# Patient Record
Sex: MALE | Race: BLACK OR AFRICAN AMERICAN | Employment: PART TIME
[De-identification: names, ages, dates, MRNs, and addresses within clinical notes are randomized per-mention and may not be internally consistent; named-entity substitution may affect disease eponyms.]

---

## 2017-01-25 ENCOUNTER — MYC MEDICAL ADVICE (OUTPATIENT)
Dept: OTHER | Age: 72
End: 2017-01-25

## 2017-02-02 ENCOUNTER — TELEPHONE (OUTPATIENT)
Dept: INTERNAL MEDICINE | Facility: CLINIC | Age: 72
End: 2017-02-02

## 2017-02-02 NOTE — TELEPHONE ENCOUNTER
Reason for Call: Request for an order    Order or referral being requested: A1C    Date needed: before my next appointment 2/14    Has the patient been seen by the PCP for this problem? DR ERWIN  Additional comments: PT REQUESTING A1C BE DONE PRIOR TO HIS APPT WITH ANGE AS HE HAS DONE IN THE PAST WITH [facility], PLEASE CONTACT PT REGARDLESS TO SCHEDULE OR NOT    Phone number Patient can be reached at:  Home number on file [phone] (cell)    Best Time:  Anytime     Can we leave a detailed message on this number?  YES    Call taken on 2/2/2017 at 1:19 PM by Yvonne Owens

## 2017-02-14 ENCOUNTER — OFFICE VISIT (OUTPATIENT)
Dept: INTERNAL MEDICINE | Facility: CLINIC | Age: 72
End: 2017-02-14
Payer: MEDICARE

## 2017-02-14 VITALS
DIASTOLIC BLOOD PRESSURE: 76 MMHG | BODY MASS INDEX: 32.45 KG/M2 | HEART RATE: 70 BPM | RESPIRATION RATE: 16 BRPM | HEIGHT: 70 IN | TEMPERATURE: 98.1 F | SYSTOLIC BLOOD PRESSURE: 118 MMHG | WEIGHT: 226.7 LBS

## 2017-02-14 DIAGNOSIS — E11.21 TYPE 2 DIABETES MELLITUS WITH DIABETIC NEPHROPATHY, WITHOUT LONG-TERM CURRENT USE OF INSULIN (H): Primary | ICD-10-CM

## 2017-02-14 DIAGNOSIS — M54.42 LEFT-SIDED LOW BACK PAIN WITH LEFT-SIDED SCIATICA, UNSPECIFIED CHRONICITY: ICD-10-CM

## 2017-02-14 DIAGNOSIS — I10 ESSENTIAL HYPERTENSION: ICD-10-CM

## 2017-02-14 DIAGNOSIS — Z23 NEED FOR PNEUMOCOCCAL VACCINATION: ICD-10-CM

## 2017-02-14 DIAGNOSIS — E78.5 HYPERLIPIDEMIA LDL GOAL <100: ICD-10-CM

## 2017-02-14 LAB
ALBUMIN UR-MCNC: NEGATIVE MG/DL
APPEARANCE UR: CLEAR
BILIRUB UR QL STRIP: NEGATIVE
COLOR UR AUTO: YELLOW
ERYTHROCYTE [DISTWIDTH] IN BLOOD BY AUTOMATED COUNT: 13.9 % (ref 10–15)
GLUCOSE UR STRIP-MCNC: NEGATIVE MG/DL
HBA1C MFR BLD: 5.9 % (ref 4.3–6)
HCT VFR BLD AUTO: 30.7 % (ref 40–53)
HGB BLD-MCNC: 10.3 G/DL (ref 13.3–17.7)
HGB UR QL STRIP: NEGATIVE
KETONES UR STRIP-MCNC: ABNORMAL MG/DL
LEUKOCYTE ESTERASE UR QL STRIP: NEGATIVE
MCH RBC QN AUTO: 32.4 PG (ref 26.5–33)
MCHC RBC AUTO-ENTMCNC: 33.6 G/DL (ref 31.5–36.5)
MCV RBC AUTO: 97 FL (ref 78–100)
NITRATE UR QL: NEGATIVE
PH UR STRIP: 5 PH (ref 5–7)
PLATELET # BLD AUTO: 151 10E9/L (ref 150–450)
RBC # BLD AUTO: 3.18 10E12/L (ref 4.4–5.9)
SP GR UR STRIP: 1.02 (ref 1–1.03)
URN SPEC COLLECT METH UR: ABNORMAL
UROBILINOGEN UR STRIP-ACNC: 0.2 EU/DL (ref 0.2–1)
WBC # BLD AUTO: 4.8 10E9/L (ref 4–11)

## 2017-02-14 PROCEDURE — 36415 COLL VENOUS BLD VENIPUNCTURE: CPT | Performed by: INTERNAL MEDICINE

## 2017-02-14 PROCEDURE — 99214 OFFICE O/P EST MOD 30 MIN: CPT | Mod: 25 | Performed by: INTERNAL MEDICINE

## 2017-02-14 PROCEDURE — 81003 URINALYSIS AUTO W/O SCOPE: CPT | Performed by: INTERNAL MEDICINE

## 2017-02-14 PROCEDURE — 82043 UR ALBUMIN QUANTITATIVE: CPT | Performed by: INTERNAL MEDICINE

## 2017-02-14 PROCEDURE — 90670 PCV13 VACCINE IM: CPT | Performed by: INTERNAL MEDICINE

## 2017-02-14 PROCEDURE — 85027 COMPLETE CBC AUTOMATED: CPT | Performed by: INTERNAL MEDICINE

## 2017-02-14 PROCEDURE — 83036 HEMOGLOBIN GLYCOSYLATED A1C: CPT | Performed by: INTERNAL MEDICINE

## 2017-02-14 PROCEDURE — G0009 ADMIN PNEUMOCOCCAL VACCINE: HCPCS | Performed by: INTERNAL MEDICINE

## 2017-02-14 PROCEDURE — 80053 COMPREHEN METABOLIC PANEL: CPT | Performed by: INTERNAL MEDICINE

## 2017-02-14 NOTE — NURSING NOTE
"Chief Complaint   Patient presents with     Diabetes       Initial /76 (BP Location: Left arm, Patient Position: Chair, Cuff Size: Adult Large)  Pulse 70  Temp 98.1  F (36.7  C) (Oral)  Resp 16  Ht 5' 10\" (1.778 m)  Wt 226 lb 11.2 oz (102.8 kg)  BMI 32.53 kg/m2 Estimated body mass index is 32.53 kg/(m^2) as calculated from the following:    Height as of this encounter: 5' 10\" (1.778 m).    Weight as of this encounter: 226 lb 11.2 oz (102.8 kg).  Medication Reconciliation: complete    "

## 2017-02-14 NOTE — PROGRESS NOTES
"  SUBJECTIVE:                                                    Jeff Ricks is a 71 year old male who presents to clinic today for the following health issues:    New patient seen for assessment of chronic medical conditions. WARD from Dr Ramos.     Concern for LBP, chronic. No neurologic deficits. Radiates to the left leg.     Has h/o HTN. on medical treatment. BP has been controlled. No side effects from medications. No CP, HA, dizziness. good compliance with medications and low salt diet.    Has H/O hyperlipidemia. On medical treatment and diet. No side effects. No muscle weakness, myoralgias or upset stomach.     Has H/O BPH. On treatment with Flomax . Mild  symptoms of frequency, decreased urinary stream, no hematuria or dysuria. No side effects from medications.    Diabetes Follow-up      Patient is checking blood sugars: not at all    Diabetic concerns: None     Symptoms of hypoglycemia (low blood sugar): none     Paresthesias (numbness or burning in feet) or sores: No     Date of last diabetic eye exam: 11/1/16   Has H/O DM. On diet , exercise and PO medications. Blood sugars are controlled. No parestesias. No hypoglycemias.        Amount of exercise or physical activity: None    Problems taking medications regularly: No    Medication side effects: none  Diet: diabetic and carbohydrate counting        Problem list and histories reviewed & adjusted, as indicated.  Additional history: as documented    Problem list, Medication list, Allergies, and Medical/Social/Surgical histories reviewed in [facility] and updated as appropriate.    ROS:  Constitutional, HEENT, cardiovascular, pulmonary, GI, , musculoskeletal, neuro, skin, endocrine and psych systems are negative, except as otherwise noted.    OBJECTIVE:                                                    /76 (BP Location: Left arm, Patient Position: Chair, Cuff Size: Adult Large)  Pulse 70  Temp 98.1  F (36.7  C) (Oral)  Resp 16  Ht 5' 10\" (1.778 m)  Wt 226 " lb 11.2 oz (102.8 kg)  BMI 32.53 kg/m2  Body mass index is 32.53 kg/(m^2).  GENERAL: healthy, alert and no distress  EYES: Eyes grossly normal to inspection, PERRL and conjunctivae and sclerae normal  NECK: no adenopathy, no asymmetry, masses, or scars and thyroid normal to palpation  RESP: lungs clear to auscultation - no rales, rhonchi or wheezes  CV: regular rate and rhythm, normal S1 S2, no S3 or S4, no murmur, click or rub, no peripheral edema and peripheral pulses strong  ABDOMEN: soft, nontender, no hepatosplenomegaly, no masses and bowel sounds normal  MS: no gross musculoskeletal defects noted, no edema  SKIN: no suspicious lesions or rashes  NEURO: Normal strength and tone, mentation intact and speech normal    Diagnostic Test Results:  Results for orders placed or performed in visit on 02/14/17   *UA reflex to Microscopic and Culture ([facility] (except [place] and [place])   Result Value Ref Range    Color Urine Yellow     Appearance Urine Clear     Glucose Urine Negative NEG mg/dL    Bilirubin Urine Negative NEG    Ketones Urine Trace (A) NEG mg/dL    Specific Gravity Urine 1.020 1.003 - 1.035    Blood Urine Negative NEG    pH Urine 5.0 5.0 - 7.0 pH    Protein Albumin Urine Negative NEG mg/dL    Urobilinogen Urine 0.2 0.2 - 1.0 EU/dL    Nitrite Urine Negative NEG    Leukocyte Esterase Urine Negative NEG    Source Midstream Urine    Hemoglobin A1c   Result Value Ref Range    Hemoglobin A1C 5.9 4.3 - 6.0 %   Comprehensive metabolic panel   Result Value Ref Range    Sodium 139 133 - 144 mmol/L    Potassium 3.9 3.4 - 5.3 mmol/L    Chloride 106 94 - 109 mmol/L    Carbon Dioxide 25 20 - 32 mmol/L    Anion Gap 8 3 - 14 mmol/L    Glucose 88 70 - 99 mg/dL    Urea Nitrogen 22 7 - 30 mg/dL    Creatinine 1.46 (H) 0.66 - 1.25 mg/dL    GFR Estimate 47 (L) >60 mL/min/1.7m2    GFR Estimate If Black 57 (L) >60 mL/min/1.7m2    Calcium 8.3 (L) 8.5 - 10.1 mg/dL    Bilirubin Total 0.3 0.2 -  1.3 mg/dL    Albumin 2.9 (L) 3.4 - 5.0 g/dL    Protein Total 5.4 (L) 6.8 - 8.8 g/dL    Alkaline Phosphatase 45 40 - 150 U/L    ALT 18 0 - 70 U/L    AST 24 0 - 45 U/L   CBC with platelets   Result Value Ref Range    WBC 4.8 4.0 - 11.0 10e9/L    RBC Count 3.18 (L) 4.4 - 5.9 10e12/L    Hemoglobin 10.3 (L) 13.3 - 17.7 g/dL    Hematocrit 30.7 (L) 40.0 - 53.0 %    MCV 97 78 - 100 fl    MCH 32.4 26.5 - 33.0 pg    MCHC 33.6 31.5 - 36.5 g/dL    RDW 13.9 10.0 - 15.0 %    Platelet Count 151 150 - 450 10e9/L   Albumin Random Urine Quantitative   Result Value Ref Range    Creatinine Urine 162 mg/dL    Albumin Urine mg/L 6 mg/L    Albumin Urine mg/g Cr 3.60 0 - 17 mg/g Cr        ASSESSMENT/PLAN:                                                      Problem List Items Addressed This Visit     Essential hypertension    Relevant Orders    *UA reflex to Microscopic and Culture ([facility] (except [place] and [place]) (Completed)    CBC with platelets (Completed)    Albumin Random Urine Quantitative (Completed)    HYPERLIPIDEMIA LDL GOAL <100    Relevant Orders    Comprehensive metabolic panel (Completed)    Low back pain    Relevant Orders    [facility] PT, HAND, AND CHIROPRACTIC REFERRAL    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

## 2017-02-14 NOTE — MR AVS SNAPSHOT
After Visit Summary   2/14/2017    Jeff Ricks    MRN: [id number]           Patient Information     Date Of Birth          1945        Visit Information        Provider Department      2/14/2017 3:00 PM Guillermo Deleon MD [facility]        Today's Diagnoses     Type 2 diabetes mellitus with diabetic nephropathy, without long-term current use of insulin (H)    -  1    Left-sided low back pain with left-sided sciatica, unspecified chronicity        Hyperlipidemia LDL goal <100        Essential hypertension           Follow-ups after your visit        Additional Services     [facility] PT, HAND, AND CHIROPRACTIC REFERRAL       **This order will print in the [facility] Scheduling Office**    Physical Therapy, Hand Therapy and Chiropractic Care are available through:    *[facility]  *[facility]  *[facility]    Call one number to schedule at any of the above locations: [phone].    Your provider has referred you to: Physical Therapy at [facility] or [facility]    Indication/Reason for Referral: Low Back Pain  Onset of Illness: 3 days   Therapy Orders: Evaluate and Treat  Special Programs: None  Special Request: None    Renny Lawson      Additional Comments for the Therapist or Chiropractor: DDD    Please be aware that coverage of these services is subject to the terms and limitations of your health insurance plan.  Call member services at your health plan with any benefit or coverage questions.      Please bring the following to your appointment:    *Your personal calendar for scheduling future appointments  *Comfortable clothing                  Follow-up notes from your care team     Return in about 6 months (around 8/14/2017).      Who to contact     If you have questions or need follow up information about today's clinic visit or your schedule please contact [facility] directly at [phone].  Normal or non-critical lab  "and imaging results will be communicated to you by MyChart, letter or phone within 4 business days after the clinic has received the results. If you do not hear from us within 7 days, please contact the clinic through Take5 or phone. If you have a critical or abnormal lab result, we will notify you by phone as soon as possible.  Submit refill requests through Take5 or call your pharmacy and they will forward the refill request to us. Please allow 3 business days for your refill to be completed.          Additional Information About Your Visit        [address] [employer] Information     Take5 gives you secure access to your electronic health record. If you see a primary care provider, you can also send messages to your care team and make appointments. If you have questions, please call your primary care clinic.  If you do not have a primary care provider, please call [phone] and they will assist you.        Care EveryWhere ID     This is your Care EveryWhere ID. This could be used by other organizations to access your [facility] medical records  [id number]        Your Vitals Were     Pulse Temperature Respirations Height BMI (Body Mass Index)       70 98.1  F (36.7  C) (Oral) 16 5' 10\" (1.778 m) 32.53 kg/m2        Blood Pressure from Last 3 Encounters:   02/14/17 118/76   10/10/16 116/58   10/06/16 148/68    Weight from Last 3 Encounters:   02/14/17 226 lb 11.2 oz (102.8 kg)   10/10/16 213 lb (96.6 kg)   10/06/16 219 lb (99.3 kg)              We Performed the Following     *UA reflex to Microscopic and Culture ([facility] (except [place] and [place])     Albumin Random Urine Quantitative     CBC with platelets     Comprehensive metabolic panel     Hemoglobin A1c     [facility] PT, HAND, AND CHIROPRACTIC REFERRAL          Today's Medication Changes          These changes are accurate as of: 2/14/17  3:32 PM.  If you have any questions, ask your nurse or doctor.               Stop taking these " medicines if you haven't already. Please contact your care team if you have questions.     doxazosin 8 MG tablet   Commonly known as:  CARDURA   Stopped by:  Guillermo Deleon MD                    Primary Care Provider Office Phone # Fax #    Jesus Ramos [phone]       [facility] [address]  [facility] [id number]        Thank you!     Thank you for choosing [facility]  for your care. Our goal is always to provide you with excellent care. Hearing back from our patients is one way we can continue to improve our services. Please take a few minutes to complete the written survey that you may receive in the mail after your visit with us. Thank you!             Your Updated Medication List - Protect others around you: Learn how to safely use, store and throw away your medicines at www.disposemymeds.org.          This list is accurate as of: 2/14/17  3:32 PM.  Always use your most recent med list.                   Brand Name Dispense Instructions for use    CLOPIDOGREL BISULFATE PO      Take 75 mg by mouth daily       FREESTYLE LITE test strip   Generic drug:  blood glucose monitoring     100 strip    Use to test blood sugars 1 times daily or as directed.       hydrochlorothiazide 50 MG tablet    HYDRODIURIL    90 tablet    Take 1 tablet (50 mg) by mouth daily       hydrocortisone 2.5 % cream     30 g    Apply topically 2 times daily       lisinopril 10 MG tablet    PRINIVIL/ZESTRIL    90 tablet    Take 1 tablet (10 mg) by mouth daily       metFORMIN 1000 MG tablet    GLUCOPHAGE    180 tablet    Take 1 tablet (1,000 mg) by mouth 2 times daily (with meals) with food       nitroglycerin 0.6 MG sublingual tablet    NITROSTAT    100 tablet    Place 1 tablet (0.6 mg) under the tongue as needed       omega-3 acid ethyl esters 1 G capsule    Lovaza    180 capsule    Take 1 capsule (1 g) by mouth 2 times daily       order for DME     1 each    Equipment being  ordered: All diabetic testing supplies including test strips, lancets and solution for testing 2 times per day.       order for DME     1 Device    Equipment being ordered: Foot Orthotics Bilateral       pioglitazone 30 MG tablet    ACTOS    90 tablet    Take 1 tablet (30 mg) by mouth daily       simvastatin 40 MG tablet    ZOCOR    90 tablet    Take 1 tablet (40 mg) by mouth At Bedtime       tamsulosin 0.4 MG capsule    FLOMAX    90 capsule    Take 1 capsule (0.4 mg) by mouth daily       VITAMIN C PO      Take by mouth daily       VITAMIN D3 PO      Take 1,000 Units by mouth 2 times daily       VITAMIN E NATURAL PO      Take by mouth daily

## 2017-02-15 LAB
ALBUMIN SERPL-MCNC: 2.9 G/DL (ref 3.4–5)
ALP SERPL-CCNC: 45 U/L (ref 40–150)
ALT SERPL W P-5'-P-CCNC: 18 U/L (ref 0–70)
ANION GAP SERPL CALCULATED.3IONS-SCNC: 8 MMOL/L (ref 3–14)
AST SERPL W P-5'-P-CCNC: 24 U/L (ref 0–45)
BILIRUB SERPL-MCNC: 0.3 MG/DL (ref 0.2–1.3)
BUN SERPL-MCNC: 22 MG/DL (ref 7–30)
CALCIUM SERPL-MCNC: 8.3 MG/DL (ref 8.5–10.1)
CHLORIDE SERPL-SCNC: 106 MMOL/L (ref 94–109)
CO2 SERPL-SCNC: 25 MMOL/L (ref 20–32)
CREAT SERPL-MCNC: 1.46 MG/DL (ref 0.66–1.25)
CREAT UR-MCNC: 162 MG/DL
GFR SERPL CREATININE-BSD FRML MDRD: 47 ML/MIN/1.7M2
GLUCOSE SERPL-MCNC: 88 MG/DL (ref 70–99)
MICROALBUMIN UR-MCNC: 6 MG/L
MICROALBUMIN/CREAT UR: 3.6 MG/G CR (ref 0–17)
POTASSIUM SERPL-SCNC: 3.9 MMOL/L (ref 3.4–5.3)
PROT SERPL-MCNC: 5.4 G/DL (ref 6.8–8.8)
SODIUM SERPL-SCNC: 139 MMOL/L (ref 133–144)

## 2017-02-24 ENCOUNTER — THERAPY VISIT (OUTPATIENT)
Dept: PHYSICAL THERAPY | Facility: CLINIC | Age: 72
End: 2017-02-24
Payer: MEDICARE

## 2017-02-24 DIAGNOSIS — M54.42 ACUTE LEFT-SIDED LOW BACK PAIN WITH LEFT-SIDED SCIATICA: Primary | ICD-10-CM

## 2017-02-24 PROCEDURE — G8982 BODY POS GOAL STATUS: HCPCS | Mod: GP | Performed by: PHYSICAL THERAPIST

## 2017-02-24 PROCEDURE — 97161 PT EVAL LOW COMPLEX 20 MIN: CPT | Mod: GP | Performed by: PHYSICAL THERAPIST

## 2017-02-24 PROCEDURE — 97110 THERAPEUTIC EXERCISES: CPT | Mod: GP | Performed by: PHYSICAL THERAPIST

## 2017-02-24 PROCEDURE — G8981 BODY POS CURRENT STATUS: HCPCS | Mod: GP | Performed by: PHYSICAL THERAPIST

## 2017-02-24 NOTE — LETTER
DEPARTMENT OF HEALTH AND HUMAN SERVICES  CENTERS FOR MEDICARE & MEDICAID SERVICES    PLAN/UPDATED PLAN OF PROGRESS FOR OUTPATIENT REHABILITATION    PATIENTS NAME:  Jeff Ricks   : 1945  PROVIDER NUMBER:    [id number]  [id number]  PROVIDER NAME: [facility]ASSAN PT  MEDICAL RECORD NUMBER: [id number]   START OF CARE DATE:  SOC Date: 17   TYPE:  PT    PRIMARY/TREATMENT DIAGNOSIS: (Pertinent Medical Diagnosis)  Acute left-sided low back pain with left-sided sciatica    VISITS FROM START OF CARE:  Rxs Used: 1     Subjective:    Jeff Ricks is a 71 year old male with a lumbar condition.  Condition occurred with:  Insidious onset.  Condition occurred: for unknown reasons.  This is a recurrent condition  Onset of left lumbar pain radiating into the left lower extremity 2 weeks ago of unknown etiology. Pt has had intermittent pain since. Pt referred by MD for therapy on 17.    Patient reports pain:  Lumbar spine left.  Radiates to:  Gluteals left, thigh left, knee left and lower leg left.  Pain is described as sharp and shooting and is intermittent and reported as 3/10.     Symptoms are exacerbated by walking, twisting and standing (sit to stand) and relieved by rest and heat.  Since onset symptoms are gradually improving.    Previous treatment includes physical therapy and chiropractic.  There was moderate improvement following previous treatment.  General health as reported by patient is good.  Pertinent medical history includes:  Overweight, diabetes, heart problems and high blood pressure.  Medical allergies: no.  Other surgeries include:  Other (hernia).  Current medications:  High blood pressure medication.  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  Employment tasks: computer work.    Barriers include:  None as reported by the patient.  Red flags:  Calf pain, swelling, warmth.               Objective:  Standing Alignment:    Lumbar deviations  alignment: increased lordosis.  Flexibility/Screens:   Lower Extremity:  Decreased left lower extremity flexibility:Hamstrings  Decreased right lower extremity flexibility:  Hamstrings  Lumbar/SI Evaluation  ROM:    AROM Lumbar:   Flexion:          80%  Ext:                    60%   Side Bend:        Left:  50% pain    Right:  70%  Rotation:           Left:     Right:   Side Glide:        Left:     Right:        Strength: weak lower abdominals and pelvic stabilizers  PATIENTS NAME:  Jeff Rikcs   : 1945    Lumbar Myotomes:  normal  Lumbar DTR's:  normal  Lumbar Dermtomes:  normal  Neural Tension/Mobility:    Left side:SLR  negative.   Right side:   SLR  negative.   Lumbar Palpation:  normal    Assessment/Plan:      Patient is a 71 year old male with lumbar complaints.    Patient has the following significant findings with corresponding treatment plan.                Diagnosis 1:  Left lumbar pain radiating into left lower extremity  Pain -  hot/cold therapy, self management, education, directional preference exercise and home program  Decreased ROM/flexibility - therapeutic exercise, therapeutic activity and home program  Decreased strength - therapeutic exercise, therapeutic activities and home program    Therapy Evaluation Codes:   1) History comprised of:   Personal factors that impact the plan of care:      Past/current experiences.    Comorbidity factors that impact the plan of care are:      Diabetes, Heart problems, High blood pressure, Overweight and Weakness.     Medications impacting care: High blood pressure.  2) Examination of Body Systems comprised of:   Body structures and functions that impact the plan of care:      Lumbar spine.   Activity limitations that impact the plan of care are:      Stairs, Standing and Walking.  3) Clinical presentation characteristics are:   Stable/Uncomplicated.  4) Decision-Making    Low complexity using standardized patient assessment instrument and/or  "measureable assessment of functional outcome.  Cumulative Therapy Evaluation is: Low complexity.    Previous and current functional limitations:  (See Goal Flow Sheet for this information)    Short term and Long term goals: (See Goal Flow Sheet for this information)     Communication ability:  Patient appears to be able to clearly communicate and understand verbal and written communication and follow directions correctly.  Treatment Explanation - The following has been discussed with the patient:   RX ordered/plan of care  Anticipated outcomes  Possible risks and side effects                PATIENTS NAME:  Jeff Ricks   : 1945    This patient would benefit from PT intervention to resume normal activities.   Rehab potential is good.    Frequency:  1 X week, once daily  Duration:  for 6 weeks  Discharge Plan:  Achieve all LTG.  Independent in home treatment program.  Reach maximal therapeutic benefit.    Caregiver Signature/Credentials _____________________________ Date ________      Treating Provider: Piyush Lindsey, PT  I have reviewed and certified the need for these services and plan of treatment while under my care.        PHYSICIAN'S SIGNATURE:   ______________________________________ Date___________     Guillermo Deleon    Certification period:  Beginning of Cert date period: 17 to  End of Cert period date: 17     Functional Level Progress Report: Please see attached \"Goal Flow sheet for Functional level.\"    ____X____ Continue Services or       ________ DC Services                Service dates: From  SOC Date: 17 date to present                         "

## 2017-02-24 NOTE — MR AVS SNAPSHOT
After Visit Summary   2/24/2017    Jeff Ricks    MRN: [id number]           Patient Information     Date Of Birth          1945        Visit Information        Provider Department      2/24/2017 4:00 PM Piyush Lindsey, PT [facility]        Today's Diagnoses     Acute left-sided low back pain with left-sided sciatica    -  1       Follow-ups after your visit        Your next 10 appointments already scheduled     Feb 28, 2017  3:10 PM CST   [facility] with Shanelle Fonseca, PTA   [facility] ([facility]  )    [id number] [place] [address]  [address] [place] [address]   [phone]            Mar 10, 2017  2:40 PM CST   [facility] with Piyush Lindsey, PT   [facility] ([facility]  )    [id number] [place] [address]  [address] [place] [address]   [phone]            Mar 17, 2017  2:40 PM CDT   [facility] with Piyush Lindsey, PT   [facility] ([facility]  )    [id number] [place] [address]  [address] [place] [address]   [phone]            Mar 24, 2017  2:40 PM CDT   [facility] with Piyush Lindsey, PT   [facility] ([facility]  )    [id number] [place] [address]  [address] [place] [address]   [phone]            Mar 31, 2017  2:40 PM CDT   [facility] with Piyush Lindsey, PT   [facility] ([facility]  )    [id number] [place] [address]  [address] [place] [address]   [phone]              Who to contact     If you have questions or need follow up information about today's clinic visit or your schedule please contact [facility] directly at [phone].  Normal or non-critical lab and imaging results will be communicated to you by MyChart, letter or phone within 4 business days after the clinic has received the results. If you do not hear from us within 7 days, please contact the clinic through MyChart or phone. If you have a critical or abnormal lab result, we will notify you by phone as soon as possible.  Submit refill requests through [employer] or  call your pharmacy and they will forward the refill request to us. Please allow 3 business days for your refill to be completed.          Additional Information About Your Visit        MyClasseshart Information     [employer] gives you secure access to your electronic health record. If you see a primary care provider, you can also send messages to your care team and make appointments. If you have questions, please call your primary care clinic.  If you do not have a primary care provider, please call [phone] and they will assist you.        Care EveryWhere ID     This is your Care EveryWhere ID. This could be used by other organizations to access your [facility] medical records  [id number]         Blood Pressure from Last 3 Encounters:   02/14/17 118/76   10/10/16 116/58   10/06/16 148/68    Weight from Last 3 Encounters:   02/14/17 102.8 kg (226 lb 11.2 oz)   10/10/16 96.6 kg (213 lb)   10/06/16 99.3 kg (219 lb)              We Performed the Following     [facility] CERT REPORT     [facility] Inital Eval Report     PT Eval, Low Complexity (21531)     Therapeutic Exercises        Primary Care Provider Office Phone # Fax #    Guillermo Deleon [phone]       [facility] [address] [place] [address]        Thank you!     Thank you for choosing [facility]  for your care. Our goal is always to provide you with excellent care. Hearing back from our patients is one way we can continue to improve our services. Please take a few minutes to complete the written survey that you may receive in the mail after your visit with us. Thank you!             Your Updated Medication List - Protect others around you: Learn how to safely use, store and throw away your medicines at www.disposemymeds.org.          This list is accurate as of: 2/24/17 11:59 PM.  Always use your most recent med list.                   Brand Name Dispense Instructions for use    CLOPIDOGREL BISULFATE PO      Take 75 mg by  mouth daily       FREESTYLE LITE test strip   Generic drug:  blood glucose monitoring     100 strip    Use to test blood sugars 1 times daily or as directed.       hydrochlorothiazide 50 MG tablet    HYDRODIURIL    90 tablet    Take 1 tablet (50 mg) by mouth daily       hydrocortisone 2.5 % cream     30 g    Apply topically 2 times daily       lisinopril 10 MG tablet    PRINIVIL/ZESTRIL    90 tablet    Take 1 tablet (10 mg) by mouth daily       metFORMIN 1000 MG tablet    GLUCOPHAGE    180 tablet    Take 1 tablet (1,000 mg) by mouth 2 times daily (with meals) with food       nitroglycerin 0.6 MG sublingual tablet    NITROSTAT    100 tablet    Place 1 tablet (0.6 mg) under the tongue as needed       omega-3 acid ethyl esters 1 G capsule    Lovaza    180 capsule    Take 1 capsule (1 g) by mouth 2 times daily       order for DME     1 each    Equipment being ordered: All diabetic testing supplies including test strips, lancets and solution for testing 2 times per day.       order for DME     1 Device    Equipment being ordered: Foot Orthotics Bilateral       pioglitazone 30 MG tablet    ACTOS    90 tablet    Take 1 tablet (30 mg) by mouth daily       simvastatin 40 MG tablet    ZOCOR    90 tablet    Take 1 tablet (40 mg) by mouth At Bedtime       tamsulosin 0.4 MG capsule    FLOMAX    90 capsule    Take 1 capsule (0.4 mg) by mouth daily       VITAMIN C PO      Take by mouth daily       VITAMIN D3 PO      Take 1,000 Units by mouth 2 times daily       VITAMIN E NATURAL PO      Take by mouth daily

## 2017-02-24 NOTE — PROGRESS NOTES
Subjective:    Jeff Ricks is a 71 year old male with a lumbar condition.  Condition occurred with:  Insidious onset.  Condition occurred: for unknown reasons.  This is a recurrent condition  Onset of left lumbar pain radiating into the left lower extremity 2 weeks ago of unknown etiology. Pt has had intermittent pain since. Pt referred by MD for therapy on 2-14-17.    Patient reports pain:  Lumbar spine left.  Radiates to:  Gluteals left, thigh left, knee left and lower leg left.  Pain is described as sharp and shooting and is intermittent and reported as 3/10.     Symptoms are exacerbated by walking, twisting and standing (sit to stand) and relieved by rest and heat.  Since onset symptoms are gradually improving.    Previous treatment includes physical therapy and chiropractic.  There was moderate improvement following previous treatment.  General health as reported by patient is good.  Pertinent medical history includes:  Overweight, diabetes, heart problems and high blood pressure.  Medical allergies: no.  Other surgeries include:  Other (hernia).  Current medications:  High blood pressure medication.  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  Employment tasks: computer work.    Barriers include:  None as reported by the patient.    Red flags:  Calf pain, swelling, warmth.                      Objective:    Standing Alignment:        Lumbar deviations alignment: increased lordosis.                Flexibility/Screens:       Lower Extremity:  Decreased left lower extremity flexibility:Hamstrings    Decreased right lower extremity flexibility:  Hamstrings               Lumbar/SI Evaluation  ROM:    AROM Lumbar:   Flexion:          80%  Ext:                    60%   Side Bend:        Left:  50% pain    Right:  70%  Rotation:           Left:     Right:   Side Glide:        Left:     Right:         Strength: weak lower abdominals and pelvic stabilizers  Lumbar Myotomes:   normal            Lumbar DTR's:  normal        Lumbar Dermtomes:  normal                Neural Tension/Mobility:      Left side:SLR  negative.     Right side:   SLR  negative.   Lumbar Palpation:  normal                                                         General     ROS    Assessment/Plan:      Patient is a 71 year old male with lumbar complaints.    Patient has the following significant findings with corresponding treatment plan.                Diagnosis 1:  Left lumbar pain radiating into left lower extremity  Pain -  hot/cold therapy, self management, education, directional preference exercise and home program  Decreased ROM/flexibility - therapeutic exercise, therapeutic activity and home program  Decreased strength - therapeutic exercise, therapeutic activities and home program    Therapy Evaluation Codes:   1) History comprised of:   Personal factors that impact the plan of care:      Past/current experiences.    Comorbidity factors that impact the plan of care are:      Diabetes, Heart problems, High blood pressure, Overweight and Weakness.     Medications impacting care: High blood pressure.  2) Examination of Body Systems comprised of:   Body structures and functions that impact the plan of care:      Lumbar spine.   Activity limitations that impact the plan of care are:      Stairs, Standing and Walking.  3) Clinical presentation characteristics are:   Stable/Uncomplicated.  4) Decision-Making    Low complexity using standardized patient assessment instrument and/or measureable assessment of functional outcome.  Cumulative Therapy Evaluation is: Low complexity.    Previous and current functional limitations:  (See Goal Flow Sheet for this information)    Short term and Long term goals: (See Goal Flow Sheet for this information)     Communication ability:  Patient appears to be able to clearly communicate and understand verbal and written communication and follow directions correctly.  Treatment  Explanation - The following has been discussed with the patient:   RX ordered/plan of care  Anticipated outcomes  Possible risks and side effects  This patient would benefit from PT intervention to resume normal activities.   Rehab potential is good.    Frequency:  1 X week, once daily  Duration:  for 6 weeks  Discharge Plan:  Achieve all LTG.  Independent in home treatment program.  Reach maximal therapeutic benefit.    Please refer to the daily flowsheet for treatment today, total treatment time and time spent performing 1:1 timed codes.

## 2017-02-28 ENCOUNTER — TELEPHONE (OUTPATIENT)
Dept: INTERNAL MEDICINE | Facility: CLINIC | Age: 72
End: 2017-02-28

## 2017-04-11 PROBLEM — M54.42 ACUTE LEFT-SIDED LOW BACK PAIN WITH LEFT-SIDED SCIATICA: Status: RESOLVED | Noted: 2017-02-24 | Resolved: 2017-04-11

## 2017-04-14 ENCOUNTER — TRANSFERRED RECORDS (OUTPATIENT)
Dept: HEALTH INFORMATION MANAGEMENT | Facility: CLINIC | Age: 72
End: 2017-04-14

## 2017-05-21 ENCOUNTER — MYC MEDICAL ADVICE (OUTPATIENT)
Dept: INTERNAL MEDICINE | Facility: CLINIC | Age: 72
End: 2017-05-21

## 2017-05-21 DIAGNOSIS — I25.10 ASHD (ARTERIOSCLEROTIC HEART DISEASE): Primary | ICD-10-CM

## 2017-05-24 RX ORDER — CLOPIDOGREL BISULFATE 75 MG/1
75 TABLET ORAL DAILY
Qty: 90 TABLET | Refills: 2 | Status: SHIPPED | OUTPATIENT
Start: 2017-05-24 | End: 2018-02-14

## 2017-05-24 NOTE — TELEPHONE ENCOUNTER
"Pt called-Stated Dr Ramos started pt on med. Pt is taking 75mg qd.    Will forward to [facility] due to CBC (pt stated his CBC is always \"off\")      Lab Results   Component Value Date    WBC 4.8 02/14/2017     Lab Results   Component Value Date    RBC 3.18 02/14/2017     Lab Results   Component Value Date    HGB 10.3 02/14/2017     Lab Results   Component Value Date    HCT 30.7 02/14/2017     No components found for: MCT  Lab Results   Component Value Date    MCV 97 02/14/2017     Lab Results   Component Value Date    MCH 32.4 02/14/2017     Lab Results   Component Value Date    MCHC 33.6 02/14/2017     Lab Results   Component Value Date    RDW 13.9 02/14/2017     Lab Results   Component Value Date     02/14/2017       "

## 2017-06-01 DIAGNOSIS — I25.10 ASHD (ARTERIOSCLEROTIC HEART DISEASE): ICD-10-CM

## 2017-06-05 RX ORDER — NITROGLYCERIN 0.6 MG/1
TABLET SUBLINGUAL
Qty: 100 TABLET | Refills: 1 | Status: SHIPPED | OUTPATIENT
Start: 2017-06-05 | End: 2017-12-02

## 2017-06-05 NOTE — TELEPHONE ENCOUNTER
Pt is using more that #25 a month. Used approximately #200 in 6 months.     Provide approval needed.

## 2017-06-07 ENCOUNTER — OFFICE VISIT (OUTPATIENT)
Dept: INTERNAL MEDICINE | Facility: CLINIC | Age: 72
End: 2017-06-07
Payer: MEDICARE

## 2017-06-07 VITALS
RESPIRATION RATE: 16 BRPM | HEART RATE: 87 BPM | WEIGHT: 221.1 LBS | TEMPERATURE: 98 F | SYSTOLIC BLOOD PRESSURE: 122 MMHG | OXYGEN SATURATION: 100 % | DIASTOLIC BLOOD PRESSURE: 56 MMHG | BODY MASS INDEX: 31.65 KG/M2 | HEIGHT: 70 IN

## 2017-06-07 DIAGNOSIS — M54.2 CERVICALGIA: ICD-10-CM

## 2017-06-07 DIAGNOSIS — E11.21 TYPE 2 DIABETES MELLITUS WITH DIABETIC NEPHROPATHY, WITHOUT LONG-TERM CURRENT USE OF INSULIN (H): ICD-10-CM

## 2017-06-07 DIAGNOSIS — M25.561 ACUTE PAIN OF RIGHT KNEE: Primary | ICD-10-CM

## 2017-06-07 PROCEDURE — 99214 OFFICE O/P EST MOD 30 MIN: CPT | Performed by: FAMILY MEDICINE

## 2017-06-07 NOTE — PROGRESS NOTES
SUBJECTIVE:                                                    Jeff Ricks is a 71 year old male who presents to clinic today for the following health issues:    SUBJECTIVE:  Chief Complaint   Patient presents with     Knee Pain     Right knee sharp pain occasionally when stepping the wrong way. Sx for 1 week.      Musculoskeletal Problem     pain when turn neck onto the right. Hear a popping sound in the neck- Sx for 1.5 month     Jeff Ricks is a 71 year old male who presents with a chief complaint of right knee pain.  Symptoms began 1 week(s) ago, are moderate and intermittent episodes lasting  Several minutes to hours  Context:  Injury:No.  Pain exacerbated by walking and twisting Relieved by rest.     Has mild L sided neck pain that accompanies a mild clicking sensation when he turns to the R.   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     Heart attack (H)      Hyperlipidaemia      Obese      Other and unspecified hyperlipidemia      Other premature beats      Phlebitis and thrombophlebitis of other deep vessels of lower extremities 2000    has had 2-3 episodes     Stented coronary artery      4 stents     Syncope      Thrombosis of leg      Type II or unspecified type diabetes mellitus without mention of complication, not stated as uncontrolled      Unspecified essential hypertension      Current Outpatient Prescriptions   Medication Sig Dispense Refill     nitroglycerin (NITROSTAT) 0.6 MG sublingual tablet DISSOLVE 1 TABLET UNDER THETONGUE AS NEEDED 100 tablet 1     clopidogrel (PLAVIX) 75 MG tablet Take 1 tablet (75 mg) by mouth daily 90 tablet 2     omega-3 acid ethyl esters (LOVAZA) 1 G capsule  "Take 1 capsule (1 g) by mouth 2 times daily 180 capsule 3     tamsulosin (FLOMAX) 0.4 MG capsule Take 1 capsule (0.4 mg) by mouth daily 90 capsule 3     metFORMIN (GLUCOPHAGE) 1000 MG tablet Take 1 tablet (1,000 mg) by mouth 2 times daily (with meals) with food 180 tablet 4     lisinopril (PRINIVIL/ZESTRIL) 10 MG tablet Take 1 tablet (10 mg) by mouth daily 90 tablet 4     pioglitazone (ACTOS) 30 MG tablet Take 1 tablet (30 mg) by mouth daily 90 tablet 4     simvastatin (ZOCOR) 40 MG tablet Take 1 tablet (40 mg) by mouth At Bedtime 90 tablet 3     hydrochlorothiazide (HYDRODIURIL) 50 MG tablet Take 1 tablet (50 mg) by mouth daily 90 tablet 4     CLOPIDOGREL BISULFATE PO Take 75 mg by mouth daily       Ascorbic Acid (VITAMIN C PO) Take by mouth daily       Cholecalciferol (VITAMIN D3 PO) Take 1,000 Units by mouth 2 times daily        VITAMIN E NATURAL PO Take by mouth daily       ORDER FOR DME Equipment being ordered: Foot Orthotics Bilateral 1 Device 1     hydrocortisone 2.5 % cream Apply topically 2 times daily 30 g 11     Glucose Blood (FREESTYLE LITE) strip Use to test blood sugars 1 times daily or as directed. 100 strip prn     ORDER FOR DME Equipment being ordered:  All diabetic testing supplies including test strips, lancets and solution for testing 2 times per day. 1 each 0     Social History   Substance Use Topics     Smoking status: Former Smoker     Packs/day: 3.00     Years: 6.00     Start date: 7/1/1961     Quit date: 1/1/1967     Smokeless tobacco: Never Used      Comment: quit 1960s     Alcohol use Yes      Comment: 1 beer a week       ROS:  Review of systems negative except as stated below    EXAM:   /56  Pulse 87  Temp 98  F (36.7  C) (Oral)  Resp 16  Ht 5' 10\" (1.778 m)  Wt 221 lb 1.6 oz (100.3 kg)  SpO2 100%  BMI 31.72 kg/m2  M/S Exam:R knee FROM and and non tender joint line   GENERAL APPEARANCE: healthy, alert and no distress  NECK: supple, non-tender to palpation, FROM   EXTREMITIES: " peripheral pulses normal  SKIN: no suspicious lesions or rashes  NEURO: Normal strength and tone, sensory exam grossly normal, mentation intact and speech normal    X-RAY was not done.    ASSESSMENT:  1. Acute pain of right knee  Patient has severe pain with pivoting.   Concern for mensicus injury.    Give 2 weeks    If persists get MRI.   PRICE.  - MR Knee Right w/o Contrast; Future    2. Cervicalgia  Reassure.   Likely djd   Tylenol prn    3. Type 2 diabetes mellitus with diabetic nephropathy, without long-term current use of insulin (H)  Stay on current meds   - **A1C FUTURE 3mo; Future  - Lipid Profile (Chol, Trig, HDL, LDL calc); Future  - Comprehensive metabolic panel (BMP + Alb, Alk Phos, ALT, AST, Total. Bili, TP); Future  - **TSH with free T4 reflex FUTURE 1yr; Future      Greater than 25 minutes spent with patient and family discussing risks and benefits and management with possible outcomes regarding this issue with greater than 50% in counseling for medical decision making and coordination of care.

## 2017-06-07 NOTE — MR AVS SNAPSHOT
After Visit Summary   6/7/2017    Jeff Ricks    MRN: [id number]           Patient Information     Date Of Birth          1945        Visit Information        Provider Department      6/7/2017 2:40 PM Ricardo Cadena MD [facility]        Today's Diagnoses     Acute pain of right knee    -  1    Cervicalgia        Type 2 diabetes mellitus with diabetic nephropathy, without long-term current use of insulin (H)           Follow-ups after your visit        Future tests that were ordered for you today     Open Future Orders        Priority Expected Expires Ordered    **A1C FUTURE 3mo Routine 9/5/2017 10/5/2017 6/7/2017    Lipid Profile (Chol, Trig, HDL, LDL calc) Routine  6/7/2018 6/7/2017    Comprehensive metabolic panel (BMP + Alb, Alk Phos, ALT, AST, Total. Bili, TP) Routine  6/7/2018 6/7/2017    **TSH with free T4 reflex FUTURE 1yr Routine 5/8/2018 6/7/2018 6/7/2017    MR Knee Right w/o Contrast Routine  6/7/2018 6/7/2017            Who to contact     If you have questions or need follow up information about today's clinic visit or your schedule please contact [facility] directly at [phone].  Normal or non-critical lab and imaging results will be communicated to you by MyChart, letter or phone within 4 business days after the clinic has received the results. If you do not hear from us within 7 days, please contact the clinic through Sportlobsterhart or phone. If you have a critical or abnormal lab result, we will notify you by phone as soon as possible.  Submit refill requests through [employer]ellisense or call your pharmacy and they will forward the refill request to us. Please allow 3 business days for your refill to be completed.          Additional Information About Your Visit        MyChart Information     Intell[employer] gives you secure access to your electronic health record. If you see a primary care provider, you can also send messages to your care team and make  "appointments. If you have questions, please call your primary care clinic.  If you do not have a primary care provider, please call [phone] and they will assist you.        Care EveryWhere ID     This is your Care EveryWhere ID. This could be used by other organizations to access your [place] medical records  [id number]        Your Vitals Were     Pulse Temperature Respirations Height Pulse Oximetry BMI (Body Mass Index)    87 98  F (36.7  C) (Oral) 16 5' 10\" (1.778 m) 100% 31.72 kg/m2       Blood Pressure from Last 3 Encounters:   06/07/17 122/56   02/14/17 118/76   10/10/16 116/58    Weight from Last 3 Encounters:   06/07/17 221 lb 1.6 oz (100.3 kg)   02/14/17 226 lb 11.2 oz (102.8 kg)   10/10/16 213 lb (96.6 kg)               Primary Care Provider Office Phone # Fax #    Guillermo Deleon [phone]       [facility] [address] [place] [address]        Thank you!     Thank you for choosing [facility]  for your care. Our goal is always to provide you with excellent care. Hearing back from our patients is one way we can continue to improve our services. Please take a few minutes to complete the written survey that you may receive in the mail after your visit with us. Thank you!             Your Updated Medication List - Protect others around you: Learn how to safely use, store and throw away your medicines at www.disposemymeds.org.          This list is accurate as of: 6/7/17  4:30 PM.  Always use your most recent med list.                   Brand Name Dispense Instructions for use    * CLOPIDOGREL BISULFATE PO      Take 75 mg by mouth daily       * clopidogrel 75 MG tablet    PLAVIX    90 tablet    Take 1 tablet (75 mg) by mouth daily       FREESTYLE LITE test strip   Generic drug:  blood glucose monitoring     100 strip    Use to test blood sugars 1 times daily or as directed.       hydrochlorothiazide 50 MG tablet    HYDRODIURIL    90 tablet    " Take 1 tablet (50 mg) by mouth daily       hydrocortisone 2.5 % cream     30 g    Apply topically 2 times daily       lisinopril 10 MG tablet    PRINIVIL/ZESTRIL    90 tablet    Take 1 tablet (10 mg) by mouth daily       metFORMIN 1000 MG tablet    GLUCOPHAGE    180 tablet    Take 1 tablet (1,000 mg) by mouth 2 times daily (with meals) with food       nitroglycerin 0.6 MG sublingual tablet    NITROSTAT    100 tablet    DISSOLVE 1 TABLET UNDER THETONGUE AS NEEDED       omega-3 acid ethyl esters 1 G capsule    Lovaza    180 capsule    Take 1 capsule (1 g) by mouth 2 times daily       order for DME     1 each    Equipment being ordered: All diabetic testing supplies including test strips, lancets and solution for testing 2 times per day.       order for DME     1 Device    Equipment being ordered: Foot Orthotics Bilateral       pioglitazone 30 MG tablet    ACTOS    90 tablet    Take 1 tablet (30 mg) by mouth daily       simvastatin 40 MG tablet    ZOCOR    90 tablet    Take 1 tablet (40 mg) by mouth At Bedtime       tamsulosin 0.4 MG capsule    FLOMAX    90 capsule    Take 1 capsule (0.4 mg) by mouth daily       VITAMIN C PO      Take by mouth daily       VITAMIN D3 PO      Take 1,000 Units by mouth 2 times daily       VITAMIN E NATURAL PO      Take by mouth daily       * Notice:  This list has 2 medication(s) that are the same as other medications prescribed for you. Read the directions carefully, and ask your doctor or other care provider to review them with you.

## 2017-06-07 NOTE — NURSING NOTE
"Chief Complaint   Patient presents with     Knee Pain     Right knee sharp pain occasionally when stepping the wrong way. Sx for 1 week.      Musculoskeletal Problem     pain when turn neck onto the right. Hear a popping sound in the neck- Sx for 1.5 month       Initial /56  Pulse 87  Temp 98  F (36.7  C) (Oral)  Resp 16  Ht 5' 10\" (1.778 m)  Wt 221 lb 1.6 oz (100.3 kg)  SpO2 100%  BMI 31.72 kg/m2 Estimated body mass index is 31.72 kg/(m^2) as calculated from the following:    Height as of this encounter: 5' 10\" (1.778 m).    Weight as of this encounter: 221 lb 1.6 oz (100.3 kg).  Medication Reconciliation: complete      Susan Deras CMA      "

## 2017-09-06 DIAGNOSIS — E11.21 TYPE 2 DIABETES MELLITUS WITH DIABETIC NEPHROPATHY, WITHOUT LONG-TERM CURRENT USE OF INSULIN (H): ICD-10-CM

## 2017-09-06 LAB — HBA1C MFR BLD: 6.7 % (ref 4.3–6)

## 2017-09-06 PROCEDURE — 80061 LIPID PANEL: CPT | Performed by: FAMILY MEDICINE

## 2017-09-06 PROCEDURE — 36415 COLL VENOUS BLD VENIPUNCTURE: CPT | Performed by: FAMILY MEDICINE

## 2017-09-06 PROCEDURE — 80053 COMPREHEN METABOLIC PANEL: CPT | Performed by: FAMILY MEDICINE

## 2017-09-06 PROCEDURE — 83036 HEMOGLOBIN GLYCOSYLATED A1C: CPT | Performed by: FAMILY MEDICINE

## 2017-09-07 LAB
ALBUMIN SERPL-MCNC: 3.1 G/DL (ref 3.4–5)
ALP SERPL-CCNC: 45 U/L (ref 40–150)
ALT SERPL W P-5'-P-CCNC: 17 U/L (ref 0–70)
ANION GAP SERPL CALCULATED.3IONS-SCNC: 8 MMOL/L (ref 3–14)
AST SERPL W P-5'-P-CCNC: 22 U/L (ref 0–45)
BILIRUB SERPL-MCNC: 0.5 MG/DL (ref 0.2–1.3)
BUN SERPL-MCNC: 21 MG/DL (ref 7–30)
CALCIUM SERPL-MCNC: 8.3 MG/DL (ref 8.5–10.1)
CHLORIDE SERPL-SCNC: 104 MMOL/L (ref 94–109)
CHOLEST SERPL-MCNC: 92 MG/DL
CO2 SERPL-SCNC: 27 MMOL/L (ref 20–32)
CREAT SERPL-MCNC: 1.59 MG/DL (ref 0.66–1.25)
GFR SERPL CREATININE-BSD FRML MDRD: 43 ML/MIN/1.7M2
GLUCOSE SERPL-MCNC: 91 MG/DL (ref 70–99)
HDLC SERPL-MCNC: 70 MG/DL
LDLC SERPL CALC-MCNC: 17 MG/DL
NONHDLC SERPL-MCNC: 22 MG/DL
POTASSIUM SERPL-SCNC: 3.8 MMOL/L (ref 3.4–5.3)
PROT SERPL-MCNC: 5.7 G/DL (ref 6.8–8.8)
SODIUM SERPL-SCNC: 139 MMOL/L (ref 133–144)
TRIGL SERPL-MCNC: 25 MG/DL

## 2017-09-08 DIAGNOSIS — R97.20 ELEVATED PROSTATE SPECIFIC ANTIGEN (PSA): Primary | ICD-10-CM

## 2017-09-08 DIAGNOSIS — R97.20 ELEVATED PROSTATE SPECIFIC ANTIGEN (PSA): ICD-10-CM

## 2017-09-08 PROCEDURE — 36415 COLL VENOUS BLD VENIPUNCTURE: CPT | Performed by: UROLOGY

## 2017-09-08 PROCEDURE — 84153 ASSAY OF PSA TOTAL: CPT | Performed by: UROLOGY

## 2017-09-09 LAB — PSA SERPL-MCNC: 4.02 UG/L (ref 0–4)

## 2017-09-11 ENCOUNTER — OFFICE VISIT (OUTPATIENT)
Dept: UROLOGY | Facility: CLINIC | Age: 72
End: 2017-09-11
Payer: MEDICARE

## 2017-09-11 VITALS
DIASTOLIC BLOOD PRESSURE: 68 MMHG | HEIGHT: 70 IN | SYSTOLIC BLOOD PRESSURE: 120 MMHG | HEART RATE: 60 BPM | BODY MASS INDEX: 32.21 KG/M2 | WEIGHT: 225 LBS

## 2017-09-11 DIAGNOSIS — R97.20 ELEVATED PROSTATE SPECIFIC ANTIGEN (PSA): Primary | ICD-10-CM

## 2017-09-11 PROCEDURE — 99213 OFFICE O/P EST LOW 20 MIN: CPT | Performed by: UROLOGY

## 2017-09-11 ASSESSMENT — PAIN SCALES - GENERAL: PAINLEVEL: MILD PAIN (2)

## 2017-09-11 NOTE — LETTER
9/11/2017       RE: Jeff Ricks  [address] [facility] [address]     Dear Colleague,    Thank you for referring your patient, Jeff Ricks, to the [facility] [place] [facility]. Please see a copy of my visit note below.    Office Visit Note  [facility]  [phone]    UROLOGIC DIAGNOSES:   Right hydrocele    CURRENT INTERVENTIONS:   S/P repair    HISTORY:   Jeff returns to clinic today because his PSA was found to be elevated. His PSA was 4.07 in May of last year. Recently it was 4.02. In 2009 it was 3.09. He has no urinary complaints or symptoms at this time.      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     Heart attack (H)      Hyperlipidaemia      Obese      Other and unspecified hyperlipidemia      Other premature beats      Phlebitis and thrombophlebitis of other deep vessels of lower extremities 2000    has had 2-3 episodes     Stented coronary artery      4 stents     Syncope      Thrombosis of leg      Type II or unspecified type diabetes mellitus without mention of complication, not stated as uncontrolled      Unspecified essential hypertension        PAST SURGICAL HISTORY:   Past Surgical History:   Procedure Laterality Date     BIOPSY  8/2016     C NONSPECIFIC PROCEDURE      colonoscopy approx 1999 done elsewhere     CARDIAC SURGERY       COLONOSCOPY       CORONARY ANGIOGRAPHY ADULT ORDER  8/28/2000    75% distal LAD stenosis, 80% third diagonal stenosis, 75-80% mid ramus stenosis, ostial 60% stenosis in circumflex w/90% stenosis in distal circumflex     CORONARY ANGIOGRAPHY ADULT ORDER  2003    4 stents placed     EP ABLATION / EP STUDIES  3/25/2014     HEART  "CATH, ANGIOPLASTY       HYDROCELECTOMY SCROTAL Right 10/6/2016    Procedure: HYDROCELECTOMY SCROTAL;  Surgeon: Jordan Chandra MD;  Location: [facility]       FAMILY HISTORY:   Family History   Problem Relation Age of Onset     Musculoskeletal Disorder Mother      spinal stenosis     CANCER Mother      cancer kidney age 85     Hypertension Mother      Born 1923     DIABETES Father      Born 1923       SOCIAL HISTORY:   Social History   Substance Use Topics     Smoking status: Former Smoker     Packs/day: 3.00     Years: 6.00     Start date: 7/1/1961     Quit date: 1/1/1967     Smokeless tobacco: Never Used      Comment: quit 1960s     Alcohol use Yes      Comment: 1 beer a week       Current Outpatient Prescriptions   Medication     clopidogrel (PLAVIX) 75 MG tablet     omega-3 acid ethyl esters (LOVAZA) 1 G capsule     tamsulosin (FLOMAX) 0.4 MG capsule     metFORMIN (GLUCOPHAGE) 1000 MG tablet     lisinopril (PRINIVIL/ZESTRIL) 10 MG tablet     pioglitazone (ACTOS) 30 MG tablet     simvastatin (ZOCOR) 40 MG tablet     hydrochlorothiazide (HYDRODIURIL) 50 MG tablet     CLOPIDOGREL BISULFATE PO     Ascorbic Acid (VITAMIN C PO)     Cholecalciferol (VITAMIN D3 PO)     VITAMIN E NATURAL PO     hydrocortisone 2.5 % cream     ORDER FOR DME     nitroglycerin (NITROSTAT) 0.6 MG sublingual tablet     ORDER FOR DME     Glucose Blood (FREESTYLE LITE) strip     No current facility-administered medications for this visit.      Facility-Administered Medications Ordered in Other Visits   Medication     gadobutrol (GADAVIST) injection 10 mL         PHYSICAL EXAM:    /68  Pulse 60  Ht 1.778 m (5' 10\")  Wt 102.1 kg (225 lb)  BMI 32.28 kg/m2    HEENT: Normocephalic and atraumatic   Cardiac: Not done  Back/Flank: Not done  CNS/PNS: Not done  Respiratory: Normal non-labored breathing  Abdomen: Soft nontender and nondistended  Peripheral Vascular: Not done  Mental Status: Not done    Penis: Not done  Scrotal Skin: Not " done  Testicles: Not done  Epididymis: Not done  Digital Rectal Exam: His prostate is moderately enlarged but benign and symmetric to palpation    Cystoscopy: Not done    Imaging: None    Urinalysis: UA RESULTS:  Recent Labs   Lab Test  02/14/17   1538   03/21/14   1940   COLOR  Yellow   < >  Yellow   APPEARANCE  Clear   < >  Clear   URINEGLC  Negative   < >  Negative   URINEBILI  Negative   < >  Negative   URINEKETONE  Trace*   < >  Negative   SG  1.020   < >  1.018   UBLD  Negative   < >  Negative   URINEPH  5.0   < >  6.5   PROTEIN  Negative   < >  Negative   UROBILINOGEN  0.2   < >   --    NITRITE  Negative   < >  Negative   LEUKEST  Negative   < >  Negative   RBCU   --    --   <1   WBCU   --    --   <1    < > = values in this interval not displayed.       PSA: 4.02    Post Void Residual:     Other labs: None today      IMPRESSION:  Elevated PSA, enlarged prostate    PLAN:  His PSA is elevated but stable. His prostate is enlarged, likely explaining the slight elevation in PSA. He has no symptoms at this time. I recommended that we continue to follow this annually. I will see him back in 1 year for a PSA and exam.    Total Time: 15 minutes                                      Total in Consultation: 15 minutes      Jordan Chandra M.D.

## 2017-09-11 NOTE — MR AVS SNAPSHOT
After Visit Summary   9/11/2017    Jeff Ricks    MRN: [id number]           Patient Information     Date Of Birth          1945        Visit Information        Provider Department      9/11/2017 3:45 PM Jordan Chandra MD [facility]rology [facility]        Today's Diagnoses     Elevated prostate specific antigen (PSA)    -  1       Follow-ups after your visit        Follow-up notes from your care team     Return in about 1 year (around 9/11/2018) for PSA.      Your next 10 appointments already scheduled     Sep 18, 2017  3:20 PM CDT   SHORT with Guillermo Deleon MD   [facility] ([facility]    [address]  [facility] [id number] [phone]              Future tests that were ordered for you today     Open Future Orders        Priority Expected Expires Ordered    PSA tumor marker Routine  9/11/2018 9/11/2017            Who to contact     If you have questions or need follow up information about today's clinic visit or your schedule please contact [facility] UROLOGY [facility] directly at [phone].  Normal or non-critical lab and imaging results will be communicated to you by MyChart, letter or phone within 4 business days after the clinic has received the results. If you do not hear from us within 7 days, please contact the clinic through [employer] or phone. If you have a critical or abnormal lab result, we will notify you by phone as soon as possible.  Submit refill requests through [employer] or call your pharmacy and they will forward the refill request to us. Please allow 3 business days for your refill to be completed.          Additional Information About Your Visit        [employer] Information     [employer] gives you secure access to your electronic health record. If you see a primary care provider, you can also send messages to your care team and make appointments. If you have  "questions, please call your primary care clinic.  If you do not have a primary care provider, please call [phone] and they will assist you.        Care EveryWhere ID     This is your Care EveryWhere ID. This could be used by other organizations to access your [place] medical records  [id number]        Your Vitals Were     Pulse Height BMI (Body Mass Index)             60 1.778 m (5' 10\") 32.28 kg/m2          Blood Pressure from Last 3 Encounters:   09/11/17 120/68   06/07/17 122/56   02/14/17 118/76    Weight from Last 3 Encounters:   09/11/17 102.1 kg (225 lb)   06/07/17 100.3 kg (221 lb 1.6 oz)   02/14/17 102.8 kg (226 lb 11.2 oz)               Primary Care Provider Office Phone # Fax #    Guillermo Deleon [phone]       [address] [facility] [address]        Equal Access to Services     [facility] : Hadii aad ku hadasho Soomaali, waaxda luqadaha, qaybta kaalmada adeegyada, waxay idiin hayaan adeeg kharalance lakian . So [facility] [phone].    ATENCIÓN: Si habla español, tiene a bishop disposición servicios gratuitos de asistencia lingüística. Llame al [phone].    We comply with applicable federal civil rights laws and [place] laws. We do not discriminate on the basis of race, color, national origin, age, disability sex, sexual orientation or gender identity.            Thank you!     Thank you for choosing [facility] [place]  for your care. Our goal is always to provide you with excellent care. Hearing back from our patients is one way we can continue to improve our services. Please take a few minutes to complete the written survey that you may receive in the mail after your visit with us. Thank you!             Your Updated Medication List - Protect others around you: Learn how to safely use, store and throw away your medicines at www.disposemymeds.org.          This list is accurate as of: 9/11/17  4:11 PM.  Always use your most " recent med list.                   Brand Name Dispense Instructions for use Diagnosis    * CLOPIDOGREL BISULFATE PO      Take 75 mg by mouth daily        * clopidogrel 75 MG tablet    PLAVIX    90 tablet    Take 1 tablet (75 mg) by mouth daily    ASHD (arteriosclerotic heart disease)       FREESTYLE LITE test strip   Generic drug:  blood glucose monitoring     100 strip    Use to test blood sugars 1 times daily or as directed.    Type 2 diabetes, HbA1C goal < 8% (H)       hydrochlorothiazide 50 MG tablet    HYDRODIURIL    90 tablet    Take 1 tablet (50 mg) by mouth daily    ASHD (arteriosclerotic heart disease)       hydrocortisone 2.5 % cream     30 g    Apply topically 2 times daily    Type II or unspecified type diabetes mellitus without mention of complication, not stated as uncontrolled       lisinopril 10 MG tablet    PRINIVIL/ZESTRIL    90 tablet    Take 1 tablet (10 mg) by mouth daily    ASHD (arteriosclerotic heart disease)       metFORMIN 1000 MG tablet    GLUCOPHAGE    180 tablet    Take 1 tablet (1,000 mg) by mouth 2 times daily (with meals) with food    Type 2 diabetes mellitus with diabetic nephropathy, without long-term current use of insulin (H)       nitroGLYcerin 0.6 MG sublingual tablet    NITROSTAT    100 tablet    DISSOLVE 1 TABLET UNDER THETONGUE AS NEEDED    ASHD (arteriosclerotic heart disease)       omega-3 acid ethyl esters 1 G capsule    Lovaza    180 capsule    Take 1 capsule (1 g) by mouth 2 times daily    Hyperlipidemia LDL goal <100       order for DME     1 each    Equipment being ordered: All diabetic testing supplies including test strips, lancets and solution for testing 2 times per day.    Type 2 diabetes, HbA1C goal < 8% (H)       order for DME     1 Device    Equipment being ordered: Foot Orthotics Bilateral    Type 2 diabetes, HbA1C goal < 8% (H), Foot deformity, unspecified laterality       pioglitazone 30 MG tablet    ACTOS    90 tablet    Take 1 tablet (30 mg) by mouth daily     Type 2 diabetes mellitus with diabetic nephropathy, without long-term current use of insulin (H)       simvastatin 40 MG tablet    ZOCOR    90 tablet    Take 1 tablet (40 mg) by mouth At Bedtime    Hyperlipidemia LDL goal <100       tamsulosin 0.4 MG capsule    FLOMAX    90 capsule    Take 1 capsule (0.4 mg) by mouth daily    BPH with obstruction/lower urinary tract symptoms       VITAMIN C PO      Take by mouth daily        VITAMIN D3 PO      Take 1,000 Units by mouth 2 times daily        VITAMIN E NATURAL PO      Take by mouth daily        * Notice:  This list has 2 medication(s) that are the same as other medications prescribed for you. Read the directions carefully, and ask your doctor or other care provider to review them with you.

## 2017-09-11 NOTE — PROGRESS NOTES
Office Visit Note  [facility]  [phone]    UROLOGIC DIAGNOSES:   Right hydrocele    CURRENT INTERVENTIONS:   S/P repair    HISTORY:   Jeff returns to clinic today because his PSA was found to be elevated. His PSA was 4.07 in May of last year. Recently it was 4.02. In 2009 it was 3.09. He has no urinary complaints or symptoms at this time.      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     Heart attack (H)      Hyperlipidaemia      Obese      Other and unspecified hyperlipidemia      Other premature beats      Phlebitis and thrombophlebitis of other deep vessels of lower extremities 2000    has had 2-3 episodes     Stented coronary artery      4 stents     Syncope      Thrombosis of leg      Type II or unspecified type diabetes mellitus without mention of complication, not stated as uncontrolled      Unspecified essential hypertension        PAST SURGICAL HISTORY:   Past Surgical History:   Procedure Laterality Date     BIOPSY  8/2016     C NONSPECIFIC PROCEDURE      colonoscopy approx 1999 done elsewhere     CARDIAC SURGERY       COLONOSCOPY       CORONARY ANGIOGRAPHY ADULT ORDER  8/28/2000    75% distal LAD stenosis, 80% third diagonal stenosis, 75-80% mid ramus stenosis, ostial 60% stenosis in circumflex w/90% stenosis in distal circumflex     CORONARY ANGIOGRAPHY ADULT ORDER  2003    4 stents placed     EP ABLATION / EP STUDIES  3/25/2014     HEART CATH, ANGIOPLASTY       HYDROCELECTOMY SCROTAL Right 10/6/2016    Procedure: HYDROCELECTOMY SCROTAL;  Surgeon: Jordan Chandra MD;  Location: [facility]       FAMILY HISTORY:   Family History   Problem Relation Age of Onset     Musculoskeletal Disorder Mother      spinal stenosis     CANCER Mother      cancer kidney  "age 85     Hypertension Mother      Born 1923     DIABETES Father      Born 1925       SOCIAL HISTORY:   Social History   Substance Use Topics     Smoking status: Former Smoker     Packs/day: 3.00     Years: 6.00     Start date: 7/1/1961     Quit date: 1/1/1967     Smokeless tobacco: Never Used      Comment: quit 1960s     Alcohol use Yes      Comment: 1 beer a week       Current Outpatient Prescriptions   Medication     clopidogrel (PLAVIX) 75 MG tablet     omega-3 acid ethyl esters (LOVAZA) 1 G capsule     tamsulosin (FLOMAX) 0.4 MG capsule     metFORMIN (GLUCOPHAGE) 1000 MG tablet     lisinopril (PRINIVIL/ZESTRIL) 10 MG tablet     pioglitazone (ACTOS) 30 MG tablet     simvastatin (ZOCOR) 40 MG tablet     hydrochlorothiazide (HYDRODIURIL) 50 MG tablet     CLOPIDOGREL BISULFATE PO     Ascorbic Acid (VITAMIN C PO)     Cholecalciferol (VITAMIN D3 PO)     VITAMIN E NATURAL PO     hydrocortisone 2.5 % cream     ORDER FOR DME     nitroglycerin (NITROSTAT) 0.6 MG sublingual tablet     ORDER FOR DME     Glucose Blood (FREESTYLE LITE) strip     No current facility-administered medications for this visit.      Facility-Administered Medications Ordered in Other Visits   Medication     gadobutrol (GADAVIST) injection 10 mL         PHYSICAL EXAM:    /68  Pulse 60  Ht 1.778 m (5' 10\")  Wt 102.1 kg (225 lb)  BMI 32.28 kg/m2    HEENT: Normocephalic and atraumatic   Cardiac: Not done  Back/Flank: Not done  CNS/PNS: Not done  Respiratory: Normal non-labored breathing  Abdomen: Soft nontender and nondistended  Peripheral Vascular: Not done  Mental Status: Not done    Penis: Not done  Scrotal Skin: Not done  Testicles: Not done  Epididymis: Not done  Digital Rectal Exam: His prostate is moderately enlarged but benign and symmetric to palpation    Cystoscopy: Not done    Imaging: None    Urinalysis: UA RESULTS:  Recent Labs   Lab Test  02/14/17   1538   03/21/14   1940   COLOR  Yellow   < >  Yellow   APPEARANCE  Clear   < >  " Clear   URINEGLC  Negative   < >  Negative   URINEBILI  Negative   < >  Negative   URINEKETONE  Trace*   < >  Negative   SG  1.020   < >  1.018   UBLD  Negative   < >  Negative   URINEPH  5.0   < >  6.5   PROTEIN  Negative   < >  Negative   UROBILINOGEN  0.2   < >   --    NITRITE  Negative   < >  Negative   LEUKEST  Negative   < >  Negative   RBCU   --    --   <1   WBCU   --    --   <1    < > = values in this interval not displayed.       PSA: 4.02    Post Void Residual:     Other labs: None today      IMPRESSION:  Elevated PSA, enlarged prostate    PLAN:  His PSA is elevated but stable. His prostate is enlarged, likely explaining the slight elevation in PSA. He has no symptoms at this time. I recommended that we continue to follow this annually. I will see him back in 1 year for a PSA and exam.    Total Time: 15 minutes                                      Total in Consultation: 15 minutes      Jordan Chandra M.D.

## 2017-09-14 ENCOUNTER — TRANSFERRED RECORDS (OUTPATIENT)
Dept: HEALTH INFORMATION MANAGEMENT | Facility: CLINIC | Age: 72
End: 2017-09-14

## 2017-09-18 ENCOUNTER — OFFICE VISIT (OUTPATIENT)
Dept: INTERNAL MEDICINE | Facility: CLINIC | Age: 72
End: 2017-09-18
Payer: MEDICARE

## 2017-09-18 VITALS
OXYGEN SATURATION: 99 % | DIASTOLIC BLOOD PRESSURE: 68 MMHG | WEIGHT: 222.3 LBS | BODY MASS INDEX: 31.82 KG/M2 | TEMPERATURE: 98.4 F | HEART RATE: 88 BPM | HEIGHT: 70 IN | SYSTOLIC BLOOD PRESSURE: 124 MMHG

## 2017-09-18 DIAGNOSIS — N18.30 CKD (CHRONIC KIDNEY DISEASE) STAGE 3, GFR 30-59 ML/MIN (H): ICD-10-CM

## 2017-09-18 DIAGNOSIS — I10 ESSENTIAL HYPERTENSION: ICD-10-CM

## 2017-09-18 DIAGNOSIS — E78.5 HYPERLIPIDEMIA LDL GOAL <100: ICD-10-CM

## 2017-09-18 DIAGNOSIS — E11.21 TYPE 2 DIABETES MELLITUS WITH DIABETIC NEPHROPATHY, WITHOUT LONG-TERM CURRENT USE OF INSULIN (H): Primary | ICD-10-CM

## 2017-09-18 DIAGNOSIS — N40.1 BENIGN PROSTATIC HYPERPLASIA WITH LOWER URINARY TRACT SYMPTOMS, SYMPTOM DETAILS UNSPECIFIED: ICD-10-CM

## 2017-09-18 DIAGNOSIS — Z23 NEEDS FLU SHOT: ICD-10-CM

## 2017-09-18 PROCEDURE — 90662 IIV NO PRSV INCREASED AG IM: CPT | Performed by: INTERNAL MEDICINE

## 2017-09-18 PROCEDURE — G0008 ADMIN INFLUENZA VIRUS VAC: HCPCS | Performed by: INTERNAL MEDICINE

## 2017-09-18 PROCEDURE — 99214 OFFICE O/P EST MOD 30 MIN: CPT | Performed by: INTERNAL MEDICINE

## 2017-09-18 NOTE — PROGRESS NOTES
SUBJECTIVE:   Jeff Ricks is a 71 year old male who presents to clinic today for the following health issues:    Patient is seen for a follow up visit.      Diabetes Follow-up      Patient is checking blood sugars: not at all    Diabetic concerns: None     Symptoms of hypoglycemia (low blood sugar): none     Paresthesias (numbness or burning in feet) or sores: No     Date of last diabetic eye exam: 6 month ago     Has H/O DM. On diet , exercise and PO med. Blood sugars are controlled. No parestesias. No hypoglycemias.    Has h/o HTN. on medical treatment. BP has been controlled. No side effects from medications. No CP, HA, dizziness. good compliance with medications and low salt diet.  Has H/O hyperlipidemia. On medical treatment and diet. No side effects. No muscle weakness, myalgias or upset stomach.   Has h/o CRF. Symptoms include mild edema. Monitoring BP, BG, medications, avoiding OTC NSAIDs. Needs periodic recheck of kidney function.  Has H/O BPH. On treatment with Flomax  . No  symptoms of frequency, decreased urinary stream or dysuria. No side effects from medications. Follows with urology             Amount of exercise or physical activity: None    Problems taking medications regularly: No    Medication side effects: none  Diet: low salt          Problem list and histories reviewed & adjusted, as indicated.  Additional history: as documented    Patient Active Problem List   Diagnosis     Essential hypertension     Coronary atherosclerosis     BPH loc w urin obs/LUTS     HYPERLIPIDEMIA LDL GOAL <100     Advanced directives, counseling/discussion     Low back pain     RBBB with left anterior fascicular block     Premature beats     Type 2 diabetes mellitus with diabetic nephropathy (H)     CKD (chronic kidney disease) stage 3, GFR 30-59 ml/min     Benign prostatic hyperplasia with lower urinary tract symptoms, symptom details unspecified     Past Surgical History:   Procedure Laterality Date     BIOPSY   8/2016     C NONSPECIFIC PROCEDURE      colonoscopy approx 1999 done elsewhere     CARDIAC SURGERY       COLONOSCOPY       CORONARY ANGIOGRAPHY ADULT ORDER  8/28/2000    75% distal LAD stenosis, 80% third diagonal stenosis, 75-80% mid ramus stenosis, ostial 60% stenosis in circumflex w/90% stenosis in distal circumflex     CORONARY ANGIOGRAPHY ADULT ORDER  2003    4 stents placed     EP ABLATION / EP STUDIES  3/25/2014     HEART CATH, ANGIOPLASTY       HYDROCELECTOMY SCROTAL Right 10/6/2016    Procedure: HYDROCELECTOMY SCROTAL;  Surgeon: Jordan Chandra MD;  Location: [facility]     TESTICLE SURGERY         Social History   Substance Use Topics     Smoking status: Former Smoker     Packs/day: 3.00     Years: 6.00     Start date: 7/1/1961     Quit date: 1/1/1967     Smokeless tobacco: Never Used      Comment: quit 1960s     Alcohol use Yes      Comment: 1 beer a week     Family History   Problem Relation Age of Onset     Musculoskeletal Disorder Mother      spinal stenosis     CANCER Mother      cancer kidney age 85     Hypertension Mother      Born 1923     DIABETES Father      Born 1923     DIABETES Brother      HEART DISEASE Brother          Current Outpatient Prescriptions   Medication Sig Dispense Refill     nitroglycerin (NITROSTAT) 0.6 MG sublingual tablet DISSOLVE 1 TABLET UNDER THETONGUE AS NEEDED 100 tablet 1     clopidogrel (PLAVIX) 75 MG tablet Take 1 tablet (75 mg) by mouth daily 90 tablet 2     omega-3 acid ethyl esters (LOVAZA) 1 G capsule Take 1 capsule (1 g) by mouth 2 times daily 180 capsule 3     metFORMIN (GLUCOPHAGE) 1000 MG tablet Take 1 tablet (1,000 mg) by mouth 2 times daily (with meals) with food 180 tablet 4     lisinopril (PRINIVIL/ZESTRIL) 10 MG tablet Take 1 tablet (10 mg) by mouth daily 90 tablet 4     simvastatin (ZOCOR) 40 MG tablet Take 1 tablet (40 mg) by mouth At Bedtime 90 tablet 3     hydrochlorothiazide (HYDRODIURIL) 50 MG tablet Take 1 tablet (50 mg) by mouth daily 90 tablet  "4     CLOPIDOGREL BISULFATE PO Take 75 mg by mouth daily       Ascorbic Acid (VITAMIN C PO) Take by mouth daily       Cholecalciferol (VITAMIN D3 PO) Take 1,000 Units by mouth 2 times daily        hydrocortisone 2.5 % cream Apply topically 2 times daily 30 g 11     Glucose Blood (FREESTYLE LITE) strip Use to test blood sugars 1 times daily or as directed. 100 strip prn     ORDER FOR DME Equipment being ordered:  All diabetic testing supplies including test strips, lancets and solution for testing 2 times per day. 1 each 0     tamsulosin (FLOMAX) 0.4 MG capsule Take 1 capsule (0.4 mg) by mouth daily 90 capsule 3     pioglitazone (ACTOS) 30 MG tablet Take 1 tablet (30 mg) by mouth daily 90 tablet 4     VITAMIN E NATURAL PO Take by mouth daily       ORDER FOR DME Equipment being ordered: Foot Orthotics Bilateral (Patient not taking: Reported on 9/18/2017) 1 Device 1         Reviewed and updated as needed this visit by clinical staffTobacco  Allergies  Meds  Med Hx  Surg Hx  Fam Hx  Soc Hx      Reviewed and updated as needed this visit by Provider         ROS:  Constitutional, HEENT, cardiovascular, pulmonary, gi and gu systems are negative, except as otherwise noted.      OBJECTIVE:   /68 (BP Location: Left arm, Patient Position: Sitting, Cuff Size: Adult Regular)  Pulse 88  Temp 98.4  F (36.9  C) (Oral)  Ht 5' 10\" (1.778 m)  Wt 222 lb 4.8 oz (100.8 kg)  SpO2 99%  BMI 31.9 kg/m2  Body mass index is 31.9 kg/(m^2).   GENERAL: healthy, alert and no distress  NECK: no adenopathy, no asymmetry, masses, or scars and thyroid normal to palpation  RESP: lungs clear to auscultation - no rales, rhonchi or wheezes  CV: regular rate and rhythm, normal S1 S2, no S3 or S4, no murmur, click or rub  ABDOMEN: soft, nontender, no hepatosplenomegaly, no masses and bowel sounds normal  MS: no gross musculoskeletal defects noted, 1+ LE  edema    Diagnostic Test Results:  Results for orders placed or performed in visit on " 09/08/17   PSA tumor marker [[id number]]   Result Value Ref Range    PSA 4.02 (H) 0 - 4 ug/L       ASSESSMENT/PLAN:     Problem List Items Addressed This Visit     Essential hypertension    HYPERLIPIDEMIA LDL GOAL <100    Type 2 diabetes mellitus with diabetic nephropathy (H) - Primary    CKD (chronic kidney disease) stage 3, GFR 30-59 ml/min    Benign prostatic hyperplasia with lower urinary tract symptoms, symptom details unspecified      Other Visit Diagnoses     Needs flu shot        Relevant Orders    FLU VACCINE, INCREASED ANTIGEN, PRESV FREE (Completed)         Nephrology follow up/ stable   Cont treatment   Improve diet, exercise- slightly worse diabetic control   Immunized     Follow-Up:in 3 months     Guillermo Deleon MD  [facility]

## 2017-09-18 NOTE — PROGRESS NOTES
"  SUBJECTIVE:   Jeff Ricks is a 71 year old male who presents to clinic today for the following health issues:      Diabetes Follow-up      Patient is checking blood sugars: { :695163}    Diabetic concerns: {Diabetic Concerns:817340::\"None\"}     Symptoms of hypoglycemia (low blood sugar): { :421370::\"none\"}     Paresthesias (numbness or burning in feet) or sores: { :253312::\"No\"}     Date of last diabetic eye exam: ***    Hyperlipidemia Follow-Up      Rate your low fat/cholesterol diet?: { :457785::\"good\"}    Taking statin?  { :882377::\"No\"}    Other lipid medications/supplements?:  { :384846::\"none\"}    Hypertension Follow-up      Outpatient blood pressures {ISCHECKIN}    Low Salt Diet: { :302391::\"no added salt\"}        Amount of exercise or physical activity: {Exercise frequency days per week:378168}    Problems taking medications regularly: {Med Problems:597523::\"No\"}    Medication side effects: {CHRONIC MED SIDE EFFECTS:158922::\"none\"}  Diet: { :366348}      {additional problems for provider to add:513229}    Problem list and histories reviewed & adjusted, as indicated.  Additional history: {NONE - AS DOCUMENTED:200903::\"as documented\"}    {HIST REVIEW/ LINKS 2:305852}    Reviewed and updated as needed this visit by clinical staff     Reviewed and updated as needed this visit by Provider         {PROVIDER CHARTING PREFERENCE:190581}    "

## 2017-09-18 NOTE — MR AVS SNAPSHOT
After Visit Summary   9/18/2017    Jeff Ricks    MRN: [id number]           Patient Information     Date Of Birth          1945        Visit Information        Provider Department      9/18/2017 3:20 PM Guillermo Deleon MD [facility]        Today's Diagnoses     Type 2 diabetes mellitus with diabetic nephropathy, without long-term current use of insulin (H)    -  1    Needs flu shot        Essential hypertension        Hyperlipidemia LDL goal <100        CKD (chronic kidney disease) stage 3, GFR 30-59 ml/min        Benign prostatic hyperplasia with lower urinary tract symptoms, symptom details unspecified           Follow-ups after your visit        Who to contact     If you have questions or need follow up information about today's clinic visit or your schedule please contact [facility] directly at [phone].  Normal or non-critical lab and imaging results will be communicated to you by MyChart, letter or phone within 4 business days after the clinic has received the results. If you do not hear from us within 7 days, please contact the clinic through [employer] or phone. If you have a critical or abnormal lab result, we will notify you by phone as soon as possible.  Submit refill requests through [employer] or call your pharmacy and they will forward the refill request to us. Please allow 3 business days for your refill to be completed.          Additional Information About Your Visit        MyChart Information     [employer] gives you secure access to your electronic health record. If you see a primary care provider, you can also send messages to your care team and make appointments. If you have questions, please call your primary care clinic.  If you do not have a primary care provider, please call [phone] and they will assist you.        Care EveryWhere ID     This is your Care EveryWhere ID. This could be used by other organizations to access your  "[place] medical records  [id number]        Your Vitals Were     Pulse Temperature Height Pulse Oximetry BMI (Body Mass Index)       88 98.4  F (36.9  C) (Oral) 5' 10\" (1.778 m) 99% 31.9 kg/m2        Blood Pressure from Last 3 Encounters:   09/18/17 124/68   09/11/17 120/68   06/07/17 122/56    Weight from Last 3 Encounters:   09/18/17 222 lb 4.8 oz (100.8 kg)   09/11/17 225 lb (102.1 kg)   06/07/17 221 lb 1.6 oz (100.3 kg)              We Performed the Following     FLU VACCINE, INCREASED ANTIGEN, PRESV FREE        Primary Care Provider Office Phone # Fax #    Guillermo Deleon [phone]       [address] [facility] [address]        Equal Access to Services     [facility]: Hadii aad ku hadasho Soomaali, waaxda luqadaha, qaybta kaalmada adeegyada, waxay peytonin hayaan jenifer bartholomew . So [facility] [phone].    ATENCIÓN: Si habla español, tiene a bishop disposición servicios gratuitos de asistencia lingüística. Llame al [phone].    We comply with applicable federal civil rights laws and [place] laws. We do not discriminate on the basis of race, color, national origin, age, disability sex, sexual orientation or gender identity.            Thank you!     Thank you for choosing [facility]  for your care. Our goal is always to provide you with excellent care. Hearing back from our patients is one way we can continue to improve our services. Please take a few minutes to complete the written survey that you may receive in the mail after your visit with us. Thank you!             Your Updated Medication List - Protect others around you: Learn how to safely use, store and throw away your medicines at www.disposemymeds.org.          This list is accurate as of: 9/18/17  4:26 PM.  Always use your most recent med list.                   Brand Name Dispense Instructions for use Diagnosis    * CLOPIDOGREL BISULFATE PO      Take 75 mg by mouth daily        * clopidogrel 75 " MG tablet    PLAVIX    90 tablet    Take 1 tablet (75 mg) by mouth daily    ASHD (arteriosclerotic heart disease)       FREESTYLE LITE test strip   Generic drug:  blood glucose monitoring     100 strip    Use to test blood sugars 1 times daily or as directed.    Type 2 diabetes, HbA1C goal < 8% (H)       hydrochlorothiazide 50 MG tablet    HYDRODIURIL    90 tablet    Take 1 tablet (50 mg) by mouth daily    ASHD (arteriosclerotic heart disease)       hydrocortisone 2.5 % cream     30 g    Apply topically 2 times daily    Type II or unspecified type diabetes mellitus without mention of complication, not stated as uncontrolled       lisinopril 10 MG tablet    PRINIVIL/ZESTRIL    90 tablet    Take 1 tablet (10 mg) by mouth daily    ASHD (arteriosclerotic heart disease)       metFORMIN 1000 MG tablet    GLUCOPHAGE    180 tablet    Take 1 tablet (1,000 mg) by mouth 2 times daily (with meals) with food    Type 2 diabetes mellitus with diabetic nephropathy, without long-term current use of insulin (H)       nitroGLYcerin 0.6 MG sublingual tablet    NITROSTAT    100 tablet    DISSOLVE 1 TABLET UNDER THETONGUE AS NEEDED    ASHD (arteriosclerotic heart disease)       omega-3 acid ethyl esters 1 G capsule    Lovaza    180 capsule    Take 1 capsule (1 g) by mouth 2 times daily    Hyperlipidemia LDL goal <100       order for DME     1 each    Equipment being ordered: All diabetic testing supplies including test strips, lancets and solution for testing 2 times per day.    Type 2 diabetes, HbA1C goal < 8% (H)       order for DME     1 Device    Equipment being ordered: Foot Orthotics Bilateral    Type 2 diabetes, HbA1C goal < 8% (H), Foot deformity, unspecified laterality       pioglitazone 30 MG tablet    ACTOS    90 tablet    Take 1 tablet (30 mg) by mouth daily    Type 2 diabetes mellitus with diabetic nephropathy, without long-term current use of insulin (H)       simvastatin 40 MG tablet    ZOCOR    90 tablet    Take 1 tablet  (40 mg) by mouth At Bedtime    Hyperlipidemia LDL goal <100       tamsulosin 0.4 MG capsule    FLOMAX    90 capsule    Take 1 capsule (0.4 mg) by mouth daily    BPH with obstruction/lower urinary tract symptoms       VITAMIN C PO      Take by mouth daily        VITAMIN D3 PO      Take 1,000 Units by mouth 2 times daily        VITAMIN E NATURAL PO      Take by mouth daily        * Notice:  This list has 2 medication(s) that are the same as other medications prescribed for you. Read the directions carefully, and ask your doctor or other care provider to review them with you.

## 2017-09-18 NOTE — NURSING NOTE

## 2017-09-18 NOTE — NURSING NOTE
"Chief Complaint   Patient presents with     Diabetes     F/U       Initial /68 (BP Location: Left arm, Patient Position: Sitting, Cuff Size: Adult Regular)  Pulse 88  Temp 98.4  F (36.9  C) (Oral)  Ht 5' 10\" (1.778 m)  Wt 222 lb 4.8 oz (100.8 kg)  SpO2 99%  BMI 31.9 kg/m2 Estimated body mass index is 31.9 kg/(m^2) as calculated from the following:    Height as of this encounter: 5' 10\" (1.778 m).    Weight as of this encounter: 222 lb 4.8 oz (100.8 kg).  Medication Reconciliation: complete   Brandon Obrien MA    "

## 2017-12-02 DIAGNOSIS — I25.10 ASHD (ARTERIOSCLEROTIC HEART DISEASE): ICD-10-CM

## 2017-12-02 DIAGNOSIS — E78.5 HYPERLIPIDEMIA LDL GOAL <100: ICD-10-CM

## 2017-12-06 ENCOUNTER — MYC MEDICAL ADVICE (OUTPATIENT)
Dept: INTERNAL MEDICINE | Facility: CLINIC | Age: 72
End: 2017-12-06

## 2017-12-06 DIAGNOSIS — I25.10 ASHD (ARTERIOSCLEROTIC HEART DISEASE): ICD-10-CM

## 2017-12-06 DIAGNOSIS — N40.1 BPH WITH OBSTRUCTION/LOWER URINARY TRACT SYMPTOMS: ICD-10-CM

## 2017-12-06 DIAGNOSIS — E11.21 TYPE 2 DIABETES MELLITUS WITH DIABETIC NEPHROPATHY, WITHOUT LONG-TERM CURRENT USE OF INSULIN (H): ICD-10-CM

## 2017-12-06 DIAGNOSIS — N13.8 BPH WITH OBSTRUCTION/LOWER URINARY TRACT SYMPTOMS: ICD-10-CM

## 2017-12-06 DIAGNOSIS — E78.5 HYPERLIPIDEMIA LDL GOAL <100: ICD-10-CM

## 2017-12-06 RX ORDER — HYDROCHLOROTHIAZIDE 50 MG/1
TABLET ORAL
Qty: 90 TABLET | Refills: 3 | Status: SHIPPED | OUTPATIENT
Start: 2017-12-06 | End: 2018-06-22

## 2017-12-06 NOTE — TELEPHONE ENCOUNTER
Potassium   Date Value Ref Range Status   09/06/2017 3.8 3.4 - 5.3 mmol/L Final     Creatinine   Date Value Ref Range Status   09/06/2017 1.59 (H) 0.66 - 1.25 mg/dL Final     BP Readings from Last 3 Encounters:   09/18/17 124/68   09/11/17 120/68   06/07/17 122/56       Routing refill request to provider for review/approval because:  Labs out of range:  Creat    Please advise, thanks.

## 2017-12-07 RX ORDER — SIMVASTATIN 40 MG
40 TABLET ORAL AT BEDTIME
Qty: 90 TABLET | Refills: 1 | Status: SHIPPED | OUTPATIENT
Start: 2017-12-07 | End: 2018-07-16

## 2017-12-07 RX ORDER — NITROGLYCERIN 0.6 MG/1
TABLET SUBLINGUAL
Qty: 100 TABLET | Refills: 1 | Status: SHIPPED | OUTPATIENT
Start: 2017-12-07 | End: 2018-07-16

## 2017-12-07 NOTE — TELEPHONE ENCOUNTER
Simvastatin 40 mg     Last Written Prescription Date: 12/20/16  Last Fill Quantity: 90, # refills: 3  Last Office Visit with [facility], [facility] or [facility] prescribing provider: 9/18/17       Lab Results   Component Value Date    CHOL 92 09/06/2017     Lab Results   Component Value Date    HDL 70 09/06/2017     Lab Results   Component Value Date    LDL 17 09/06/2017     Lab Results   Component Value Date    TRIG 25 09/06/2017     Lab Results   Component Value Date    CHOLHDLRATIO 1.5 05/13/2015       Labs showing if normal/abnormal  Lab Results   Component Value Date    CHOL 92 09/06/2017    TRIG 25 09/06/2017    HDL 70 09/06/2017    LDL 17 09/06/2017    VLDL 6 05/13/2015    CHOLHDLRATIO 1.5 05/13/2015     Nitroglycerin 0.6mg       Last Written Prescription Date: 6/5/17  Last Fill Quantity: 100,  # refills: 1   Last Office Visit with [facility], [facility] or [facility] prescribing provider: 9/18/17

## 2017-12-09 RX ORDER — TAMSULOSIN HYDROCHLORIDE 0.4 MG/1
0.4 CAPSULE ORAL DAILY
Qty: 90 CAPSULE | Refills: 0 | Status: SHIPPED | OUTPATIENT
Start: 2017-12-09 | End: 2018-02-20

## 2017-12-09 RX ORDER — LISINOPRIL 10 MG/1
10 TABLET ORAL DAILY
Qty: 90 TABLET | Refills: 0 | Status: SHIPPED | OUTPATIENT
Start: 2017-12-09 | End: 2018-02-20

## 2017-12-09 RX ORDER — PIOGLITAZONEHYDROCHLORIDE 30 MG/1
30 TABLET ORAL DAILY
Qty: 90 TABLET | Refills: 0 | Status: SHIPPED | OUTPATIENT
Start: 2017-12-09 | End: 2018-02-20

## 2017-12-09 RX ORDER — OMEGA-3-ACID ETHYL ESTERS 1 G/1
1 CAPSULE, LIQUID FILLED ORAL 2 TIMES DAILY
Qty: 180 CAPSULE | Refills: 0 | Status: SHIPPED | OUTPATIENT
Start: 2017-12-09 | End: 2018-02-20

## 2017-12-27 ENCOUNTER — OFFICE VISIT (OUTPATIENT)
Dept: INTERNAL MEDICINE | Facility: CLINIC | Age: 72
End: 2017-12-27
Payer: MEDICARE

## 2017-12-27 VITALS
HEIGHT: 70 IN | DIASTOLIC BLOOD PRESSURE: 68 MMHG | HEART RATE: 66 BPM | SYSTOLIC BLOOD PRESSURE: 136 MMHG | OXYGEN SATURATION: 100 % | BODY MASS INDEX: 32.5 KG/M2 | WEIGHT: 227 LBS | TEMPERATURE: 97.5 F

## 2017-12-27 DIAGNOSIS — N18.30 CKD (CHRONIC KIDNEY DISEASE) STAGE 3, GFR 30-59 ML/MIN (H): ICD-10-CM

## 2017-12-27 DIAGNOSIS — I10 ESSENTIAL HYPERTENSION: ICD-10-CM

## 2017-12-27 DIAGNOSIS — E11.21 TYPE 2 DIABETES MELLITUS WITH DIABETIC NEPHROPATHY, WITHOUT LONG-TERM CURRENT USE OF INSULIN (H): Primary | ICD-10-CM

## 2017-12-27 LAB — HBA1C MFR BLD: 6.2 % (ref 4.3–6)

## 2017-12-27 PROCEDURE — 36415 COLL VENOUS BLD VENIPUNCTURE: CPT | Performed by: INTERNAL MEDICINE

## 2017-12-27 PROCEDURE — 80048 BASIC METABOLIC PNL TOTAL CA: CPT | Performed by: INTERNAL MEDICINE

## 2017-12-27 PROCEDURE — 83036 HEMOGLOBIN GLYCOSYLATED A1C: CPT | Performed by: INTERNAL MEDICINE

## 2017-12-27 PROCEDURE — 84443 ASSAY THYROID STIM HORMONE: CPT | Performed by: INTERNAL MEDICINE

## 2017-12-27 PROCEDURE — 99214 OFFICE O/P EST MOD 30 MIN: CPT | Performed by: INTERNAL MEDICINE

## 2017-12-27 NOTE — MR AVS SNAPSHOT
After Visit Summary   12/27/2017    Jeff Ricks    MRN: [id number]           Patient Information     Date Of Birth          1945        Visit Information        Provider Department      12/27/2017 4:20 PM Guillermo Deleon MD [facility] [address]        Today's Diagnoses     Type 2 diabetes mellitus with diabetic nephropathy, without long-term current use of insulin (H)    -  1    Essential hypertension        CKD (chronic kidney disease) stage 3, GFR 30-59 ml/min           Follow-ups after your visit        Who to contact     If you have questions or need follow up information about today's clinic visit or your schedule please contact [facility] directly at [phone].  Normal or non-critical lab and imaging results will be communicated to you by MyChart, letter or phone within 4 business days after the clinic has received the results. If you do not hear from us within 7 days, please contact the clinic through [employer] or phone. If you have a critical or abnormal lab result, we will notify you by phone as soon as possible.  Submit refill requests through [employer] or call your pharmacy and they will forward the refill request to us. Please allow 3 business days for your refill to be completed.          Additional Information About Your Visit        MyChart Information     [employer] gives you secure access to your electronic health record. If you see a primary care provider, you can also send messages to your care team and make appointments. If you have questions, please call your primary care clinic.  If you do not have a primary care provider, please call [phone] and they will assist you.        Care EveryWhere ID     This is your Care EveryWhere ID. This could be used by other organizations to access your [place] medical records  [id number]        Your Vitals Were     Pulse Temperature Height Pulse Oximetry BMI (Body Mass Index)       66 97.5  F (36.4  C) (Oral)  "5' 10\" (1.778 m) 100% 32.57 kg/m2        Blood Pressure from Last 3 Encounters:   12/27/17 136/68   09/18/17 124/68   09/11/17 120/68    Weight from Last 3 Encounters:   12/27/17 227 lb (103 kg)   09/18/17 222 lb 4.8 oz (100.8 kg)   09/11/17 225 lb (102.1 kg)              We Performed the Following     **TSH with free T4 reflex FUTURE 1yr     Basic metabolic panel     Hemoglobin A1c        Primary Care Provider Office Phone # Fax #    Guillermo Deleon [phone]       [address] [facility] [address]        Equal Access to Services     [facility] : Hadii abigail bruner hadasho Soandrés, waaxda luqadaha, qaybta kaalmada adeegyada, ayo bartholomew . So [facility] [phone].    ATENCIÓN: Si habla español, tiene a bishop disposición servicios gratuitos de asistencia lingüística. Llame al [phone].    We comply with applicable federal civil rights laws and [place] laws. We do not discriminate on the basis of race, color, national origin, age, disability, sex, sexual orientation, or gender identity.            Thank you!     Thank you for choosing [facility]  for your care. Our goal is always to provide you with excellent care. Hearing back from our patients is one way we can continue to improve our services. Please take a few minutes to complete the written survey that you may receive in the mail after your visit with us. Thank you!             Your Updated Medication List - Protect others around you: Learn how to safely use, store and throw away your medicines at www.disposemymeds.org.          This list is accurate as of: 12/27/17  4:53 PM.  Always use your most recent med list.                   Brand Name Dispense Instructions for use Diagnosis    * CLOPIDOGREL BISULFATE PO      Take 75 mg by mouth daily        * clopidogrel 75 MG tablet    PLAVIX    90 tablet    Take 1 tablet (75 mg) by mouth daily    ASHD (arteriosclerotic heart disease)       FREESTYLE " LITE test strip   Generic drug:  blood glucose monitoring     100 strip    Use to test blood sugars 1 times daily or as directed.    Type 2 diabetes, HbA1C goal < 8% (H)       hydrochlorothiazide 50 MG tablet    HYDRODIURIL    90 tablet    TAKE 1 TABLET DAILY    ASHD (arteriosclerotic heart disease)       hydrocortisone 2.5 % cream     30 g    Apply topically 2 times daily    Type II or unspecified type diabetes mellitus without mention of complication, not stated as uncontrolled       lisinopril 10 MG tablet    PRINIVIL/ZESTRIL    90 tablet    Take 1 tablet (10 mg) by mouth daily    ASHD (arteriosclerotic heart disease)       metFORMIN 1000 MG tablet    GLUCOPHAGE    180 tablet    Take 1 tablet (1,000 mg) by mouth 2 times daily (with meals) with food    Type 2 diabetes mellitus with diabetic nephropathy, without long-term current use of insulin (H)       nitroGLYcerin 0.6 MG sublingual tablet    NITROSTAT    100 tablet    DISSOLVE 1 TABLET UNDER THETONGUE AS NEEDED    ASHD (arteriosclerotic heart disease)       omega-3 acid ethyl esters 1 G capsule    Lovaza    180 capsule    Take 1 capsule (1 g) by mouth 2 times daily    Hyperlipidemia LDL goal <100       pioglitazone 30 MG tablet    ACTOS    90 tablet    Take 1 tablet (30 mg) by mouth daily    Type 2 diabetes mellitus with diabetic nephropathy, without long-term current use of insulin (H)       simvastatin 40 MG tablet    ZOCOR    90 tablet    Take 1 tablet (40 mg) by mouth At Bedtime    Hyperlipidemia LDL goal <100       tamsulosin 0.4 MG capsule    FLOMAX    90 capsule    Take 1 capsule (0.4 mg) by mouth daily    BPH with obstruction/lower urinary tract symptoms       VITAMIN C PO      Take by mouth daily        VITAMIN D3 PO      Take 1,000 Units by mouth 2 times daily        VITAMIN E NATURAL PO      Take by mouth daily        * Notice:  This list has 2 medication(s) that are the same as other medications prescribed for you. Read the directions carefully, and  ask your doctor or other care provider to review them with you.

## 2017-12-27 NOTE — PROGRESS NOTES
SUBJECTIVE:   Jeff Ricks is a 72 year old male who presents to clinic today for the following health issues:      Patient is seen for a follow up visit.  No acute complaints, no medication change or new medical conditions.  Has h/o HTN. on medical treatment. BP has been controlled. No side effects from medications. No CP, HA, dizziness. good compliance with medications and low salt diet.  Has h/o CRF. Symptoms include LE edema. Monitoring BP, BG, medications, avoiding OTC NSAIDs. Needs periodic recheck of kidney function.  Has h/o ischemic heart disease, asymptomatic regarding chest pains, SOB,palpitations. Has good compliance with treatment, diet and exercise.    Diabetes Follow-up  Has H/O DM. On diet , exercise and PO medications. Blood sugars are not checked. No parestesias. No hypoglycemias.      Patient is checking blood sugars: not at all    Diabetic concerns: None     Symptoms of hypoglycemia (low blood sugar): none     Paresthesias (numbness or burning in feet) or sores: No     Date of last diabetic eye exam: 04/14/17    BP Readings from Last 2 Encounters:   09/18/17 124/68   09/11/17 120/68     Hemoglobin A1C (%)   Date Value   09/06/2017 6.7 (H)   02/14/2017 5.9     LDL Cholesterol Calculated (mg/dL)   Date Value   09/06/2017 17   05/16/2016 14         Amount of exercise or physical activity: None    Problems taking medications regularly: No    Medication side effects: none    Diet: regular             Problem list and histories reviewed & adjusted, as indicated.  Additional history: as documented    Patient Active Problem List   Diagnosis     Essential hypertension     Coronary atherosclerosis     Benign localized hyperplasia of prostate with urinary obstruction and other lower urinary tract symptoms (LUTS)(600.21)     HYPERLIPIDEMIA LDL GOAL <100     Advanced directives, counseling/discussion     Low back pain     RBBB with left anterior fascicular block     Premature beats     Type 2 diabetes  mellitus with diabetic nephropathy (H)     CKD (chronic kidney disease) stage 3, GFR 30-59 ml/min     Benign prostatic hyperplasia with lower urinary tract symptoms, symptom details unspecified     Past Surgical History:   Procedure Laterality Date     BIOPSY  8/2016     C NONSPECIFIC PROCEDURE      colonoscopy approx 1999 done elsewhere     CARDIAC SURGERY       COLONOSCOPY       CORONARY ANGIOGRAPHY ADULT ORDER  8/28/2000    75% distal LAD stenosis, 80% third diagonal stenosis, 75-80% mid ramus stenosis, ostial 60% stenosis in circumflex w/90% stenosis in distal circumflex     CORONARY ANGIOGRAPHY ADULT ORDER  2003    4 stents placed     EP ABLATION / EP STUDIES  3/25/2014     HEART CATH, ANGIOPLASTY       HYDROCELECTOMY SCROTAL Right 10/6/2016    Procedure: HYDROCELECTOMY SCROTAL;  Surgeon: Jordan Chandra MD;  Location: [facility]     TESTICLE SURGERY         Social History   Substance Use Topics     Smoking status: Former Smoker     Packs/day: 3.00     Years: 6.00     Start date: 7/1/1961     Quit date: 1/1/1967     Smokeless tobacco: Never Used      Comment: quit 1960s     Alcohol use Yes      Comment: 1 beer a week     Family History   Problem Relation Age of Onset     Musculoskeletal Disorder Mother      spinal stenosis     CANCER Mother      cancer kidney age 85     Hypertension Mother      Born 1923     DIABETES Father      Born 1923     DIABETES Brother      HEART DISEASE Brother          Current Outpatient Prescriptions   Medication Sig Dispense Refill     tamsulosin (FLOMAX) 0.4 MG capsule Take 1 capsule (0.4 mg) by mouth daily 90 capsule 0     omega-3 acid ethyl esters (LOVAZA) 1 G capsule Take 1 capsule (1 g) by mouth 2 times daily 180 capsule 0     metFORMIN (GLUCOPHAGE) 1000 MG tablet Take 1 tablet (1,000 mg) by mouth 2 times daily (with meals) with food 180 tablet 0     lisinopril (PRINIVIL/ZESTRIL) 10 MG tablet Take 1 tablet (10 mg) by mouth daily 90 tablet 0     pioglitazone (ACTOS) 30 MG  "tablet Take 1 tablet (30 mg) by mouth daily 90 tablet 0     nitroGLYcerin (NITROSTAT) 0.6 MG sublingual tablet DISSOLVE 1 TABLET UNDER THETONGUE AS NEEDED 100 tablet 1     simvastatin (ZOCOR) 40 MG tablet Take 1 tablet (40 mg) by mouth At Bedtime 90 tablet 1     hydrochlorothiazide (HYDRODIURIL) 50 MG tablet TAKE 1 TABLET DAILY 90 tablet 3     clopidogrel (PLAVIX) 75 MG tablet Take 1 tablet (75 mg) by mouth daily 90 tablet 2     CLOPIDOGREL BISULFATE PO Take 75 mg by mouth daily       Ascorbic Acid (VITAMIN C PO) Take by mouth daily       Cholecalciferol (VITAMIN D3 PO) Take 1,000 Units by mouth 2 times daily        VITAMIN E NATURAL PO Take by mouth daily       hydrocortisone 2.5 % cream Apply topically 2 times daily 30 g 11     Glucose Blood (FREESTYLE LITE) strip Use to test blood sugars 1 times daily or as directed. 100 strip prn         Reviewed and updated as needed this visit by clinical staffTobacco       Reviewed and updated as needed this visit by Provider         ROS:  Constitutional, HEENT, cardiovascular, pulmonary, gi and gu systems are negative, except as otherwise noted.      OBJECTIVE:   /68  Pulse 66  Temp 97.5  F (36.4  C) (Oral)  Ht 5' 10\" (1.778 m)  Wt 227 lb (103 kg)  SpO2 100%  BMI 32.57 kg/m2  Body mass index is 32.57 kg/(m^2).   GENERAL: healthy, alert and no distress  NECK: no adenopathy, no asymmetry, masses, or scars and thyroid normal to palpation  RESP: lungs clear to auscultation - no rales, rhonchi or wheezes  CV: regular rate and rhythm, normal S1 S2, no S3 or S4, no murmur, click or rub,peripheral pulses strong  ABDOMEN: soft, nontender, no hepatosplenomegaly, no masses and bowel sounds normal  MS: no gross musculoskeletal defects noted, 1+ LE and ankles edema    Diagnostic Test Results:  Results for orders placed or performed in visit on 09/08/17   PSA tumor marker [[id number]]   Result Value Ref Range    PSA 4.02 (H) 0 - 4 ug/L       ASSESSMENT/PLAN:     Problem List " Items Addressed This Visit     Essential hypertension    Type 2 diabetes mellitus with diabetic nephropathy (H) - Primary    Relevant Orders    Hemoglobin A1c (Completed)    CKD (chronic kidney disease) stage 3, GFR 30-59 ml/min    Relevant Orders    Basic metabolic panel (Completed)           Controlled HTN, cont treatment   Assess DM control. Keep diet and medications   Monitor kidney function     Follow-Up:6 months     Guillermo Deleon MD  [facility]

## 2017-12-27 NOTE — NURSING NOTE
"Chief Complaint   Patient presents with     Recheck Medication     3 month F/U on Diabetes       Initial /68  Pulse 66  Temp 97.5  F (36.4  C) (Oral)  Ht 5' 10\" (1.778 m)  Wt 227 lb (103 kg)  SpO2 100%  BMI 32.57 kg/m2 Estimated body mass index is 32.57 kg/(m^2) as calculated from the following:    Height as of this encounter: 5' 10\" (1.778 m).    Weight as of this encounter: 227 lb (103 kg).  Medication Reconciliation: complete   Annalise Lizama MA      "

## 2017-12-28 LAB
ANION GAP SERPL CALCULATED.3IONS-SCNC: 6 MMOL/L (ref 3–14)
BUN SERPL-MCNC: 19 MG/DL (ref 7–30)
CALCIUM SERPL-MCNC: 8.4 MG/DL (ref 8.5–10.1)
CHLORIDE SERPL-SCNC: 105 MMOL/L (ref 94–109)
CO2 SERPL-SCNC: 28 MMOL/L (ref 20–32)
CREAT SERPL-MCNC: 1.46 MG/DL (ref 0.66–1.25)
GFR SERPL CREATININE-BSD FRML MDRD: 47 ML/MIN/1.7M2
GLUCOSE SERPL-MCNC: 79 MG/DL (ref 70–99)
POTASSIUM SERPL-SCNC: 3.8 MMOL/L (ref 3.4–5.3)
SODIUM SERPL-SCNC: 139 MMOL/L (ref 133–144)
TSH SERPL DL<=0.005 MIU/L-ACNC: 2.3 MU/L (ref 0.4–4)

## 2018-02-14 DIAGNOSIS — I25.10 ASHD (ARTERIOSCLEROTIC HEART DISEASE): ICD-10-CM

## 2018-02-19 ENCOUNTER — MYC MEDICAL ADVICE (OUTPATIENT)
Dept: INTERNAL MEDICINE | Facility: CLINIC | Age: 73
End: 2018-02-19

## 2018-02-19 DIAGNOSIS — E78.5 HYPERLIPIDEMIA LDL GOAL <100: ICD-10-CM

## 2018-02-19 DIAGNOSIS — E11.21 TYPE 2 DIABETES MELLITUS WITH DIABETIC NEPHROPATHY, WITHOUT LONG-TERM CURRENT USE OF INSULIN (H): ICD-10-CM

## 2018-02-19 DIAGNOSIS — I25.10 ASHD (ARTERIOSCLEROTIC HEART DISEASE): ICD-10-CM

## 2018-02-19 DIAGNOSIS — N13.8 BPH WITH OBSTRUCTION/LOWER URINARY TRACT SYMPTOMS: ICD-10-CM

## 2018-02-19 DIAGNOSIS — N40.1 BPH WITH OBSTRUCTION/LOWER URINARY TRACT SYMPTOMS: ICD-10-CM

## 2018-02-19 RX ORDER — CLOPIDOGREL BISULFATE 75 MG/1
75 TABLET ORAL DAILY
Qty: 90 TABLET | Refills: 0 | Status: SHIPPED | OUTPATIENT
Start: 2018-02-19 | End: 2018-05-04

## 2018-02-19 NOTE — TELEPHONE ENCOUNTER
"Requested Prescriptions   Pending Prescriptions Disp Refills     clopidogrel (PLAVIX) 75 MG tablet 90 tablet 2     Sig: Take 1 tablet (75 mg) by mouth daily    Plavix Passed    2/14/2018  4:09 PM       Passed - No active PPI on record unless is Protonix       Passed - Normal HGB on file in past 12 months    Recent Labs   Lab Test  02/14/17   1538   HGB  10.3*              Passed - Normal Platelets on file in past 12 months    Recent Labs   Lab Test  02/14/17   1538   PLT  151              Passed - Recent or future visit with authorizing provider's specialty    Patient had office visit in the last year or has a visit in the next 30 days with authorizing provider.  See \"Patient Info\" tab in inbasket, or \"Choose Columns\" in Meds & Orders section of the refill encounter.            Passed - Patient is age 18 or older        Routing refill request to provider for review/approval because:  Labs out of range:  HGB   Labs not current:  HGB last done in 2/2017        "

## 2018-02-20 RX ORDER — PIOGLITAZONEHYDROCHLORIDE 30 MG/1
30 TABLET ORAL DAILY
Qty: 90 TABLET | Refills: 0 | Status: SHIPPED | OUTPATIENT
Start: 2018-02-20 | End: 2018-05-04

## 2018-02-20 RX ORDER — LISINOPRIL 10 MG/1
10 TABLET ORAL DAILY
Qty: 90 TABLET | Refills: 0 | Status: SHIPPED | OUTPATIENT
Start: 2018-02-20 | End: 2018-05-04

## 2018-02-20 RX ORDER — TAMSULOSIN HYDROCHLORIDE 0.4 MG/1
0.4 CAPSULE ORAL DAILY
Qty: 90 CAPSULE | Refills: 0 | Status: SHIPPED | OUTPATIENT
Start: 2018-02-20 | End: 2018-05-04

## 2018-02-20 RX ORDER — OMEGA-3-ACID ETHYL ESTERS 1 G/1
1 CAPSULE, LIQUID FILLED ORAL 2 TIMES DAILY
Qty: 180 CAPSULE | Refills: 0 | Status: SHIPPED | OUTPATIENT
Start: 2018-02-20 | End: 2018-06-04

## 2018-02-20 NOTE — TELEPHONE ENCOUNTER
"See MyChart message. Patient requesting more than a 90 day supply.    Requested Prescriptions   Pending Prescriptions Disp Refills     lisinopril (PRINIVIL/ZESTRIL) 10 MG tablet 90 tablet 0     Sig: Take 1 tablet (10 mg) by mouth daily    ACE Inhibitors (Including Combos) Protocol Failed    2/20/2018  4:39 PM       Failed - Normal serum creatinine on file in past 12 months    Recent Labs   Lab Test  12/27/17   1648   CR  1.46*            Passed - Blood pressure under 140/90 in past 12 months    BP Readings from Last 3 Encounters:   12/27/17 136/68   09/18/17 124/68   09/11/17 120/68                Passed - Recent or future visit with authorizing provider's specialty    Patient had office visit in the last year or has a visit in the next 30 days with authorizing provider.  See \"Patient Info\" tab in inbasket, or \"Choose Columns\" in Meds & Orders section of the refill encounter.            Passed - Patient is age 18 or older       Passed - Normal serum potassium on file in past 12 months    Recent Labs   Lab Test  12/27/17   1648   POTASSIUM  3.8             omega-3 acid ethyl esters (LOVAZA) 1 G capsule 180 capsule 0     Sig: Take 1 capsule (1 g) by mouth 2 times daily    Antihyperlipidemic agents Passed    2/20/2018  4:39 PM       Passed - Lipid panel on file in past 12 mos    Recent Labs   Lab Test  09/06/17   0954   05/13/15   1030   CHOL  92   < >  95   TRIG  25   < >  28   HDL  70   < >  65   LDL  17   < >  24   NHDL  22   < >   --    VLDL   --    --   6   CHOLHDLRATIO   --    --   1.5    < > = values in this interval not displayed.              Passed - Normal serum ALT on record in past 12 mos    Recent Labs   Lab Test  09/06/17   0954   ALT  17            Passed - Recent or future visit with authorizing provider's specialty    Patient had office visit in the last year or has a visit in the next 30 days with authorizing provider.  See \"Patient Info\" tab in inbasket, or \"Choose Columns\" in Meds & Orders section " "of the refill encounter.            Passed - Patient is age 18 years or older        pioglitazone (ACTOS) 30 MG tablet 90 tablet 0     Sig: Take 1 tablet (30 mg) by mouth daily    Thiazolidinedione Agents (TZD's)  Failed    2/20/2018  4:39 PM       Failed - Patient has had a Microalbumin in the past 12 mos.    Recent Labs   Lab Test  02/14/17   1538   MICROL  6   UMALCR  3.60            Failed - Patient has a normal serum Creatinine in the past 12 months    Recent Labs   Lab Test  12/27/17   1648   CR  1.46*            Passed - Blood pressure less than 140/90 in past 6 months    BP Readings from Last 3 Encounters:   12/27/17 136/68   09/18/17 124/68 09/11/17 120/68                Passed - Patient has documented LDL within the past 12 mos.    Recent Labs   Lab Test  09/06/17   0954   LDL  17            Passed - Patient has a normal ALT within the past 12 mos.    Recent Labs   Lab Test  09/06/17   0954   ALT  17            Passed - Patient has a normal AST within the past 12 mos.     Recent Labs   Lab Test  09/06/17   0954   AST  22            Passed - Patient has documented A1c within the specified period of time.    Recent Labs   Lab Test  12/27/17   1648   A1C  6.2*            Passed - Diagnosis not CHF       Passed - Patient is age 18 or older       Passed - Patient has had an appointment with authorizing provider within the past 6 mos.or within the next 30 days.    Patient had office visit in the last 6 months or has a visit in the next 30 days with authorizing provider.  See \"Patient Info\" tab in inbasket, or \"Choose Columns\" in Meds & Orders section of the refill encounter.            tamsulosin (FLOMAX) 0.4 MG capsule 90 capsule 0     Sig: Take 1 capsule (0.4 mg) by mouth daily    Alpha Blockers Passed    2/20/2018  4:39 PM       Passed - Blood pressure under 140/90 in past 12 months    BP Readings from Last 3 Encounters:   12/27/17 136/68   09/18/17 124/68 09/11/17 120/68                Passed - Recent or " "future visit with authorizing provider's specialty    Patient had office visit in the last year or has a visit in the next 30 days with authorizing provider.  See \"Patient Info\" tab in inbasket, or \"Choose Columns\" in Meds & Orders section of the refill encounter.            Passed - Patient does not have Tadalafil, Vardenafil, or Sildenafil on their medication list       Passed - Patient is 18 years of age or older        metFORMIN (GLUCOPHAGE) 1000 MG tablet 180 tablet 0     Sig: Take 1 tablet (1,000 mg) by mouth 2 times daily (with meals) with food    Biguanide Agents Failed    2/20/2018  4:39 PM       Failed - Patient has had a Microalbumin in the past 12 mos.    Recent Labs   Lab Test  02/14/17   1538   MICROL  6   UMALCR  3.60            Failed - Patient's CR is NOT>1.4 OR Patient's EGFR is NOT<45 within past 12 mos.    Recent Labs   Lab Test  12/27/17   1648   GFRESTIMATED  47*   GFRESTBLACK  57*       Recent Labs   Lab Test  12/27/17   1648   CR  1.46*            Passed - Blood pressure less than 140/90 in past 6 months    BP Readings from Last 3 Encounters:   12/27/17 136/68   09/18/17 124/68   09/11/17 120/68                Passed - Patient has documented LDL within the past 12 mos.    Recent Labs   Lab Test  09/06/17   0954   LDL  17            Passed - Patient is age 10 or older       Passed - Patient has documented A1c within the specified period of time.    Recent Labs   Lab Test  12/27/17   1648   A1C  6.2*            Passed - Patient does NOT have a diagnosis of CHF.       Passed - Recent (6 mos) or future visit with authorizing provider's specialty    Patient had office visit in the last 6 months or has a visit in the next 30 days with authorizing provider.  See \"Patient Info\" tab in inbasket, or \"Choose Columns\" in Meds & Orders section of the refill encounter.            Routing refill request to provider for review/approval because:  Labs out of range:  Labs not current:          "

## 2018-02-24 ASSESSMENT — ACTIVITIES OF DAILY LIVING (ADL)
CURRENT_FUNCTION: NO ASSISTANCE NEEDED
I_NEED_ASSISTANCE_FOR_THE_FOLLOWING_DAILY_ACTIVITIES:: NO ASSISTANCE IS NEEDED

## 2018-02-27 ENCOUNTER — OFFICE VISIT (OUTPATIENT)
Dept: INTERNAL MEDICINE | Facility: CLINIC | Age: 73
End: 2018-02-27
Payer: MEDICARE

## 2018-02-27 VITALS
TEMPERATURE: 97.6 F | HEIGHT: 70 IN | OXYGEN SATURATION: 98 % | HEART RATE: 85 BPM | BODY MASS INDEX: 31.92 KG/M2 | SYSTOLIC BLOOD PRESSURE: 130 MMHG | DIASTOLIC BLOOD PRESSURE: 64 MMHG | WEIGHT: 223 LBS

## 2018-02-27 DIAGNOSIS — Z00.00 ROUTINE GENERAL MEDICAL EXAMINATION AT A HEALTH CARE FACILITY: Primary | ICD-10-CM

## 2018-02-27 DIAGNOSIS — N40.1 BENIGN PROSTATIC HYPERPLASIA WITH NOCTURIA: ICD-10-CM

## 2018-02-27 DIAGNOSIS — I10 ESSENTIAL HYPERTENSION: ICD-10-CM

## 2018-02-27 DIAGNOSIS — N18.30 CKD (CHRONIC KIDNEY DISEASE) STAGE 3, GFR 30-59 ML/MIN (H): ICD-10-CM

## 2018-02-27 DIAGNOSIS — E11.21 TYPE 2 DIABETES MELLITUS WITH DIABETIC NEPHROPATHY, WITHOUT LONG-TERM CURRENT USE OF INSULIN (H): ICD-10-CM

## 2018-02-27 DIAGNOSIS — R35.1 BENIGN PROSTATIC HYPERPLASIA WITH NOCTURIA: ICD-10-CM

## 2018-02-27 DIAGNOSIS — D64.9 ANEMIA, UNSPECIFIED TYPE: ICD-10-CM

## 2018-02-27 LAB
ALBUMIN UR-MCNC: NEGATIVE MG/DL
APPEARANCE UR: CLEAR
BASOPHILS # BLD AUTO: 0 10E9/L (ref 0–0.2)
BASOPHILS NFR BLD AUTO: 0.5 %
BILIRUB UR QL STRIP: NEGATIVE
COLOR UR AUTO: YELLOW
DIFFERENTIAL METHOD BLD: ABNORMAL
EOSINOPHIL # BLD AUTO: 0.1 10E9/L (ref 0–0.7)
EOSINOPHIL NFR BLD AUTO: 1.7 %
ERYTHROCYTE [DISTWIDTH] IN BLOOD BY AUTOMATED COUNT: 13.7 % (ref 10–15)
GLUCOSE UR STRIP-MCNC: NEGATIVE MG/DL
HBA1C MFR BLD: 5.9 % (ref 4.3–6)
HCT VFR BLD AUTO: 31.6 % (ref 40–53)
HGB BLD-MCNC: 10.6 G/DL (ref 13.3–17.7)
HGB UR QL STRIP: NEGATIVE
KETONES UR STRIP-MCNC: NEGATIVE MG/DL
LEUKOCYTE ESTERASE UR QL STRIP: NEGATIVE
LYMPHOCYTES # BLD AUTO: 1 10E9/L (ref 0.8–5.3)
LYMPHOCYTES NFR BLD AUTO: 23.6 %
MCH RBC QN AUTO: 32.2 PG (ref 26.5–33)
MCHC RBC AUTO-ENTMCNC: 33.5 G/DL (ref 31.5–36.5)
MCV RBC AUTO: 96 FL (ref 78–100)
MONOCYTES # BLD AUTO: 0.5 10E9/L (ref 0–1.3)
MONOCYTES NFR BLD AUTO: 12.4 %
NEUTROPHILS # BLD AUTO: 2.6 10E9/L (ref 1.6–8.3)
NEUTROPHILS NFR BLD AUTO: 61.8 %
NITRATE UR QL: NEGATIVE
PH UR STRIP: 6 PH (ref 5–7)
PLATELET # BLD AUTO: 193 10E9/L (ref 150–450)
RBC # BLD AUTO: 3.29 10E12/L (ref 4.4–5.9)
SOURCE: NORMAL
SP GR UR STRIP: 1.01 (ref 1–1.03)
UROBILINOGEN UR STRIP-ACNC: 1 EU/DL (ref 0.2–1)
WBC # BLD AUTO: 4.2 10E9/L (ref 4–11)

## 2018-02-27 PROCEDURE — G0439 PPPS, SUBSEQ VISIT: HCPCS | Performed by: INTERNAL MEDICINE

## 2018-02-27 PROCEDURE — 80053 COMPREHEN METABOLIC PANEL: CPT | Performed by: INTERNAL MEDICINE

## 2018-02-27 PROCEDURE — 36415 COLL VENOUS BLD VENIPUNCTURE: CPT | Performed by: INTERNAL MEDICINE

## 2018-02-27 PROCEDURE — 81003 URINALYSIS AUTO W/O SCOPE: CPT | Performed by: INTERNAL MEDICINE

## 2018-02-27 PROCEDURE — 82043 UR ALBUMIN QUANTITATIVE: CPT | Performed by: INTERNAL MEDICINE

## 2018-02-27 PROCEDURE — 85025 COMPLETE CBC W/AUTO DIFF WBC: CPT | Performed by: INTERNAL MEDICINE

## 2018-02-27 PROCEDURE — 83036 HEMOGLOBIN GLYCOSYLATED A1C: CPT | Performed by: INTERNAL MEDICINE

## 2018-02-27 RX ORDER — FINASTERIDE 5 MG/1
5 TABLET, FILM COATED ORAL DAILY
Qty: 90 TABLET | Refills: 3 | Status: SHIPPED | OUTPATIENT
Start: 2018-02-27 | End: 2018-11-17

## 2018-02-27 NOTE — LETTER
March 5, 2018      Jeff Ricks  [address] [facility] [address]        Dear ,    We are writing to inform you of your test results.    Decreased kidney function and chronic anemia, low red cells. No change. Suggest to check labs for his anemia -iron, B12, folate, LDH Rest of the results are normal.   Follow up in 6 months to recheck    Resulted Orders   Comprehensive metabolic panel   Result Value Ref Range    Sodium 136 133 - 144 mmol/L    Potassium 3.5 3.4 - 5.3 mmol/L    Chloride 103 94 - 109 mmol/L    Carbon Dioxide 27 20 - 32 mmol/L    Anion Gap 6 3 - 14 mmol/L    Glucose 89 70 - 99 mg/dL    Urea Nitrogen 19 7 - 30 mg/dL    Creatinine 1.49 (H) 0.66 - 1.25 mg/dL    GFR Estimate 46 (L) >60 mL/min/1.7m2      Comment:      Non  GFR Calc    GFR Estimate If Black 56 (L) >60 mL/min/1.7m2      Comment:       GFR Calc    Calcium 8.3 (L) 8.5 - 10.1 mg/dL    Bilirubin Total 0.5 0.2 - 1.3 mg/dL    Albumin 3.0 (L) 3.4 - 5.0 g/dL    Protein Total 5.5 (L) 6.8 - 8.8 g/dL    Alkaline Phosphatase 41 40 - 150 U/L    ALT 22 0 - 70 U/L    AST 33 0 - 45 U/L   Hemoglobin A1c   Result Value Ref Range    Hemoglobin A1C 5.9 4.3 - 6.0 %   *UA reflex to Microscopic   Result Value Ref Range    Color Urine Yellow     Appearance Urine Clear     Glucose Urine Negative NEG^Negative mg/dL    Bilirubin Urine Negative NEG^Negative    Ketones Urine Negative NEG^Negative mg/dL    Specific Gravity Urine 1.010 1.003 - 1.035    Blood Urine Negative NEG^Negative    pH Urine 6.0 5.0 - 7.0 pH    Protein Albumin Urine Negative NEG^Negative mg/dL    Urobilinogen Urine 1.0 0.2 - 1.0 EU/dL    Nitrite Urine Negative NEG^Negative    Leukocyte Esterase Urine Negative NEG^Negative    Source Midstream Urine    Albumin Random Urine Quantitative with Creat Ratio   Result Value Ref Range    Creatinine Urine 94 mg/dL    Albumin Urine mg/L <5 mg/L    Albumin Urine mg/g Cr Unable to calculate due to low value 0  - 17 mg/g Cr   CBC with platelets differential   Result Value Ref Range    WBC 4.2 4.0 - 11.0 10e9/L    RBC Count 3.29 (L) 4.4 - 5.9 10e12/L    Hemoglobin 10.6 (L) 13.3 - 17.7 g/dL    Hematocrit 31.6 (L) 40.0 - 53.0 %    MCV 96 78 - 100 fl    MCH 32.2 26.5 - 33.0 pg    MCHC 33.5 31.5 - 36.5 g/dL    RDW 13.7 10.0 - 15.0 %    Platelet Count 193 150 - 450 10e9/L      Comment:      Reviewed: OK with previous    Diff Method Automated Method     % Neutrophils 61.8 %    % Lymphocytes 23.6 %    % Monocytes 12.4 %    % Eosinophils 1.7 %    % Basophils 0.5 %    Absolute Neutrophil 2.6 1.6 - 8.3 10e9/L    Absolute Lymphocytes 1.0 0.8 - 5.3 10e9/L    Absolute Monocytes 0.5 0.0 - 1.3 10e9/L    Absolute Eosinophils 0.1 0.0 - 0.7 10e9/L    Absolute Basophils 0.0 0.0 - 0.2 10e9/L       If you have any questions or concerns, please call the clinic at the number listed above.       Sincerely,        Guillermo Deleon MD

## 2018-02-27 NOTE — MR AVS SNAPSHOT
After Visit Summary   2/27/2018    Jeff Ricks    MRN: [id number]           Patient Information     Date Of Birth          1945        Visit Information        Provider Department      2/27/2018 2:20 PM Guillermo Deleon MD [facility]        Today's Diagnoses     Routine general medical examination at a health care facility    -  1    Benign prostatic hyperplasia with nocturia        Essential hypertension        Type 2 diabetes mellitus with diabetic nephropathy, without long-term current use of insulin (H)        CKD (chronic kidney disease) stage 3, GFR 30-59 ml/min        Anemia, unspecified type          Care Instructions      Preventive Health Recommendations:       Male Ages 65 and over    Yearly exam:             See your health care provider every year in order to  o   Review health changes.   o   Discuss preventive care.    o   Review your medicines if your doctor has prescribed any.    Talk with your health care provider about whether you should have a test to screen for prostate cancer (PSA).    Every 3 years, have a diabetes test (fasting glucose). If you are at risk for diabetes, you should have this test more often.    Every 5 years, have a cholesterol test. Have this test more often if you are at risk for high cholesterol or heart disease.     Every 10 years, have a colonoscopy. Or, have a yearly FIT test (stool test). These exams will check for colon cancer.    Talk to with your health care provider about screening for Abdominal Aortic Aneurysm if you have a family history of AAA or have a history of smoking.  Shots:     Get a flu shot each year.     Get a tetanus shot every 10 years.     Talk to your doctor about your pneumonia vaccines. There are now two you should receive - Pneumovax (PPSV 23) and Prevnar (PCV 13).    Talk to your doctor about a shingles vaccine.     Talk to your doctor about the hepatitis B vaccine.  Nutrition:     Eat at least 5 servings  of fruits and vegetables each day.     Eat whole-grain bread, whole-wheat pasta and brown rice instead of white grains and rice.     Talk to your doctor about Calcium and Vitamin D.   Lifestyle    Exercise for at least 150 minutes a week (30 minutes a day, 5 days a week). This will help you control your weight and prevent disease.     Limit alcohol to one drink per day.     No smoking.     Wear sunscreen to prevent skin cancer.     See your dentist every six months for an exam and cleaning.     See your eye doctor every 1 to 2 years to screen for conditions such as glaucoma, macular degeneration and cataracts.          Follow-ups after your visit        Who to contact     If you have questions or need follow up information about today's clinic visit or your schedule please contact [facility] directly at [phone].  Normal or non-critical lab and imaging results will be communicated to you by MyChart, letter or phone within 4 business days after the clinic has received the results. If you do not hear from us within 7 days, please contact the clinic through [employer] or phone. If you have a critical or abnormal lab result, we will notify you by phone as soon as possible.  Submit refill requests through [employer] or call your pharmacy and they will forward the refill request to us. Please allow 3 business days for your refill to be completed.          Additional Information About Your Visit        [employer] Information     [employer] gives you secure access to your electronic health record. If you see a primary care provider, you can also send messages to your care team and make appointments. If you have questions, please call your primary care clinic.  If you do not have a primary care provider, please call [phone] and they will assist you.        Care EveryWhere ID     This is your Care EveryWhere ID. This could be used by other organizations to access your [facility] medical records  [id number]       "  Your Vitals Were     Pulse Temperature Height Pulse Oximetry BMI (Body Mass Index)       85 97.6  F (36.4  C) (Oral) 5' 9.69\" (1.77 m) 98% 32.29 kg/m2        Blood Pressure from Last 3 Encounters:   02/27/18 130/64   12/27/17 136/68   09/18/17 124/68    Weight from Last 3 Encounters:   02/27/18 223 lb (101.2 kg)   12/27/17 227 lb (103 kg)   09/18/17 222 lb 4.8 oz (100.8 kg)              We Performed the Following     *UA reflex to Microscopic     Albumin Random Urine Quantitative with Creat Ratio     CBC with platelets differential     Comprehensive metabolic panel     Hemoglobin A1c          Today's Medication Changes          These changes are accurate as of 2/27/18  2:59 PM.  If you have any questions, ask your nurse or doctor.               Start taking these medicines.        Dose/Directions    finasteride 5 MG tablet   Commonly known as:  PROSCAR   Used for:  Benign prostatic hyperplasia with nocturia   Started by:  Guillermo Deleon MD        Dose:  5 mg   Take 1 tablet (5 mg) by mouth daily   Quantity:  90 tablet   Refills:  3            Where to get your medicines      These medications were sent to [facility] - [place], [place] - [address] AT [address] [place] [address] [facility] [address]     Phone:  [phone]     finasteride 5 MG tablet                Primary Care Provider Office Phone # Fax #    Guillermo Deleon [phone]       [address]  [facility] [address]        Equal Access to Services     THOMAS BELLE : Hadii abigail ku hadasho Soomaali, waaxda luqadaha, qaybta kaalmada adeegyada, waxay idiin hayaan adekane bartholomew . So [facility] [phone].    ATENCIÓN: Si habla español, tiene a bishop disposición servicios gratuitos de asistencia lingüística. Llame al [phone].    We comply with applicable federal civil rights laws and [place] laws. We do not discriminate on the basis of race, color, national origin, " age, disability, sex, sexual orientation, or gender identity.            Thank you!     Thank you for choosing [facility]  for your care. Our goal is always to provide you with excellent care. Hearing back from our patients is one way we can continue to improve our services. Please take a few minutes to complete the written survey that you may receive in the mail after your visit with us. Thank you!             Your Updated Medication List - Protect others around you: Learn how to safely use, store and throw away your medicines at www.disposemymeds.org.          This list is accurate as of 2/27/18  2:59 PM.  Always use your most recent med list.                   Brand Name Dispense Instructions for use Diagnosis    clopidogrel 75 MG tablet    PLAVIX    90 tablet    Take 1 tablet (75 mg) by mouth daily    ASHD (arteriosclerotic heart disease)       finasteride 5 MG tablet    PROSCAR    90 tablet    Take 1 tablet (5 mg) by mouth daily    Benign prostatic hyperplasia with nocturia       FREESTYLE LITE test strip   Generic drug:  blood glucose monitoring     100 strip    Use to test blood sugars 1 times daily or as directed.    Type 2 diabetes, HbA1C goal < 8% (H)       hydrochlorothiazide 50 MG tablet    HYDRODIURIL    90 tablet    TAKE 1 TABLET DAILY    ASHD (arteriosclerotic heart disease)       hydrocortisone 2.5 % cream     30 g    Apply topically 2 times daily    Type II or unspecified type diabetes mellitus without mention of complication, not stated as uncontrolled       lisinopril 10 MG tablet    PRINIVIL/ZESTRIL    90 tablet    Take 1 tablet (10 mg) by mouth daily    ASHD (arteriosclerotic heart disease)       metFORMIN 1000 MG tablet    GLUCOPHAGE    180 tablet    Take 1 tablet (1,000 mg) by mouth 2 times daily (with meals) with food    Type 2 diabetes mellitus with diabetic nephropathy, without long-term current use of insulin (H)       nitroGLYcerin 0.6 MG sublingual tablet    NITROSTAT     100 tablet    DISSOLVE 1 TABLET UNDER THETONGUE AS NEEDED    ASHD (arteriosclerotic heart disease)       omega-3 acid ethyl esters 1 G capsule    Lovaza    180 capsule    Take 1 capsule (1 g) by mouth 2 times daily    Hyperlipidemia LDL goal <100       pioglitazone 30 MG tablet    ACTOS    90 tablet    Take 1 tablet (30 mg) by mouth daily    Type 2 diabetes mellitus with diabetic nephropathy, without long-term current use of insulin (H)       simvastatin 40 MG tablet    ZOCOR    90 tablet    Take 1 tablet (40 mg) by mouth At Bedtime    Hyperlipidemia LDL goal <100       tamsulosin 0.4 MG capsule    FLOMAX    90 capsule    Take 1 capsule (0.4 mg) by mouth daily    BPH with obstruction/lower urinary tract symptoms       VITAMIN C PO      Take by mouth daily        VITAMIN D3 PO      Take 1,000 Units by mouth 2 times daily        VITAMIN E NATURAL PO      Take by mouth daily

## 2018-02-27 NOTE — PROGRESS NOTES
SUBJECTIVE:   Jeff Ricks is a 72 year old male who presents for Preventive Visit.    Are you in the first 12 months of your Medicare Part B coverage?  No    Healthy Habits:  Answers for HPI/ROS submitted by the patient on 2/24/2018   Annual Exam:  Getting at least 3 servings of Calcium per day:: NO  Bi-annual eye exam:: Yes  Dental care twice a year:: Yes  Sleep apnea or symptoms of sleep apnea:: Sleep apnea  Diet:: Regular (no restrictions)  Frequency of exercise:: 2-3 days/week  Taking medications regularly:: Yes  Medication side effects:: None  Additional concerns today:: YES  Activities of Daily Living: no assistance needed  Home safety: throw rugs in the hallway, lack of grab bars in the bathroom  Hearing Impairment:: no hearing concerns  PHQ-2 Score: 0  Duration of exercise:: 30-45 minutes        Fall risk: completed, no falls           COGNITIVE SCREEN  1) Repeat 3 items (Banana, Sunrise, Chair)    2) Clock draw: NORMAL  3) 3 item recall: Recalls 3 objects  Results: 3 items recalled: COGNITIVE IMPAIRMENT LESS LIKELY    Mini-CogTM Copyright S Gonzalo. Licensed by the author for use in [facility]; reprinted with permission ([email or web address]). All rights reserved.            PROBLEMS TO ADD ON...  Has H/O DM. On diet , exercise and PO medications. Blood sugars are not checked. No parestesias. No hypoglycemias.  Has H/O hyperlipidemia. On medical treatment and diet. No side effects. No muscle weakness, myalgias or upset stomach.   Has h/o HTN. on medical treatment. BP has been controlled. No side effects from medications. No CP, HA, dizziness. good compliance with medications and low salt diet.  Has h/o anemia with CRF. Symptoms include mild fatigue, LE edema. Monitoring BP, BG, medications, avoiding OTC NSAIDs. Needs periodic recheck of kidney function.    Concern for urine symptoms of nocturia - up to 4 times at night. Has BPH with slightly elevated PSA. Seen urology.       Reviewed and updated as  needed this visit by clinical staff  Tobacco  Allergies  Meds  Med Hx  Surg Hx  Fam Hx  Soc Hx        Reviewed and updated as needed this visit by Provider        Social History   Substance Use Topics     Smoking status: Former Smoker     Packs/day: 3.00     Years: 6.00     Start date: 7/1/1961     Quit date: 1/1/1967     Smokeless tobacco: Never Used      Comment: quit 1960s     Alcohol use Yes      Comment: 1 beer a week       If you drink alcohol do you typically have >3 drinks per day or >7 drinks per week? No                        Today's PHQ-2 Score:   PHQ-2 ( 1999 [employer]) 2/24/2018 12/27/2017   Q1: Little interest or pleasure in doing things 0 0   Q2: Feeling down, depressed or hopeless 0 0   PHQ-2 Score 0 0   Q1: Little interest or pleasure in doing things Not at all -   Q2: Feeling down, depressed or hopeless Not at all -   PHQ-2 Score 0 -       Do you feel safe in your environment - Yes    Do you have a Health Care Directive?: Yes: Advance Directive has been received and scanned.    Current providers sharing in care for this patient include:   Patient Care Team:  Guillermo Deleon MD as PCP - General (Internal Medicine)    The following health maintenance items are reviewed in Epic and correct as of today:  Health Maintenance   Topic Date Due     MEDICARE ANNUAL WELLNESS VISIT  10/24/1963     AORTIC ANEURYSM SCREENING (SYSTEM ASSIGNED)  10/24/2008     FOOT EXAM Q1 YEAR  05/16/2017     MICROALBUMIN Q1 YEAR  02/14/2018     EYE EXAM Q1 YEAR  04/14/2018     A1C Q4 MO  04/27/2018     FALL RISK ASSESSMENT  06/07/2018     LIPID MONITORING Q1 YEAR  09/06/2018     CREATININE Q1 YEAR  12/27/2018     TETANUS IMMUNIZATION (SYSTEM ASSIGNED)  01/13/2019     TSH W/ FREE T4 REFLEX Q2 YEAR  12/27/2019     ADVANCE DIRECTIVE PLANNING Q5 YRS  05/16/2021     COLONOSCOPY Q10 YR  09/01/2025     INFLUENZA VACCINE (SYSTEM ASSIGNED)  Completed     PNEUMOCOCCAL  Completed     Labs reviewed in EPIC  BP Readings from Last 3  "Encounters:   02/27/18 130/64   12/27/17 136/68   09/18/17 124/68    Wt Readings from Last 3 Encounters:   02/27/18 223 lb (101.2 kg)   12/27/17 227 lb (103 kg)   09/18/17 222 lb 4.8 oz (100.8 kg)                        ROS:  CONSTITUTIONAL: NEGATIVE for fever, chills, change in weight  INTEGUMENTARY/SKIN: NEGATIVE for worrisome rashes, moles or lesions  EYES: NEGATIVE for vision changes or irritation  ENT/MOUTH: NEGATIVE for ear, mouth and throat problems  RESP: NEGATIVE for significant cough or SOB  BREAST: NEGATIVE for masses, tenderness or discharge  CV: NEGATIVE for chest pain, palpitations or peripheral edema  GI: NEGATIVE for nausea, abdominal pain, heartburn, or change in bowel habits  : NEGATIVE for frequency, dysuria, or hematuria  MUSCULOSKELETAL: NEGATIVE for significant arthralgias or myalgia  NEURO: NEGATIVE for weakness, dizziness or paresthesias  ENDOCRINE: NEGATIVE for temperature intolerance, skin/hair changes  HEME: NEGATIVE for bleeding problems  PSYCHIATRIC: NEGATIVE for changes in mood or affect    OBJECTIVE:   /64  Pulse 85  Temp 97.6  F (36.4  C) (Oral)  Ht 5' 9.69\" (1.77 m)  Wt 223 lb (101.2 kg)  SpO2 98%  BMI 32.29 kg/m2 Estimated body mass index is 32.29 kg/(m^2) as calculated from the following:    Height as of this encounter: 5' 9.69\" (1.77 m).    Weight as of this encounter: 223 lb (101.2 kg).  EXAM:   GENERAL: healthy, alert and no distress  EYES: Eyes grossly normal to inspection, PERRL and conjunctivae and sclerae normal  HENT: ear canals and TM's normal, nose and mouth without ulcers or lesions  NECK: no adenopathy, no asymmetry, masses, or scars and thyroid normal to palpation  RESP: lungs clear to auscultation - no rales, rhonchi or wheezes  CV: regular rate and rhythm, normal S1 S2, no S3 or S4, no murmur, click or rub,  peripheral pulses strong  ABDOMEN: soft, nontender, no hepatosplenomegaly, no masses and bowel sounds normal  MS: no gross musculoskeletal defects " "noted, 1+ LE edema  SKIN: no suspicious lesions or rashes  NEURO: Normal strength and tone, mentation intact and speech normal  PSYCH: mentation appears normal, affect normal/bright    ASSESSMENT / PLAN:       ICD-10-CM    1. Routine general medical examination at a health care facility Z00.00 Comprehensive metabolic panel     Hemoglobin A1c     *UA reflex to Microscopic     Albumin Random Urine Quantitative with Creat Ratio   2. Benign prostatic hyperplasia with nocturia N40.1 finasteride (PROSCAR) 5 MG tablet    R35.1    3. Essential hypertension I10 Comprehensive metabolic panel     *UA reflex to Microscopic     Albumin Random Urine Quantitative with Creat Ratio   4. Type 2 diabetes mellitus with diabetic nephropathy, without long-term current use of insulin (H) E11.21 Hemoglobin A1c     *UA reflex to Microscopic     Albumin Random Urine Quantitative with Creat Ratio   5. CKD (chronic kidney disease) stage 3, GFR 30-59 ml/min N18.3 Comprehensive metabolic panel   6. Anemia, unspecified type D64.9 CBC with platelets differential     Cont treatment  Start Proscar in addition to Flomax   Advised side effects     End of Life Planning:  Patient currently has an advanced directive: Yes.  Practitioner is supportive of decision.    COUNSELING:  Reviewed preventive health counseling, as reflected in patient instructions       Regular exercise       Healthy diet/nutrition       Vision screening       Hearing screening       Dental care       Colon cancer screening       Prostate cancer screening        Estimated body mass index is 32.29 kg/(m^2) as calculated from the following:    Height as of this encounter: 5' 9.69\" (1.77 m).    Weight as of this encounter: 223 lb (101.2 kg).  Weight management plan: Discussed healthy diet and exercise guidelines and patient will follow up in 6 months in clinic to re-evaluate.     reports that he quit smoking about 51 years ago. He started smoking about 56 years ago. He has a 18.00 " pack-year smoking history. He has never used smokeless tobacco.      Appropriate preventive services were discussed with this patient, including applicable screening as appropriate for cardiovascular disease, diabetes, osteopenia/osteoporosis, and glaucoma.  As appropriate for age/gender, discussed screening for colorectal cancer, prostate cancer, breast cancer, and cervical cancer. Checklist reviewing preventive services available has been given to the patient.    Reviewed patients plan of care and provided an AVS. The Intermediate Care Plan ( asthma action plan, low back pain action plan, and migraine action plan) for Jeff meets the Care Plan requirement. This Care Plan has been established and reviewed with the Patient.    Counseling Resources:  ATP IV Guidelines  Pooled Cohorts Equation Calculator  Breast Cancer Risk Calculator  FRAX Risk Assessment  ICSI Preventive Guidelines  Dietary Guidelines for Americans, 2010  USDA's MyPlate  ASA Prophylaxis  Lung CA Screening    Guillermo Deleon MD  [facility]

## 2018-02-27 NOTE — NURSING NOTE
"Chief Complaint   Patient presents with     Medicare Visit       Initial /64  Pulse 85  Temp 97.6  F (36.4  C) (Oral)  Ht 5' 9.69\" (1.77 m)  Wt 223 lb (101.2 kg)  SpO2 98%  BMI 32.29 kg/m2 Estimated body mass index is 32.29 kg/(m^2) as calculated from the following:    Height as of this encounter: 5' 9.69\" (1.77 m).    Weight as of this encounter: 223 lb (101.2 kg).  Medication Reconciliation: complete   Christian GILL      "

## 2018-02-28 LAB
ALBUMIN SERPL-MCNC: 3 G/DL (ref 3.4–5)
ALP SERPL-CCNC: 41 U/L (ref 40–150)
ALT SERPL W P-5'-P-CCNC: 22 U/L (ref 0–70)
ANION GAP SERPL CALCULATED.3IONS-SCNC: 6 MMOL/L (ref 3–14)
AST SERPL W P-5'-P-CCNC: 33 U/L (ref 0–45)
BILIRUB SERPL-MCNC: 0.5 MG/DL (ref 0.2–1.3)
BUN SERPL-MCNC: 19 MG/DL (ref 7–30)
CALCIUM SERPL-MCNC: 8.3 MG/DL (ref 8.5–10.1)
CHLORIDE SERPL-SCNC: 103 MMOL/L (ref 94–109)
CO2 SERPL-SCNC: 27 MMOL/L (ref 20–32)
CREAT SERPL-MCNC: 1.49 MG/DL (ref 0.66–1.25)
CREAT UR-MCNC: 94 MG/DL
GFR SERPL CREATININE-BSD FRML MDRD: 46 ML/MIN/1.7M2
GLUCOSE SERPL-MCNC: 89 MG/DL (ref 70–99)
MICROALBUMIN UR-MCNC: <5 MG/L
MICROALBUMIN/CREAT UR: NORMAL MG/G CR (ref 0–17)
POTASSIUM SERPL-SCNC: 3.5 MMOL/L (ref 3.4–5.3)
PROT SERPL-MCNC: 5.5 G/DL (ref 6.8–8.8)
SODIUM SERPL-SCNC: 136 MMOL/L (ref 133–144)

## 2018-04-12 ENCOUNTER — MYC MEDICAL ADVICE (OUTPATIENT)
Dept: INTERNAL MEDICINE | Facility: CLINIC | Age: 73
End: 2018-04-12

## 2018-05-04 DIAGNOSIS — I25.10 ASHD (ARTERIOSCLEROTIC HEART DISEASE): ICD-10-CM

## 2018-05-04 DIAGNOSIS — N40.1 BPH WITH OBSTRUCTION/LOWER URINARY TRACT SYMPTOMS: ICD-10-CM

## 2018-05-04 DIAGNOSIS — E78.5 HYPERLIPIDEMIA LDL GOAL <100: ICD-10-CM

## 2018-05-04 DIAGNOSIS — E11.21 TYPE 2 DIABETES MELLITUS WITH DIABETIC NEPHROPATHY, WITHOUT LONG-TERM CURRENT USE OF INSULIN (H): ICD-10-CM

## 2018-05-04 DIAGNOSIS — N13.8 BPH WITH OBSTRUCTION/LOWER URINARY TRACT SYMPTOMS: ICD-10-CM

## 2018-05-07 RX ORDER — LISINOPRIL 10 MG/1
TABLET ORAL
Qty: 90 TABLET | Refills: 0 | Status: SHIPPED | OUTPATIENT
Start: 2018-05-07 | End: 2018-07-16

## 2018-05-07 RX ORDER — TAMSULOSIN HYDROCHLORIDE 0.4 MG/1
CAPSULE ORAL
Qty: 90 CAPSULE | Refills: 0 | Status: SHIPPED | OUTPATIENT
Start: 2018-05-07 | End: 2018-07-16

## 2018-05-07 RX ORDER — SIMVASTATIN 40 MG
TABLET ORAL
Qty: 90 TABLET | Refills: 1 | OUTPATIENT
Start: 2018-05-07

## 2018-05-07 RX ORDER — PIOGLITAZONEHYDROCHLORIDE 30 MG/1
TABLET ORAL
Qty: 90 TABLET | Refills: 0 | Status: SHIPPED | OUTPATIENT
Start: 2018-05-07 | End: 2018-07-16

## 2018-05-07 RX ORDER — CLOPIDOGREL BISULFATE 75 MG/1
TABLET ORAL
Qty: 90 TABLET | Refills: 0 | Status: SHIPPED | OUTPATIENT
Start: 2018-05-07 | End: 2018-07-16

## 2018-05-07 NOTE — TELEPHONE ENCOUNTER
"    Refill on Simvastatin not needed           Requested Prescriptions   Pending Prescriptions Disp Refills     simvastatin (ZOCOR) 40 MG tablet [Pharmacy Med Name: SIMVASTATIN  TAB 40MG] 90 tablet 1     Sig: TAKE 1 TABLET AT BEDTIME    Statins Protocol Passed    5/4/2018  9:06 AM       Passed - LDL on file in past 12 months    Recent Labs   Lab Test  09/06/17   0954   LDL  17            Passed - No abnormal creatine kinase in past 12 months    No lab results found.            Passed - Recent (12 mo) or future (30 days) visit within the authorizing provider's specialty    Patient had office visit in the last 12 months or has a visit in the next 30 days with authorizing provider or within the authorizing provider's specialty.  See \"Patient Info\" tab in inbasket, or \"Choose Columns\" in Meds & Orders section of the refill encounter.           Passed - Patient is age 18 or older        clopidogrel (PLAVIX) 75 MG tablet [Pharmacy Med Name: CLOPIDOGREL  TAB 75MG] 90 tablet 0     Sig: TAKE 1 TABLET DAILY    Plavix Failed    5/4/2018  9:06 AM       Failed - Normal HGB on file in past 12 months    Recent Labs   Lab Test  02/27/18   1459   HGB  10.6*              Passed - No active PPI on record unless is Protonix       Passed - Normal Platelets on file in past 12 months    Recent Labs   Lab Test  02/27/18   1459   PLT  193              Passed - Recent (12 mo) or future (30 days) visit within the authorizing provider's specialty    Patient had office visit in the last 12 months or has a visit in the next 30 days with authorizing provider or within the authorizing provider's specialty.  See \"Patient Info\" tab in inbasket, or \"Choose Columns\" in Meds & Orders section of the refill encounter.           Passed - Patient is age 18 or older        pioglitazone (ACTOS) 30 MG tablet [Pharmacy Med Name: PIOGLITAZONE TAB 30MG] 90 tablet 0     Sig: TAKE 1 TABLET DAILY    Thiazolidinedione Agents (TZDs)  Failed    5/4/2018  9:06 AM       " "Failed - Patient has a normal serum Creatinine in the past 12 months    Recent Labs   Lab Test  02/27/18   1459   CR  1.49*            Passed - Blood pressure less than 140/90 in past 6 months    BP Readings from Last 3 Encounters:   02/27/18 130/64   12/27/17 136/68   09/18/17 124/68                Passed - Patient has documented LDL within the past 12 mos.    Recent Labs   Lab Test  09/06/17   0954   LDL  17            Passed - Patient has a normal ALT within the past 12 mos.    Recent Labs   Lab Test  02/27/18   1459   ALT  22            Passed - Patient has a normal AST within the past 12 mos.     Recent Labs   Lab Test  02/27/18   1459   AST  33            Passed - Patient has had a Microalbumin in the past 12 mos.    Recent Labs   Lab Test  02/27/18   1500   MICROL  <5   UMALCR  Unable to calculate due to low value            Passed - Patient has documented A1c within the specified period of time.    Recent Labs   Lab Test  02/27/18   1459   A1C  5.9            Passed - Diagnosis not CHF       Passed - Patient is age 18 or older       Passed - Recent (6 mo) or future (30 days) visit within the authorizing provider's specialty    Patient had office visit in the last 6 months or has a visit in the next 30 days with authorizing provider or within the authorizing provider's specialty.  See \"Patient Info\" tab in inbasket, or \"Choose Columns\" in Meds & Orders section of the refill encounter.            metFORMIN (GLUCOPHAGE) 1000 MG tablet [Pharmacy Med Name: METFORMIN TAB 1000MG] 180 tablet 0     Sig: TAKE 1 TABLET TWICE DAILY  (WITH MEALS) WITH FOOD    Biguanide Agents Failed    5/4/2018  9:06 AM       Failed - Patient's CR is NOT>1.4 OR Patient's EGFR is NOT<45 within past 12 mos.    Recent Labs   Lab Test  02/27/18   1459   GFRESTIMATED  46*   GFRESTBLACK  56*       Recent Labs   Lab Test  02/27/18   1459   CR  1.49*            Passed - Blood pressure less than 140/90 in past 6 months    BP Readings from Last 3 " "Encounters:   02/27/18 130/64   12/27/17 136/68   09/18/17 124/68                Passed - Patient has documented LDL within the past 12 mos.    Recent Labs   Lab Test  09/06/17   0954   LDL  17            Passed - Patient has had a Microalbumin in the past 12 mos.    Recent Labs   Lab Test  02/27/18   1500   MICROL  <5   UMALCR  Unable to calculate due to low value            Passed - Patient is age 10 or older       Passed - Patient has documented A1c within the specified period of time.    Recent Labs   Lab Test  02/27/18   1459   A1C  5.9            Passed - Patient does NOT have a diagnosis of CHF.       Passed - Recent (6 mo) or future (30 days) visit within the authorizing provider's specialty    Patient had office visit in the last 6 months or has a visit in the next 30 days with authorizing provider or within the authorizing provider's specialty.  See \"Patient Info\" tab in inbasket, or \"Choose Columns\" in Meds & Orders section of the refill encounter.            lisinopril (PRINIVIL/ZESTRIL) 10 MG tablet [Pharmacy Med Name: LISINOPRIL TAB 10MG] 90 tablet 0     Sig: TAKE 1 TABLET DAILY    ACE Inhibitors (Including Combos) Protocol Failed    5/4/2018  9:06 AM       Failed - Normal serum creatinine on file in past 12 months    Recent Labs   Lab Test  02/27/18   1459   CR  1.49*            Passed - Blood pressure under 140/90 in past 12 months    BP Readings from Last 3 Encounters:   02/27/18 130/64   12/27/17 136/68   09/18/17 124/68                Passed - Recent (12 mo) or future (30 days) visit within the authorizing provider's specialty    Patient had office visit in the last 12 months or has a visit in the next 30 days with authorizing provider or within the authorizing provider's specialty.  See \"Patient Info\" tab in inbasket, or \"Choose Columns\" in Meds & Orders section of the refill encounter.           Passed - Patient is age 18 or older       Passed - Normal serum potassium on file in past 12 months " "   Recent Labs   Lab Test  02/27/18   1459   POTASSIUM  3.5             tamsulosin (FLOMAX) 0.4 MG capsule [Pharmacy Med Name: TAMSULOSIN CAP 0.4MG] 90 capsule 0     Sig: TAKE 1 CAPSULE DAILY    Alpha Blockers Passed    5/4/2018  9:06 AM       Passed - Blood pressure under 140/90 in past 12 months    BP Readings from Last 3 Encounters:   02/27/18 130/64   12/27/17 136/68   09/18/17 124/68                Passed - Recent (12 mo) or future (30 days) visit within the authorizing provider's specialty    Patient had office visit in the last 12 months or has a visit in the next 30 days with authorizing provider or within the authorizing provider's specialty.  See \"Patient Info\" tab in inbasket, or \"Choose Columns\" in Meds & Orders section of the refill encounter.           Passed - Patient does not have Tadalafil, Vardenafil, or Sildenafil on their medication list       Passed - Patient is 18 years of age or older          "

## 2018-05-09 ENCOUNTER — TRANSFERRED RECORDS (OUTPATIENT)
Dept: HEALTH INFORMATION MANAGEMENT | Facility: CLINIC | Age: 73
End: 2018-05-09

## 2018-05-16 ENCOUNTER — MYC MEDICAL ADVICE (OUTPATIENT)
Dept: INTERNAL MEDICINE | Facility: CLINIC | Age: 73
End: 2018-05-16

## 2018-05-17 ENCOUNTER — MYC MEDICAL ADVICE (OUTPATIENT)
Dept: INTERNAL MEDICINE | Facility: CLINIC | Age: 73
End: 2018-05-17

## 2018-05-18 ENCOUNTER — OFFICE VISIT (OUTPATIENT)
Dept: INTERNAL MEDICINE | Facility: CLINIC | Age: 73
End: 2018-05-18
Payer: MEDICARE

## 2018-05-18 VITALS
SYSTOLIC BLOOD PRESSURE: 128 MMHG | DIASTOLIC BLOOD PRESSURE: 60 MMHG | WEIGHT: 231 LBS | BODY MASS INDEX: 33.07 KG/M2 | OXYGEN SATURATION: 98 % | HEART RATE: 73 BPM | TEMPERATURE: 98.2 F | HEIGHT: 70 IN

## 2018-05-18 DIAGNOSIS — R06.00 DYSPNEA, UNSPECIFIED TYPE: ICD-10-CM

## 2018-05-18 DIAGNOSIS — I10 ESSENTIAL HYPERTENSION: ICD-10-CM

## 2018-05-18 DIAGNOSIS — R60.0 BILATERAL LEG EDEMA: Primary | ICD-10-CM

## 2018-05-18 DIAGNOSIS — E11.21 TYPE 2 DIABETES MELLITUS WITH DIABETIC NEPHROPATHY, WITHOUT LONG-TERM CURRENT USE OF INSULIN (H): ICD-10-CM

## 2018-05-18 DIAGNOSIS — N18.30 CKD (CHRONIC KIDNEY DISEASE) STAGE 3, GFR 30-59 ML/MIN (H): ICD-10-CM

## 2018-05-18 LAB
ALBUMIN SERPL-MCNC: 2.7 G/DL (ref 3.4–5)
ALBUMIN UR-MCNC: NEGATIVE MG/DL
ALP SERPL-CCNC: 40 U/L (ref 40–150)
ALT SERPL W P-5'-P-CCNC: 21 U/L (ref 0–70)
ANION GAP SERPL CALCULATED.3IONS-SCNC: 7 MMOL/L (ref 3–14)
APPEARANCE UR: CLEAR
AST SERPL W P-5'-P-CCNC: 26 U/L (ref 0–45)
BILIRUB SERPL-MCNC: 0.4 MG/DL (ref 0.2–1.3)
BILIRUB UR QL STRIP: NEGATIVE
BUN SERPL-MCNC: 24 MG/DL (ref 7–30)
CALCIUM SERPL-MCNC: 8.3 MG/DL (ref 8.5–10.1)
CHLORIDE SERPL-SCNC: 106 MMOL/L (ref 94–109)
CO2 SERPL-SCNC: 26 MMOL/L (ref 20–32)
COLOR UR AUTO: YELLOW
CREAT SERPL-MCNC: 1.43 MG/DL (ref 0.66–1.25)
CREAT UR-MCNC: 119 MG/DL
ERYTHROCYTE [DISTWIDTH] IN BLOOD BY AUTOMATED COUNT: 13.4 % (ref 10–15)
GFR SERPL CREATININE-BSD FRML MDRD: 48 ML/MIN/1.7M2
GLUCOSE SERPL-MCNC: 153 MG/DL (ref 70–99)
GLUCOSE UR STRIP-MCNC: NEGATIVE MG/DL
HBA1C MFR BLD: 6 % (ref 0–5.6)
HCT VFR BLD AUTO: 31.1 % (ref 40–53)
HGB BLD-MCNC: 10.3 G/DL (ref 13.3–17.7)
HGB UR QL STRIP: NEGATIVE
KETONES UR STRIP-MCNC: NEGATIVE MG/DL
LEUKOCYTE ESTERASE UR QL STRIP: NEGATIVE
MCH RBC QN AUTO: 32 PG (ref 26.5–33)
MCHC RBC AUTO-ENTMCNC: 33.1 G/DL (ref 31.5–36.5)
MCV RBC AUTO: 97 FL (ref 78–100)
MICROALBUMIN UR-MCNC: 5 MG/L
MICROALBUMIN/CREAT UR: 4.29 MG/G CR (ref 0–17)
NITRATE UR QL: NEGATIVE
NT-PROBNP SERPL-MCNC: 136 PG/ML (ref 0–125)
PH UR STRIP: 5 PH (ref 5–7)
PLATELET # BLD AUTO: 167 10E9/L (ref 150–450)
POTASSIUM SERPL-SCNC: 3.7 MMOL/L (ref 3.4–5.3)
PROT SERPL-MCNC: 5.1 G/DL (ref 6.8–8.8)
RBC # BLD AUTO: 3.22 10E12/L (ref 4.4–5.9)
SODIUM SERPL-SCNC: 139 MMOL/L (ref 133–144)
SOURCE: NORMAL
SP GR UR STRIP: 1.01 (ref 1–1.03)
TSH SERPL DL<=0.005 MIU/L-ACNC: 1.68 MU/L (ref 0.4–4)
UROBILINOGEN UR STRIP-ACNC: 0.2 EU/DL (ref 0.2–1)
WBC # BLD AUTO: 4.7 10E9/L (ref 4–11)

## 2018-05-18 PROCEDURE — 81003 URINALYSIS AUTO W/O SCOPE: CPT | Performed by: INTERNAL MEDICINE

## 2018-05-18 PROCEDURE — 80053 COMPREHEN METABOLIC PANEL: CPT | Performed by: INTERNAL MEDICINE

## 2018-05-18 PROCEDURE — 36415 COLL VENOUS BLD VENIPUNCTURE: CPT | Performed by: INTERNAL MEDICINE

## 2018-05-18 PROCEDURE — 82043 UR ALBUMIN QUANTITATIVE: CPT | Performed by: INTERNAL MEDICINE

## 2018-05-18 PROCEDURE — 83036 HEMOGLOBIN GLYCOSYLATED A1C: CPT | Performed by: INTERNAL MEDICINE

## 2018-05-18 PROCEDURE — 99214 OFFICE O/P EST MOD 30 MIN: CPT | Performed by: INTERNAL MEDICINE

## 2018-05-18 PROCEDURE — 83880 ASSAY OF NATRIURETIC PEPTIDE: CPT | Performed by: INTERNAL MEDICINE

## 2018-05-18 PROCEDURE — 84443 ASSAY THYROID STIM HORMONE: CPT | Performed by: INTERNAL MEDICINE

## 2018-05-18 PROCEDURE — 85027 COMPLETE CBC AUTOMATED: CPT | Performed by: INTERNAL MEDICINE

## 2018-05-18 NOTE — PROGRESS NOTES
SUBJECTIVE:   Jeff Ricks is a 72 year old male who presents to clinic today for the following health issues:      Fluid retention/swelling of legs:    Presents with edema of the LE, weight gain 6 lbs for past 2 weeks.   No change in diet, activity, medications .  Has h/o CKD. No increased salt intake. No change in urine output.   Has H/O DM. On diet , exercise and PO Medications. Blood sugars are controlled. No parestesias. No hypoglycemias.  Has h/o HTN. on medical treatment. BP has been controlled. No side effects from medications. No CP, HA, dizziness. good compliance with medications and low salt diet.  Has mild SOB on exertion.           Problem list and histories reviewed & adjusted, as indicated.  Additional history: as documented    Patient Active Problem List   Diagnosis     Essential hypertension     Coronary atherosclerosis     Benign localized hyperplasia of prostate with urinary obstruction and other lower urinary tract symptoms (LUTS)(600.21)     HYPERLIPIDEMIA LDL GOAL <100     Advanced directives, counseling/discussion     Low back pain     RBBB with left anterior fascicular block     Premature beats     Type 2 diabetes mellitus with diabetic nephropathy (H)     CKD (chronic kidney disease) stage 3, GFR 30-59 ml/min     Benign prostatic hyperplasia with lower urinary tract symptoms, symptom details unspecified     Past Surgical History:   Procedure Laterality Date     BIOPSY  8/2016     C NONSPECIFIC PROCEDURE      colonoscopy approx 1999 done elsewhere     CARDIAC SURGERY       COLONOSCOPY       CORONARY ANGIOGRAPHY ADULT ORDER  8/28/2000    75% distal LAD stenosis, 80% third diagonal stenosis, 75-80% mid ramus stenosis, ostial 60% stenosis in circumflex w/90% stenosis in distal circumflex     CORONARY ANGIOGRAPHY ADULT ORDER  2003    4 stents placed     EP ABLATION / EP STUDIES  3/25/2014     HEART CATH, ANGIOPLASTY       HYDROCELECTOMY SCROTAL Right 10/6/2016    Procedure: HYDROCELECTOMY  SCROTAL;  Surgeon: Jordan Chandra MD;  Location: [facility]     TESTICLE SURGERY         Social History   Substance Use Topics     Smoking status: Former Smoker     Packs/day: 3.00     Years: 6.00     Start date: 7/1/1961     Quit date: 1/1/1967     Smokeless tobacco: Never Used      Comment: quit 1960s     Alcohol use Yes      Comment: 1 beer a week     Family History   Problem Relation Age of Onset     Musculoskeletal Disorder Mother      spinal stenosis     CANCER Mother      cancer kidney age 85     Hypertension Mother      Born 1923     DIABETES Father      Born 1923     DIABETES Brother      HEART DISEASE Brother          Current Outpatient Prescriptions   Medication Sig Dispense Refill     Ascorbic Acid (VITAMIN C PO) Take by mouth daily       Cholecalciferol (VITAMIN D3 PO) Take 1,000 Units by mouth 2 times daily        clopidogrel (PLAVIX) 75 MG tablet TAKE 1 TABLET DAILY 90 tablet 0     finasteride (PROSCAR) 5 MG tablet Take 1 tablet (5 mg) by mouth daily 90 tablet 3     hydrochlorothiazide (HYDRODIURIL) 50 MG tablet TAKE 1 TABLET DAILY 90 tablet 3     hydrocortisone 2.5 % cream Apply topically 2 times daily 30 g 11     lisinopril (PRINIVIL/ZESTRIL) 10 MG tablet TAKE 1 TABLET DAILY 90 tablet 0     metFORMIN (GLUCOPHAGE) 1000 MG tablet TAKE 1 TABLET TWICE DAILY  (WITH MEALS) WITH FOOD 180 tablet 0     nitroGLYcerin (NITROSTAT) 0.6 MG sublingual tablet DISSOLVE 1 TABLET UNDER THETONGUE AS NEEDED 100 tablet 1     omega-3 acid ethyl esters (LOVAZA) 1 G capsule Take 1 capsule (1 g) by mouth 2 times daily 180 capsule 0     pioglitazone (ACTOS) 30 MG tablet TAKE 1 TABLET DAILY 90 tablet 0     simvastatin (ZOCOR) 40 MG tablet Take 1 tablet (40 mg) by mouth At Bedtime 90 tablet 1     tamsulosin (FLOMAX) 0.4 MG capsule TAKE 1 CAPSULE DAILY 90 capsule 0     VITAMIN E NATURAL PO Take by mouth daily       Glucose Blood (FREESTYLE LITE) strip Use to test blood sugars 1 times daily or as directed. 100 strip prn  "      Reviewed and updated as needed this visit by clinical staff  Tobacco       Reviewed and updated as needed this visit by Provider         ROS:  Constitutional, HEENT, cardiovascular, pulmonary, gi and gu systems are negative, except as otherwise noted.    OBJECTIVE:     /60  Pulse 73  Temp 98.2  F (36.8  C) (Oral)  Ht 5' 10\" (1.778 m)  Wt 231 lb (104.8 kg)  SpO2 98%  BMI 33.15 kg/m2  Body mass index is 33.15 kg/(m^2).   GENERAL: healthy, alert and no distress  NECK: no adenopathy, no asymmetry, masses, or scars and thyroid normal to palpation  RESP: lungs clear to auscultation - no rales, rhonchi or wheezes  CV: regular rate and rhythm, normal S1 S2, no S3 or S4, no murmur, click or rub, and peripheral pulses strong  ABDOMEN: soft, nontender, no hepatosplenomegaly, no masses and bowel sounds normal  MS: no gross musculoskeletal defects noted, 2+ LE edema - ankles to knees     Diagnostic Test Results:  none     ASSESSMENT/PLAN:     Problem List Items Addressed This Visit     Essential hypertension    Relevant Orders    CBC with platelets (Completed)    Comprehensive metabolic panel (Completed)    Type 2 diabetes mellitus with diabetic nephropathy (H)    Relevant Orders    Hemoglobin A1c (Completed)    CKD (chronic kidney disease) stage 3, GFR 30-59 ml/min    Relevant Orders    *UA reflex to Microscopic (Completed)      Other Visit Diagnoses     Bilateral leg edema    -  Primary    Relevant Orders    CBC with platelets (Completed)    Comprehensive metabolic panel (Completed)    TSH with free T4 reflex (Completed)    Hemoglobin A1c (Completed)    Albumin Random Urine Quantitative with Creat Ratio (Completed)    BNP-N terminal pro (Completed)    *UA reflex to Microscopic (Completed)    Dyspnea, unspecified type         Relevant Orders    BNP-N terminal pro (Completed)           Assess lab work for worsening renal function CHF  May need reassessment of medical treatment regarding use of Metformin, " Actos, ACE I  Keep low salt diet.       Follow-Up:with results     Guillermo Deleon MD  [facility]

## 2018-05-22 ENCOUNTER — MYC MEDICAL ADVICE (OUTPATIENT)
Dept: INTERNAL MEDICINE | Facility: CLINIC | Age: 73
End: 2018-05-22

## 2018-05-22 DIAGNOSIS — R60.0 BILATERAL LEG EDEMA: Primary | ICD-10-CM

## 2018-05-22 RX ORDER — FUROSEMIDE 40 MG
40 TABLET ORAL DAILY
Qty: 15 TABLET | Refills: 1 | Status: SHIPPED | OUTPATIENT
Start: 2018-05-22 | End: 2018-06-22

## 2018-05-22 NOTE — TELEPHONE ENCOUNTER
Will call in Lasix.   Suggest to take Tylenol 1000 mg tid for back pain, if not better to call - 1-2 days.

## 2018-05-23 NOTE — TELEPHONE ENCOUNTER
Patient notified and expressed understanding. Pt states he will start Lasix when he returns from out-of-town next Tuesday.  Jennifer Pepper, CMA

## 2018-05-30 ENCOUNTER — MYC MEDICAL ADVICE (OUTPATIENT)
Dept: INTERNAL MEDICINE | Facility: CLINIC | Age: 73
End: 2018-05-30

## 2018-05-30 NOTE — TELEPHONE ENCOUNTER
Per note attached to 5/18 lab results-Notes Recorded by Guillermo Deleon MD on 5/21/2018 at 8:44 AM  Mild anemia and decreased kidney function are stable.   Keep low salt diet, suggest to start on diuretic - lasix in addition to HCTZ for short time to improve leg swelling.

## 2018-06-04 DIAGNOSIS — E78.5 HYPERLIPIDEMIA LDL GOAL <100: ICD-10-CM

## 2018-06-05 ENCOUNTER — MYC MEDICAL ADVICE (OUTPATIENT)
Dept: INTERNAL MEDICINE | Facility: CLINIC | Age: 73
End: 2018-06-05

## 2018-06-06 RX ORDER — OMEGA-3-ACID ETHYL ESTERS 1 G/1
CAPSULE, LIQUID FILLED ORAL
Qty: 180 CAPSULE | Refills: 1 | Status: SHIPPED | OUTPATIENT
Start: 2018-06-06 | End: 2018-11-15

## 2018-06-06 NOTE — TELEPHONE ENCOUNTER
"See 6/6 my chart       Requested Prescriptions   Pending Prescriptions Disp Refills     omega-3 acid ethyl esters (LOVAZA) 1 g capsule [Pharmacy Med Name: OMEGA-3-ACID CAP 1GM(L)] 180 capsule 0     Sig: TAKE 1 CAPSULE TWICE DAILY    Antihyperlipidemic agents Passed    6/4/2018  1:03 AM       Passed - Lipid panel on file in past 12 mos    Recent Labs   Lab Test  09/06/17   0954   05/13/15   1030   CHOL  92   < >  95   TRIG  25   < >  28   HDL  70   < >  65   LDL  17   < >  24   NHDL  22   < >   --    VLDL   --    --   6   CHOLHDLRATIO   --    --   1.5    < > = values in this interval not displayed.              Passed - Normal serum ALT on record in past 12 mos    Recent Labs   Lab Test  05/18/18   1539   ALT  21            Passed - Recent (12 mo) or future (30 days) visit within the authorizing provider's specialty    Patient had office visit in the last 12 months or has a visit in the next 30 days with authorizing provider or within the authorizing provider's specialty.  See \"Patient Info\" tab in inbasket, or \"Choose Columns\" in Meds & Orders section of the refill encounter.           Passed - Patient is age 18 years or older          "

## 2018-06-22 ENCOUNTER — RADIANT APPOINTMENT (OUTPATIENT)
Dept: GENERAL RADIOLOGY | Facility: CLINIC | Age: 73
End: 2018-06-22
Attending: INTERNAL MEDICINE
Payer: MEDICARE

## 2018-06-22 ENCOUNTER — OFFICE VISIT (OUTPATIENT)
Dept: INTERNAL MEDICINE | Facility: CLINIC | Age: 73
End: 2018-06-22
Payer: MEDICARE

## 2018-06-22 VITALS
HEIGHT: 70 IN | BODY MASS INDEX: 32.5 KG/M2 | TEMPERATURE: 98.4 F | HEART RATE: 92 BPM | WEIGHT: 227 LBS | DIASTOLIC BLOOD PRESSURE: 62 MMHG | SYSTOLIC BLOOD PRESSURE: 128 MMHG | OXYGEN SATURATION: 99 %

## 2018-06-22 DIAGNOSIS — Z12.11 SPECIAL SCREENING FOR MALIGNANT NEOPLASMS, COLON: ICD-10-CM

## 2018-06-22 DIAGNOSIS — R60.0 BILATERAL LEG EDEMA: ICD-10-CM

## 2018-06-22 DIAGNOSIS — M54.50 BILATERAL LOW BACK PAIN WITHOUT SCIATICA, UNSPECIFIED CHRONICITY: ICD-10-CM

## 2018-06-22 DIAGNOSIS — D64.9 ANEMIA, UNSPECIFIED TYPE: ICD-10-CM

## 2018-06-22 DIAGNOSIS — N18.30 CKD (CHRONIC KIDNEY DISEASE) STAGE 3, GFR 30-59 ML/MIN (H): Primary | ICD-10-CM

## 2018-06-22 LAB
FOLATE SERPL-MCNC: 9.1 NG/ML
LDH SERPL L TO P-CCNC: 163 U/L (ref 85–227)
PTH-INTACT SERPL-MCNC: 58 PG/ML (ref 18–80)
VIT B12 SERPL-MCNC: 464 PG/ML (ref 193–986)

## 2018-06-22 PROCEDURE — 83970 ASSAY OF PARATHORMONE: CPT | Performed by: INTERNAL MEDICINE

## 2018-06-22 PROCEDURE — 36415 COLL VENOUS BLD VENIPUNCTURE: CPT | Performed by: INTERNAL MEDICINE

## 2018-06-22 PROCEDURE — 72100 X-RAY EXAM L-S SPINE 2/3 VWS: CPT

## 2018-06-22 PROCEDURE — 82607 VITAMIN B-12: CPT | Performed by: INTERNAL MEDICINE

## 2018-06-22 PROCEDURE — 83550 IRON BINDING TEST: CPT | Performed by: INTERNAL MEDICINE

## 2018-06-22 PROCEDURE — 83615 LACTATE (LD) (LDH) ENZYME: CPT | Performed by: INTERNAL MEDICINE

## 2018-06-22 PROCEDURE — 80048 BASIC METABOLIC PNL TOTAL CA: CPT | Performed by: INTERNAL MEDICINE

## 2018-06-22 PROCEDURE — 82746 ASSAY OF FOLIC ACID SERUM: CPT | Performed by: INTERNAL MEDICINE

## 2018-06-22 PROCEDURE — 83540 ASSAY OF IRON: CPT | Performed by: INTERNAL MEDICINE

## 2018-06-22 PROCEDURE — 84100 ASSAY OF PHOSPHORUS: CPT | Performed by: INTERNAL MEDICINE

## 2018-06-22 PROCEDURE — 99214 OFFICE O/P EST MOD 30 MIN: CPT | Performed by: INTERNAL MEDICINE

## 2018-06-22 PROCEDURE — 83735 ASSAY OF MAGNESIUM: CPT | Performed by: INTERNAL MEDICINE

## 2018-06-22 RX ORDER — PYRIDOXINE HCL (VITAMIN B6) 100 MG
100 TABLET ORAL DAILY
COMMUNITY

## 2018-06-22 RX ORDER — CALCITRIOL 0.25 UG/1
0.25 CAPSULE, LIQUID FILLED ORAL DAILY
COMMUNITY

## 2018-06-22 RX ORDER — FUROSEMIDE 40 MG
40 TABLET ORAL DAILY
Qty: 90 TABLET | Refills: 1 | Status: SHIPPED | OUTPATIENT
Start: 2018-06-22 | End: 2018-11-17

## 2018-06-22 NOTE — PROGRESS NOTES
SUBJECTIVE:   Jeff Ricks is a 72 year old male who presents to clinic today for the following health issues:      1 month F/U on medications:    No acute complaints, no medication change or new medical conditions.  Has h/o CRF. Symptoms include LE edema. Monitoring BP, BG, medications, avoiding OTC NSAIDs. Needs periodic recheck of kidney function.  Has had improved LE edema on Lasix. Weight is also down 5 lbs.   Patient has anemia of chronic disease.  No significant  symptoms of dizziness, no GI bleed history.  Concern for LBP, chronic recurrent. No pain radiation. No neurologic deficits.       PROBLEMS TO ADD ON...  Has h/o HTN. on medical treatment. BP has been controlled. No side effects from medications. No CP, HA, dizziness. good compliance with medications and low salt diet.  Has H/O DM. On diet , exercise and oral medications. Blood sugars are controlled. No parestesias. No hypoglycemias.      Problem list and histories reviewed & adjusted, as indicated.  Additional history: as documented    Patient Active Problem List   Diagnosis     Essential hypertension     Coronary atherosclerosis     Benign localized hyperplasia of prostate with urinary obstruction and other lower urinary tract symptoms (LUTS)(600.21)     HYPERLIPIDEMIA LDL GOAL <100     Advanced directives, counseling/discussion     Low back pain     RBBB with left anterior fascicular block     Premature beats     Type 2 diabetes mellitus with diabetic nephropathy (H)     CKD (chronic kidney disease) stage 3, GFR 30-59 ml/min     Benign prostatic hyperplasia with lower urinary tract symptoms, symptom details unspecified     Past Surgical History:   Procedure Laterality Date     BIOPSY  8/2016     C NONSPECIFIC PROCEDURE      colonoscopy approx 1999 done elsewhere     CARDIAC SURGERY       COLONOSCOPY       CORONARY ANGIOGRAPHY ADULT ORDER  8/28/2000    75% distal LAD stenosis, 80% third diagonal stenosis, 75-80% mid ramus stenosis, ostial 60%  stenosis in circumflex w/90% stenosis in distal circumflex     CORONARY ANGIOGRAPHY ADULT ORDER  2003    4 stents placed     EP ABLATION / EP STUDIES  3/25/2014     HEART CATH, ANGIOPLASTY       HYDROCELECTOMY SCROTAL Right 10/6/2016    Procedure: HYDROCELECTOMY SCROTAL;  Surgeon: Jordan Chandra MD;  Location:  [facility]     TESTICLE SURGERY         Social History   Substance Use Topics     Smoking status: Former Smoker     Packs/day: 3.00     Years: 6.00     Start date: 7/1/1961     Quit date: 1/1/1967     Smokeless tobacco: Never Used      Comment: quit 1960s     Alcohol use Yes      Comment: 1 beer a week     Family History   Problem Relation Age of Onset     Musculoskeletal Disorder Mother      spinal stenosis     Cancer Mother      cancer kidney age 85     Hypertension Mother      Born 1923     Diabetes Father      Born 1923     Diabetes Brother      HEART DISEASE Brother          Current Outpatient Prescriptions   Medication Sig Dispense Refill     Ascorbic Acid (VITAMIN C PO) Take by mouth daily       calcitRIOL (ROCALTROL) 0.25 MCG capsule Take 0.25 mcg by mouth every other day       Cholecalciferol (VITAMIN D3 PO) Take 1,000 Units by mouth 2 times daily        clopidogrel (PLAVIX) 75 MG tablet TAKE 1 TABLET DAILY 90 tablet 0     Cyanocobalamin (B-12) 1000 MCG CAPS Take by mouth daily       finasteride (PROSCAR) 5 MG tablet Take 1 tablet (5 mg) by mouth daily 90 tablet 3     furosemide (LASIX) 40 MG tablet Take 1 tablet (40 mg) by mouth daily 90 tablet 1     Glucose Blood (FREESTYLE LITE) strip Use to test blood sugars 1 times daily or as directed. 100 strip prn     hydrocortisone 2.5 % cream Apply topically 2 times daily 30 g 11     lisinopril (PRINIVIL/ZESTRIL) 10 MG tablet TAKE 1 TABLET DAILY 90 tablet 0     metFORMIN (GLUCOPHAGE) 1000 MG tablet TAKE 1 TABLET TWICE DAILY  (WITH MEALS) WITH FOOD 180 tablet 0     nitroGLYcerin (NITROSTAT) 0.6 MG sublingual tablet DISSOLVE 1 TABLET UNDER THETONGUE AS  "NEEDED 100 tablet 1     omega-3 acid ethyl esters (LOVAZA) 1 g capsule TAKE 1 CAPSULE TWICE DAILY 180 capsule 1     pioglitazone (ACTOS) 30 MG tablet TAKE 1 TABLET DAILY 90 tablet 0     Pyridoxine HCl (B-6) 100 MG TABS Take by mouth daily       simvastatin (ZOCOR) 40 MG tablet Take 1 tablet (40 mg) by mouth At Bedtime 90 tablet 1     tamsulosin (FLOMAX) 0.4 MG capsule TAKE 1 CAPSULE DAILY 90 capsule 0     VITAMIN E NATURAL PO Take by mouth daily       [DISCONTINUED] furosemide (LASIX) 40 MG tablet Take 1 tablet (40 mg) by mouth daily 15 tablet 1       Reviewed and updated as needed this visit by clinical staff       Reviewed and updated as needed this visit by Provider         ROS:  Constitutional, HEENT, cardiovascular, pulmonary, gi and gu systems are negative, except as otherwise noted.    OBJECTIVE:     /62  Pulse 92  Temp 98.4  F (36.9  C) (Oral)  Ht 5' 10\" (1.778 m)  Wt 227 lb (103 kg)  SpO2 99%  BMI 32.57 kg/m2  Body mass index is 32.57 kg/(m^2).   GENERAL: overweight, healthy, alert and no distress  NECK: no adenopathy, no asymmetry, masses, or scars and thyroid normal to palpation  RESP: lungs clear to auscultation - no rales, rhonchi or wheezes  CV: regular rate and rhythm, normal S1 S2, no S3 or S4, no murmur, click or rub, 1+ peripheral edema and peripheral pulses strong  ABDOMEN: soft, nontender, no hepatosplenomegaly, no masses and bowel sounds normal  MS: no gross musculoskeletal defects noted    Diagnostic Test Results:  none     ASSESSMENT/PLAN:     Problem List Items Addressed This Visit     Low back pain    Relevant Orders    XR Lumbar Spine 2/3 Views    [facility] REFERRAL    CKD (chronic kidney disease) stage 3, GFR 30-59 ml/min - Primary    Relevant Orders    US Renal Complete    Parathyroid Hormone Intact (Completed)    Phosphorus (Completed)    Magnesium (Completed)    Basic metabolic panel (Completed)      Other Visit Diagnoses     Bilateral leg edema        " Relevant Medications    furosemide (LASIX) 40 MG tablet    Anemia, unspecified type        Relevant Medications    Cyanocobalamin (B-12) 1000 MCG CAPS    Other Relevant Orders    Iron and iron binding capacity (Completed)    Vitamin B12 (Completed)    Folate (Completed)    Lactate Dehydrogenase (Completed)    Fecal colorectal cancer screen (FIT)    Special screening for malignant neoplasms, colon        Relevant Orders    Fecal colorectal cancer screen (FIT)           Assess renal function, renal US  Assess anemia, likely CKD related   Change from HCTZ to Lasix treatment, monitor renal function and electrolytes   Cont rest of medications , consider change to insulin if renal function and edema worsens and unable to tolerate Metformin and Actos     Follow-Up:in 3 months     Guillermo Deleon MD  [facility]

## 2018-06-22 NOTE — MR AVS SNAPSHOT
After Visit Summary   6/22/2018    Jeff Ricks    MRN: [id number]           Patient Information     Date Of Birth          1945        Visit Information        Provider Department      6/22/2018 2:40 PM Guillermo Deleon MD [facility]        Today's Diagnoses     CKD (chronic kidney disease) stage 3, GFR 30-59 ml/min    -  1    Bilateral leg edema        Anemia, unspecified type        Special screening for malignant neoplasms, colon        Bilateral low back pain without sciatica, unspecified chronicity           Follow-ups after your visit        Additional Services     [facility], AND CHIR[facility]RACTIC REFERRAL       **This order will print in the [facility] Scheduling Office**    Physical Therapy, Hand Therapy and Chiropractic Care are available through:    *[facility]  *[facility]  *[facility]    Call one number to schedule at any of the above locations: [phone].    Your provider has referred you to: Physical Therapy at [facility] or [facility]    Indication/Reason for Referral: Low Back Pain  Onset of Illness: chronic , worsened   Therapy Orders: Evaluate and Treat  Special Programs: None  Special Request: Jaspreet Lawson      Additional Comments for the Therapist or Chiropractor:     Please be aware that coverage of these services is subject to the terms and limitations of your health insurance plan.  Call member services at your health plan with any benefit or coverage questions.      Please bring the following to your appointment:    *Your personal calendar for scheduling future appointments  *Comfortable clothing                  Follow-up notes from your care team     Return in about 3 months (around 9/22/2018) for Lab Work, Routine Visit.      Future tests that were ordered for you today     Open Future Orders        Priority Expected Expires Ordered    XR Lumbar Spine 2/3 Views Routine 6/22/2018 6/22/2019 6/22/2018     "Fecal colorectal cancer screen (FIT) Routine 7/13/2018 9/14/2018 6/22/2018    US Renal Complete Routine  6/22/2019 6/22/2018            Who to contact     If you have questions or need follow up information about today's clinic visit or your schedule please contact [facility] directly at [phone].  Normal or non-critical lab and imaging results will be communicated to you by [employer], letter or phone within 4 business days after the clinic has received the results. If you do not hear from us within 7 days, please contact the clinic through [employer] or phone. If you have a critical or abnormal lab result, we will notify you by phone as soon as possible.  Submit refill requests through [employer] or call your pharmacy and they will forward the refill request to us. Please allow 3 business days for your refill to be completed.          Additional Information About Your Visit        [employer] Information     [employer] gives you secure access to your electronic health record. If you see a primary care provider, you can also send messages to your care team and make appointments. If you have questions, please call your primary care clinic.  If you do not have a primary care provider, please call [phone] and they will assist you.        Care EveryWhere ID     This is your Care EveryWhere ID. This could be used by other organizations to access your [facility] medical records  [id number]        Your Vitals Were     Pulse Temperature Height Pulse Oximetry BMI (Body Mass Index)       92 98.4  F (36.9  C) (Oral) 5' 10\" (1.778 m) 99% 32.57 kg/m2        Blood Pressure from Last 3 Encounters:   06/22/18 128/62   05/18/18 128/60   02/27/18 130/64    Weight from Last 3 Encounters:   06/22/18 227 lb (103 kg)   05/18/18 231 lb (104.8 kg)   02/27/18 223 lb (101.2 kg)              We Performed the Following     Basic metabolic panel     Folate     [facility] CHIR[facility]RACTIC REFERRAL     Iron and iron binding capacity "     Lactate Dehydrogenase     Magnesium     Parathyroid Hormone Intact     Phosphorus     Vitamin B12          Today's Medication Changes          These changes are accurate as of 6/22/18  3:12 PM.  If you have any questions, ask your nurse or doctor.               Stop taking these medicines if you haven't already. Please contact your care team if you have questions.     hydrochlorothiazide 50 MG tablet   Commonly known as:  HYDRODIURIL   Stopped by:  Guillermo Deleon MD                Where to get your medicines      These medications were sent to [facility] - [place], [place] - [address] AT [address] [place] [address] [facility], [facility] [address]     Phone:  [phone]     furosemide 40 MG tablet                Primary Care Provider Office Phone # Fax #    Guillermo Deleon [phone]       [address]  [facility] [address]        Equal Access to Services     [facility]: Hadii abigail bruner hadasho Soomaali, waaxda luqadaha, qaybta kaalmada adeegyada, ayo chacko haycharley bartholomew . So [facility] [phone].    ATENCIÓN: Si habla español, tiene a bishop disposición servicios gratuitos de asistencia lingüística. Patricia al [phone].    We comply with applicable federal civil rights laws and [place] laws. We do not discriminate on the basis of race, color, national origin, age, disability, sex, sexual orientation, or gender identity.            Thank you!     Thank you for choosing [facility]  for your care. Our goal is always to provide you with excellent care. Hearing back from our patients is one way we can continue to improve our services. Please take a few minutes to complete the written survey that you may receive in the mail after your visit with us. Thank you!             Your Updated Medication List - Protect others around you: Learn how to safely use, store and throw away your medicines at  www.disposemymeds.org.          This list is accurate as of 6/22/18  3:12 PM.  Always use your most recent med list.                   Brand Name Dispense Instructions for use Diagnosis    B-12 1000 MCG Caps      Take by mouth daily        B-6 100 MG Tabs      Take by mouth daily        calcitRIOL 0.25 MCG capsule    ROCALTROL     Take 0.25 mcg by mouth every other day        clopidogrel 75 MG tablet    PLAVIX    90 tablet    TAKE 1 TABLET DAILY    ASHD (arteriosclerotic heart disease)       finasteride 5 MG tablet    PROSCAR    90 tablet    Take 1 tablet (5 mg) by mouth daily    Benign prostatic hyperplasia with nocturia       FREESTYLE LITE test strip   Generic drug:  blood glucose monitoring     100 strip    Use to test blood sugars 1 times daily or as directed.    Type 2 diabetes, HbA1C goal < 8% (H)       furosemide 40 MG tablet    LASIX    90 tablet    Take 1 tablet (40 mg) by mouth daily    Bilateral leg edema       hydrocortisone 2.5 % cream     30 g    Apply topically 2 times daily    Type II or unspecified type diabetes mellitus without mention of complication, not stated as uncontrolled       lisinopril 10 MG tablet    PRINIVIL/ZESTRIL    90 tablet    TAKE 1 TABLET DAILY    ASHD (arteriosclerotic heart disease)       metFORMIN 1000 MG tablet    GLUCOPHAGE    180 tablet    TAKE 1 TABLET TWICE DAILY  (WITH MEALS) WITH FOOD    Type 2 diabetes mellitus with diabetic nephropathy, without long-term current use of insulin (H)       nitroGLYcerin 0.6 MG sublingual tablet    NITROSTAT    100 tablet    DISSOLVE 1 TABLET UNDER THETONGUE AS NEEDED    ASHD (arteriosclerotic heart disease)       omega-3 acid ethyl esters 1 g capsule    Lovaza    180 capsule    TAKE 1 CAPSULE TWICE DAILY    Hyperlipidemia LDL goal <100       pioglitazone 30 MG tablet    ACTOS    90 tablet    TAKE 1 TABLET DAILY    Type 2 diabetes mellitus with diabetic nephropathy, without long-term current use of insulin (H)       simvastatin 40 MG  tablet    ZOCOR    90 tablet    Take 1 tablet (40 mg) by mouth At Bedtime    Hyperlipidemia LDL goal <100       tamsulosin 0.4 MG capsule    FLOMAX    90 capsule    TAKE 1 CAPSULE DAILY    BPH with obstruction/lower urinary tract symptoms       VITAMIN C PO      Take by mouth daily        VITAMIN D3 PO      Take 1,000 Units by mouth 2 times daily        VITAMIN E NATURAL PO      Take by mouth daily

## 2018-06-23 LAB
ANION GAP SERPL CALCULATED.3IONS-SCNC: 11 MMOL/L (ref 3–14)
BUN SERPL-MCNC: 26 MG/DL (ref 7–30)
CALCIUM SERPL-MCNC: 8.6 MG/DL (ref 8.5–10.1)
CHLORIDE SERPL-SCNC: 104 MMOL/L (ref 94–109)
CO2 SERPL-SCNC: 24 MMOL/L (ref 20–32)
CREAT SERPL-MCNC: 1.62 MG/DL (ref 0.66–1.25)
GFR SERPL CREATININE-BSD FRML MDRD: 42 ML/MIN/1.7M2
GLUCOSE SERPL-MCNC: 139 MG/DL (ref 70–99)
IRON SATN MFR SERPL: 26 % (ref 15–46)
IRON SERPL-MCNC: 93 UG/DL (ref 35–180)
MAGNESIUM SERPL-MCNC: 1.6 MG/DL (ref 1.6–2.3)
PHOSPHATE SERPL-MCNC: 2.3 MG/DL (ref 2.5–4.5)
POTASSIUM SERPL-SCNC: 3.7 MMOL/L (ref 3.4–5.3)
SODIUM SERPL-SCNC: 139 MMOL/L (ref 133–144)
TIBC SERPL-MCNC: 363 UG/DL (ref 240–430)

## 2018-06-25 DIAGNOSIS — N18.30 CKD (CHRONIC KIDNEY DISEASE) STAGE 3, GFR 30-59 ML/MIN (H): Primary | ICD-10-CM

## 2018-06-27 PROCEDURE — 82274 ASSAY TEST FOR BLOOD FECAL: CPT | Performed by: INTERNAL MEDICINE

## 2018-06-28 ENCOUNTER — THERAPY VISIT (OUTPATIENT)
Dept: PHYSICAL THERAPY | Facility: CLINIC | Age: 73
End: 2018-06-28
Payer: MEDICARE

## 2018-06-28 DIAGNOSIS — M54.50 ACUTE BILATERAL LOW BACK PAIN WITHOUT SCIATICA: Primary | ICD-10-CM

## 2018-06-28 PROCEDURE — 97161 PT EVAL LOW COMPLEX 20 MIN: CPT | Mod: GP | Performed by: PHYSICAL THERAPIST

## 2018-06-28 PROCEDURE — 97110 THERAPEUTIC EXERCISES: CPT | Mod: GP | Performed by: PHYSICAL THERAPIST

## 2018-06-28 PROCEDURE — G8979 MOBILITY GOAL STATUS: HCPCS | Mod: GP | Performed by: PHYSICAL THERAPIST

## 2018-06-28 PROCEDURE — G8978 MOBILITY CURRENT STATUS: HCPCS | Mod: GP | Performed by: PHYSICAL THERAPIST

## 2018-06-28 NOTE — PROGRESS NOTES
[facility] Initial Evaluation  Subjective:  Patient is a 72 year old male presenting with rehab back hpi.           Pt describes LBP and stiffness.  Worse in the right LB.  Began in late April after moving, but has been worse the last 3 weeks for unknown reasons.  Hx of episodic LBP for many years.  Referred to PT on 6/22/18..    Patient reports pain:  Lower lumbar spine and lumbar spine right.  Radiates to:  No radiation.  Pain is described as sharp and aching (stiffness) and is intermittent (constant stiffness) and reported as 3/10.  Associated symptoms:  Loss of motion/stiffness. Pain is the same all the time.  Symptoms are exacerbated by standing and walking and relieved by heat (sitting).  Since onset symptoms are unchanged.  Special tests:  X-ray (US).  Previous treatment: none.    General health as reported by patient is good.                  Barriers include:  None as reported by the patient.    Red flags:  None as reported by the patient.                        Objective:  Standing Alignment:        Lumbar:  Lordosis incr                           Lumbar/SI Evaluation  ROM:    AROM Lumbar:   Flexion:          Full  Ext:                    Moderate loss   Side Bend:        Left:  Moderate loss     Right:  Moderate loss with right LBP  Rotation:           Left:     Right:   Side Glide:        Left:     Right:                   Neural Tension/Mobility:  Lumbar:  Normal        Lumbar Palpation:  Palpation (lumbar): Tight QL and erector spinae bilaterally.                                                         General     ROS    Assessment/Plan:    Patient is a 72 year old male with lumbar complaints.    Patient has the following significant findings with corresponding treatment plan.                Diagnosis 1:  LBP  Pain -  hot/cold therapy, manual therapy, education and home program  Decreased ROM/flexibility - manual therapy, therapeutic exercise and home program  Decreased strength -  therapeutic exercise, therapeutic activities and home program    Therapy Evaluation Codes:   1) History comprised of:   Personal factors that impact the plan of care:      None.    Comorbidity factors that impact the plan of care are:      None.     Medications impacting care: None.  2) Examination of Body Systems comprised of:   Body structures and functions that impact the plan of care:      Lumbar spine.   Activity limitations that impact the plan of care are:      Standing and Walking.  3) Clinical presentation characteristics are:   Stable/Uncomplicated.  4) Decision-Making    Low complexity using standardized patient assessment instrument and/or measureable assessment of functional outcome.  Cumulative Therapy Evaluation is: Low complexity.    Previous and current functional limitations:  (See Goal Flow Sheet for this information)    Short term and Long term goals: (See Goal Flow Sheet for this information)     Communication ability:  Patient appears to be able to clearly communicate and understand verbal and written communication and follow directions correctly.  Treatment Explanation - The following has been discussed with the patient:   RX ordered/plan of care  Anticipated outcomes  Possible risks and side effects  This patient would benefit from PT intervention to resume normal activities.   Rehab potential is good.    Frequency:  1 X week, once daily  Duration:  for 4 weeks  Discharge Plan:  Achieve all LTG.  Independent in home treatment program.  Reach maximal therapeutic benefit.    Please refer to the daily flowsheet for treatment today, total treatment time and time spent performing 1:1 timed codes.

## 2018-06-28 NOTE — LETTER
DEPARTMENT OF HEALTH AND HUMAN SERVICES  CENTERS FOR MEDICARE & MEDICAID SERVICES    PLAN/UPDATED PLAN OF PROGRESS FOR OUTPATIENT REHABILITATION    PATIENTS NAME:  Jeff Ricks   : 1945    PROVIDER NUMBER:    [id number]    [facility]  [id number]     PROVIDER NAME: [facility]    MEDICAL RECORD NUMBER: [id number]     START OF CARE DATE:  SOC Date: 18   TYPE:  PT    PRIMARY/TREATMENT DIAGNOSIS: (Pertinent Medical Diagnosis)  Acute bilateral low back pain without sciatica    VISITS FROM START OF CARE:  Rxs Used: 1     [place] [facility] Initial Evaluation  Subjective:  Patient is a 72 year old male presenting with rehab back hpi.   Pt describes LBP and stiffness.  Worse in the right LB.  Began in late April after moving, but has been worse the last 3 weeks for unknown reasons.  Hx of episodic LBP for many years.  Referred to PT on 18.   Patient reports pain:  Lower lumbar spine and lumbar spine right.  Radiates to:  No radiation.  Pain is described as sharp and aching (stiffness) and is intermittent (constant stiffness) and reported as 3/10.  Associated symptoms:  Loss of motion/stiffness. Pain is the same all the time.  Symptoms are exacerbated by standing and walking and relieved by heat (sitting).  Since onset symptoms are unchanged.  Special tests:  X-ray (US).  Previous treatment: none. General health as reported by patient is good.                Barriers include:  None as reported by the patient.  Red flags:  None as reported by the patient.  Pertinent medical history includes:  Anemia, diabetes, heart problems and sleep disorder/apnea.    Surgical history: 4 stints.  Current medications:  High blood pressure medication.  Current occupation is computers.    Primary job tasks include:  Prolonged sitting (computer work).    Objective:  Standing Alignment:    Lumbar:  Lordosis incr       Lumbar/SI Evaluation  ROM:    AROM Lumbar:   Flexion:           Full  Ext:                    Moderate loss   Side Bend:        Left:  Moderate loss     Right:  Moderate loss with right LBP  Rotation:           Left:     Right:   Side Glide:        Left:     Right:     PATIENTS NAME:  Jeff Ricks   : 1945        Neural Tension/Mobility:  Lumbar:  Normal    Lumbar Palpation:  Palpation (lumbar): Tight QL and erector spinae bilaterally.    Assessment/Plan:    Patient is a 72 year old male with lumbar complaints.    Patient has the following significant findings with corresponding treatment plan.                Diagnosis 1:  LBP  Pain -  hot/cold therapy, manual therapy, education and home program  Decreased ROM/flexibility - manual therapy, therapeutic exercise and home program  Decreased strength - therapeutic exercise, therapeutic activities and home program    Therapy Evaluation Codes:   1) History comprised of:   Personal factors that impact the plan of care:      None.    Comorbidity factors that impact the plan of care are:      None.     Medications impacting care: None.  2) Examination of Body Systems comprised of:   Body structures and functions that impact the plan of care:      Lumbar spine.   Activity limitations that impact the plan of care are:      Standing and Walking.  3) Clinical presentation characteristics are:   Stable/Uncomplicated.  4) Decision-Making    Low complexity using standardized patient assessment instrument and/or measureable assessment of functional outcome.  Cumulative Therapy Evaluation is: Low complexity.    Previous and current functional limitations:  (See Goal Flow Sheet for this information)    Short term and Long term goals: (See Goal Flow Sheet for this information)     Communication ability:  Patient appears to be able to clearly communicate and understand verbal and written communication and follow directions correctly.  Treatment Explanation - The following has been discussed with the patient:   RX ordered/plan of  "care  Anticipated outcomes  Possible risks and side effects  This patient would benefit from PT intervention to resume normal activities.   Rehab potential is good.    Frequency:  1 X week, once daily  Duration:  for 4 weeks  Discharge Plan:  Achieve all LTG.  Independent in home treatment program.  Reach maximal therapeutic benefit.        PATIENTS NAME:  Jeff Ricks   : 1945                                        Caregiver Signature/Credentials _____________________________ Date ________       Treating Provider: Rom Amaral, PT   I have reviewed and certified the need for these services and plan of treatment while under my care.        PHYSICIAN'S SIGNATURE:   _________________________________________  Date___________   Guillermo Deleon MD    Certification period:  Beginning of Cert date period: 18 to  End of Cert period date: 18     Functional Level Progress Report: Please see attached \"Goal Flow sheet for Functional level.\"    ____X____ Continue Services or       ________ DC Services                Service dates: From  SOC Date: 18 date to present                         "

## 2018-06-29 DIAGNOSIS — Z12.11 SPECIAL SCREENING FOR MALIGNANT NEOPLASMS, COLON: ICD-10-CM

## 2018-06-29 DIAGNOSIS — D64.9 ANEMIA, UNSPECIFIED TYPE: ICD-10-CM

## 2018-06-29 LAB — HEMOCCULT STL QL IA: NEGATIVE

## 2018-06-29 NOTE — PROGRESS NOTES
[facility] [facility] Initial Evaluation  Subjective:  Patient is a 72 year old male presenting with rehab left ankle/foot hpi.                                      Pertinent medical history includes:  Anemia, diabetes, heart problems and sleep disorder/apnea.    Surgical history: 4 stints.  Current medications:  High blood pressure medication.  Current occupation is computers.    Primary job tasks include:  Prolonged sitting (computer work).                                Objective:  System    Physical Exam    General     ROS    Assessment/Plan:

## 2018-07-02 ENCOUNTER — TELEPHONE (OUTPATIENT)
Dept: INTERNAL MEDICINE | Facility: CLINIC | Age: 73
End: 2018-07-02

## 2018-07-02 NOTE — TELEPHONE ENCOUNTER
This request is forwarded to Dr Pearson who covers Dr Bhatti and Dr Deleon patients with the letters O,P,Q,R      [facility]  Evaluation notes and goals  Gave to Michel since Fong is gone  Fax back

## 2018-07-05 ENCOUNTER — THERAPY VISIT (OUTPATIENT)
Dept: PHYSICAL THERAPY | Facility: CLINIC | Age: 73
End: 2018-07-05
Payer: MEDICARE

## 2018-07-05 DIAGNOSIS — M54.50 ACUTE BILATERAL LOW BACK PAIN WITHOUT SCIATICA: ICD-10-CM

## 2018-07-05 PROCEDURE — 97110 THERAPEUTIC EXERCISES: CPT | Mod: GP

## 2018-07-12 ENCOUNTER — THERAPY VISIT (OUTPATIENT)
Dept: PHYSICAL THERAPY | Facility: CLINIC | Age: 73
End: 2018-07-12
Payer: MEDICARE

## 2018-07-12 DIAGNOSIS — M54.50 ACUTE BILATERAL LOW BACK PAIN WITHOUT SCIATICA: ICD-10-CM

## 2018-07-12 PROCEDURE — 97110 THERAPEUTIC EXERCISES: CPT | Mod: GP

## 2018-07-12 NOTE — PROGRESS NOTES
Subjective:  HPI                    Objective:  System    Physical Exam    General     ROS    Assessment/Plan:    DISCHARGE REPORT    Progress reporting period is from 6/28/2018 to 7/12/2018.       SUBJECTIVE  Subjective changes noted by patient: States he is doing well.  He is able to walk for 25 minutes and perform his normal exercise program.  He does have to limit his lifting and sitting time.      Current pain level is:1/10.     Changes in function:  Yes (See Goal flowsheet attached for changes in current functional level)  Adverse reaction to treatment or activity: None    OBJECTIVE  Changes noted in objective findings:  Yes,   Objective: AROM lumbar- WNL.  Plank X 15 seconds, core strength- fair +      ASSESSMENT/PLAN  Updated problem list and treatment plan: Diagnosis 1:  LBP  Pain -  hot/cold therapy  Decreased ROM/flexibility - manual therapy and therapeutic exercise  Decreased strength - therapeutic exercise and therapeutic activities  STG/LTGs have been met or progress has been made towards goals:  Yes (See Goal flow sheet completed today.)  Assessment of Progress: The patient's condition is improving.  Self Management Plans:  Patient has been instructed in a home treatment program.    Jeff continues to require the following intervention to meet STG and LTG's:  PT intervention is no longer required to meet STG/LTG.    Recommendations:  This patient is ready to be discharged from therapy and continue their home treatment program.    Please refer to the daily flowsheet for treatment today, total treatment time and time spent performing 1:1 timed codes.

## 2018-07-12 NOTE — MR AVS SNAPSHOT
After Visit Summary   7/12/2018    Jeff Ricks    MRN: [id number]           Patient Information     Date Of Birth          1945        Visit Information        Provider Department      7/12/2018 3:40 PM April Cuevas PTA [facility] [facility]        Today's Diagnoses     Acute bilateral low back pain without sciatica           Follow-ups after your visit        Who to contact     If you have questions or need follow up information about today's clinic visit or your schedule please contact [facility] directly at [phone].  Normal or non-critical lab and imaging results will be communicated to you by [facility], letter or phone within 4 business days after the clinic has received the results. If you do not hear from us within 7 days, please contact the clinic through [employer] or phone. If you have a critical or abnormal lab result, we will notify you by phone as soon as possible.  Submit refill requests through [employer] or call your pharmacy and they will forward the refill request to us. Please allow 3 business days for your refill to be completed.          Additional Information About Your Visit        MyChart Information     [employer] gives you secure access to your electronic health record. If you see a primary care provider, you can also send messages to your care team and make appointments. If you have questions, please call your primary care clinic.  If you do not have a primary care provider, please call [phone] and they will assist you.        Care EveryWhere ID     This is your Care EveryWhere ID. This could be used by other organizations to access your [facility] medical records  [id number]         Blood Pressure from Last 3 Encounters:   06/22/18 128/62   05/18/18 128/60   02/27/18 130/64    Weight from Last 3 Encounters:   06/22/18 103 kg (227 lb)   05/18/18 104.8 kg (231 lb)   02/27/18 101.2 kg (223 lb)              We Performed the  Following     Hot or Cold Packs Therapy     Therapeutic Exercises        Primary Care Provider Office Phone # Fax #    Guillermo Deleon [phone]       [address]  [facility] [id number]        Equal Access to Services     DANO BELLE : Hadii aad ku hadclariceo Soomaali, waaxda luqadaha, qaybta kaalmada adeegyada, waxshea herzogn jenifer nunez laBuzzcharley uribe. So [facility] [phone].    ATENCIÓN: Si habla español, tiene a bishop disposición servicios gratuitos de asistencia lingüística. Llame al [phone].    We comply with applicable federal civil rights laws and [place] laws. We do not discriminate on the basis of race, color, national origin, age, disability, sex, sexual orientation, or gender identity.            Thank you!     Thank you for choosing [facility] [place]  for your care. Our goal is always to provide you with excellent care. Hearing back from our patients is one way we can continue to improve our services. Please take a few minutes to complete the written survey that you may receive in the mail after your visit with us. Thank you!             Your Updated Medication List - Protect others around you: Learn how to safely use, store and throw away your medicines at www.disposemymeds.org.          This list is accurate as of 7/12/18  4:39 PM.  Always use your most recent med list.                   Brand Name Dispense Instructions for use Diagnosis    B-12 1000 MCG Caps      Take by mouth daily        B-6 100 MG Tabs      Take by mouth daily        calcitRIOL 0.25 MCG capsule    ROCALTROL     Take 0.25 mcg by mouth every other day        clopidogrel 75 MG tablet    PLAVIX    90 tablet    TAKE 1 TABLET DAILY    ASHD (arteriosclerotic heart disease)       finasteride 5 MG tablet    PROSCAR    90 tablet    Take 1 tablet (5 mg) by mouth daily    Benign prostatic hyperplasia with nocturia       FREESTYLE LITE test strip   Generic drug:  blood glucose monitoring      100 strip    Use to test blood sugars 1 times daily or as directed.    Type 2 diabetes, HbA1C goal < 8% (H)       furosemide 40 MG tablet    LASIX    90 tablet    Take 1 tablet (40 mg) by mouth daily    Bilateral leg edema       hydrocortisone 2.5 % cream     30 g    Apply topically 2 times daily    Type II or unspecified type diabetes mellitus without mention of complication, not stated as uncontrolled       lisinopril 10 MG tablet    PRINIVIL/ZESTRIL    90 tablet    TAKE 1 TABLET DAILY    ASHD (arteriosclerotic heart disease)       metFORMIN 1000 MG tablet    GLUCOPHAGE    180 tablet    TAKE 1 TABLET TWICE DAILY  (WITH MEALS) WITH FOOD    Type 2 diabetes mellitus with diabetic nephropathy, without long-term current use of insulin (H)       nitroGLYcerin 0.6 MG sublingual tablet    NITROSTAT    100 tablet    DISSOLVE 1 TABLET UNDER THETONGUE AS NEEDED    ASHD (arteriosclerotic heart disease)       omega-3 acid ethyl esters 1 g capsule    Lovaza    180 capsule    TAKE 1 CAPSULE TWICE DAILY    Hyperlipidemia LDL goal <100       pioglitazone 30 MG tablet    ACTOS    90 tablet    TAKE 1 TABLET DAILY    Type 2 diabetes mellitus with diabetic nephropathy, without long-term current use of insulin (H)       simvastatin 40 MG tablet    ZOCOR    90 tablet    Take 1 tablet (40 mg) by mouth At Bedtime    Hyperlipidemia LDL goal <100       tamsulosin 0.4 MG capsule    FLOMAX    90 capsule    TAKE 1 CAPSULE DAILY    BPH with obstruction/lower urinary tract symptoms       VITAMIN C PO      Take by mouth daily        VITAMIN D3 PO      Take 1,000 Units by mouth 2 times daily        VITAMIN E NATURAL PO      Take by mouth daily

## 2018-07-16 DIAGNOSIS — N40.1 BPH WITH OBSTRUCTION/LOWER URINARY TRACT SYMPTOMS: ICD-10-CM

## 2018-07-16 DIAGNOSIS — N13.8 BPH WITH OBSTRUCTION/LOWER URINARY TRACT SYMPTOMS: ICD-10-CM

## 2018-07-16 DIAGNOSIS — I25.10 ASHD (ARTERIOSCLEROTIC HEART DISEASE): ICD-10-CM

## 2018-07-16 DIAGNOSIS — E78.5 HYPERLIPIDEMIA LDL GOAL <100: ICD-10-CM

## 2018-07-16 DIAGNOSIS — E11.21 TYPE 2 DIABETES MELLITUS WITH DIABETIC NEPHROPATHY, WITHOUT LONG-TERM CURRENT USE OF INSULIN (H): ICD-10-CM

## 2018-07-17 NOTE — TELEPHONE ENCOUNTER
"Requested Prescriptions   Pending Prescriptions Disp Refills     lisinopril (PRINIVIL/ZESTRIL) 10 MG tablet [Pharmacy Med Name: LISINOPRIL TAB 10MG]  Last Written Prescription Date:  5/7/18  Last Fill Quantity: 90 TABLET,  # refills: 0   Last office visit: 6/22/2018 with prescribing provider:  ASHLI   Future Office Visit:     90 tablet 0     Sig: TAKE 1 TABLET DAILY    ACE Inhibitors (Including Combos) Protocol Failed    7/16/2018  8:07 AM       Failed - Normal serum creatinine on file in past 12 months    Recent Labs   Lab Test  06/22/18   1515   CR  1.62*            Passed - Blood pressure under 140/90 in past 12 months    BP Readings from Last 3 Encounters:   06/22/18 128/62   05/18/18 128/60   02/27/18 130/64                Passed - Recent (12 mo) or future (30 days) visit within the authorizing provider's specialty    Patient had office visit in the last 12 months or has a visit in the next 30 days with authorizing provider or within the authorizing provider's specialty.  See \"Patient Info\" tab in inbasket, or \"Choose Columns\" in Meds & Orders section of the refill encounter.           Passed - Patient is age 18 or older       Passed - Normal serum potassium on file in past 12 months    Recent Labs   Lab Test  06/22/18   1515   POTASSIUM  3.7             metFORMIN (GLUCOPHAGE) 1000 MG tablet [Pharmacy Med Name: METFORMIN TAB 1000MG]  Last Written Prescription Date:  5/7/18  Last Fill Quantity: 180 TABLET,  # refills: 0   Last office visit: 6/22/2018 with prescribing provider:  ASHLI   Future Office Visit:     180 tablet 0     Sig: TAKE 1 TABLET TWICE A DAY  WITH FOOD (WITH MEALS)    Biguanide Agents Failed    7/16/2018  8:07 AM       Failed - Patient's CR is NOT>1.4 OR Patient's EGFR is NOT<45 within past 12 mos.    Recent Labs   Lab Test  06/22/18   1515   GFRESTIMATED  42*   GFRESTBLACK  51*       Recent Labs   Lab Test  06/22/18   1515   CR  1.62*            Passed - Blood pressure less than 140/90 in " "past 6 months    BP Readings from Last 3 Encounters:   06/22/18 128/62   05/18/18 128/60   02/27/18 130/64                Passed - Patient has documented LDL within the past 12 mos.    Recent Labs   Lab Test  09/06/17   0954   LDL  17            Passed - Patient has had a Microalbumin in the past 12 mos.    Recent Labs   Lab Test  05/18/18   1539   MICROL  5   UMALCR  4.29            Passed - Patient is age 10 or older       Passed - Patient has documented A1c within the specified period of time.    If HgbA1C is 8 or greater, it needs to be on file within the past 3 months.  If less than 8, must be on file within the past 6 months.     Recent Labs   Lab Test  05/18/18   1539   A1C  6.0*            Passed - Patient does NOT have a diagnosis of CHF.       Passed - Recent (6 mo) or future (30 days) visit within the authorizing provider's specialty    Patient had office visit in the last 6 months or has a visit in the next 30 days with authorizing provider or within the authorizing provider's specialty.  See \"Patient Info\" tab in inbasket, or \"Choose Columns\" in Meds & Orders section of the refill encounter.            pioglitazone (ACTOS) 30 MG tablet [Pharmacy Med Name: PIOGLITAZONE TAB 30MG]  Last Written Prescription Date:  5/7/18  Last Fill Quantity: 90 TABLET,  # refills: 0   Last office visit: 6/22/2018 with prescribing provider:  ASHLI   Future Office Visit:     90 tablet 0     Sig: TAKE 1 TABLET DAILY    Thiazolidinedione Agents (TZDs)  Failed    7/16/2018  8:07 AM       Failed - Patient has a normal serum Creatinine in the past 12 months    Recent Labs   Lab Test  06/22/18   1515   CR  1.62*            Passed - Blood pressure less than 140/90 in past 6 months    BP Readings from Last 3 Encounters:   06/22/18 128/62   05/18/18 128/60   02/27/18 130/64                Passed - Patient has documented LDL within the past 12 mos.    Recent Labs   Lab Test  09/06/17   0954   LDL  17            Passed - Patient has " "a normal ALT within the past 12 mos.    Recent Labs   Lab Test  05/18/18   1539   ALT  21            Passed - Patient has a normal AST within the past 12 mos.     Recent Labs   Lab Test  05/18/18   1539   AST  26            Passed - Patient has had a Microalbumin in the past 12 mos.    Recent Labs   Lab Test  05/18/18   1539   MICROL  5   UMALCR  4.29            Passed - Patient has documented A1c within the specified period of time.    If HgbA1C is 8 or greater, it needs to be on file within the past 3 months.  If less than 8, must be on file within the past 6 months.     Recent Labs   Lab Test  05/18/18   1539   A1C  6.0*            Passed - Diagnosis not CHF       Passed - Patient is age 18 or older       Passed - Recent (6 mo) or future (30 days) visit within the authorizing provider's specialty    Patient had office visit in the last 6 months or has a visit in the next 30 days with authorizing provider or within the authorizing provider's specialty.  See \"Patient Info\" tab in inbasket, or \"Choose Columns\" in Meds & Orders section of the refill encounter.            clopidogrel (PLAVIX) 75 MG tablet [Pharmacy Med Name: CLOPIDOGREL  TAB 75MG]  Last Written Prescription Date:  5/7/18  Last Fill Quantity: 90 TABLET,  # refills: 0   Last office visit: 6/22/2018 with prescribing provider:  ASHLI   Future Office Visit:     90 tablet 0     Sig: TAKE 1 TABLET DAILY    Plavix Failed    7/16/2018  8:07 AM       Failed - Normal HGB on file in past 12 months    Recent Labs   Lab Test  05/18/18   1539   HGB  10.3*              Passed - No active PPI on record unless is Protonix       Passed - Normal Platelets on file in past 12 months    Recent Labs   Lab Test  05/18/18   1539   PLT  167              Passed - Recent (12 mo) or future (30 days) visit within the authorizing provider's specialty    Patient had office visit in the last 12 months or has a visit in the next 30 days with authorizing provider or within the " "authorizing provider's specialty.  See \"Patient Info\" tab in inbasket, or \"Choose Columns\" in Meds & Orders section of the refill encounter.           Passed - Patient is age 18 or older        nitroGLYcerin (NITROSTAT) 0.6 MG sublingual tablet [Pharmacy Med Name: NITROGLYC SL 0.6 MG 1/100]  Last Written Prescription Date:  12/7/17  Last Fill Quantity: 100 TABLET,  # refills: 1   Last office visit: 6/22/2018 with prescribing provider:  ASHLI   Future Office Visit:     100 tablet 1     Sig: DISSOLVE 1 TABLET UNDER THETONGUE AS NEEDED.    Nitrates Failed    7/16/2018  8:07 AM       Failed - Sublingual nitro order needs review    If refill exceeds 1 bottle per month, please forward request to provider.          Passed - Blood pressure under 140/90 in past 12 months    BP Readings from Last 3 Encounters:   06/22/18 128/62   05/18/18 128/60   02/27/18 130/64                Passed - Pt is not on erectile dysfunction medications       Passed - Recent (12 mo) or future (30 days) visit within the authorizing provider's specialty    Patient had office visit in the last 12 months or has a visit in the next 30 days with authorizing provider or within the authorizing provider's specialty.  See \"Patient Info\" tab in inbasket, or \"Choose Columns\" in Meds & Orders section of the refill encounter.           Passed - Patient is age 18 or older        simvastatin (ZOCOR) 40 MG tablet [Pharmacy Med Name: SIMVASTATIN  TAB 40MG]  Last Written Prescription Date:  12/7/17  Last Fill Quantity: 90 TABLET,  # refills: 1   Last office visit: 6/22/2018 with prescribing provider:  ASHLI   Future Office Visit:     90 tablet 1     Sig: TAKE 1 TABLET AT BEDTIME    Statins Protocol Passed    7/16/2018  8:07 AM       Passed - LDL on file in past 12 months    Recent Labs   Lab Test  09/06/17   0954   LDL  17            Passed - No abnormal creatine kinase in past 12 months    No lab results found.            Passed - Recent (12 mo) or future (30 " "days) visit within the authorizing provider's specialty    Patient had office visit in the last 12 months or has a visit in the next 30 days with authorizing provider or within the authorizing provider's specialty.  See \"Patient Info\" tab in inbasket, or \"Choose Columns\" in Meds & Orders section of the refill encounter.           Passed - Patient is age 18 or older        tamsulosin (FLOMAX) 0.4 MG capsule [Pharmacy Med Name: TAMSULOSIN CAP 0.4MG]  Last Written Prescription Date:  5/7/18  Last Fill Quantity: 90 CAPSULE,  # refills: 0   Last office visit: 6/22/2018 with prescribing provider:  ASLHI   Future Office Visit:     90 capsule 0     Sig: TAKE 1 CAPSULE DAILY    Alpha Blockers Passed    7/16/2018  8:07 AM       Passed - Blood pressure under 140/90 in past 12 months    BP Readings from Last 3 Encounters:   06/22/18 128/62   05/18/18 128/60   02/27/18 130/64                Passed - Recent (12 mo) or future (30 days) visit within the authorizing provider's specialty    Patient had office visit in the last 12 months or has a visit in the next 30 days with authorizing provider or within the authorizing provider's specialty.  See \"Patient Info\" tab in inbasket, or \"Choose Columns\" in Meds & Orders section of the refill encounter.           Passed - Patient does not have Tadalafil, Vardenafil, or Sildenafil on their medication list       Passed - Patient is 18 years of age or older          "

## 2018-07-18 RX ORDER — TAMSULOSIN HYDROCHLORIDE 0.4 MG/1
CAPSULE ORAL
Qty: 90 CAPSULE | Refills: 3 | Status: SHIPPED | OUTPATIENT
Start: 2018-07-18 | End: 2019-02-26

## 2018-07-18 RX ORDER — SIMVASTATIN 40 MG
TABLET ORAL
Qty: 90 TABLET | Refills: 0 | Status: SHIPPED | OUTPATIENT
Start: 2018-07-18 | End: 2018-11-17

## 2018-07-18 NOTE — TELEPHONE ENCOUNTER
Routing refill request to provider for review/approval because:  Labs out of range:  Creatinine CBC

## 2018-07-20 RX ORDER — LISINOPRIL 10 MG/1
TABLET ORAL
Qty: 90 TABLET | Refills: 0 | Status: SHIPPED | OUTPATIENT
Start: 2018-07-20 | End: 2019-03-27

## 2018-07-20 RX ORDER — NITROGLYCERIN 0.6 MG/1
TABLET SUBLINGUAL
Qty: 100 TABLET | Refills: 0 | Status: SHIPPED | OUTPATIENT
Start: 2018-07-20

## 2018-07-20 RX ORDER — PIOGLITAZONEHYDROCHLORIDE 30 MG/1
TABLET ORAL
Qty: 90 TABLET | Refills: 0 | Status: SHIPPED | OUTPATIENT
Start: 2018-07-20 | End: 2018-11-09

## 2018-07-20 RX ORDER — CLOPIDOGREL BISULFATE 75 MG/1
TABLET ORAL
Qty: 90 TABLET | Refills: 0 | Status: SHIPPED | OUTPATIENT
Start: 2018-07-20 | End: 2018-12-30

## 2018-08-16 ENCOUNTER — TRANSFERRED RECORDS (OUTPATIENT)
Dept: HEALTH INFORMATION MANAGEMENT | Facility: CLINIC | Age: 73
End: 2018-08-16

## 2018-08-24 ENCOUNTER — HOSPITAL ENCOUNTER (EMERGENCY)
Facility: CLINIC | Age: 73
Discharge: HOME | End: 2018-08-24
Attending: EMERGENCY MEDICINE | Admitting: EMERGENCY MEDICINE
Payer: MEDICARE

## 2018-08-24 ENCOUNTER — OFFICE VISIT (OUTPATIENT)
Dept: PEDIATRICS | Facility: CLINIC | Age: 73
End: 2018-08-24
Payer: MEDICARE

## 2018-08-24 ENCOUNTER — NURSE TRIAGE (OUTPATIENT)
Dept: NURSING | Facility: CLINIC | Age: 73
End: 2018-08-24

## 2018-08-24 VITALS
HEART RATE: 60 BPM | TEMPERATURE: 96.3 F | BODY MASS INDEX: 33.64 KG/M2 | DIASTOLIC BLOOD PRESSURE: 70 MMHG | HEIGHT: 70 IN | SYSTOLIC BLOOD PRESSURE: 140 MMHG | WEIGHT: 235 LBS | OXYGEN SATURATION: 97 %

## 2018-08-24 VITALS
OXYGEN SATURATION: 99 % | TEMPERATURE: 97.5 F | SYSTOLIC BLOOD PRESSURE: 150 MMHG | HEART RATE: 55 BPM | DIASTOLIC BLOOD PRESSURE: 73 MMHG | RESPIRATION RATE: 16 BRPM

## 2018-08-24 DIAGNOSIS — R33.9 URINARY RETENTION: ICD-10-CM

## 2018-08-24 DIAGNOSIS — R10.84 GENERALIZED ABDOMINAL PRESSURE: ICD-10-CM

## 2018-08-24 DIAGNOSIS — N40.1 BENIGN PROSTATIC HYPERPLASIA WITH URINARY OBSTRUCTION: ICD-10-CM

## 2018-08-24 DIAGNOSIS — R35.0 URINARY FREQUENCY: Primary | ICD-10-CM

## 2018-08-24 DIAGNOSIS — N13.8 BENIGN PROSTATIC HYPERPLASIA WITH URINARY OBSTRUCTION: ICD-10-CM

## 2018-08-24 DIAGNOSIS — N40.0 ENLARGED PROSTATE: ICD-10-CM

## 2018-08-24 LAB
ALBUMIN UR-MCNC: NEGATIVE MG/DL
ALBUMIN UR-MCNC: NEGATIVE MG/DL
ANION GAP SERPL CALCULATED.3IONS-SCNC: 8 MMOL/L (ref 3–14)
APPEARANCE UR: CLEAR
APPEARANCE UR: CLEAR
BASOPHILS # BLD AUTO: 0 10E9/L (ref 0–0.2)
BASOPHILS NFR BLD AUTO: 0.2 %
BILIRUB UR QL STRIP: NEGATIVE
BILIRUB UR QL STRIP: NEGATIVE
BUN SERPL-MCNC: 18 MG/DL (ref 7–30)
CALCIUM SERPL-MCNC: 8 MG/DL (ref 8.5–10.1)
CHLORIDE SERPL-SCNC: 110 MMOL/L (ref 94–109)
CO2 SERPL-SCNC: 24 MMOL/L (ref 20–32)
COLOR UR AUTO: COLORLESS
COLOR UR AUTO: YELLOW
CREAT SERPL-MCNC: 1.59 MG/DL (ref 0.66–1.25)
DIFFERENTIAL METHOD BLD: ABNORMAL
EOSINOPHIL # BLD AUTO: 0 10E9/L (ref 0–0.7)
EOSINOPHIL NFR BLD AUTO: 0.2 %
ERYTHROCYTE [DISTWIDTH] IN BLOOD BY AUTOMATED COUNT: 14.3 % (ref 10–15)
GFR SERPL CREATININE-BSD FRML MDRD: 43 ML/MIN/1.7M2
GLUCOSE SERPL-MCNC: 154 MG/DL (ref 70–99)
GLUCOSE UR STRIP-MCNC: NEGATIVE MG/DL
GLUCOSE UR STRIP-MCNC: NEGATIVE MG/DL
HCT VFR BLD AUTO: 28.9 % (ref 40–53)
HGB BLD-MCNC: 9.6 G/DL (ref 13.3–17.7)
HGB UR QL STRIP: NEGATIVE
HGB UR QL STRIP: NEGATIVE
IMM GRANULOCYTES # BLD: 0 10E9/L (ref 0–0.4)
IMM GRANULOCYTES NFR BLD: 0.3 %
KETONES UR STRIP-MCNC: NEGATIVE MG/DL
KETONES UR STRIP-MCNC: NEGATIVE MG/DL
LEUKOCYTE ESTERASE UR QL STRIP: NEGATIVE
LEUKOCYTE ESTERASE UR QL STRIP: NEGATIVE
LYMPHOCYTES # BLD AUTO: 0.4 10E9/L (ref 0.8–5.3)
LYMPHOCYTES NFR BLD AUTO: 5.9 %
MCH RBC QN AUTO: 33.1 PG (ref 26.5–33)
MCHC RBC AUTO-ENTMCNC: 33.2 G/DL (ref 31.5–36.5)
MCV RBC AUTO: 100 FL (ref 78–100)
MONOCYTES # BLD AUTO: 0.4 10E9/L (ref 0–1.3)
MONOCYTES NFR BLD AUTO: 5.9 %
MUCOUS THREADS #/AREA URNS LPF: PRESENT /LPF
NEUTROPHILS # BLD AUTO: 5.4 10E9/L (ref 1.6–8.3)
NEUTROPHILS NFR BLD AUTO: 87.5 %
NITRATE UR QL: NEGATIVE
NITRATE UR QL: NEGATIVE
NRBC # BLD AUTO: 0 10*3/UL
NRBC BLD AUTO-RTO: 0 /100
PH UR STRIP: 5 PH (ref 5–7)
PH UR STRIP: 5 PH (ref 5–7)
PLATELET # BLD AUTO: 155 10E9/L (ref 150–450)
POTASSIUM SERPL-SCNC: 3.7 MMOL/L (ref 3.4–5.3)
RBC # BLD AUTO: 2.9 10E12/L (ref 4.4–5.9)
RBC #/AREA URNS AUTO: 0 /HPF (ref 0–2)
SODIUM SERPL-SCNC: 142 MMOL/L (ref 133–144)
SOURCE: ABNORMAL
SOURCE: NORMAL
SP GR UR STRIP: 1.01 (ref 1–1.03)
SP GR UR STRIP: 1.01 (ref 1–1.03)
UROBILINOGEN UR STRIP-ACNC: 0.2 EU/DL (ref 0.2–1)
UROBILINOGEN UR STRIP-MCNC: 0 MG/DL (ref 0–2)
WBC # BLD AUTO: 6.2 10E9/L (ref 4–11)
WBC #/AREA URNS AUTO: 0 /HPF (ref 0–5)

## 2018-08-24 PROCEDURE — 99214 OFFICE O/P EST MOD 30 MIN: CPT | Performed by: PHYSICIAN ASSISTANT

## 2018-08-24 PROCEDURE — 85025 COMPLETE CBC W/AUTO DIFF WBC: CPT | Performed by: EMERGENCY MEDICINE

## 2018-08-24 PROCEDURE — 81003 URINALYSIS AUTO W/O SCOPE: CPT | Performed by: PHYSICIAN ASSISTANT

## 2018-08-24 PROCEDURE — 99283 EMERGENCY DEPT VISIT LOW MDM: CPT | Mod: 25

## 2018-08-24 PROCEDURE — 36415 COLL VENOUS BLD VENIPUNCTURE: CPT | Performed by: EMERGENCY MEDICINE

## 2018-08-24 PROCEDURE — 80048 BASIC METABOLIC PNL TOTAL CA: CPT | Performed by: EMERGENCY MEDICINE

## 2018-08-24 PROCEDURE — 51702 INSERT TEMP BLADDER CATH: CPT

## 2018-08-24 PROCEDURE — 81001 URINALYSIS AUTO W/SCOPE: CPT | Performed by: EMERGENCY MEDICINE

## 2018-08-24 RX ORDER — TAMSULOSIN HYDROCHLORIDE 0.4 MG/1
0.4 CAPSULE ORAL DAILY
Qty: 20 CAPSULE | Refills: 0 | Status: SHIPPED | OUTPATIENT
Start: 2018-08-24 | End: 2019-02-26

## 2018-08-24 ASSESSMENT — ENCOUNTER SYMPTOMS
NAUSEA: 1
VOMITING: 0
DIFFICULTY URINATING: 1
FEVER: 0
CHILLS: 0
FREQUENCY: 0

## 2018-08-24 NOTE — ED AVS SNAPSHOT
[facility] Emergency Department    [address]    [facility] [address]    Phone:  [phone]    Fax:  [phone]                                       Jeff Ricks   MRN: [id number]    Department:  [facility] Emergency Department   Date of Visit:  8/24/2018           After Visit Summary Signature Page     I have received my discharge instructions, and my questions have been answered. I have discussed any challenges I see with this plan with the nurse or doctor.    ..........................................................................................................................................  Patient/Patient Representative Signature      ..........................................................................................................................................  Patient Representative Print Name and Relationship to Patient    ..................................................               ................................................  Date                                            Time    ..........................................................................................................................................  Reviewed by Signature/Title    ...................................................              ..............................................  Date                                                            Time          22EPIC Rev 08/18

## 2018-08-24 NOTE — DISCHARGE INSTRUCTIONS
Leave catheter in place until seen by Urologist next week.  Empty bag when needed.      BPH (Enlarged Prostate)  The prostate is a gland at the base of the bladder. As some men get older, the prostate may get bigger in size. This problem is called benign prostatic hyperplasia (BPH). BPH puts pressure on the urethra. This is the tube that carries urine from the bladder to the penis. It may interfere with the flow of urine. It may also keep the bladder from emptying fully.    Symptoms of BPH include trouble starting urination and feeling as though the bladder isn t emptying all the way. It also includes a weak urine stream, dribbling and leaking of urine, and frequent and urgent urination (especially at night). BPH can increase the risk of urinary infections. It can also block off urine flow completely. If this occurs, a thin tube (catheter) may be passed into the bladder to help drain urine.  If symptoms are mild, no treatment may be needed right now. If symptoms are more severe, treatment is likely needed. The goal of treatment is to improve urine flow and reduce symptoms. Treatments can include medicine and procedures. Your healthcare provider will discuss treatment options with you as needed.  Home care  The following guidelines will help you care for yourself at home:    Urinate as soon as you feel the urge. Don't try to hold your urine.    Don't limit your fluid intake during the day. Drink 6 to 8 glasses of water or liquids a day. This prevents bacteria from building up in the bladder.    Avoid drinking fluids after dinner to help reduce urination during the night.    Avoid medicines that can worsen your symptoms. These include certain cold and allergy medicines and antidepressants. Diuretics used for high blood pressure can also worsen symptoms. Talk to your doctor about the medicines you take. Other choices may work better for you.  Prostate cancer screening  BPH does not increase the risk of prostate  cancer. But because prostate cancer is a common cancer in men, screening is sometimes recommended. This may help detect the cancer in its early stages when treatment is most effective. Factors that can increase the risk of prostate cancer include being -American or having a father or brother who had prostate cancer. A high-fat diet may also increase the risk of prostate cancer. Talk to your healthcare provider to see whether you should be screened for prostate cancer.  Follow-up care  Follow up with your healthcare provider, or as advised  To learn more, go to:    National Kidney & Urologic Diseases Information Clearinghouse  kidney.niddk.nih.gov, 543.463.5403  When to seek medical advice  Call your healthcare provider right away if any of these occur:    Fever of 100.4 F (38.0 C) or higher, or as advised    Unable to pass urine for 8 hours    Increasing pressure or pain in your bladder (lower abdomen)    Blood in the urine    Increasing low back pain, not related to injury    Symptoms of urinary infection (increased urge to urinate, burning when passing urine, foul-smelling urine)  Date Last Reviewed: 7/1/2016 2000-2017 [employer]. [address] [place] [address]. All rights reserved. This information is not intended as a substitute for professional medical care. Always follow your healthcare professional's instructions.

## 2018-08-24 NOTE — TELEPHONE ENCOUNTER
Reason for Disposition    Urinating more frequently than usual (i.e., frequency)    Additional Information    Negative: Shock suspected (e.g., cold/pale/clammy skin, too weak to stand, low BP, rapid pulse)    Negative: Sounds like a life-threatening emergency to the triager    Negative: Followed a genital area injury    Negative: Followed a genital area injury (penis, scrotum)    Negative: Vaginal discharge    Negative: Pus (white, yellow) or bloody discharge from end of penis    Negative: [1] Taking antibiotic for urinary tract infection (UTI) AND [2] female    Negative: [1] Taking antibiotic for urinary tract infection (UTI) AND [2] male    Negative: [1] Discomfort (pain, burning or stinging) when passing urine AND [2] pregnant    Negative: [1] Discomfort (pain, burning or stinging) when passing urine AND [2] postpartum < 1 month    Negative: [1] Discomfort (pain, burning or stinging) when passing urine AND [2] female    Negative: [1] Discomfort (pain, burning or stinging) when passing urine AND [2] male    Negative: Pain or itching in the vulvar area    Negative: Pain in scrotum is main symptom    Negative: Blood in the urine is main symptom    Negative: Symptoms arising from use of a urinary catheter (Garza or Coude)    Negative: [1] Unable to urinate (or only a few drops) > 4 hours AND     [2] bladder feels very full (e.g., palpable bladder or strong urge to urinate)    Negative: [1] Decreased urination and [2] drinking very little AND [2] dehydration suspected (e.g., dark urine, no urine > 12 hours, very dry mouth, very lightheaded)    Negative: Patient sounds very sick or weak to the triager    Negative: Fever > 100.5 F (38.1 C)    Negative: Side (flank) or lower back pain present    Negative: [1] Can't control passage of urine (i.e., urinary incontinence) AND [2] new onset (< 2 weeks) or worsening    Protocols used: URINARY SYMPTOMS-ADULT-  Caller states he is having urinary frequency and urgency. Caller  states he is only able to urinate a small amount each time. Triage guidelines recommend to see provider within 24 hours. Caller verbalized and understands directives.

## 2018-08-24 NOTE — ED AVS SNAPSHOT
[facility] Emergency Department    [address] [place] [address]    Phone:  [phone]    Fax:  [phone]                                       Jeff Ricks   MRN: [id number]    Department:  [facility] Emergency Department   Date of Visit:  8/24/2018           Patient Information     Date Of Birth          1945        Your diagnoses for this visit were:     Urinary retention     Enlarged prostate     Benign prostatic hyperplasia with urinary obstruction        You were seen by Marck De La Garza MD.      Follow-up Information     Follow up with Jordan Chandra MD In 3 days.    Specialty:  Urology    Contact information:    [address] [place] [address]  [phone]          Discharge Instructions         Leave catheter in place until seen by Urologist next week.  Empty bag when needed.      BPH (Enlarged Prostate)  The prostate is a gland at the base of the bladder. As some men get older, the prostate may get bigger in size. This problem is called benign prostatic hyperplasia (BPH). BPH puts pressure on the urethra. This is the tube that carries urine from the bladder to the penis. It may interfere with the flow of urine. It may also keep the bladder from emptying fully.    Symptoms of BPH include trouble starting urination and feeling as though the bladder isn t emptying all the way. It also includes a weak urine stream, dribbling and leaking of urine, and frequent and urgent urination (especially at night). BPH can increase the risk of urinary infections. It can also block off urine flow completely. If this occurs, a thin tube (catheter) may be passed into the bladder to help drain urine.  If symptoms are mild, no treatment may be needed right now. If symptoms are more severe, treatment is likely needed. The goal of treatment is to improve urine flow and reduce symptoms. Treatments can include medicine and procedures. Your healthcare  provider will discuss treatment options with you as needed.  Home care  The following guidelines will help you care for yourself at home:    Urinate as soon as you feel the urge. Don't try to hold your urine.    Don't limit your fluid intake during the day. Drink 6 to 8 glasses of water or liquids a day. This prevents bacteria from building up in the bladder.    Avoid drinking fluids after dinner to help reduce urination during the night.    Avoid medicines that can worsen your symptoms. These include certain cold and allergy medicines and antidepressants. Diuretics used for high blood pressure can also worsen symptoms. Talk to your doctor about the medicines you take. Other choices may work better for you.  Prostate cancer screening  BPH does not increase the risk of prostate cancer. But because prostate cancer is a common cancer in men, screening is sometimes recommended. This may help detect the cancer in its early stages when treatment is most effective. Factors that can increase the risk of prostate cancer include being -American or having a father or brother who had prostate cancer. A high-fat diet may also increase the risk of prostate cancer. Talk to your healthcare provider to see whether you should be screened for prostate cancer.  Follow-up care  Follow up with your healthcare provider, or as advised  To learn more, go to:    National Kidney & Urologic Diseases Information Clearinghouse  kidney.niddk.nih.gov, [phone]  When to seek medical advice  Call your healthcare provider right away if any of these occur:    Fever of 100.4 F (38.0 C) or higher, or as advised    Unable to pass urine for 8 hours    Increasing pressure or pain in your bladder (lower abdomen)    Blood in the urine    Increasing low back pain, not related to injury    Symptoms of urinary infection (increased urge to urinate, burning when passing urine, foul-smelling urine)  Date Last Reviewed: 7/1/2016 2000-2017 [employer]. [address] [place] [address]. All rights reserved. This information is not intended as a substitute for professional medical care. Always follow your healthcare professional's instructions.          24 Hour Appointment Hotline       To make an appointment at any [facility], call [phone] ([phone]). If you don't have a family doctor or clinic, we will help you find one. [facility] are conveniently located to serve the needs of you and your family.             Review of your medicines      Our records show that you are taking the medicines listed below. If these are incorrect, please call your family doctor or clinic.        Dose / Directions Last dose taken    B-12 1000 MCG Caps        Take by mouth daily   Refills:  0        B-6 100 MG Tabs        Take by mouth daily   Refills:  0        calcitRIOL 0.25 MCG capsule   Commonly known as:  ROCALTROL   Dose:  0.25 mcg        Take 0.25 mcg by mouth every other day   Refills:  0        clopidogrel 75 MG tablet   Commonly known as:  PLAVIX   Quantity:  90 tablet        TAKE 1 TABLET DAILY   Refills:  0        finasteride 5 MG tablet   Commonly known as:  PROSCAR   Dose:  5 mg   Quantity:  90 tablet        Take 1 tablet (5 mg) by mouth daily   Refills:  3        FREESTYLE LITE test strip   Quantity:  100 strip   Generic drug:  blood glucose monitoring        Use to test blood sugars 1 times daily or as directed.   Refills:  prn        furosemide 40 MG tablet   Commonly known as:  LASIX   Dose:  40 mg   Quantity:  90 tablet        Take 1 tablet (40 mg) by mouth daily   Refills:  1        hydrocortisone 2.5 % cream   Quantity:  30 g        Apply topically 2 times daily   Refills:  11        lisinopril 10 MG tablet   Commonly known as:  PRINIVIL/ZESTRIL   Quantity:  90 tablet        TAKE 1 TABLET DAILY   Refills:  0        * metFORMIN 1000 MG tablet   Commonly known as:  GLUCOPHAGE   Quantity:  180 tablet        TAKE 1 TABLET  TWICE DAILY  (WITH MEALS) WITH FOOD   Refills:  0        * metFORMIN 1000 MG tablet   Commonly known as:  GLUCOPHAGE   Quantity:  180 tablet        TAKE 1 TABLET TWICE A DAY  WITH FOOD (WITH MEALS)   Refills:  0        nitroGLYcerin 0.6 MG sublingual tablet   Commonly known as:  NITROSTAT   Quantity:  100 tablet        DISSOLVE 1 TABLET UNDER THETONGUE AS NEEDED.   Refills:  0        omega-3 acid ethyl esters 1 g capsule   Commonly known as:  Lovaza   Quantity:  180 capsule        TAKE 1 CAPSULE TWICE DAILY   Refills:  1        pioglitazone 30 MG tablet   Commonly known as:  ACTOS   Quantity:  90 tablet        TAKE 1 TABLET DAILY   Refills:  0        simvastatin 40 MG tablet   Commonly known as:  ZOCOR   Quantity:  90 tablet        TAKE 1 TABLET AT BEDTIME   Refills:  0        VITAMIN C PO        Take by mouth daily   Refills:  0        VITAMIN D3 PO   Dose:  1000 Units        Take 1,000 Units by mouth 2 times daily   Refills:  0        VITAMIN E NATURAL PO        Take by mouth daily   Refills:  0        * Notice:  This list has 2 medication(s) that are the same as other medications prescribed for you. Read the directions carefully, and ask your doctor or other care provider to review them with you.      ASK your doctor about these medications        Dose / Directions Last dose taken    * tamsulosin 0.4 MG capsule   Commonly known as:  FLOMAX   What changed:  Another medication with the same name was added. Make sure you understand how and when to take each.   Quantity:  90 capsule   Ask about: Which instructions should I use?        TAKE 1 CAPSULE DAILY   Refills:  3        * tamsulosin 0.4 MG capsule   Commonly known as:  FLOMAX   Dose:  0.4 mg   What changed:  You were already taking a medication with the same name, and this prescription was added. Make sure you understand how and when to take each.   Quantity:  20 capsule   Ask about: Which instructions should I use?        Take 1 capsule (0.4 mg) by mouth daily  for 20 days   Refills:  0        * Notice:  This list has 2 medication(s) that are the same as other medications prescribed for you. Read the directions carefully, and ask your doctor or other care provider to review them with you.            Prescriptions were sent or printed at these locations (1 Prescription)                   Other Prescriptions                Printed at Department/Unit printer (1 of 1)         tamsulosin (FLOMAX) 0.4 MG capsule                Procedures and tests performed during your visit     Basic metabolic panel    CBC with platelets differential    UA with Microscopic      Orders Needing Specimen Collection     None      Pending Results     No orders found from 8/22/2018 to 8/25/2018.            Pending Culture Results     No orders found from 8/22/2018 to 8/25/2018.            Pending Results Instructions     If you had any lab results that were not finalized at the time of your Discharge, you can call the ED Lab Result RN at [phone]. You will be contacted by this team for any positive Lab results or changes in treatment. The nurses are available 7 days a week from 10A to 6:30P.  You can leave a message 24 hours per day and they will return your call.        Test Results From Your Hospital Stay        8/24/2018  9:55 AM      Component Results     Component Value Ref Range & Units Status    Color Urine Colorless  Final    Appearance Urine Clear  Final    Glucose Urine Negative NEG^Negative mg/dL Final    Bilirubin Urine Negative NEG^Negative Final    Ketones Urine Negative NEG^Negative mg/dL Final    Specific Gravity Urine 1.006 1.003 - 1.035 Final    Blood Urine Negative NEG^Negative Final    pH Urine 5.0 5.0 - 7.0 pH Final    Protein Albumin Urine Negative NEG^Negative mg/dL Final    Urobilinogen mg/dL 0.0 0.0 - 2.0 mg/dL Final    Nitrite Urine Negative NEG^Negative Final    Leukocyte Esterase Urine Negative NEG^Negative Final    Source Catheterized Urine  Final    WBC Urine 0 0 - 5  /HPF Final    RBC Urine 0 0 - 2 /HPF Final    Mucous Urine Present (A) NEG^Negative /LPF Final         8/24/2018 10:22 AM      Component Results     Component Value Ref Range & Units Status    Sodium 142 133 - 144 mmol/L Final    Potassium 3.7 3.4 - 5.3 mmol/L Final    Chloride 110 (H) 94 - 109 mmol/L Final    Carbon Dioxide 24 20 - 32 mmol/L Final    Anion Gap 8 3 - 14 mmol/L Final    Glucose 154 (H) 70 - 99 mg/dL Final    Urea Nitrogen 18 7 - 30 mg/dL Final    Creatinine 1.59 (H) 0.66 - 1.25 mg/dL Final    GFR Estimate 43 (L) >60 mL/min/1.7m2 Final    Non  GFR Calc    GFR Estimate If Black 52 (L) >60 mL/min/1.7m2 Final    African American GFR Calc    Calcium 8.0 (L) 8.5 - 10.1 mg/dL Final         8/24/2018 10:02 AM      Component Results     Component Value Ref Range & Units Status    WBC 6.2 4.0 - 11.0 10e9/L Final    RBC Count 2.90 (L) 4.4 - 5.9 10e12/L Final    Hemoglobin 9.6 (L) 13.3 - 17.7 g/dL Final    Hematocrit 28.9 (L) 40.0 - 53.0 % Final     78 - 100 fl Final    MCH 33.1 (H) 26.5 - 33.0 pg Final    MCHC 33.2 31.5 - 36.5 g/dL Final    RDW 14.3 10.0 - 15.0 % Final    Platelet Count 155 150 - 450 10e9/L Final    Diff Method Automated Method  Final    % Neutrophils 87.5 % Final    % Lymphocytes 5.9 % Final    % Monocytes 5.9 % Final    % Eosinophils 0.2 % Final    % Basophils 0.2 % Final    % Immature Granulocytes 0.3 % Final    Nucleated RBCs 0 0 /100 Final    Absolute Neutrophil 5.4 1.6 - 8.3 10e9/L Final    Absolute Lymphocytes 0.4 (L) 0.8 - 5.3 10e9/L Final    Absolute Monocytes 0.4 0.0 - 1.3 10e9/L Final    Absolute Eosinophils 0.0 0.0 - 0.7 10e9/L Final    Absolute Basophils 0.0 0.0 - 0.2 10e9/L Final    Abs Immature Granulocytes 0.0 0 - 0.4 10e9/L Final    Absolute Nucleated RBC 0.0  Final                Clinical Quality Measure: Blood Pressure Screening     Your blood pressure was checked while you were in the emergency department today. The last reading we obtained was  BP:  146/63 . Please read the guidelines below about what these numbers mean and what you should do about them.  If your systolic blood pressure (the top number) is less than 120 and your diastolic blood pressure (the bottom number) is less than 80, then your blood pressure is normal. There is nothing more that you need to do about it.  If your systolic blood pressure (the top number) is 120-139 or your diastolic blood pressure (the bottom number) is 80-89, your blood pressure may be higher than it should be. You should have your blood pressure rechecked within a year by a primary care provider.  If your systolic blood pressure (the top number) is 140 or greater or your diastolic blood pressure (the bottom number) is 90 or greater, you may have high blood pressure. High blood pressure is treatable, but if left untreated over time it can put you at risk for heart attack, stroke, or kidney failure. You should have your blood pressure rechecked by a primary care provider within the next 4 weeks.  If your provider in the emergency department today gave you specific instructions to follow-up with your doctor or provider even sooner than that, you should follow that instruction and not wait for up to 4 weeks for your follow-up visit.        Thank you for choosing [facility]       Thank you for choosing [facility] for your care. Our goal is always to provide you with excellent care. Hearing back from our patients is one way we can continue to improve our services. Please take a few minutes to complete the written survey that you may receive in the mail after you visit with us. Thank you!        [employer] Information     [employer] gives you secure access to your electronic health record. If you see a primary care provider, you can also send messages to your care team and make appointments. If you have questions, please call your primary care clinic.  If you do not have a primary care provider, please call [phone] and they will  assist you.        Care EveryWhere ID     This is your Care EveryWhere ID. This could be used by other organizations to access your [facility] medical records  [id number]        Equal Access to Services     DANO BELLE : Pio Blake, niya martínez, baljit mclain, ayo uribe. So [facility] [phone].    ATENCIÓN: Si habla español, tiene a bishop disposición servicios gratuitos de asistencia lingüística. Llame al [phone].    We comply with applicable federal civil rights laws and [place] laws. We do not discriminate on the basis of race, color, national origin, age, disability, sex, sexual orientation, or gender identity.            After Visit Summary       This is your record. Keep this with you and show to your community pharmacist(s) and doctor(s) at your next visit.

## 2018-08-24 NOTE — ED PROVIDER NOTES
History     Chief Complaint:  Urinary Retention      HPI   Jeff Ricks is a 72 year old male with a history of diabetes and chronic kidney disease who presents with his wife for evaluation of urinary retention. The patient states that around 1 am this morning he woke up to go to the bathroom but could not fully empty his bladder. Following his initial passing of urine, he began to feel pressure in his abdominal and pelvic region that radiated around the right to his back and then into his testicles. He also had nausea. Around 5 pm it began to get increasingly painful. Of note, he was switched to lasix but misplaced his medication one week ago has not been taking any medication for the duration of that time. He has never had urinary retention before. The patient also denies frequency, increased urine, burning sensation while urinating, fever, chills, emesis. The patient also mentioned that he has an enlarged prostate but has never had a prostate issues.         Allergies:  No known drug allergies     Medications:    Flomax  Rocaltrol  Plavix  Proscar  Lasix  Lisinopril  Metformin  nitrostat  Actos  Pyridoxine  Zocor    Past Medical History:    Coronary kidney disease  Edema   Heart attack  Hernia abdominal  Obese  Phlebitis  Syncope  Thrombosis of leg  Type 2 diabetes    Past Surgical History:    Cardiac surgery  Coronary angiography  Heart cath angioplasty  Hydrocelectomy scrotal  Testicle surgery    Family History:    Musculoskeletal disorder  Cancer  Hypertension  Diabetes  Heart disease      Social History:  Smoking status: Former smoker 1/1/1967  Alcohol use: Yes    Marital Status:   [2]       Review of Systems   Constitutional: Negative for chills and fever.   Gastrointestinal: Positive for nausea. Negative for vomiting.   Genitourinary: Positive for difficulty urinating and testicular pain. Negative for frequency and urgency.         Physical Exam     Patient Vitals for the past 24 hrs:   BP Temp  Temp src Pulse Heart Rate Resp SpO2   08/24/18 1101 150/73 - - - 63 16 99 %   08/24/18 1100 150/73 - - - - - -   08/24/18 1045 154/71 - - - - - 98 %   08/24/18 1030 155/69 - - - - - 98 %   08/24/18 1015 146/63 - - - - - 98 %   08/24/18 1000 151/62 - - - - - 97 %   08/24/18 0945 143/57 - - - - - 97 %   08/24/18 0930 144/56 - - - - - -   08/24/18 0900 138/53 - - - - - 97 %   08/24/18 0833 149/62 97.5  F (36.4  C) Oral 55 - 26 100 %         Physical Exam    General:   HEENT:   The scalp and head appear normal    The pupils are equal, round, and reactive to light    Extraocular muscles are intact.    The nose is normal.    The oropharynx is normal.      Uvula is in the midline.    Neck:  Normal range of motion.    Lungs:  Clear.      No rales, no wheezing.      There is no tachypnea.  Non-labored.  Cardiac: Regular rate.      Normal S1 and S2.      No pathological murmur/rub    Abdomen: Soft. No distension, no localized tenderness or rebound.    :                  Patient currently feels comfortable, 600cc of clear urine in bag, has lowe. Is circumcised. testicles are walnut size and non tender. Scrotum normal. Prostate enlarged on the right and boggy to palpation. Left non boggy and firm      MS:  Normal tone. Normal movement of all extremities.   Neurologic:     Normal mentation.  No cranial nerve deficits.  No focal motor or sensory                            changes.      Speech normal.  Psych:  Awake.     Alert.      Normal affect.      Appropriate interactions.  Skin:  No rash.      No lesions.          Emergency Department Course       Laboratory:  BMP:chloride 110 (H), Glucose 154 (H), creatinine 1.59 (H), GFR 43 (L), Calcium 8.0 (L),      CBC: HGB 9.6 (L) WNL (WBC 6.2, )    UA:Mucous present o/w negative      Emergency Department Course:  Past medical records, nursing notes, and vitals reviewed.  0843: I performed an exam of the patient and obtained history, as documented above.    IV inserted and  blood drawn.    1020: I rechecked the patient. Explained findings to patient.    1029: I spoke with Dr. Ross     1035: I rechecked the patient.  Findings and plan explained to the Patient. Patient discharged home with instructions regarding supportive care, medications, and reasons to return. The importance of close follow-up was reviewed.       Impression & Plan      Medical Decision Making:  Jeff Ricks is a 72 year old male with a knonw history of BPH who presents with a urinary outle obsruction. A complete relief with insertio of lowe with return of 600cc clear yellow urine. His urin analysis is completely negative. At this point I went back into the room and did a  exam which included a rectal exam. He does have symptoms of asymmetrical hypertrophy of his prostate in the context of known benign prostatic hypertrophy. However, his PSA was known to be elevating and increasing over the last 2 years. It does not sound like he followed through to have an MRI study [place] or the biopsies done as requested by Dr. Ross due to claustrophobia. I spoke with Dr. Ross about the patient and he is going to reach out to Dr. Ross. Schedule time for further evaluation of his symptomology. In the mean time he will leave the lowe catheter in place. Change the bag as needed and follow up as scheduled by Dr. Ross early next week. Return for any new symptoms such as fever, vomiting, bloody urine or other    Diagnosis:    ICD-10-CM    1. Urinary retention R33.9    2. Enlarged prostate N40.0    3. Benign prostatic hyperplasia with urinary obstruction N40.1     N13.8        Disposition:  discharged to home    Discharge Medications:  Discharge Medication List as of 8/24/2018 10:35 AM      START taking these medications    Details   !! tamsulosin (FLOMAX) 0.4 MG capsule Take 1 capsule (0.4 mg) by mouth daily for 20 days, Disp-20 capsule, R-0, Local Print       !! - Potential duplicate medications found. Please discuss with  provider.            Javier Tineo  8/24/2018   [facility] EMERGENCY DEPARTMENT    Scribe Disclosure:  I, Javier Tineo, am serving as a scribe at 8:43 AM on 8/24/2018 to document services personally performed by Marck De La Garza MD based on my observations and the provider's statements to me.        Marck De La Garza MD  08/24/18 [id number]

## 2018-08-24 NOTE — PROGRESS NOTES
"  SUBJECTIVE:   Jeff Ricks is a 72 year old male, accompanied by wife, who presents to clinic today for the following health issues:    Genitourinary - Male  Onset: several hours ago --throughout night    Description:   Dysuria (painful urination): no  Hematuria (blood in urine): no   Small voids  Frequency: YES  Are you urinating at night : YES  Hesitancy (delay in urine): YES  Retention (unable to empty): does not feel as if he is emptying fully   Decrease in urinary flow: YES  Incontinence: no     Progression of Symptoms:  worsening and constant    Accompanying Signs & Symptoms:  Fever: no   Back/Flank pain: YES--low back  Urethral discharge: no   Testicle lumps/masses/pain: none  Nausea and/or vomiting: YES- nausea  Abdominal pain: YES-lower abdominal pain--pressure; unable to sit; very uncomfortable    History:   History of frequent UTI's: no   History of kidney stones: no   History of hernias: YES  Personal or Family history of Prostate problems: Yes, BPH--takes flomax daily   Sexually active: YES    Precipitating factors:   none    Alleviating factors:  none    Lost bottle of lasix and has not taken x one week.    ROS:  CONSTITUTIONAL: NEGATIVE for fever, chills  RESP: NEGATIVE for significant cough or SOB  CV: NEGATIVE for chest pain, palpitations  GI: POSITIVE for nausea  MUSCULOSKELETAL: NEGATIVE for significant arthralgias or myalgia  NEURO: NEGATIVE for weakness, dizziness or paresthesias    OBJECTIVE:                                                    /70 (BP Location: Right arm, Cuff Size: Adult Large)  Pulse 60  Temp 96.3  F (35.7  C) (Tympanic)  Ht 5' 10\" (1.778 m)  Wt 235 lb (106.6 kg)  SpO2 97%  BMI 33.72 kg/m2  Body mass index is 33.72 kg/(m^2).   GENERAL: alert, pacing, diaphoretic, intermittently leaning on exam table  RESP: lungs clear to auscultation - no rales, no rhonchi, no wheezes  CV: regular rates and rhythm, normal S1 S2, no S3 or S4 and no murmur, no click or " rub  ABDOMEN: soft, mid lower abdominal tenderness  Back: no CVAT    Diagnostic test results:  Results for orders placed or performed in visit on 08/24/18 (from the past 24 hour(s))   UA reflex to Microscopic and Culture   Result Value Ref Range    Color Urine Yellow     Appearance Urine Clear     Glucose Urine Negative NEG^Negative mg/dL    Bilirubin Urine Negative NEG^Negative    Ketones Urine Negative NEG^Negative mg/dL    Specific Gravity Urine 1.010 1.003 - 1.035    Blood Urine Negative NEG^Negative    pH Urine 5.0 5.0 - 7.0 pH    Protein Albumin Urine Negative NEG^Negative mg/dL    Urobilinogen Urine 0.2 0.2 - 1.0 EU/dL    Nitrite Urine Negative NEG^Negative    Leukocyte Esterase Urine Negative NEG^Negative    Source Midstream Urine         ASSESSMENT/PLAN:                                                    (R35.0) Urinary frequency  (primary encounter diagnosis)  Comment: Discussed that this is likely prostatitis with urinary retention. However, patient is very uncomfortable. Patient and wife agree to be evaluated in the ED.    Plan:     (R10.84) Generalized abdominal pressure  Comment:   Plan:     Lucie Connolly PA-C  [facility]

## 2018-08-24 NOTE — MR AVS SNAPSHOT
After Visit Summary   8/24/2018    Jeff Ricks    MRN: [id number]           Patient Information     Date Of Birth          1945        Visit Information        Provider Department      8/24/2018 7:30 AM Lucie Connolly PA-C [facility]        Today's Diagnoses     Urinary frequency    -  1    Generalized abdominal pressure           Follow-ups after your visit        Follow-up notes from your care team     Return in about 1 week (around 8/31/2018) for if symptoms worsen or fail to improve.      Who to contact     If you have questions or need follow up information about today's clinic visit or your schedule please contact [facility] directly at [phone].  Normal or non-critical lab and imaging results will be communicated to you by MyChart, letter or phone within 4 business days after the clinic has received the results. If you do not hear from us within 7 days, please contact the clinic through appssavvyhart or phone. If you have a critical or abnormal lab result, we will notify you by phone as soon as possible.  Submit refill requests through TuneUp or call your pharmacy and they will forward the refill request to us. Please allow 3 business days for your refill to be completed.          Additional Information About Your Visit        MyChart Information     TuneUp gives you secure access to your electronic health record. If you see a primary care provider, you can also send messages to your care team and make appointments. If you have questions, please call your primary care clinic.  If you do not have a primary care provider, please call [phone] and they will assist you.        Care EveryWhere ID     This is your Care EveryWhere ID. This could be used by other organizations to access your [facility] medical records  [id number]        Your Vitals Were     Pulse Temperature Height Pulse Oximetry BMI (Body Mass Index)       60 96.3  F (35.7  C) (Tympanic)  "5' 10\" (1.778 m) 97% 33.72 kg/m2        Blood Pressure from Last 3 Encounters:   08/24/18 149/62   08/24/18 140/70   06/22/18 128/62    Weight from Last 3 Encounters:   08/24/18 235 lb (106.6 kg)   06/22/18 227 lb (103 kg)   05/18/18 231 lb (104.8 kg)              We Performed the Following     UA reflex to Microscopic and Culture        Primary Care Provider Office Phone # Fax #    Guillermo Deleon [phone]       [address]  [facility] [address]        Equal Access to Services     [facility]: Hadii abigail bruner hadasho Soandrés, waaxda luqadaha, qaybta kaalmada adekaneyada, ayo bartholomew . So [facility] [phone].    ATENCIÓN: Si habla español, tiene a bishop disposición servicios gratuitos de asistencia lingüística. Ll[facility] [phone].    We comply with applicable federal civil rights laws and [place] laws. We do not discriminate on the basis of race, color, national origin, age, disability, sex, sexual orientation, or gender identity.            Thank you!     Thank you for choosing [facility]  for your care. Our goal is always to provide you with excellent care. Hearing back from our patients is one way we can continue to improve our services. Please take a few minutes to complete the written survey that you may receive in the mail after your visit with us. Thank you!             Your Updated Medication List - Protect others around you: Learn how to safely use, store and throw away your medicines at www.disposemymeds.org.          This list is accurate as of 8/24/18  9:41 AM.  Always use your most recent med list.                   Brand Name Dispense Instructions for use Diagnosis    B-12 1000 MCG Caps      Take by mouth daily        B-6 100 MG Tabs      Take by mouth daily        calcitRIOL 0.25 MCG capsule    ROCALTROL     Take 0.25 mcg by mouth every other day        clopidogrel 75 MG tablet    PLAVIX    90 tablet    TAKE 1 TABLET DAILY    ASHD " (arteriosclerotic heart disease)       finasteride 5 MG tablet    PROSCAR    90 tablet    Take 1 tablet (5 mg) by mouth daily    Benign prostatic hyperplasia with nocturia       FREESTYLE LITE test strip   Generic drug:  blood glucose monitoring     100 strip    Use to test blood sugars 1 times daily or as directed.    Type 2 diabetes, HbA1C goal < 8% (H)       furosemide 40 MG tablet    LASIX    90 tablet    Take 1 tablet (40 mg) by mouth daily    Bilateral leg edema       hydrocortisone 2.5 % cream     30 g    Apply topically 2 times daily    Type II or unspecified type diabetes mellitus without mention of complication, not stated as uncontrolled       lisinopril 10 MG tablet    PRINIVIL/ZESTRIL    90 tablet    TAKE 1 TABLET DAILY    ASHD (arteriosclerotic heart disease)       * metFORMIN 1000 MG tablet    GLUCOPHAGE    180 tablet    TAKE 1 TABLET TWICE DAILY  (WITH MEALS) WITH FOOD    Type 2 diabetes mellitus with diabetic nephropathy, without long-term current use of insulin (H)       * metFORMIN 1000 MG tablet    GLUCOPHAGE    180 tablet    TAKE 1 TABLET TWICE A DAY  WITH FOOD (WITH MEALS)    Type 2 diabetes mellitus with diabetic nephropathy, without long-term current use of insulin (H)       nitroGLYcerin 0.6 MG sublingual tablet    NITROSTAT    100 tablet    DISSOLVE 1 TABLET UNDER THETONGUE AS NEEDED.    ASHD (arteriosclerotic heart disease)       omega-3 acid ethyl esters 1 g capsule    Lovaza    180 capsule    TAKE 1 CAPSULE TWICE DAILY    Hyperlipidemia LDL goal <100       pioglitazone 30 MG tablet    ACTOS    90 tablet    TAKE 1 TABLET DAILY    Type 2 diabetes mellitus with diabetic nephropathy, without long-term current use of insulin (H)       simvastatin 40 MG tablet    ZOCOR    90 tablet    TAKE 1 TABLET AT BEDTIME    Hyperlipidemia LDL goal <100       tamsulosin 0.4 MG capsule    FLOMAX    90 capsule    TAKE 1 CAPSULE DAILY    BPH with obstruction/lower urinary tract symptoms       VITAMIN C PO       Take by mouth daily        VITAMIN D3 PO      Take 1,000 Units by mouth 2 times daily        VITAMIN E NATURAL PO      Take by mouth daily        * Notice:  This list has 2 medication(s) that are the same as other medications prescribed for you. Read the directions carefully, and ask your doctor or other care provider to review them with you.

## 2018-08-28 ENCOUNTER — OFFICE VISIT (OUTPATIENT)
Dept: UROLOGY | Facility: CLINIC | Age: 73
End: 2018-08-28
Payer: MEDICARE

## 2018-08-28 VITALS
HEART RATE: 83 BPM | DIASTOLIC BLOOD PRESSURE: 66 MMHG | WEIGHT: 220 LBS | SYSTOLIC BLOOD PRESSURE: 128 MMHG | OXYGEN SATURATION: 98 % | BODY MASS INDEX: 31.5 KG/M2 | HEIGHT: 70 IN

## 2018-08-28 DIAGNOSIS — R33.8 BENIGN PROSTATIC HYPERPLASIA WITH URINARY RETENTION: Primary | ICD-10-CM

## 2018-08-28 DIAGNOSIS — N40.1 BENIGN PROSTATIC HYPERPLASIA WITH URINARY RETENTION: Primary | ICD-10-CM

## 2018-08-28 PROCEDURE — 99213 OFFICE O/P EST LOW 20 MIN: CPT | Performed by: PHYSICIAN ASSISTANT

## 2018-08-28 RX ORDER — DOXAZOSIN 8 MG/1
TABLET ORAL
COMMUNITY
Start: 2017-12-02 | End: 2019-02-26

## 2018-08-28 RX ORDER — CHLORHEXIDINE GLUCONATE ORAL RINSE 1.2 MG/ML
SOLUTION DENTAL
Refills: 3 | COMMUNITY
Start: 2017-12-28

## 2018-08-28 RX ORDER — FINASTERIDE 5 MG/1
5 TABLET, FILM COATED ORAL DAILY
Qty: 90 TABLET | Refills: 3 | Status: SHIPPED | OUTPATIENT
Start: 2018-08-28 | End: 2018-08-28

## 2018-08-28 ASSESSMENT — PAIN SCALES - GENERAL: PAINLEVEL: NO PAIN (0)

## 2018-08-28 NOTE — NURSING NOTE
Chief Complaint   Patient presents with     Clinic Care Coordination - Follow-up     Pt here for TOV   Comes into clinic today at the request of Sintia for TOV / catheter removal.    This service provided today was under the direct supervision of Sintia, who was available if needed.    Patient presented today for a trial of void.  Approximately 150 mL of normal saline instilled into bladder via catheter.  Patient stated he had urge to urinate and catheter was removed without difficulty.  Patient was given a graduated cylinder to measure urine output.  Patient voided approximately 150 mL of pink urine.  PVR 0 mL.    Cipro 500 given per protocol: No per Sintia    Patient did tolerate procedure well.    Teaching done with patient verbally as where to call or go if pain, fever, or unable to urinate post catheter removal.          Dianne Hester, CMA

## 2018-08-28 NOTE — LETTER
8/28/2018       RE: Jeff Ricks  [address]  [facility] [facility] [address]     Dear Colleague,    Thank you for referring your patient, Jeff Ricks, to the [facility] [place] [facility]. Please see a copy of my visit note below.    CC: Urinary retention.    HPI: It is a pleasure to see . Jeff Ricks, a 72 year old male seen today in the urology clinic in ER follow up for evaluation of urinary retention. He is a pt of Dr. Chandra's followed for elevated PSA (due for a visit next month). He developed urinary retention (without any warning) requiring Garza catheter placement on 8/24/18 (600cc). This has been draining well with pale, yellow urine. The patient is tolerating the catheter without issue. No clots of hematuria noted. Has been on Flomax for years.     The patient has no prior history of AUR or similar symptoms. At baseline, patient does not think he has dysfunctional voiding. Currently denies fevers, chills, N/V, abdominal/back pain.    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     Heart attack      Hernia, abdominal      Hyperlipidaemia      Mumps      Obese      Other and unspecified hyperlipidemia      Other premature beats      Palpitations      Phlebitis and thrombophlebitis of other deep vessels of lower extremities 2000    has had 2-3 episodes     Stented coronary artery      4 stents     Syncope      Thrombosis of leg      Type II or unspecified type diabetes mellitus without mention of complication, not stated as uncontrolled      Unspecified essential hypertension      Past Surgical History:   Procedure Laterality Date     BIOPSY  8/2016     C NONSPECIFIC PROCEDURE      colonoscopy approx 1999 done elsewhere      CARDIAC SURGERY       COLONOSCOPY       CORONARY ANGIOGRAPHY ADULT ORDER  8/28/2000    75% distal LAD stenosis, 80% third diagonal stenosis, 75-80% mid ramus stenosis, ostial 60% stenosis in circumflex w/90% stenosis in distal circumflex     CORONARY ANGIOGRAPHY ADULT ORDER  2003    4 stents placed     EP ABLATION / EP STUDIES  3/25/2014     HEART CATH, ANGIOPLASTY       HYDROCELECTOMY SCROTAL Right 10/6/2016    Procedure: HYDROCELECTOMY SCROTAL;  Surgeon: Jordan Chandra MD;  Location: [facility]     TESTICLE SURGERY       Current Outpatient Prescriptions   Medication Sig Dispense Refill     Ascorbic Acid (VITAMIN C PO) Take by mouth daily       calcitRIOL (ROCALTROL) 0.25 MCG capsule Take 0.25 mcg by mouth every other day       chlorhexidine (PERIDEX) 0.12 % solution USE 1/2 OZ TO SWISH FOR 30 SECONDS ONCE D  3     Cholecalciferol (VITAMIN D3 PO) Take 1,000 Units by mouth 2 times daily        clopidogrel (PLAVIX) 75 MG tablet TAKE 1 TABLET DAILY 90 tablet 0     Cyanocobalamin (B-12) 1000 MCG CAPS Take by mouth daily       doxazosin (CARDURA) 8 MG tablet        finasteride (PROSCAR) 5 MG tablet Take 1 tablet (5 mg) by mouth daily 90 tablet 3     furosemide (LASIX) 40 MG tablet Take 1 tablet (40 mg) by mouth daily 90 tablet 1     Glucose Blood (FREESTYLE LITE) strip Use to test blood sugars 1 times daily or as directed. 100 strip prn     hydrocortisone 2.5 % cream Apply topically 2 times daily 30 g 11     lisinopril (PRINIVIL/ZESTRIL) 10 MG tablet TAKE 1 TABLET DAILY 90 tablet 0     metFORMIN (GLUCOPHAGE) 1000 MG tablet TAKE 1 TABLET TWICE A DAY  WITH FOOD (WITH MEALS) 180 tablet 0     metFORMIN (GLUCOPHAGE) 1000 MG tablet TAKE 1 TABLET TWICE DAILY  (WITH MEALS) WITH FOOD 180 tablet 0     nitroGLYcerin (NITROSTAT) 0.6 MG sublingual tablet DISSOLVE 1 TABLET UNDER THETONGUE AS NEEDED. 100 tablet 0     omega-3 acid ethyl esters (LOVAZA) 1 g capsule TAKE 1 CAPSULE TWICE DAILY 180 capsule 1     pioglitazone (ACTOS) 30  "MG tablet TAKE 1 TABLET DAILY 90 tablet 0     Pyridoxine HCl (B-6) 100 MG TABS Take by mouth daily       simvastatin (ZOCOR) 40 MG tablet TAKE 1 TABLET AT BEDTIME 90 tablet 0     tamsulosin (FLOMAX) 0.4 MG capsule Take 1 capsule (0.4 mg) by mouth daily for 20 days 20 capsule 0     tamsulosin (FLOMAX) 0.4 MG capsule TAKE 1 CAPSULE DAILY 90 capsule 3     VITAMIN E NATURAL PO Take by mouth daily       Allergies   Allergen Reactions     No Known Drug Allergies      Family History: There is no h/o  malignancy.  There is no h/o urolithiasis.     Social History: The patient does not smoke cigarettes.  Denies EtOH and illicit drug use.      ROS: A comprehensive 14 point ROS was obtained and was  otherwise negative except for that outlined above in the HPI.    PHYSICAL EXAM:   /66 (BP Location: Left arm, Patient Position: Sitting, Cuff Size: Adult Regular)  Pulse 83  Ht 1.778 m (5' 10\")  Wt 99.8 kg (220 lb)  SpO2 98%  BMI 31.57 kg/m2  GENERAL: Well groomed/well developed/well nourished male in NAD.  HEENT: EOMI, AT, NC.  SKIN: Warm to touch, dry.  No visible rashes or lesions.  RESP: No increased respiratory effort.  LYMPH: No LE edema.  ABD: Soft, NT, ND.  No CVAT.  MS: Full ROM in extremities.  : Garza catheter indwelling draining yellow urine.  NEURO: Alert and oriented x 3.  PSYCH: Normal mood and affect, pleasant and agreeable during interview and exam.    PROCEDURE: A trial of void was performed by nursing staff today in the clinic.  The patient's Garza leg bag was disconnected and 150 cc of sterile water were infused into the patient's bladder via gravity drainage, which the patient tolerated fine.  The Garza balloon was decompressed and the catheter was then removed.  After a few minutes Mr. Ricks voided 150 cc.     REVIEW OF OUTSIDE RECORDS: 5 minutes spent reviewing previous/outside records.    ASSESSMENT/PLAN:  72 year old male who developed acute urinary retention requiring Agrza catheter placement. " Has been taking tamsulosin and finasteride daily (for years).   -Continue dual therapy  -Move up cysto appt with Dr. Chandra.     30 min spent with the patient, >50% of this time was spent in a face-to-face manner and on coordination of care of urinary retention.     Again, thank you for allowing me to participate in the care of your patient.      Sincerely,    Sintia Staley PA-C, [facility]IR[facility]

## 2018-08-28 NOTE — PROGRESS NOTES
CC: Urinary retention.    HPI: It is a pleasure to see . Jeff Ricks, a 72 year old male seen today in the urology clinic in ER follow up for evaluation of urinary retention. He is a pt of Dr. Chandra's followed for elevated PSA (due for a visit next month). He developed urinary retention (without any warning) requiring Garza catheter placement on 8/24/18 (600cc). This has been draining well with pale, yellow urine. The patient is tolerating the catheter without issue. No clots of hematuria noted. Has been on Flomax for years.     The patient has no prior history of AUR or similar symptoms. At baseline, patient does not think he has dysfunctional voiding. Currently denies fevers, chills, N/V, abdominal/back pain.    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     Heart attack      Hernia, abdominal      Hyperlipidaemia      Mumps      Obese      Other and unspecified hyperlipidemia      Other premature beats      Palpitations      Phlebitis and thrombophlebitis of other deep vessels of lower extremities 2000    has had 2-3 episodes     Stented coronary artery      4 stents     Syncope      Thrombosis of leg      Type II or unspecified type diabetes mellitus without mention of complication, not stated as uncontrolled      Unspecified essential hypertension      Past Surgical History:   Procedure Laterality Date     BIOPSY  8/2016     C NONSPECIFIC PROCEDURE      colonoscopy approx 1999 done elsewhere     CARDIAC SURGERY       COLONOSCOPY       CORONARY ANGIOGRAPHY ADULT ORDER  8/28/2000    75% distal LAD stenosis, 80% third diagonal stenosis, 75-80% mid ramus stenosis, ostial 60% stenosis in circumflex w/90% stenosis in distal circumflex     CORONARY ANGIOGRAPHY ADULT ORDER  2003    4 stents placed     EP  ABLATION / EP STUDIES  3/25/2014     HEART CATH, ANGIOPLASTY       HYDROCELECTOMY SCROTAL Right 10/6/2016    Procedure: HYDROCELECTOMY SCROTAL;  Surgeon: Jordan Chandra MD;  Location: [facility]     TESTICLE SURGERY       Current Outpatient Prescriptions   Medication Sig Dispense Refill     Ascorbic Acid (VITAMIN C PO) Take by mouth daily       calcitRIOL (ROCALTROL) 0.25 MCG capsule Take 0.25 mcg by mouth every other day       chlorhexidine (PERIDEX) 0.12 % solution USE 1/2 OZ TO SWISH FOR 30 SECONDS ONCE D  3     Cholecalciferol (VITAMIN D3 PO) Take 1,000 Units by mouth 2 times daily        clopidogrel (PLAVIX) 75 MG tablet TAKE 1 TABLET DAILY 90 tablet 0     Cyanocobalamin (B-12) 1000 MCG CAPS Take by mouth daily       doxazosin (CARDURA) 8 MG tablet        finasteride (PROSCAR) 5 MG tablet Take 1 tablet (5 mg) by mouth daily 90 tablet 3     furosemide (LASIX) 40 MG tablet Take 1 tablet (40 mg) by mouth daily 90 tablet 1     Glucose Blood (FREESTYLE LITE) strip Use to test blood sugars 1 times daily or as directed. 100 strip prn     hydrocortisone 2.5 % cream Apply topically 2 times daily 30 g 11     lisinopril (PRINIVIL/ZESTRIL) 10 MG tablet TAKE 1 TABLET DAILY 90 tablet 0     metFORMIN (GLUCOPHAGE) 1000 MG tablet TAKE 1 TABLET TWICE A DAY  WITH FOOD (WITH MEALS) 180 tablet 0     metFORMIN (GLUCOPHAGE) 1000 MG tablet TAKE 1 TABLET TWICE DAILY  (WITH MEALS) WITH FOOD 180 tablet 0     nitroGLYcerin (NITROSTAT) 0.6 MG sublingual tablet DISSOLVE 1 TABLET UNDER THETONGUE AS NEEDED. 100 tablet 0     omega-3 acid ethyl esters (LOVAZA) 1 g capsule TAKE 1 CAPSULE TWICE DAILY 180 capsule 1     pioglitazone (ACTOS) 30 MG tablet TAKE 1 TABLET DAILY 90 tablet 0     Pyridoxine HCl (B-6) 100 MG TABS Take by mouth daily       simvastatin (ZOCOR) 40 MG tablet TAKE 1 TABLET AT BEDTIME 90 tablet 0     tamsulosin (FLOMAX) 0.4 MG capsule Take 1 capsule (0.4 mg) by mouth daily for 20 days 20 capsule 0     tamsulosin (FLOMAX) 0.4  "MG capsule TAKE 1 CAPSULE DAILY 90 capsule 3     VITAMIN E NATURAL PO Take by mouth daily       Allergies   Allergen Reactions     No Known Drug Allergies      Family History: There is no h/o  malignancy.  There is no h/o urolithiasis.     Social History: The patient does not smoke cigarettes.  Denies EtOH and illicit drug use.      ROS: A comprehensive 14 point ROS was obtained and was  otherwise negative except for that outlined above in the HPI.    PHYSICAL EXAM:   /66 (BP Location: Left arm, Patient Position: Sitting, Cuff Size: Adult Regular)  Pulse 83  Ht 1.778 m (5' 10\")  Wt 99.8 kg (220 lb)  SpO2 98%  BMI 31.57 kg/m2  GENERAL: Well groomed/well developed/well nourished male in NAD.  HEENT: EOMI, AT, NC.  SKIN: Warm to touch, dry.  No visible rashes or lesions.  RESP: No increased respiratory effort.  LYMPH: No LE edema.  ABD: Soft, NT, ND.  No CVAT.  MS: Full ROM in extremities.  : Garza catheter indwelling draining yellow urine.  NEURO: Alert and oriented x 3.  PSYCH: Normal mood and affect, pleasant and agreeable during interview and exam.    PROCEDURE: A trial of void was performed by nursing staff today in the clinic.  The patient's Garza leg bag was disconnected and 150 cc of sterile water were infused into the patient's bladder via gravity drainage, which the patient tolerated fine.  The Garza balloon was decompressed and the catheter was then removed.  After a few minutes Mr. Ricks voided 150 cc.     REVIEW OF OUTSIDE RECORDS: 5 minutes spent reviewing previous/outside records.    ASSESSMENT/PLAN:  72 year old male who developed acute urinary retention requiring Garza catheter placement. Has been taking tamsulosin and finasteride daily (for years).   -Continue dual therapy  -Move up cysto appt with Dr. Chandra.     Sintia Staley PA-C  [facility]    30 min spent with the patient, >50% of this time was spent in a face-to-face manner and on coordination of care of urinary retention. "

## 2018-08-28 NOTE — MR AVS SNAPSHOT
After Visit Summary   8/28/2018    Jeff Ricks    MRN: [id number]           Patient Information     Date Of Birth          1945        Visit Information        Provider Department      8/28/2018 2:30 PM Sintia Staley PA-C [facility]        Today's Diagnoses     Benign prostatic hyperplasia with urinary retention    -  1      Care Instructions    Cystoscopy with Dr. Chandra to evaluate the prostate/bladder.                   Follow-ups after your visit        Your next 10 appointments already scheduled     Oct 08, 2018  8:00 AM CDT   [facility]   [facility] ([facility])    [address]  [address]  [facility] [address] [phone]              Who to contact     If you have questions or need follow up information about today's clinic visit or your schedule please contact [facility] directly at [phone].  Normal or non-critical lab and imaging results will be communicated to you by Sypherlinkhart, letter or phone within 4 business days after the clinic has received the results. If you do not hear from us within 7 days, please contact the clinic through Minterat or phone. If you have a critical or abnormal lab result, we will notify you by phone as soon as possible.  Submit refill requests through SouthWing or call your pharmacy and they will forward the refill request to us. Please allow 3 business days for your refill to be completed.          Additional Information About Your Visit        MyChart Information     SouthWing gives you secure access to your electronic health record. If you see a primary care provider, you can also send messages to your care team and make appointments. If you have questions, please call your primary care clinic.  If you do not have a primary care provider, please call [phone] and they  "will assist you.        Care EveryWhere ID     This is your Care EveryWhere ID. This could be used by other organizations to access your [place] medical records  [id number]        Your Vitals Were     Pulse Height Pulse Oximetry BMI (Body Mass Index)          83 1.778 m (5' 10\") 98% 31.57 kg/m2         Blood Pressure from Last 3 Encounters:   08/28/18 128/66   08/24/18 150/73   08/24/18 140/70    Weight from Last 3 Encounters:   08/28/18 99.8 kg (220 lb)   08/24/18 106.6 kg (235 lb)   06/22/18 103 kg (227 lb)              Today, you had the following     No orders found for display       Primary Care Provider Office Phone # Fax #    Guillermo Deleon [phone]       [address]  [facility] [address]        Equal Access to Services     [facility]: Hadii aad ku hadasho Soomaali, waaxda luqadaha, qaybta kaalmada adeegyada, ayo chacko haycharley bartholomew . So [facility] [phone].    ATENCIÓN: Si habla español, tiene a bishop disposición servicios gratuitos de asistencia lingüística. Llame al [phone].    We comply with applicable federal civil rights laws and [place] laws. We do not discriminate on the basis of race, color, national origin, age, disability, sex, sexual orientation, or gender identity.            Thank you!     Thank you for choosing [facility] [place]  for your care. Our goal is always to provide you with excellent care. Hearing back from our patients is one way we can continue to improve our services. Please take a few minutes to complete the written survey that you may receive in the mail after your visit with us. Thank you!             Your Updated Medication List - Protect others around you: Learn how to safely use, store and throw away your medicines at www.disposemymeds.org.          This list is accurate as of 8/28/18  3:20 PM.  Always use your most recent med list.                   Brand Name Dispense Instructions for " use Diagnosis    B-12 1000 MCG Caps      Take by mouth daily        B-6 100 MG Tabs      Take by mouth daily        calcitRIOL 0.25 MCG capsule    ROCALTROL     Take 0.25 mcg by mouth every other day        chlorhexidine 0.12 % solution    PERIDEX     USE 1/2 OZ TO SWISH FOR 30 SECONDS ONCE D        clopidogrel 75 MG tablet    PLAVIX    90 tablet    TAKE 1 TABLET DAILY    ASHD (arteriosclerotic heart disease)       doxazosin 8 MG tablet    CARDURA          finasteride 5 MG tablet    PROSCAR    90 tablet    Take 1 tablet (5 mg) by mouth daily    Benign prostatic hyperplasia with nocturia       FREESTYLE LITE test strip   Generic drug:  blood glucose monitoring     100 strip    Use to test blood sugars 1 times daily or as directed.    Type 2 diabetes, HbA1C goal < 8% (H)       furosemide 40 MG tablet    LASIX    90 tablet    Take 1 tablet (40 mg) by mouth daily    Bilateral leg edema       hydrocortisone 2.5 % cream     30 g    Apply topically 2 times daily    Type II or unspecified type diabetes mellitus without mention of complication, not stated as uncontrolled       lisinopril 10 MG tablet    PRINIVIL/ZESTRIL    90 tablet    TAKE 1 TABLET DAILY    ASHD (arteriosclerotic heart disease)       * metFORMIN 1000 MG tablet    GLUCOPHAGE    180 tablet    TAKE 1 TABLET TWICE DAILY  (WITH MEALS) WITH FOOD    Type 2 diabetes mellitus with diabetic nephropathy, without long-term current use of insulin (H)       * metFORMIN 1000 MG tablet    GLUCOPHAGE    180 tablet    TAKE 1 TABLET TWICE A DAY  WITH FOOD (WITH MEALS)    Type 2 diabetes mellitus with diabetic nephropathy, without long-term current use of insulin (H)       nitroGLYcerin 0.6 MG sublingual tablet    NITROSTAT    100 tablet    DISSOLVE 1 TABLET UNDER THETONGUE AS NEEDED.    ASHD (arteriosclerotic heart disease)       omega-3 acid ethyl esters 1 g capsule    Lovaza    180 capsule    TAKE 1 CAPSULE TWICE DAILY    Hyperlipidemia LDL goal <100       pioglitazone 30  MG tablet    ACTOS    90 tablet    TAKE 1 TABLET DAILY    Type 2 diabetes mellitus with diabetic nephropathy, without long-term current use of insulin (H)       simvastatin 40 MG tablet    ZOCOR    90 tablet    TAKE 1 TABLET AT BEDTIME    Hyperlipidemia LDL goal <100       * tamsulosin 0.4 MG capsule    FLOMAX    90 capsule    TAKE 1 CAPSULE DAILY    BPH with obstruction/lower urinary tract symptoms       * tamsulosin 0.4 MG capsule    FLOMAX    20 capsule    Take 1 capsule (0.4 mg) by mouth daily for 20 days        VITAMIN C PO      Take by mouth daily        VITAMIN D3 PO      Take 1,000 Units by mouth 2 times daily        VITAMIN E NATURAL PO      Take by mouth daily        * Notice:  This list has 4 medication(s) that are the same as other medications prescribed for you. Read the directions carefully, and ask your doctor or other care provider to review them with you.

## 2018-09-13 DIAGNOSIS — R33.9 URINARY RETENTION: Primary | ICD-10-CM

## 2018-09-20 ENCOUNTER — OFFICE VISIT (OUTPATIENT)
Dept: UROLOGY | Facility: CLINIC | Age: 73
End: 2018-09-20
Payer: MEDICARE

## 2018-09-20 VITALS
WEIGHT: 218 LBS | SYSTOLIC BLOOD PRESSURE: 128 MMHG | BODY MASS INDEX: 31.21 KG/M2 | DIASTOLIC BLOOD PRESSURE: 70 MMHG | HEIGHT: 70 IN

## 2018-09-20 DIAGNOSIS — Z79.2 PROPHYLACTIC ANTIBIOTIC: Primary | ICD-10-CM

## 2018-09-20 DIAGNOSIS — N40.0 ENLARGED PROSTATE: ICD-10-CM

## 2018-09-20 DIAGNOSIS — R97.20 ELEVATED PROSTATE SPECIFIC ANTIGEN (PSA): ICD-10-CM

## 2018-09-20 DIAGNOSIS — R33.9 URINARY RETENTION: ICD-10-CM

## 2018-09-20 LAB
ALBUMIN UR-MCNC: NEGATIVE MG/DL
APPEARANCE UR: CLEAR
BILIRUB UR QL STRIP: NEGATIVE
COLOR UR AUTO: YELLOW
GLUCOSE UR STRIP-MCNC: NEGATIVE MG/DL
HGB UR QL STRIP: NEGATIVE
KETONES UR STRIP-MCNC: ABNORMAL MG/DL
LEUKOCYTE ESTERASE UR QL STRIP: NEGATIVE
NITRATE UR QL: NEGATIVE
PH UR STRIP: 6 PH (ref 5–7)
SOURCE: ABNORMAL
SP GR UR STRIP: 1.01 (ref 1–1.03)
UROBILINOGEN UR STRIP-ACNC: 1 EU/DL (ref 0.2–1)

## 2018-09-20 PROCEDURE — 99214 OFFICE O/P EST MOD 30 MIN: CPT | Mod: 25 | Performed by: UROLOGY

## 2018-09-20 PROCEDURE — 52000 CYSTOURETHROSCOPY: CPT | Performed by: UROLOGY

## 2018-09-20 PROCEDURE — 81003 URINALYSIS AUTO W/O SCOPE: CPT | Performed by: UROLOGY

## 2018-09-20 RX ORDER — CIPROFLOXACIN 500 MG/1
500 TABLET, FILM COATED ORAL ONCE
Qty: 1 TABLET | Refills: 0 | Status: SHIPPED | OUTPATIENT
Start: 2018-09-20 | End: 2019-02-26

## 2018-09-20 ASSESSMENT — PAIN SCALES - GENERAL: PAINLEVEL: NO PAIN (0)

## 2018-09-20 NOTE — PROGRESS NOTES
Office Visit Note  [facility]  [phone]    UROLOGIC DIAGNOSES:   Elevated PSA, enlarged prostate, urinary retention, Hx of hydrocele    CURRENT INTERVENTIONS:   Flomax, finasteride. Negative prostate biopsy in 2016.    HISTORY:   Ferdinand returns to clinic today for follow-up. He unfortunately went into urinary retention recently. He still has a Garza catheter in place. He then successfully passed a trial of void in our clinic last month. He is here today for cystoscopy, and trial of void. His most recent PSA was 4.02, which was stable from the time of his negative biopsy in 2016. At that time in 2016 he was found have a 70 g prostate with negative biopsies throughout. He continues to take Flomax and finasteride, which he has taken for years. Since removing his catheter he reports no problems. He says that he still does well on the Flomax and finasteride.       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     Heart attack      Hernia, abdominal      Hyperlipidaemia      Mumps      Obese      Other and unspecified hyperlipidemia      Other premature beats      Palpitations      Phlebitis and thrombophlebitis of other deep vessels of lower extremities 2000    has had 2-3 episodes     Stented coronary artery      4 stents     Syncope      Thrombosis of leg      Type II or unspecified type diabetes mellitus without mention of complication, not stated as uncontrolled      Unspecified essential hypertension        PAST SURGICAL HISTORY:   Past Surgical History:   Procedure Laterality Date     BIOPSY  8/2016     C NONSPECIFIC PROCEDURE      colonoscopy approx 1999 done elsewhere     CARDIAC SURGERY       COLONOSCOPY       CORONARY ANGIOGRAPHY ADULT ORDER  8/28/2000    75% distal LAD  stenosis, 80% third diagonal stenosis, 75-80% mid ramus stenosis, ostial 60% stenosis in circumflex w/90% stenosis in distal circumflex     CORONARY ANGIOGRAPHY ADULT ORDER  2003    4 stents placed     EP ABLATION / EP STUDIES  3/25/2014     HEART CATH, ANGIOPLASTY       HYDROCELECTOMY SCROTAL Right 10/6/2016    Procedure: HYDROCELECTOMY SCROTAL;  Surgeon: Jordan Chandra MD;  Location: [facility]     TESTICLE SURGERY         FAMILY HISTORY:   Family History   Problem Relation Age of Onset     Musculoskeletal Disorder Mother      spinal stenosis     Cancer Mother      cancer kidney age 85     Hypertension Mother      Born 1923     Diabetes Father      Born 1923     Diabetes Brother      HEART DISEASE Brother        SOCIAL HISTORY:   Social History   Substance Use Topics     Smoking status: Former Smoker     Packs/day: 3.00     Years: 6.00     Start date: 7/1/1961     Quit date: 1/1/1967     Smokeless tobacco: Never Used      Comment: quit 1960s     Alcohol use Yes      Comment: 1 beer a week       Current Outpatient Prescriptions   Medication     Ascorbic Acid (VITAMIN C PO)     calcitRIOL (ROCALTROL) 0.25 MCG capsule     chlorhexidine (PERIDEX) 0.12 % solution     Cholecalciferol (VITAMIN D3 PO)     clopidogrel (PLAVIX) 75 MG tablet     Cyanocobalamin (B-12) 1000 MCG CAPS     doxazosin (CARDURA) 8 MG tablet     finasteride (PROSCAR) 5 MG tablet     furosemide (LASIX) 40 MG tablet     Glucose Blood (FREESTYLE LITE) strip     hydrocortisone 2.5 % cream     lisinopril (PRINIVIL/ZESTRIL) 10 MG tablet     metFORMIN (GLUCOPHAGE) 1000 MG tablet     metFORMIN (GLUCOPHAGE) 1000 MG tablet     nitroGLYcerin (NITROSTAT) 0.6 MG sublingual tablet     omega-3 acid ethyl esters (LOVAZA) 1 g capsule     pioglitazone (ACTOS) 30 MG tablet     Pyridoxine HCl (B-6) 100 MG TABS     simvastatin (ZOCOR) 40 MG tablet     tamsulosin (FLOMAX) 0.4 MG capsule     VITAMIN E NATURAL PO     No current facility-administered medications for this  visit.      Facility-Administered Medications Ordered in Other Visits   Medication     gadobutrol (GADAVIST) injection 10 mL         PHYSICAL EXAM:    There were no vitals taken for this visit.    Constitutional: Well developed. Conversant and in no acute distress  Eyes: Anicteric sclera, conjunctiva clear, normal extraocular movements  ENT: Normocephalic and atraumatic,   Skin: Warm and dry. No rashes or lesions  Cardiac: No peripheral edema  Back/Flank: Not done  CNS/PNS: Normal musculature and movements, moves all extremities normally  Respiratory: Normal non-labored breathing  Abdomen: Soft nontender and nondistended  Peripheral Vascular: No peripheral edema  Mental Status/Psych: Alert and Oriented x 3. Normal mood and affect    Penis: Normal  Scrotal skin: Normal, no lesions  Testicles: Normal to palpation bilaterally  Epididymis: Normal to palpation bilaterally  Lymphatic: Normal inguinal lymph nodes    Digital Rectal Exam:     Cystoscopy: I performed flexible cystoscopy and the bladder was normal throughout. The bladder was empty upon entrance into the bladder. There was some trabeculation but no tumors. On retroflexion of the scope he had enlarged lateral lobes of the prostate resulting in obstruction.    Imaging: None    Urinalysis: UA RESULTS:  Recent Labs   Lab Test  08/24/18   0902  08/24/18   0712   COLOR  Colorless  Yellow   APPEARANCE  Clear  Clear   URINEGLC  Negative  Negative   URINEBILI  Negative  Negative   URINEKETONE  Negative  Negative   SG  1.006  1.010   UBLD  Negative  Negative   URINEPH  5.0  5.0   PROTEIN  Negative  Negative   UROBILINOGEN   --   0.2   NITRITE  Negative  Negative   LEUKEST  Negative  Negative   RBCU  0   --    WBCU  0   --        PSA: 4.02    Post Void Residual:     Other labs: None today      IMPRESSION:  Enlarged prostate, history of retention, elevated PSA    PLAN:  History is a is elevated but unchanged over the past 2 years since his negative biopsy. He is now doing  well again on the Flomax and finasteride. We discussed his options and further risks for urinary retention. He can continue with the medications or pursue a procedure for enlarged prostate. He would be extended in a procedure for his enlarged prostate in order to get off of the medications. We discussed his surgical treatment options but mainly we discussed photo vaporization of the prostate versus TURP versus Rezum procedure. All of his questions were answered and he was given literature on the procedures. He would like to discuss these with his wife and get back to me about which procedure he would prefer. I will follow-up with him via telephone. If he does not pursue a surgical option I will see him back again in one year.    Total Time: 30 minutes                                      Total in Consultation: 25 minutes      Jordan Chandra M.D.

## 2018-09-20 NOTE — MR AVS SNAPSHOT
"              After Visit Summary   9/20/2018    Jeff Ricks    MRN: [id number]           Patient Information     Date Of Birth          1945        Visit Information        Provider Department      9/20/2018 8:00 AM Jordan Chandra MD; [facility]        Today's Diagnoses     Prophylactic antibiotic    -  1    Urinary retention          Care Instructions         AFTER YOUR CYSTOSCOPY         You have just completed a cystoscopy, or \"cysto\", which allowed your physician to learn more about your bladder (or to remove a stent placed after surgery). We suggest that you continue to avoid caffeine, fruit juice, and alcohol for the next 24 hours, however, you are encouraged to return to your normal activities.       A few things that are considered normal after your cystoscopy:    * small amount of bleeding (or spotting) that clears within the next 24 hours    * slight burning sensation with urination    * sensation to of needing to avoid more frequently    * the feeling of \"air\" in your urine    * mild discomfort that is relieved with Tylonol        Please contact our office promptly if you:    * develop a fever above 101 degrees    * are unable to urinate    * develop bright red blood that does not stop    * severe pain or swelling        And of course, please contact our office with any concerns or questions [phone]              Follow-ups after your visit        Who to contact     If you have questions or need follow up information about today's clinic visit or your schedule please contact [facility] directly at [phone].  Normal or non-critical lab and imaging results will be communicated to you by MyChart, letter or phone within 4 business days after the clinic has received the results. If you do not hear from us within 7 days, please contact the clinic through MyChart or phone. If you have a critical or " "abnormal lab result, we will notify you by phone as soon as possible.  Submit refill requests through [employer] or call your pharmacy and they will forward the refill request to us. Please allow 3 business days for your refill to be completed.          Additional Information About Your Visit        [employer] Information     [employer] gives you secure access to your electronic health record. If you see a primary care provider, you can also send messages to your care team and make appointments. If you have questions, please call your primary care clinic.  If you do not have a primary care provider, please call [phone] and they will assist you.        Care EveryWhere ID     This is your Care EveryWhere ID. This could be used by other organizations to access your [facility] medical records  [id number]        Your Vitals Were     Height BMI (Body Mass Index)                1.778 m (5' 10\") 31.28 kg/m2           Blood Pressure from Last 3 Encounters:   09/20/18 128/70   08/28/18 128/66   08/24/18 150/73    Weight from Last 3 Encounters:   09/20/18 98.9 kg (218 lb)   08/28/18 99.8 kg (220 lb)   08/24/18 106.6 kg (235 lb)              We Performed the Following     UA without Microscopic          Today's Medication Changes          These changes are accurate as of 9/20/18  9:05 AM.  If you have any questions, ask your nurse or doctor.               Start taking these medicines.        Dose/Directions    ciprofloxacin 500 MG tablet   Commonly known as:  CIPRO   Used for:  Prophylactic antibiotic   Started by:  Jordan Chandra MD        Dose:  500 mg   Take 1 tablet (500 mg) by mouth once for 1 dose   Quantity:  1 tablet   Refills:  0            Where to get your medicines      These medications were sent to [facility] - [address]  [address] [place] [address]     Phone:  [phone]     ciprofloxacin 500 MG tablet                Primary Care Provider Office Phone # " Fax #    Guillermo Deleon [phone]       [address] [facility] [address]        Equal Access to Services     DANO BELLE : Hadshelbie abigail bruner gurdeepkeila Soandrés, wamosheda luqadaha, qajavita kaalmada chemo, ayo rand cristinekane nunez laBuzzcharley uribe. So [facility] [phone].    ATENCIÓN: Si habla español, tiene a bishop disposición servicios gratuitos de asistencia lingüística. Llame al [phone].    We comply with applicable federal civil rights laws and [place] laws. We do not discriminate on the basis of race, color, national origin, age, disability, sex, sexual orientation, or gender identity.            Thank you!     Thank you for choosing [facility] [place]  for your care. Our goal is always to provide you with excellent care. Hearing back from our patients is one way we can continue to improve our services. Please take a few minutes to complete the written survey that you may receive in the mail after your visit with us. Thank you!             Your Updated Medication List - Protect others around you: Learn how to safely use, store and throw away your medicines at www.disposemymeds.org.          This list is accurate as of 9/20/18  9:05 AM.  Always use your most recent med list.                   Brand Name Dispense Instructions for use Diagnosis    B-12 1000 MCG Caps      Take by mouth daily        B-6 100 MG Tabs      Take by mouth daily        calcitRIOL 0.25 MCG capsule    ROCALTROL     Take 0.25 mcg by mouth every other day        chlorhexidine 0.12 % solution    PERIDEX     USE 1/2 OZ TO SWISH FOR 30 SECONDS ONCE D        ciprofloxacin 500 MG tablet    CIPRO    1 tablet    Take 1 tablet (500 mg) by mouth once for 1 dose    Prophylactic antibiotic       clopidogrel 75 MG tablet    PLAVIX    90 tablet    TAKE 1 TABLET DAILY    ASHD (arteriosclerotic heart disease)       doxazosin 8 MG tablet    CARDURA          finasteride 5 MG tablet    PROSCAR    90  tablet    Take 1 tablet (5 mg) by mouth daily    Benign prostatic hyperplasia with nocturia       FREESTYLE LITE test strip   Generic drug:  blood glucose monitoring     100 strip    Use to test blood sugars 1 times daily or as directed.    Type 2 diabetes, HbA1C goal < 8% (H)       furosemide 40 MG tablet    LASIX    90 tablet    Take 1 tablet (40 mg) by mouth daily    Bilateral leg edema       hydrocortisone 2.5 % cream     30 g    Apply topically 2 times daily    Type II or unspecified type diabetes mellitus without mention of complication, not stated as uncontrolled       lisinopril 10 MG tablet    PRINIVIL/ZESTRIL    90 tablet    TAKE 1 TABLET DAILY    ASHD (arteriosclerotic heart disease)       * metFORMIN 1000 MG tablet    GLUCOPHAGE    180 tablet    TAKE 1 TABLET TWICE DAILY  (WITH MEALS) WITH FOOD    Type 2 diabetes mellitus with diabetic nephropathy, without long-term current use of insulin (H)       * metFORMIN 1000 MG tablet    GLUCOPHAGE    180 tablet    TAKE 1 TABLET TWICE A DAY  WITH FOOD (WITH MEALS)    Type 2 diabetes mellitus with diabetic nephropathy, without long-term current use of insulin (H)       nitroGLYcerin 0.6 MG sublingual tablet    NITROSTAT    100 tablet    DISSOLVE 1 TABLET UNDER THETONGUE AS NEEDED.    ASHD (arteriosclerotic heart disease)       omega-3 acid ethyl esters 1 g capsule    Lovaza    180 capsule    TAKE 1 CAPSULE TWICE DAILY    Hyperlipidemia LDL goal <100       pioglitazone 30 MG tablet    ACTOS    90 tablet    TAKE 1 TABLET DAILY    Type 2 diabetes mellitus with diabetic nephropathy, without long-term current use of insulin (H)       simvastatin 40 MG tablet    ZOCOR    90 tablet    TAKE 1 TABLET AT BEDTIME    Hyperlipidemia LDL goal <100       tamsulosin 0.4 MG capsule    FLOMAX    90 capsule    TAKE 1 CAPSULE DAILY    BPH with obstruction/lower urinary tract symptoms       VITAMIN C PO      Take by mouth daily        VITAMIN D3 PO      Take 1,000 Units by mouth 2  times daily        VITAMIN E NATURAL PO      Take by mouth daily        * Notice:  This list has 2 medication(s) that are the same as other medications prescribed for you. Read the directions carefully, and ask your doctor or other care provider to review them with you.

## 2018-09-20 NOTE — NURSING NOTE
Chief Complaint   Patient presents with     BPH Retention     Pt is here for cystoscopy due to BPH.      UA RESULTS:  Recent Labs   Lab Test  09/20/18   0829  08/24/18   0902   COLOR  Yellow  Colorless   APPEARANCE  Clear  Clear   URINEGLC  Negative  Negative   URINEBILI  Negative  Negative   URINEKETONE  Trace*  Negative   SG  1.015  1.006   UBLD  Negative  Negative   URINEPH  6.0  5.0   PROTEIN  Negative  Negative   UROBILINOGEN  1.0   --    NITRITE  Negative  Negative   LEUKEST  Negative  Negative   RBCU   --   0   WBCU   --   0     Prior to the start of the procedure and with procedural staff participation, I verbally confirmed the patient s identity using two indicators, relevant allergies, that the procedure was appropriate and matched the consent or emergent situation, and that the correct equipment/implants were available. Immediately prior to starting the procedure I conducted the Time Out with the procedural staff and re-confirmed the patient s name, procedure, and site/side. I have wiped the patient off with the povidone-Iodine solution, draped them,  used Lidocaine hydrochloride jelly, and instilled sterile water into the bladder. (The Joint Commission universal protocol was followed.)  Yes    Sedation (Moderate or Deep): None  Nicole Caldera, BRIDGET

## 2018-09-20 NOTE — PATIENT INSTRUCTIONS
"     AFTER YOUR CYSTOSCOPY         You have just completed a cystoscopy, or \"cysto\", which allowed your physician to learn more about your bladder (or to remove a stent placed after surgery). We suggest that you continue to avoid caffeine, fruit juice, and alcohol for the next 24 hours, however, you are encouraged to return to your normal activities.       A few things that are considered normal after your cystoscopy:    * small amount of bleeding (or spotting) that clears within the next 24 hours    * slight burning sensation with urination    * sensation to of needing to avoid more frequently    * the feeling of \"air\" in your urine    * mild discomfort that is relieved with Tylonol        Please contact our office promptly if you:    * develop a fever above 101 degrees    * are unable to urinate    * develop bright red blood that does not stop    * severe pain or swelling        And of course, please contact our office with any concerns or questions [phone]      "

## 2018-09-20 NOTE — LETTER
9/20/2018     RE: Jeff Ricks  [address]  [place] [address]     Dear Colleague,    Thank you for referring your patient, Jeff Ricks, to the [facility] at [facility]. Please see a copy of my visit note below.    Office Visit Note  [facility]  [phone]    UROLOGIC DIAGNOSES:   Elevated PSA, enlarged prostate, urinary retention, Hx of hydrocele    CURRENT INTERVENTIONS:   Flomax, finasteride. Negative prostate biopsy in 2016.    HISTORY:   Ferdinand returns to clinic today for follow-up. He unfortunately went into urinary retention recently. He still has a Garza catheter in place. He then successfully passed a trial of void in our clinic last month. He is here today for cystoscopy, and trial of void. His most recent PSA was 4.02, which was stable from the time of his negative biopsy in 2016. At that time in 2016 he was found have a 70 g prostate with negative biopsies throughout. He continues to take Flomax and finasteride, which he has taken for years. Since removing his catheter he reports no problems. He says that he still does well on the Flomax and finasteride.       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     Heart attack      Hernia, abdominal      Hyperlipidaemia      Mumps      Obese      Other and unspecified hyperlipidemia      Other premature beats      Palpitations      Phlebitis and thrombophlebitis of other deep vessels of lower extremities 2000    has had 2-3 episodes     Stented coronary artery      4 stents     Syncope      Thrombosis of leg      Type II or unspecified type diabetes mellitus without mention of complication, not stated as uncontrolled       Unspecified essential hypertension        PAST SURGICAL HISTORY:   Past Surgical History:   Procedure Laterality Date     BIOPSY  8/2016     C NONSPECIFIC PROCEDURE      colonoscopy approx 1999 done elsewhere     CARDIAC SURGERY       COLONOSCOPY       CORONARY ANGIOGRAPHY ADULT ORDER  8/28/2000    75% distal LAD stenosis, 80% third diagonal stenosis, 75-80% mid ramus stenosis, ostial 60% stenosis in circumflex w/90% stenosis in distal circumflex     CORONARY ANGIOGRAPHY ADULT ORDER  2003    4 stents placed     EP ABLATION / EP STUDIES  3/25/2014     HEART CATH, ANGIOPLASTY       HYDROCELECTOMY SCROTAL Right 10/6/2016    Procedure: HYDROCELECTOMY SCROTAL;  Surgeon: Jordan Chandra MD;  Location: [facility]     TESTICLE SURGERY         FAMILY HISTORY:   Family History   Problem Relation Age of Onset     Musculoskeletal Disorder Mother      spinal stenosis     Cancer Mother      cancer kidney age 85     Hypertension Mother      Born 1923     Diabetes Father      Born 1923     Diabetes Brother      HEART DISEASE Brother        SOCIAL HISTORY:   Social History   Substance Use Topics     Smoking status: Former Smoker     Packs/day: 3.00     Years: 6.00     Start date: 7/1/1961     Quit date: 1/1/1967     Smokeless tobacco: Never Used      Comment: quit 1960s     Alcohol use Yes      Comment: 1 beer a week       Current Outpatient Prescriptions   Medication     Ascorbic Acid (VITAMIN C PO)     calcitRIOL (ROCALTROL) 0.25 MCG capsule     chlorhexidine (PERIDEX) 0.12 % solution     Cholecalciferol (VITAMIN D3 PO)     clopidogrel (PLAVIX) 75 MG tablet     Cyanocobalamin (B-12) 1000 MCG CAPS     doxazosin (CARDURA) 8 MG tablet     finasteride (PROSCAR) 5 MG tablet     furosemide (LASIX) 40 MG tablet     Glucose Blood (FREESTYLE LITE) strip     hydrocortisone 2.5 % cream     lisinopril (PRINIVIL/ZESTRIL) 10 MG tablet     metFORMIN (GLUCOPHAGE) 1000 MG tablet     metFORMIN (GLUCOPHAGE) 1000 MG tablet     nitroGLYcerin  (NITROSTAT) 0.6 MG sublingual tablet     omega-3 acid ethyl esters (LOVAZA) 1 g capsule     pioglitazone (ACTOS) 30 MG tablet     Pyridoxine HCl (B-6) 100 MG TABS     simvastatin (ZOCOR) 40 MG tablet     tamsulosin (FLOMAX) 0.4 MG capsule     VITAMIN E NATURAL PO     No current facility-administered medications for this visit.      Facility-Administered Medications Ordered in Other Visits   Medication     gadobutrol (GADAVIST) injection 10 mL         PHYSICAL EXAM:    There were no vitals taken for this visit.    Constitutional: Well developed. Conversant and in no acute distress  Eyes: Anicteric sclera, conjunctiva clear, normal extraocular movements  ENT: Normocephalic and atraumatic,   Skin: Warm and dry. No rashes or lesions  Cardiac: No peripheral edema  Back/Flank: Not done  CNS/PNS: Normal musculature and movements, moves all extremities normally  Respiratory: Normal non-labored breathing  Abdomen: Soft nontender and nondistended  Peripheral Vascular: No peripheral edema  Mental Status/Psych: Alert and Oriented x 3. Normal mood and affect    Penis: Normal  Scrotal skin: Normal, no lesions  Testicles: Normal to palpation bilaterally  Epididymis: Normal to palpation bilaterally  Lymphatic: Normal inguinal lymph nodes    Digital Rectal Exam:     Cystoscopy: I performed flexible cystoscopy and the bladder was normal throughout. The bladder was empty upon entrance into the bladder. There was some trabeculation but no tumors. On retroflexion of the scope he had enlarged lateral lobes of the prostate resulting in obstruction.    Imaging: None    Urinalysis: UA RESULTS:  Recent Labs   Lab Test  08/24/18   0902  08/24/18   0712   COLOR  Colorless  Yellow   APPEARANCE  Clear  Clear   URINEGLC  Negative  Negative   URINEBILI  Negative  Negative   URINEKETONE  Negative  Negative   SG  1.006  1.010   UBLD  Negative  Negative   URINEPH  5.0  5.0   PROTEIN  Negative  Negative   UROBILINOGEN   --   0.2   NITRITE  Negative   Negative   LEUKEST  Negative  Negative   RBCU  0   --    WBCU  0   --        PSA: 4.02    Post Void Residual:     Other labs: None today      IMPRESSION:  Enlarged prostate, history of retention, elevated PSA    PLAN:  History is a is elevated but unchanged over the past 2 years since his negative biopsy. He is now doing well again on the Flomax and finasteride. We discussed his options and further risks for urinary retention. He can continue with the medications or pursue a procedure for enlarged prostate. He would be extended in a procedure for his enlarged prostate in order to get off of the medications. We discussed his surgical treatment options but mainly we discussed photo vaporization of the prostate versus TURP versus Rezum procedure. All of his questions were answered and he was given literature on the procedures. He would like to discuss these with his wife and get back to me about which procedure he would prefer. I will follow-up with him via telephone. If he does not pursue a surgical option I will see him back again in one year.    Total Time: 30 minutes                                      Total in Consultation: 25 minutes      Jordan Chandra M.D.

## 2018-10-29 ENCOUNTER — MYC MEDICAL ADVICE (OUTPATIENT)
Dept: INTERNAL MEDICINE | Facility: CLINIC | Age: 73
End: 2018-10-29

## 2018-10-31 DIAGNOSIS — N18.30 CKD (CHRONIC KIDNEY DISEASE) STAGE 3, GFR 30-59 ML/MIN (H): ICD-10-CM

## 2018-10-31 DIAGNOSIS — E11.21 TYPE 2 DIABETES MELLITUS WITH DIABETIC NEPHROPATHY, WITHOUT LONG-TERM CURRENT USE OF INSULIN (H): ICD-10-CM

## 2018-10-31 PROCEDURE — 36415 COLL VENOUS BLD VENIPUNCTURE: CPT | Performed by: INTERNAL MEDICINE

## 2018-10-31 PROCEDURE — 82043 UR ALBUMIN QUANTITATIVE: CPT | Performed by: INTERNAL MEDICINE

## 2018-10-31 PROCEDURE — 80048 BASIC METABOLIC PNL TOTAL CA: CPT | Performed by: INTERNAL MEDICINE

## 2018-11-01 ENCOUNTER — TELEPHONE (OUTPATIENT)
Dept: UROLOGY | Facility: CLINIC | Age: 73
End: 2018-11-01

## 2018-11-01 LAB
ANION GAP SERPL CALCULATED.3IONS-SCNC: 10 MMOL/L (ref 3–14)
BUN SERPL-MCNC: 27 MG/DL (ref 7–30)
CALCIUM SERPL-MCNC: 8.4 MG/DL (ref 8.5–10.1)
CHLORIDE SERPL-SCNC: 106 MMOL/L (ref 94–109)
CO2 SERPL-SCNC: 25 MMOL/L (ref 20–32)
CREAT SERPL-MCNC: 1.7 MG/DL (ref 0.66–1.25)
CREAT UR-MCNC: 319 MG/DL
GFR SERPL CREATININE-BSD FRML MDRD: 39 ML/MIN/1.7M2
GLUCOSE SERPL-MCNC: 98 MG/DL (ref 70–99)
MICROALBUMIN UR-MCNC: 10 MG/L
MICROALBUMIN/CREAT UR: 2.99 MG/G CR (ref 0–17)
POTASSIUM SERPL-SCNC: 3.9 MMOL/L (ref 3.4–5.3)
SODIUM SERPL-SCNC: 141 MMOL/L (ref 133–144)

## 2018-11-01 NOTE — TELEPHONE ENCOUNTER
"Spoke on phone  He would like to proceed with \"Rezum\" in-office procedure for enlarged prostate  Will get him scheduled  He will need to see Dr Deleon prior in order to make certain he can hold his Plavix for 1 week prior to procedure  "

## 2018-11-06 DIAGNOSIS — N40.0 BPH (BENIGN PROSTATIC HYPERPLASIA): Primary | ICD-10-CM

## 2018-11-06 DIAGNOSIS — Z79.2 PROPHYLACTIC ANTIBIOTIC: ICD-10-CM

## 2018-11-06 RX ORDER — DIAZEPAM 10 MG
10 TABLET ORAL ONCE
Qty: 1 TABLET | Refills: 0 | Status: SHIPPED | OUTPATIENT
Start: 2018-11-06 | End: 2019-02-26

## 2018-11-06 RX ORDER — CIPROFLOXACIN 500 MG/1
500 TABLET, FILM COATED ORAL 2 TIMES DAILY
Qty: 6 TABLET | Refills: 0 | Status: SHIPPED | OUTPATIENT
Start: 2018-11-06 | End: 2018-11-09

## 2018-11-06 RX ORDER — OXYCODONE HYDROCHLORIDE 5 MG/1
5 TABLET ORAL ONCE
Qty: 1 TABLET | Refills: 0 | Status: SHIPPED | OUTPATIENT
Start: 2018-11-06 | End: 2019-02-26

## 2018-11-09 ENCOUNTER — OFFICE VISIT (OUTPATIENT)
Dept: INTERNAL MEDICINE | Facility: CLINIC | Age: 73
End: 2018-11-09
Payer: MEDICARE

## 2018-11-09 VITALS
HEART RATE: 89 BPM | SYSTOLIC BLOOD PRESSURE: 128 MMHG | OXYGEN SATURATION: 99 % | BODY MASS INDEX: 33.07 KG/M2 | DIASTOLIC BLOOD PRESSURE: 60 MMHG | WEIGHT: 231 LBS | TEMPERATURE: 98.1 F | HEIGHT: 70 IN

## 2018-11-09 DIAGNOSIS — I25.9 IHD (ISCHEMIC HEART DISEASE): ICD-10-CM

## 2018-11-09 DIAGNOSIS — E11.21 TYPE 2 DIABETES MELLITUS WITH DIABETIC NEPHROPATHY, WITHOUT LONG-TERM CURRENT USE OF INSULIN (H): Primary | ICD-10-CM

## 2018-11-09 DIAGNOSIS — E78.5 HYPERLIPIDEMIA LDL GOAL <100: ICD-10-CM

## 2018-11-09 DIAGNOSIS — R60.0 LEG EDEMA: ICD-10-CM

## 2018-11-09 DIAGNOSIS — I10 ESSENTIAL HYPERTENSION: ICD-10-CM

## 2018-11-09 DIAGNOSIS — N18.30 CKD (CHRONIC KIDNEY DISEASE) STAGE 3, GFR 30-59 ML/MIN (H): ICD-10-CM

## 2018-11-09 LAB — HBA1C MFR BLD: 6.4 % (ref 0–5.6)

## 2018-11-09 PROCEDURE — 36415 COLL VENOUS BLD VENIPUNCTURE: CPT | Performed by: INTERNAL MEDICINE

## 2018-11-09 PROCEDURE — 99214 OFFICE O/P EST MOD 30 MIN: CPT | Performed by: INTERNAL MEDICINE

## 2018-11-09 PROCEDURE — 83036 HEMOGLOBIN GLYCOSYLATED A1C: CPT | Performed by: INTERNAL MEDICINE

## 2018-11-09 PROCEDURE — 80061 LIPID PANEL: CPT | Performed by: INTERNAL MEDICINE

## 2018-11-09 RX ORDER — GLIMEPIRIDE 1 MG/1
1 TABLET ORAL
Qty: 90 TABLET | Refills: 3 | Status: SHIPPED | OUTPATIENT
Start: 2018-11-09 | End: 2019-08-26

## 2018-11-09 NOTE — NURSING NOTE
"Vital signs:  Temp: 98.1  F (36.7  C) Temp src: Oral BP: 128/60 Pulse: 89     SpO2: 99 %     Height: 5' 10\" (177.8 cm) Weight: 231 lb (104.8 kg)  Estimated body mass index is 33.15 kg/(m^2) as calculated from the following:    Height as of this encounter: 5' 10\" (1.778 m).    Weight as of this encounter: 231 lb (104.8 kg).          "

## 2018-11-09 NOTE — PROGRESS NOTES
SUBJECTIVE:   Jeff Ricks is a 73 year old male who presents to clinic today for the following health issues:      Patient is seen for a follow up visit.    Concern for LE edema, on diuretic treatment. Has not improved. No associated SOB. No weight change.   Has h/o CRF. Symptoms include LE edema. Monitoring BP, BG, medications, avoiding OTC NSAIDs. Needs periodic recheck of kidney function.    Has h/o ischemic heart disease, asymptomatic regarding chest pains, SOB,palpitations. Has good compliance with treatment, diet and exercise.      Diabetes Follow-up  Has H/O DM. On diet , exercise and PO Actos. Blood sugars are controlled. No parestesias. No hypoglycemias.        Patient is checking blood sugars: not at all    Diabetic concerns: None     Symptoms of hypoglycemia (low blood sugar): none     Paresthesias (numbness or burning in feet) or sores: No     Date of last diabetic eye exam: 05/09/18    Diabetes Management Resources    Hyperlipidemia Follow-Up  Has H/O hyperlipidemia. On medical treatment and diet. No side effects. No muscle weakness, myalgias or upset stomach.       Rate your low fat/cholesterol diet?: good    Taking statin?  Yes, no muscle aches from statin    Other lipid medications/supplements?:  none    Hypertension Follow-up  Has h/o HTN. on medical treatment. BP has been controlled. No side effects from medications. No CP, HA, dizziness. good compliance with medications and low salt diet.      Outpatient blood pressures are being not being checked.     Low Salt Diet: no added salt    BP Readings from Last 2 Encounters:   09/20/18 128/70   08/28/18 128/66     Hemoglobin A1C (%)   Date Value   05/18/2018 6.0 (H)   02/27/2018 5.9     LDL Cholesterol Calculated (mg/dL)   Date Value   09/06/2017 17   05/16/2016 14       Amount of exercise or physical activity: 3-4    Problems taking medications regularly: No    Medication side effects: fluid retention, possible Actos    Diet: gluten  free            Problem list and histories reviewed & adjusted, as indicated.  Additional history: as documented    Patient Active Problem List   Diagnosis     Essential hypertension     Coronary atherosclerosis     Benign localized hyperplasia of prostate with urinary obstruction and other lower urinary tract symptoms (LUTS)(600.21)     HYPERLIPIDEMIA LDL GOAL <100     Advanced directives, counseling/discussion     Low back pain     RBBB with left anterior fascicular block     Premature beats     Type 2 diabetes mellitus with diabetic nephropathy (H)     CKD (chronic kidney disease) stage 3, GFR 30-59 ml/min (H)     Benign prostatic hyperplasia with lower urinary tract symptoms, symptom details unspecified     Past Surgical History:   Procedure Laterality Date     BIOPSY  8/2016     C NONSPECIFIC PROCEDURE      colonoscopy approx 1999 done elsewhere     CARDIAC SURGERY       COLONOSCOPY       CORONARY ANGIOGRAPHY ADULT ORDER  8/28/2000    75% distal LAD stenosis, 80% third diagonal stenosis, 75-80% mid ramus stenosis, ostial 60% stenosis in circumflex w/90% stenosis in distal circumflex     CORONARY ANGIOGRAPHY ADULT ORDER  2003    4 stents placed     EP ABLATION / EP STUDIES  3/25/2014     HEART CATH, ANGIOPLASTY       HYDROCELECTOMY SCROTAL Right 10/6/2016    Procedure: HYDROCELECTOMY SCROTAL;  Surgeon: Jordan Chandra MD;  Location:  [facility]     TESTICLE SURGERY         Social History   Substance Use Topics     Smoking status: Former Smoker     Packs/day: 3.00     Years: 6.00     Start date: 7/1/1961     Quit date: 1/1/1967     Smokeless tobacco: Never Used      Comment: quit 1960s     Alcohol use Yes      Comment: 1 beer a week     Family History   Problem Relation Age of Onset     Musculoskeletal Disorder Mother      spinal stenosis     Cancer Mother      cancer kidney age 85     Hypertension Mother      Born 1923     Diabetes Father      Born 1923     Diabetes Brother      HEART DISEASE Brother           Current Outpatient Prescriptions   Medication Sig Dispense Refill     Ascorbic Acid (VITAMIN C PO) Take by mouth daily       calcitRIOL (ROCALTROL) 0.25 MCG capsule Take 0.25 mcg by mouth every other day       chlorhexidine (PERIDEX) 0.12 % solution USE 1/2 OZ TO SWISH FOR 30 SECONDS ONCE D  3     Cholecalciferol (VITAMIN D3 PO) Take 1,000 Units by mouth 2 times daily        clopidogrel (PLAVIX) 75 MG tablet TAKE 1 TABLET DAILY 90 tablet 0     Cyanocobalamin (B-12) 1000 MCG CAPS Take by mouth daily       doxazosin (CARDURA) 8 MG tablet        finasteride (PROSCAR) 5 MG tablet Take 1 tablet (5 mg) by mouth daily 90 tablet 3     furosemide (LASIX) 40 MG tablet Take 1 tablet (40 mg) by mouth daily 90 tablet 1     glimepiride (AMARYL) 1 MG tablet Take 1 tablet (1 mg) by mouth every morning (before breakfast) 90 tablet 3     Glucose Blood (FREESTYLE LITE) strip Use to test blood sugars 1 times daily or as directed. 100 strip prn     hydrocortisone 2.5 % cream Apply topically 2 times daily 30 g 11     lisinopril (PRINIVIL/ZESTRIL) 10 MG tablet TAKE 1 TABLET DAILY 90 tablet 0     metFORMIN (GLUCOPHAGE) 1000 MG tablet TAKE 1 TABLET TWICE A DAY  WITH FOOD (WITH MEALS) 180 tablet 0     nitroGLYcerin (NITROSTAT) 0.6 MG sublingual tablet DISSOLVE 1 TABLET UNDER THETONGUE AS NEEDED. 100 tablet 0     omega-3 acid ethyl esters (LOVAZA) 1 g capsule TAKE 1 CAPSULE TWICE DAILY 180 capsule 1     Pyridoxine HCl (B-6) 100 MG TABS Take by mouth daily       simvastatin (ZOCOR) 40 MG tablet TAKE 1 TABLET AT BEDTIME 90 tablet 0     tamsulosin (FLOMAX) 0.4 MG capsule TAKE 1 CAPSULE DAILY 90 capsule 3     VITAMIN E NATURAL PO Take by mouth daily         Reviewed and updated as needed this visit by clinical staff       Reviewed and updated as needed this visit by Provider         ROS:  Constitutional, HEENT, cardiovascular, pulmonary, GI, , musculoskeletal, neuro, skin, endocrine and psych systems are negative, except as otherwise  "noted.    OBJECTIVE:     /60  Pulse 89  Temp 98.1  F (36.7  C) (Oral)  Ht 5' 10\" (1.778 m)  Wt 231 lb (104.8 kg)  SpO2 99%  BMI 33.15 kg/m2  Body mass index is 33.15 kg/(m^2).   GENERAL: healthy, alert and no distress  EYES: Eyes grossly normal to inspection, PERRL and conjunctivae and sclerae normal  HENT: ear canals and TM's normal, nose and mouth without ulcers or lesions  NECK: no adenopathy, no asymmetry, masses, or scars and thyroid normal to palpation  RESP: lungs clear to auscultation - no rales, rhonchi or wheezes  CV: regular rate and rhythm, normal S1 S2, no S3 or S4, no murmur, click or rub, no peripheral edema and peripheral pulses strong  ABDOMEN: soft, nontender, no hepatosplenomegaly, no masses and bowel sounds normal  MS: no gross musculoskeletal defects noted, no edema  SKIN: no suspicious lesions or rashes  NEURO: Normal strength and tone, mentation intact and speech normal  Diabetic foot exam: normal DP and PT pulses, no trophic changes or ulcerative lesions and normal sensory exam    Diagnostic Test Results:  Results for orders placed or performed in visit on 11/09/18   Hemoglobin A1c   Result Value Ref Range    Hemoglobin A1C 6.4 (H) 0 - 5.6 %   Lipid panel reflex to direct LDL Non-fasting   Result Value Ref Range    Cholesterol 118 <200 mg/dL    Triglycerides 30 <150 mg/dL    HDL Cholesterol 79 >39 mg/dL    LDL Cholesterol Calculated 33 <100 mg/dL    Non HDL Cholesterol 39 <130 mg/dL       ASSESSMENT/PLAN:     Problem List Items Addressed This Visit     Essential hypertension    HYPERLIPIDEMIA LDL GOAL <100    Type 2 diabetes mellitus with diabetic nephropathy (H) - Primary    Relevant Medications    glimepiride (AMARYL) 1 MG tablet    Other Relevant Orders    Hemoglobin A1c (Completed)    Lipid panel reflex to direct LDL Non-fasting (Completed)    Echocardiogram Complete    CKD (chronic kidney disease) stage 3, GFR 30-59 ml/min (H)      Other Visit Diagnoses     Leg edema        " Relevant Orders    Echocardiogram Complete    IHD (ischemic heart disease)        Relevant Orders    Echocardiogram Complete           Assess DM control  Will stop Actos as possible contributor to LE edema, start on Glimepiride and can titrate dose for optimal glucose control   Assess ECHO with history of IHD and LE edema   Cont rest of treatment       Follow-Up:in 3 months     Guillermo Deleon MD  [facility]

## 2018-11-09 NOTE — MR AVS SNAPSHOT
After Visit Summary   11/9/2018    Jeff Ricks    MRN: [id number]           Patient Information     Date Of Birth          1945        Visit Information        Provider Department      11/9/2018 3:20 PM Guillermo Deleon MD [facility]        Today's Diagnoses     Type 2 diabetes mellitus with diabetic nephropathy, without long-term current use of insulin (H)    -  1       Follow-ups after your visit        Follow-up notes from your care team     Return in about 6 months (around 5/9/2019) for Physical Exam.      Your next 10 appointments already scheduled     Dec 10, 2018  8:00 AM CST   Nurse Visit with UB NURSE   [facility] ([facility])    [address]  [address]  [facility] [id number] [phone]            Dec 18, 2018  3:00 PM CST   (Arrive by 2:30 PM)   Procedure with Jordan Chandra MD, UA BX ROOM   [facility] ([facility])    [address]  [address]  [facility] [id number] [phone]            Dec 20, 2018  2:00 PM CST   Nurse Visit with UA NURSE   [facility] [place] ([facility])    [address]  [address]  [place] [id number] [phone]            Jan 02, 2019  8:00 AM CST   Return Visit with Jordan Chandra MD   [facility] ([facility])    [address]  [address]  [facility] [id number] [phone]              Who to contact     If you have questions or need follow up information about today's clinic visit or your schedule please contact [facility] directly at [phone].  Normal or non-critical lab and imaging results will be communicated to you by MyChart, letter or phone within 4 business days after the clinic has received the results. If you do not hear from us  "within 7 days, please contact the clinic through [employer] or phone. If you have a critical or abnormal lab result, we will notify you by phone as soon as possible.  Submit refill requests through [employer] or call your pharmacy and they will forward the refill request to us. Please allow 3 business days for your refill to be completed.          Additional Information About Your Visit        [employer] Information     [employer] gives you secure access to your electronic health record. If you see a primary care provider, you can also send messages to your care team and make appointments. If you have questions, please call your primary care clinic.  If you do not have a primary care provider, please call [phone] and they will assist you.        Care EveryWhere ID     This is your Care EveryWhere ID. This could be used by other organizations to access your [place] medical records  [id number]        Your Vitals Were     Pulse Temperature Height Pulse Oximetry BMI (Body Mass Index)       89 98.1  F (36.7  C) (Oral) 5' 10\" (1.778 m) 99% 33.15 kg/m2        Blood Pressure from Last 3 Encounters:   11/09/18 128/60   09/20/18 128/70   08/28/18 128/66    Weight from Last 3 Encounters:   11/09/18 231 lb (104.8 kg)   09/20/18 218 lb (98.9 kg)   08/28/18 220 lb (99.8 kg)              We Performed the Following     Hemoglobin A1c     Lipid panel reflex to direct LDL Non-fasting          Today's Medication Changes          These changes are accurate as of 11/9/18  3:56 PM.  If you have any questions, ask your nurse or doctor.               Start taking these medicines.        Dose/Directions    glimepiride 1 MG tablet   Commonly known as:  AMARYL   Used for:  Type 2 diabetes mellitus with diabetic nephropathy, without long-term current use of insulin (H)   Started by:  Guillermo Deleon MD        Dose:  1 mg   Take 1 tablet (1 mg) by mouth every morning (before breakfast)   Quantity:  90 tablet   Refills:  3         Stop taking " these medicines if you haven't already. Please contact your care team if you have questions.     pioglitazone 30 MG tablet   Commonly known as:  ACTOS   Stopped by:  Guillermo Deleon MD                Where to get your medicines      [facility] [place] [place] - [address] AT Portal to Registered [facility] Sites  [address] [facility], [facility] [address]     Phone:  [phone]     glimepiride 1 MG tablet                Primary Care Provider Office Phone # Fax #    Guillermo Deleon [phone]       [address]  [facility] [address]        Equal Access to Services     [facility] : Hadii abigail bruner hadasho Soomaali, waaxda luqadaha, qaybta kaalmada adeegyada, ayo bartholomew . So [facility] [phone].    ATENCIÓN: Si habla español, tiene a bishop disposición servicios gratuitos de asistencia lingüística. Llame al [phone].    We comply with applicable federal civil rights laws and [place] laws. We do not discriminate on the basis of race, color, national origin, age, disability, sex, sexual orientation, or gender identity.            Thank you!     Thank you for choosing [facility]  for your care. Our goal is always to provide you with excellent care. Hearing back from our patients is one way we can continue to improve our services. Please take a few minutes to complete the written survey that you may receive in the mail after your visit with us. Thank you!             Your Updated Medication List - Protect others around you: Learn how to safely use, store and throw away your medicines at www.disposemymeds.org.          This list is accurate as of 11/9/18  3:56 PM.  Always use your most recent med list.                   Brand Name Dispense Instructions for use Diagnosis    B-12 1000 MCG Caps      Take by mouth daily        B-6 100 MG Tabs      Take by mouth daily        calcitRIOL 0.25 MCG  capsule    ROCALTROL     Take 0.25 mcg by mouth every other day        chlorhexidine 0.12 % solution    PERIDEX     USE 1/2 OZ TO SWISH FOR 30 SECONDS ONCE D        clopidogrel 75 MG tablet    PLAVIX    90 tablet    TAKE 1 TABLET DAILY    ASHD (arteriosclerotic heart disease)       doxazosin 8 MG tablet    CARDURA          finasteride 5 MG tablet    PROSCAR    90 tablet    Take 1 tablet (5 mg) by mouth daily    Benign prostatic hyperplasia with nocturia       FREESTYLE LITE test strip   Generic drug:  blood glucose monitoring     100 strip    Use to test blood sugars 1 times daily or as directed.    Type 2 diabetes, HbA1C goal < 8% (H)       furosemide 40 MG tablet    LASIX    90 tablet    Take 1 tablet (40 mg) by mouth daily    Bilateral leg edema       glimepiride 1 MG tablet    AMARYL    90 tablet    Take 1 tablet (1 mg) by mouth every morning (before breakfast)    Type 2 diabetes mellitus with diabetic nephropathy, without long-term current use of insulin (H)       hydrocortisone 2.5 % cream     30 g    Apply topically 2 times daily    Type II or unspecified type diabetes mellitus without mention of complication, not stated as uncontrolled       lisinopril 10 MG tablet    PRINIVIL/ZESTRIL    90 tablet    TAKE 1 TABLET DAILY    ASHD (arteriosclerotic heart disease)       metFORMIN 1000 MG tablet    GLUCOPHAGE    180 tablet    TAKE 1 TABLET TWICE A DAY  WITH FOOD (WITH MEALS)    Type 2 diabetes mellitus with diabetic nephropathy, without long-term current use of insulin (H)       nitroGLYcerin 0.6 MG sublingual tablet    NITROSTAT    100 tablet    DISSOLVE 1 TABLET UNDER THETONGUE AS NEEDED.    ASHD (arteriosclerotic heart disease)       omega-3 acid ethyl esters 1 g capsule    Lovaza    180 capsule    TAKE 1 CAPSULE TWICE DAILY    Hyperlipidemia LDL goal <100       simvastatin 40 MG tablet    ZOCOR    90 tablet    TAKE 1 TABLET AT BEDTIME    Hyperlipidemia LDL goal <100       tamsulosin 0.4 MG capsule    FLOMAX     90 capsule    TAKE 1 CAPSULE DAILY    BPH with obstruction/lower urinary tract symptoms       VITAMIN C PO      Take by mouth daily        VITAMIN D3 PO      Take 1,000 Units by mouth 2 times daily        VITAMIN E NATURAL PO      Take by mouth daily

## 2018-11-10 LAB
CHOLEST SERPL-MCNC: 118 MG/DL
HDLC SERPL-MCNC: 79 MG/DL
LDLC SERPL CALC-MCNC: 33 MG/DL
NONHDLC SERPL-MCNC: 39 MG/DL
TRIGL SERPL-MCNC: 30 MG/DL

## 2018-11-15 DIAGNOSIS — E78.5 HYPERLIPIDEMIA LDL GOAL <100: ICD-10-CM

## 2018-11-16 NOTE — TELEPHONE ENCOUNTER
"Requested Prescriptions   Pending Prescriptions Disp Refills     omega-3 acid ethyl esters (LOVAZA) 1 g capsule [Pharmacy Med Name: OMEGA-3-ACID CAP 1GM(L)] 180 capsule 1    Last Written Prescription Date:  06/06/2018  Last Fill Quantity: 180,  # refills: 1   Last office visit: 11/9/2018 with prescribing provider:     Future Office Visit:   Sig: TAKE 1 CAPSULE TWICE DAILY    Antihyperlipidemic agents Passed    11/15/2018  6:31 PM       Passed - Lipid panel on file in past 12 mos    Recent Labs   Lab Test  11/09/18   1604   05/13/15   1030   CHOL  118   < >  95   TRIG  30   < >  28   HDL  79   < >  65   LDL  33   < >  24   NHDL  39   < >   --    VLDL   --    --   6   CHOLHDLRATIO   --    --   1.5    < > = values in this interval not displayed.              Passed - Normal serum ALT on record in past 12 mos    Recent Labs   Lab Test  05/18/18   1539   ALT  21            Passed - Recent (12 mo) or future (30 days) visit within the authorizing provider's specialty    Patient had office visit in the last 12 months or has a visit in the next 30 days with authorizing provider or within the authorizing provider's specialty.  See \"Patient Info\" tab in inbasket, or \"Choose Columns\" in Meds & Orders section of the refill encounter.             Passed - Patient is age 18 years or older        "

## 2018-11-17 DIAGNOSIS — R35.1 BENIGN PROSTATIC HYPERPLASIA WITH NOCTURIA: ICD-10-CM

## 2018-11-17 DIAGNOSIS — N40.1 BENIGN PROSTATIC HYPERPLASIA WITH NOCTURIA: ICD-10-CM

## 2018-11-17 DIAGNOSIS — R60.0 BILATERAL LEG EDEMA: ICD-10-CM

## 2018-11-17 DIAGNOSIS — E78.5 HYPERLIPIDEMIA LDL GOAL <100: ICD-10-CM

## 2018-11-19 RX ORDER — OMEGA-3-ACID ETHYL ESTERS 1 G/1
CAPSULE, LIQUID FILLED ORAL
Qty: 180 CAPSULE | Refills: 1 | Status: SHIPPED | OUTPATIENT
Start: 2018-11-19 | End: 2019-03-27

## 2018-11-19 NOTE — TELEPHONE ENCOUNTER
"Requested Prescriptions   Pending Prescriptions Disp Refills     furosemide (LASIX) 40 MG tablet [Pharmacy Med Name: FUROSEMIDE TAB 40MG] 90 tablet 1    Last Written Prescription Date:  06/22/2018  Last Fill Quantity: 90,  # refills: 1   Last office visit: 11/9/2018 with prescribing provider:     Future Office Visit:   Sig: TAKE 1 TABLET DAILY    Diuretics (Including Combos) Protocol Failed    11/17/2018 12:13 PM       Failed - Normal serum creatinine on file in past 12 months    Recent Labs   Lab Test  10/31/18   0903   CR  1.70*             Passed - Blood pressure under 140/90 in past 12 months    BP Readings from Last 3 Encounters:   11/09/18 128/60   09/20/18 128/70   08/28/18 128/66                Passed - Recent (12 mo) or future (30 days) visit within the authorizing provider's specialty    Patient had office visit in the last 12 months or has a visit in the next 30 days with authorizing provider or within the authorizing provider's specialty.  See \"Patient Info\" tab in inbasket, or \"Choose Columns\" in Meds & Orders section of the refill encounter.             Passed - Patient is age 18 or older       Passed - Normal serum potassium on file in past 12 months    Recent Labs   Lab Test  10/31/18   0903   POTASSIUM  3.9                   Passed - Normal serum sodium on file in past 12 months    Recent Labs   Lab Test  10/31/18   0903   NA  141              finasteride (PROSCAR) 5 MG tablet [Pharmacy Med Name: FINASTERIDE  TAB 5MG] 90 tablet 3    Last Written Prescription Date:  02/27/2018  Last Fill Quantity: 90,  # refills: 3   Last office visit: 11/9/2018 with prescribing provider:     Future Office Visit:   Sig: TAKE 1 TABLET DAILY    Alpha Blockers Passed    11/17/2018 12:13 PM       Passed - Blood pressure under 140/90 in past 12 months    BP Readings from Last 3 Encounters:   11/09/18 128/60   09/20/18 128/70   08/28/18 128/66                Passed - Recent (12 mo) or future (30 days) visit within the " "authorizing provider's specialty    Patient had office visit in the last 12 months or has a visit in the next 30 days with authorizing provider or within the authorizing provider's specialty.  See \"Patient Info\" tab in inbasket, or \"Choose Columns\" in Meds & Orders section of the refill encounter.             Passed - Patient does not have Tadalafil, Vardenafil, or Sildenafil on their medication list       Passed - Patient is 18 years of age or older        simvastatin (ZOCOR) 40 MG tablet [Pharmacy Med Name: SIMVASTATIN  TAB 40MG] 90 tablet 0    Last Written Prescription Date:  07/18/2018  Last Fill Quantity: 90,  # refills: 0   Last office visit: 11/9/2018 with prescribing provider:     Future Office Visit:   Sig: TAKE 1 TABLET AT BEDTIME    Statins Protocol Passed    11/17/2018 12:13 PM       Passed - LDL on file in past 12 months    Recent Labs   Lab Test  11/09/18   1604   LDL  33            Passed - No abnormal creatine kinase in past 12 months    No lab results found.            Passed - Recent (12 mo) or future (30 days) visit within the authorizing provider's specialty    Patient had office visit in the last 12 months or has a visit in the next 30 days with authorizing provider or within the authorizing provider's specialty.  See \"Patient Info\" tab in inbasket, or \"Choose Columns\" in Meds & Orders section of the refill encounter.             Passed - Patient is age 18 or older        "

## 2018-11-20 RX ORDER — SIMVASTATIN 40 MG
TABLET ORAL
Qty: 90 TABLET | Refills: 1 | Status: SHIPPED | OUTPATIENT
Start: 2018-11-20 | End: 2019-03-27

## 2018-11-20 RX ORDER — FINASTERIDE 5 MG/1
TABLET, FILM COATED ORAL
Qty: 90 TABLET | Refills: 1 | Status: SHIPPED | OUTPATIENT
Start: 2018-11-20 | End: 2019-02-26

## 2018-11-20 RX ORDER — FUROSEMIDE 40 MG
TABLET ORAL
Qty: 90 TABLET | Refills: 1 | Status: SHIPPED | OUTPATIENT
Start: 2018-11-20 | End: 2019-03-27

## 2018-11-20 NOTE — TELEPHONE ENCOUNTER
Zocor, Proscar - Prescription approved per [facility] Refill Protocol.  Lasix - Routing refill request to provider for review/approval because:  Labs out of range:

## 2018-12-06 DIAGNOSIS — N40.1 BENIGN PROSTATIC HYPERPLASIA WITH LOWER URINARY TRACT SYMPTOMS, SYMPTOM DETAILS UNSPECIFIED: Primary | ICD-10-CM

## 2018-12-10 ENCOUNTER — ALLIED HEALTH/NURSE VISIT (OUTPATIENT)
Dept: UROLOGY | Facility: CLINIC | Age: 73
End: 2018-12-10
Payer: MEDICARE

## 2018-12-10 ENCOUNTER — HOSPITAL ENCOUNTER (OUTPATIENT)
Dept: CARDIOLOGY | Facility: CLINIC | Age: 73
Discharge: HOME | End: 2018-12-10
Attending: INTERNAL MEDICINE | Admitting: INTERNAL MEDICINE
Payer: MEDICARE

## 2018-12-10 DIAGNOSIS — I25.9 IHD (ISCHEMIC HEART DISEASE): ICD-10-CM

## 2018-12-10 DIAGNOSIS — E11.21 TYPE 2 DIABETES MELLITUS WITH DIABETIC NEPHROPATHY, WITHOUT LONG-TERM CURRENT USE OF INSULIN (H): ICD-10-CM

## 2018-12-10 DIAGNOSIS — R60.0 LEG EDEMA: ICD-10-CM

## 2018-12-10 DIAGNOSIS — N40.1 BENIGN PROSTATIC HYPERPLASIA WITH LOWER URINARY TRACT SYMPTOMS, SYMPTOM DETAILS UNSPECIFIED: ICD-10-CM

## 2018-12-10 LAB
ALBUMIN UR-MCNC: NEGATIVE MG/DL
APPEARANCE UR: CLEAR
BILIRUB UR QL STRIP: NEGATIVE
COLOR UR AUTO: YELLOW
GLUCOSE UR STRIP-MCNC: NEGATIVE MG/DL
HGB UR QL STRIP: NEGATIVE
KETONES UR STRIP-MCNC: ABNORMAL MG/DL
LEUKOCYTE ESTERASE UR QL STRIP: NEGATIVE
NITRATE UR QL: NEGATIVE
PH UR STRIP: 5.5 PH (ref 5–7)
SOURCE: ABNORMAL
SP GR UR STRIP: >1.03 (ref 1–1.03)
UROBILINOGEN UR STRIP-ACNC: 2 EU/DL (ref 0.2–1)

## 2018-12-10 PROCEDURE — 81003 URINALYSIS AUTO W/O SCOPE: CPT | Mod: QW | Performed by: UROLOGY

## 2018-12-10 PROCEDURE — 99207 ZZC NO CHARGE NURSE ONLY: CPT

## 2018-12-10 PROCEDURE — 93306 TTE W/DOPPLER COMPLETE: CPT

## 2018-12-10 PROCEDURE — 87086 URINE CULTURE/COLONY COUNT: CPT | Performed by: UROLOGY

## 2018-12-10 PROCEDURE — 93306 TTE W/DOPPLER COMPLETE: CPT | Mod: 26 | Performed by: INTERNAL MEDICINE

## 2018-12-10 ASSESSMENT — PAIN SCALES - GENERAL: PAINLEVEL: NO PAIN (0)

## 2018-12-10 NOTE — NURSING NOTE
Jeff Ricks comes into clinic today at the request of Dr. Chandra for UA/UC prior to Rezume.  This service provided today was under the supervising provider of the day, Dr. Chandra, who was available if needed.  Midstream urine collected.  UA RESULTS:  Recent Labs   Lab Test 12/10/18  0834  08/24/18  0902   COLOR Yellow   < > Colorless   APPEARANCE Clear   < > Clear   URINEGLC Negative   < > Negative   URINEBILI Negative   < > Negative   URINEKETONE Trace*   < > Negative   SG >1.030   < > 1.006   UBLD Negative   < > Negative   URINEPH 5.5   < > 5.0   PROTEIN Negative   < > Negative   UROBILINOGEN 2.0*   < >  --    NITRITE Negative   < > Negative   LEUKEST Negative   < > Negative   RBCU  --   --  0   WBCU  --   --  0    < > = values in this interval not displayed.     Urine will be sent for culture.  Treatment pending culture results if needed.      Hazel Calvert LPN

## 2018-12-11 ENCOUNTER — MYC MEDICAL ADVICE (OUTPATIENT)
Dept: INTERNAL MEDICINE | Facility: CLINIC | Age: 73
End: 2018-12-11

## 2018-12-11 LAB
BACTERIA SPEC CULT: NO GROWTH
Lab: NORMAL
SPECIMEN SOURCE: NORMAL

## 2018-12-13 ENCOUNTER — TELEPHONE (OUTPATIENT)
Dept: UROLOGY | Facility: CLINIC | Age: 73
End: 2018-12-13

## 2018-12-14 DIAGNOSIS — N40.0 BPH (BENIGN PROSTATIC HYPERPLASIA): Primary | ICD-10-CM

## 2018-12-18 ENCOUNTER — OFFICE VISIT (OUTPATIENT)
Dept: UROLOGY | Facility: CLINIC | Age: 73
End: 2018-12-18
Payer: MEDICARE

## 2018-12-18 VITALS
HEART RATE: 85 BPM | HEIGHT: 70 IN | OXYGEN SATURATION: 97 % | WEIGHT: 218 LBS | BODY MASS INDEX: 31.21 KG/M2 | DIASTOLIC BLOOD PRESSURE: 70 MMHG | SYSTOLIC BLOOD PRESSURE: 132 MMHG

## 2018-12-18 DIAGNOSIS — N40.0 ENLARGED PROSTATE: Primary | ICD-10-CM

## 2018-12-18 DIAGNOSIS — Z79.2 PROPHYLACTIC ANTIBIOTIC: ICD-10-CM

## 2018-12-18 PROCEDURE — 53852 PROSTATIC RF THERMOTX: CPT | Performed by: UROLOGY

## 2018-12-18 RX ORDER — GENTAMICIN 40 MG/ML
80 INJECTION, SOLUTION INTRAMUSCULAR; INTRAVENOUS ONCE
Status: DISCONTINUED | OUTPATIENT
Start: 2018-12-18 | End: 2019-02-26

## 2018-12-18 ASSESSMENT — MIFFLIN-ST. JEOR
SCORE: 1730.09
SCORE: 1730.09

## 2018-12-18 ASSESSMENT — PAIN SCALES - GENERAL
PAINLEVEL: NO PAIN (0)
PAINLEVEL: NO PAIN (0)

## 2018-12-18 NOTE — PROGRESS NOTES
The Patient understands the material risk, possible benefits, and alternatives for symptomatic Benign Prostate Hypertrophy.  The Patient understands that BLADDER FUNCTION is independent of this de-obstructing procedure.  Improved voiding results may take 4 to 8 weeks before being noticed depending on prostate size and number of treatments.  Bleeding, infection and irritative voiding are the most common side effects.   Patients will continue to use their current BPH meds until advised to taper or stop.  The patient was given the opportunity to have their questions answered in a previous face to face meeting and also today.  Informed consent signed by patient.    Patient medications and allergies have been reviewed prior to treatment.  Patients on blood thinners or aspirin (ASA) have been advised to check wit the prescribing MD if it ok to hold these prior to treatment.  Risks of withholding blood thinners or ASA have been discussed.    He was given gentamicin antibiotic 1 hour prior to treatment:     Prior to proceeding, transrectal ultrasound was obtained to measure prostate volume and to inject local anestheitc    The prostate measured  58 grams    Maki prostatic block anesthesia was then performed using transrectal ultrasound guidance and a long 18 gauge spinal needle.  20 cc's of 1% lidocaine was injected on each side.     DESCRIPTION OF PROCEDURE:  Transurethral destruction of prostate tissue; by radiofrequency thermotherapy.  In the lithotomy position, under local anesthesia, the patient was prepped and draped in the usual manner.  The anterior urethra, prostate, bladder neck, right and left orifices were visualized as the scope was passed.  RF was then used to create thermal energy to selectively ablate prostate tissue 1cm from the bladder neck to the proximal end of the veru spaced 1cm apart.    9 total treatments were given:      Right lateral lobe: 4   Left lateral lobe: 4   Median lobe 1       At the  completion of the procedure the treatment device was removed.  There was a careful check that there were no clots in the bladder or injury to the bladder, prostate or urethra.      DISPOSITION  The patient was observed, appeared stable and in good condition at the time of discharge.  Post procedure expectations and activity limitations were reviewed with him and written instructions were provided.  The patient is going home with a 16 Fr catheter, therefore catheter care and PRN irrigation instructions were reviewed and written post op instructions were provided.  He is scheduled to return tali the office for it removal.  He was also instructed to continue mild oral analgesia PRN to continue his prophylactic short course of antibiotics until they are gone.  (If Garza) (If CIC) He was taught aseptic technique, catheter positioning and insertion procedures and proper catheter care.  He was observed during his first attempt at performing this while still in the clinic.  The patient was then transported by our attending staff member to his care by wheelchair.  His care was given unto a consenting adult who is responsible for his transportation home.

## 2018-12-18 NOTE — PATIENT INSTRUCTIONS
Rezum Post-procedure Information    What are some of the short term side effects that may occur with Rezum?    As a minimally invasive procedure, Rezum has demonstrated fewer side effects compared to those typically seen with surgical therapies, but as with any interventional procedure, some of the following side effects may temporarily occur:    .  Painful urination  .  Blood in urine  .  Blood in semen  .  Frequent urination  .  Inability to urinate or completely empty the bladder  .  Need for short-term catheterization    Most of these resolve within 3 weeks of the procedure, but there is a possibility some of these effects may be prolonged.  Please talk with your physician about ways to potentially minimize the effects of these risks.  Patients have found that the following options may help relieve discomfort during the short term healing process:    .  Take a mild pain medication such as Tylenol  .  Try a warm bath or sitting on a hot water bottle  .  Eliminate caffeine, chocolate, and alcohol from your diet    Symptoms that may be serious:    YOUR SHOULD CONTACT YOUR DOCTOR IMMEDIATELY IF ANY OF THE FOLLOWING OCCUR    .  Fever or chills.  If your temperature is greater than or equal to 101, this may indicate that you have an infection that needs to be evaluated quickly.  Call your doctor.    .  Inability to urinate.  This may indicate that swelling or a blood clot is blocking the urinary passage.  If not treated this can become more and more painful.  If you cannot urinate, do not drink any more fluids.  Call your doctor.    Severe or uncontrollable diarrhea.  This may indicate an infection or complication from the treatment.  Call your doctor.    Severe pain.  While there may be some pain related to the procedure, it is usually not severe.  If you are experiencing severe pain or any condition you think may be serious, call your doctor.    The number to call if you are having a problem is  [phone].

## 2018-12-18 NOTE — NURSING NOTE
Chief Complaint   Patient presents with     Urinary Retention       Pre-Operative    Consent read and signed: Yes     Allergies   Allergen Reactions     No Known Drug Allergies      Pre-operative antibiotics taken: Yes  Aspirin or other blood thinning medications not taken in 7 days:  Yes  Time of Fleet's enema: 12:00pm  Oral sedation taken 1/2 hour prior to procedure: Yes  Patient given Gentamicin intramuscular injection 30 minutes prior to procedure Yes      The following medication was given:     MEDICATION:  Lidocaine 1% Soln  ROUTE: Rectal  SITE: Prostate  DOSE: 30ML  LOT #: [id number]  : [employer]  EXPIRATION DATE: 10/10/2019  NDC#: 8968-9542-96  Was there drug waste? No  Multi-dose vial: No    The following medication was given:     MEDICATION:  Gentamicin  ROUTE: IM  SITE: RUQ - Gluteus  DOSE: 80MG  LOT #: [id number]  : [employer]  EXPIRATION DATE: 11/19  NDC#: 09367-749-90   Was there drug waste? No  Multi-dose vial: No    Noemi Morales CMA  December 18, 2018

## 2018-12-18 NOTE — LETTER
12/18/2018       RE: Jeff Ricks  [id number]  [place] [address]     Dear Colleague,    Thank you for referring your patient, Jeff Ricks, to the [facility] [place] [facility]. Please see a copy of my visit note below.    The Patient understands the material risk, possible benefits, and alternatives for symptomatic Benign Prostate Hypertrophy.  The Patient understands that BLADDER FUNCTION is independent of this de-obstructing procedure.  Improved voiding results may take 4 to 8 weeks before being noticed depending on prostate size and number of treatments.  Bleeding, infection and irritative voiding are the most common side effects.   Patients will continue to use their current BPH meds until advised to taper or stop.  The patient was given the opportunity to have their questions answered in a previous face to face meeting and also today.  Informed consent signed by patient.    Patient medications and allergies have been reviewed prior to treatment.  Patients on blood thinners or aspirin (ASA) have been advised to check wit the prescribing MD if it ok to hold these prior to treatment.  Risks of withholding blood thinners or ASA have been discussed.    He was given gentamicin antibiotic 1 hour prior to treatment:     Prior to proceeding, transrectal ultrasound was obtained to measure prostate volume and to inject local anestheitc    The prostate measured  58 grams    Maki prostatic block anesthesia was then performed using transrectal ultrasound guidance and a long 18 gauge spinal needle.  20 cc's of 1% lidocaine was injected on each side.     DESCRIPTION OF PROCEDURE:  Transurethral destruction of prostate tissue; by radiofrequency thermotherapy.  In the lithotomy position, under local anesthesia, the patient was prepped and draped in the usual manner.  The anterior urethra, prostate, bladder neck, right and left orifices were  visualized as the scope was passed.  RF was then used to create thermal energy to selectively ablate prostate tissue 1cm from the bladder neck to the proximal end of the veru spaced 1cm apart.    9 total treatments were given:      Right lateral lobe: 4   Left lateral lobe: 4   Median lobe 1       At the completion of the procedure the treatment device was removed.  There was a careful check that there were no clots in the bladder or injury to the bladder, prostate or urethra.    DISPOSITION  The patient was observed, appeared stable and in good condition at the time of discharge.  Post procedure expectations and activity limitations were reviewed with him and written instructions were provided.  The patient is going home with a 16 Fr catheter, therefore catheter care and PRN irrigation instructions were reviewed and written post op instructions were provided.  He is scheduled to return tali the office for it removal.  He was also instructed to continue mild oral analgesia PRN to continue his prophylactic short course of antibiotics until they are gone.  (If Garza) (If CIC) He was taught aseptic technique, catheter positioning and insertion procedures and proper catheter care.  He was observed during his first attempt at performing this while still in the clinic.  The patient was then transported by our attending staff member to his care by wheelchair.  His care was given unto a consenting adult who is responsible for his transportation home.     Again, thank you for allowing me to participate in the care of your patient.      Sincerely,    Jordan Chandra MD

## 2018-12-20 ENCOUNTER — ALLIED HEALTH/NURSE VISIT (OUTPATIENT)
Dept: UROLOGY | Facility: CLINIC | Age: 73
End: 2018-12-20
Payer: MEDICARE

## 2018-12-20 DIAGNOSIS — Z79.2 PROPHYLACTIC ANTIBIOTIC: Primary | ICD-10-CM

## 2018-12-20 PROCEDURE — 51700 IRRIGATION OF BLADDER: CPT | Mod: 58

## 2018-12-20 RX ORDER — CIPROFLOXACIN 500 MG/1
500 TABLET, FILM COATED ORAL ONCE
Qty: 1 TABLET | Refills: 0 | Status: SHIPPED | OUTPATIENT
Start: 2018-12-20 | End: 2019-02-26

## 2018-12-20 RX ORDER — CIPROFLOXACIN 500 MG/1
500 TABLET, FILM COATED ORAL ONCE
Status: DISCONTINUED | OUTPATIENT
Start: 2018-12-20 | End: 2019-02-26

## 2018-12-20 NOTE — PROGRESS NOTES
Jeff Ricks comes into clinic today at the request of Dr. Chandra Ordering Provider for TOV (trial of void).        This service provided today was under the supervising provider of the day Dr. Chandra, who was available if needed.        Eduard Guo

## 2018-12-20 NOTE — PROCEDURES
Jeff HERNANDEZ Ricks comes into clinic today at the request of Dr. Chandra Ordering Provider for TOV (trial of void)    This service provided today was under the supervising provider of the day Dr. Chandra, who was available if needed.  Comes into clinic today at the request of No ref. provider found for TOV / catheter removal after Rezume    This service provided today was under the direct supervision of Dr. Chandra, who was available if needed.    presented today for a trial of void.  Approximately 150 mL of normal saline instilled into bladder via catheter.  Patient stated he had urge to urinate and catheter was removed without difficulty.  Patient was given a graduate to measure urine output.  Patient voided approximately 225mL of pink urine.  PVR 50 mL.    Cipro 500 given per protocol: Yes    Patient did tolerate procedure well.    Teaching done with patient verbally as where to call or go if pain, fever, or unable to urinate post catheter removal.        Eduard Guo

## 2018-12-20 NOTE — PATIENT INSTRUCTIONS

## 2018-12-21 ENCOUNTER — MYC MEDICAL ADVICE (OUTPATIENT)
Dept: INTERNAL MEDICINE | Facility: CLINIC | Age: 73
End: 2018-12-21

## 2018-12-22 ENCOUNTER — OFFICE VISIT (OUTPATIENT)
Dept: URGENT CARE | Facility: URGENT CARE | Age: 73
End: 2018-12-22
Payer: MEDICARE

## 2018-12-22 VITALS
WEIGHT: 218 LBS | SYSTOLIC BLOOD PRESSURE: 130 MMHG | HEART RATE: 63 BPM | BODY MASS INDEX: 31.28 KG/M2 | RESPIRATION RATE: 16 BRPM | OXYGEN SATURATION: 95 % | TEMPERATURE: 98.5 F | DIASTOLIC BLOOD PRESSURE: 78 MMHG

## 2018-12-22 DIAGNOSIS — H61.22 IMPACTED CERUMEN OF LEFT EAR: Primary | ICD-10-CM

## 2018-12-22 DIAGNOSIS — L29.9 ITCHING OF EAR: ICD-10-CM

## 2018-12-22 PROCEDURE — 69209 REMOVE IMPACTED EAR WAX UNI: CPT | Mod: LT | Performed by: PHYSICIAN ASSISTANT

## 2018-12-22 ASSESSMENT — ENCOUNTER SYMPTOMS
NAUSEA: 0
FEVER: 0
CHILLS: 0
SORE THROAT: 0
VOMITING: 0
DIARRHEA: 0
COUGH: 0

## 2018-12-22 NOTE — PROGRESS NOTES
SUBJECTIVE:   Jeff Ricks is a 73 year old male presenting with a chief complaint of   Chief Complaint   Patient presents with     Urgent Care     Ear Problem     Left ear is ringing and right ear is itching        He is an established patient of [facility].    He is presenting to urgent care today with a complaint of left ear feeling plugged for the past couple days.  Notes sounds are muffled. Notes some ringing in the left ear as well. Notes he has had cerumen impaction in his ears  about a year ago and has needed to have them irrigated.  Notes some itching in the right ear.  Denies any URI symptoms.       Review of Systems   Constitutional: Negative for chills and fever.   HENT: Negative for congestion, ear pain and sore throat.         Left ear feel plugged   Respiratory: Negative for cough.    Gastrointestinal: Negative for diarrhea, nausea and vomiting.       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     Heart attack (H)      Hernia, abdominal      Hyperlipidaemia      Mumps      Obese      Other and unspecified hyperlipidemia      Other premature beats      Palpitations      Phlebitis and thrombophlebitis of other deep vessels of lower extremities 2000    has had 2-3 episodes     Stented coronary artery      4 stents     Syncope      Thrombosis of leg      Type II or unspecified type diabetes mellitus without mention of complication, not stated as uncontrolled      Unspecified essential hypertension      Family History   Problem Relation Age of Onset     Musculoskeletal Disorder Mother         spinal stenosis     Cancer Mother         cancer kidney age 85     Hypertension Mother         Born 1923     Diabetes Father         Born 1923     Diabetes Brother      Heart Disease Brother      Current Outpatient  Medications   Medication Sig Dispense Refill     Ascorbic Acid (VITAMIN C PO) Take by mouth daily       aspirin (ASA) 81 MG chewable tablet Take 81 mg by mouth 2 times daily       calcitRIOL (ROCALTROL) 0.25 MCG capsule Take 0.25 mcg by mouth every other day       chlorhexidine (PERIDEX) 0.12 % solution USE 1/2 OZ TO SWISH FOR 30 SECONDS ONCE D  3     Cholecalciferol (VITAMIN D3 PO) Take 1,000 Units by mouth 2 times daily        clopidogrel (PLAVIX) 75 MG tablet TAKE 1 TABLET DAILY (Patient not taking: Reported on 2018) 90 tablet 0     Cyanocobalamin (B-12) 1000 MCG CAPS Take by mouth daily       doxazosin (CARDURA) 8 MG tablet        finasteride (PROSCAR) 5 MG tablet TAKE 1 TABLET DAILY 90 tablet 1     furosemide (LASIX) 40 MG tablet TAKE 1 TABLET DAILY (Patient not taking: Reported on 2018) 90 tablet 1     glimepiride (AMARYL) 1 MG tablet Take 1 tablet (1 mg) by mouth every morning (before breakfast) 90 tablet 3     hydrocortisone 2.5 % cream Apply topically 2 times daily 30 g 11     lisinopril (PRINIVIL/ZESTRIL) 10 MG tablet TAKE 1 TABLET DAILY 90 tablet 0     metFORMIN (GLUCOPHAGE) 1000 MG tablet TAKE 1 TABLET TWICE A DAY  WITH FOOD (WITH MEALS) 180 tablet 0     nitroGLYcerin (NITROSTAT) 0.6 MG sublingual tablet DISSOLVE 1 TABLET UNDER THETONGUE AS NEEDED. 100 tablet 0     omega-3 acid ethyl esters (LOVAZA) 1 g capsule TAKE 1 CAPSULE TWICE DAILY 180 capsule 1     Pyridoxine HCl (B-6) 100 MG TABS Take by mouth daily       simvastatin (ZOCOR) 40 MG tablet TAKE 1 TABLET AT BEDTIME 90 tablet 1     tamsulosin (FLOMAX) 0.4 MG capsule TAKE 1 CAPSULE DAILY 90 capsule 3     VITAMIN E NATURAL PO Take by mouth daily       Social History     Tobacco Use     Smoking status: Former Smoker     Packs/day: 3.00     Years: 6.00     Pack years: 18.00     Start date: 1961     Last attempt to quit: 1967     Years since quittin.0     Smokeless tobacco: Never Used     Tobacco comment: quit 1960s   Substance  Use Topics     Alcohol use: Yes     Comment: 1 beer a week       OBJECTIVE  /78   Pulse 63   Temp 98.5  F (36.9  C) (Oral)   Resp 16   Wt 98.9 kg (218 lb)   SpO2 95%   BMI 31.28 kg/m      Physical Exam   Constitutional: He appears well-developed and well-nourished. No distress.   HENT:   Head: Normocephalic and atraumatic.   Right Ear: Tympanic membrane and ear canal normal.   Left Ear: Tympanic membrane normal.   Mouth/Throat: Oropharynx is clear and moist.   Left ear canal impacted with cerumen.  This is irrigated today by the MA staff with very good results.   Eyes: Conjunctivae are normal.   Neck: Normal range of motion.   Cardiovascular: Regular rhythm and normal heart sounds.   Pulmonary/Chest: Effort normal and breath sounds normal. No respiratory distress.   Neurological: He is alert.   Skin: Skin is warm and dry.   Psychiatric: He has a normal mood and affect.   Nursing note and vitals reviewed.      Labs:  No results found for this or any previous visit (from the past 24 hour(s)).        ASSESSMENT:      ICD-10-CM    1. Impacted cerumen of left ear H61.22 HC REMOVAL IMPACTED CERUMEN IRRIGATION/LVG UNILAT   2. Itching of ear L29.9         Medical Decision Making:    Differential Diagnosis:  Cerumen impaction, eustachian tube dysfunction.    Serious Comorbid Conditions:  Diabetes    PLAN:    Left ear cerumen impaction: Irrigated today with very good results.  Follow-up if any worsening symptoms.  Patient understands and agrees with the plan.    Itching right ear canal: Ear canal clear on exam today.  Recommend mineral oil as needed.  Follow-up if any worsening symptoms.  He agrees.      Followup:    If not improving or if condition worsens, follow up with your Primary Care Provider

## 2018-12-26 NOTE — TELEPHONE ENCOUNTER
Pt did not receive a response from us and was seen at an urgent care on 12/22/18-OV notes from there indicate that he did have impacted ear wax and ear was flushed with good results. Message sent to pt advising to let us know if he is still having any issues with either one of his ears.

## 2018-12-30 ENCOUNTER — MYC REFILL (OUTPATIENT)
Dept: INTERNAL MEDICINE | Facility: CLINIC | Age: 73
End: 2018-12-30

## 2018-12-30 DIAGNOSIS — I25.10 ASHD (ARTERIOSCLEROTIC HEART DISEASE): ICD-10-CM

## 2018-12-31 NOTE — TELEPHONE ENCOUNTER
"Requested Prescriptions   Pending Prescriptions Disp Refills     clopidogrel (PLAVIX) 75 MG tablet [Pharmacy Med Name: CLOPIDOGREL  TAB 75MG] 90 tablet 0    Last Written Prescription Date:  07/20/2018  Last Fill Quantity: 90,  # refills: 0   Last office visit: 11/9/2018 with prescribing provider:     Future Office Visit:   Sig: TAKE 1 TABLET DAILY    Plavix Failed - 12/30/2018 11:06 AM       Failed - Normal HGB on file in past 12 months    Recent Labs   Lab Test 08/24/18  0930   HGB 9.6*              Passed - No active PPI on record unless is Protonix       Passed - Normal Platelets on file in past 12 months    Recent Labs   Lab Test 08/24/18  0930                 Passed - Recent (12 mo) or future (30 days) visit within the authorizing provider's specialty    Patient had office visit in the last 12 months or has a visit in the next 30 days with authorizing provider or within the authorizing provider's specialty.  See \"Patient Info\" tab in inbasket, or \"Choose Columns\" in Meds & Orders section of the refill encounter.             Passed - Patient is age 18 or older        "

## 2019-01-02 ENCOUNTER — MYC MEDICAL ADVICE (OUTPATIENT)
Dept: INTERNAL MEDICINE | Facility: CLINIC | Age: 74
End: 2019-01-02

## 2019-01-03 ENCOUNTER — OFFICE VISIT (OUTPATIENT)
Dept: UROLOGY | Facility: CLINIC | Age: 74
End: 2019-01-03
Payer: MEDICARE

## 2019-01-03 VITALS
SYSTOLIC BLOOD PRESSURE: 132 MMHG | OXYGEN SATURATION: 99 % | WEIGHT: 218 LBS | HEIGHT: 70 IN | BODY MASS INDEX: 31.21 KG/M2 | HEART RATE: 58 BPM | DIASTOLIC BLOOD PRESSURE: 70 MMHG

## 2019-01-03 DIAGNOSIS — N40.0 ENLARGED PROSTATE: ICD-10-CM

## 2019-01-03 DIAGNOSIS — Z87.898 HISTORY OF URINARY RETENTION: Primary | ICD-10-CM

## 2019-01-03 LAB
ALBUMIN UR-MCNC: NEGATIVE MG/DL
APPEARANCE UR: CLEAR
BILIRUB UR QL STRIP: NEGATIVE
COLOR UR AUTO: YELLOW
GLUCOSE UR STRIP-MCNC: NEGATIVE MG/DL
HGB UR QL STRIP: ABNORMAL
KETONES UR STRIP-MCNC: NEGATIVE MG/DL
LEUKOCYTE ESTERASE UR QL STRIP: ABNORMAL
NITRATE UR QL: NEGATIVE
PH UR STRIP: 5 PH (ref 5–7)
SOURCE: ABNORMAL
SP GR UR STRIP: <=1.005 (ref 1–1.03)
UROBILINOGEN UR STRIP-ACNC: 0.2 EU/DL (ref 0.2–1)

## 2019-01-03 PROCEDURE — 99024 POSTOP FOLLOW-UP VISIT: CPT | Performed by: UROLOGY

## 2019-01-03 PROCEDURE — 81003 URINALYSIS AUTO W/O SCOPE: CPT | Performed by: UROLOGY

## 2019-01-03 PROCEDURE — 51798 US URINE CAPACITY MEASURE: CPT | Mod: 58 | Performed by: UROLOGY

## 2019-01-03 RX ORDER — CLOPIDOGREL BISULFATE 75 MG/1
TABLET ORAL
Qty: 90 TABLET | Refills: 0 | Status: SHIPPED | OUTPATIENT
Start: 2019-01-03 | End: 2019-01-07

## 2019-01-03 ASSESSMENT — MIFFLIN-ST. JEOR: SCORE: 1730.09

## 2019-01-03 NOTE — NURSING NOTE
Chief Complaint   Patient presents with     Hx of Retention     Patient here today for follow after Rezum     UA RESULTS:  Recent Labs   Lab Test 01/03/19  1452  08/24/18  0902   COLOR Yellow   < > Colorless   APPEARANCE Clear   < > Clear   URINEGLC Negative   < > Negative   URINEBILI Negative   < > Negative   URINEKETONE Negative   < > Negative   SG <=1.005   < > 1.006   UBLD Large*   < > Negative   URINEPH 5.0   < > 5.0   PROTEIN Negative   < > Negative   UROBILINOGEN 0.2   < >  --    NITRITE Negative   < > Negative   LEUKEST Trace*   < > Negative   RBCU  --   --  0   WBCU  --   --  0    < > = values in this interval not displayed.

## 2019-01-03 NOTE — TELEPHONE ENCOUNTER
"Requested Prescriptions   Pending Prescriptions Disp Refills     clopidogrel (PLAVIX) 75 MG tablet 90 tablet 0     Sig: Take 1 tablet (75 mg) by mouth daily    Plavix Failed - 12/30/2018 10:03 AM       Failed - Normal HGB on file in past 12 months    Recent Labs   Lab Test 08/24/18  0930   HGB 9.6*              Passed - No active PPI on record unless is Protonix       Passed - Normal Platelets on file in past 12 months    Recent Labs   Lab Test 08/24/18  0930                 Passed - Recent (12 mo) or future (30 days) visit within the authorizing provider's specialty    Patient had office visit in the last 12 months or has a visit in the next 30 days with authorizing provider or within the authorizing provider's specialty.  See \"Patient Info\" tab in inbasket, or \"Choose Columns\" in Meds & Orders section of the refill encounter.             Passed - Patient is age 18 or older          Routing refill request to provider for review/approval because:  A break in medication--last fill 7-20-18 #90 with 0 refills.    Please advise, thanks.        "

## 2019-01-03 NOTE — LETTER
"1/3/2019       RE: Jeff Ricks  [address]  [facility] [address]     Dear Colleague,    Thank you for referring your patient, Jeff Ricks, to the [facility] [place] [facility]. Please see a copy of my visit note below.    Office Visit Note  [facility]  [phone]    UROLOGIC DIAGNOSES:   Elevated PSA, enlarged prostate, history of retention, history of hydrocele    CURRENT INTERVENTIONS:   S/P \"Rezum procedure\", negative prostate biopsy in 2016    HISTORY:   Jeff returns to clinic today, now 3 weeks after his Rezum procedure for enlarged prostate. He says that he had some recurrence of hematuria after restarting his Plavix but that has resolved now. He has actually stopped taking his Flomax and finasteride already.He is at the feels that he is urinating well.      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     Heart attack (H)      Hernia, abdominal      Hyperlipidaemia      Mumps      Obese      Other and unspecified hyperlipidemia      Other premature beats      Palpitations      Phlebitis and thrombophlebitis of other deep vessels of lower extremities 2000    has had 2-3 episodes     Stented coronary artery      4 stents     Syncope      Thrombosis of leg      Type II or unspecified type diabetes mellitus without mention of complication, not stated as uncontrolled      Unspecified essential hypertension        PAST SURGICAL HISTORY:   Past Surgical History:   Procedure Laterality Date     BIOPSY  8/2016     C NONSPECIFIC PROCEDURE      colonoscopy approx 1999 done elsewhere     CARDIAC SURGERY       COLONOSCOPY       CORONARY ANGIOGRAPHY ADULT ORDER  8/28/2000    75% distal LAD " stenosis, 80% third diagonal stenosis, 75-80% mid ramus stenosis, ostial 60% stenosis in circumflex w/90% stenosis in distal circumflex     CORONARY ANGIOGRAPHY ADULT ORDER      4 stents placed     EP ABLATION / EP STUDIES  3/25/2014     HEART CATH, ANGIOPLASTY       HYDROCELECTOMY SCROTAL Right 10/6/2016    Procedure: HYDROCELECTOMY SCROTAL;  Surgeon: Jordan Chandra MD;  Location: [facility]     TESTICLE SURGERY         FAMILY HISTORY:   Family History   Problem Relation Age of Onset     Musculoskeletal Disorder Mother         spinal stenosis     Cancer Mother         cancer kidney age 85     Hypertension Mother         Born 1923     Diabetes Father         Born 1923     Diabetes Brother      Heart Disease Brother        SOCIAL HISTORY:   Social History     Tobacco Use     Smoking status: Former Smoker     Packs/day: 3.00     Years: 6.00     Pack years: 18.00     Start date: 1961     Last attempt to quit: 1967     Years since quittin.0     Smokeless tobacco: Never Used     Tobacco comment: quit    Substance Use Topics     Alcohol use: Yes     Comment: 1 beer a week       Current Outpatient Medications   Medication     Ascorbic Acid (VITAMIN C PO)     aspirin (ASA) 81 MG chewable tablet     calcitRIOL (ROCALTROL) 0.25 MCG capsule     chlorhexidine (PERIDEX) 0.12 % solution     Cholecalciferol (VITAMIN D3 PO)     clopidogrel (PLAVIX) 75 MG tablet     Cyanocobalamin (B-12) 1000 MCG CAPS     hydrocortisone 2.5 % cream     metFORMIN (GLUCOPHAGE) 1000 MG tablet     nitroGLYcerin (NITROSTAT) 0.6 MG sublingual tablet     omega-3 acid ethyl esters (LOVAZA) 1 g capsule     simvastatin (ZOCOR) 40 MG tablet     VITAMIN E NATURAL PO     doxazosin (CARDURA) 8 MG tablet     finasteride (PROSCAR) 5 MG tablet     furosemide (LASIX) 40 MG tablet     glimepiride (AMARYL) 1 MG tablet     lisinopril (PRINIVIL/ZESTRIL) 10 MG tablet     Pyridoxine HCl (B-6) 100 MG TABS     tamsulosin (FLOMAX) 0.4 MG capsule  "    Current Facility-Administered Medications   Medication     ciprofloxacin (CIPRO) tablet 500 mg     gentamicin (GARAMYCIN) injection 80 mg     lidocaine 1 % 30 mL     Facility-Administered Medications Ordered in Other Visits   Medication     gadobutrol (GADAVIST) injection 10 mL         PHYSICAL EXAM:    /70 (BP Location: Left arm, Patient Position: Sitting, Cuff Size: Adult Regular)   Pulse 58   Ht 1.778 m (5' 10\")   Wt 98.9 kg (218 lb)   SpO2 99%   BMI 31.28 kg/m       Constitutional: Well developed. Conversant and in no acute distress  Eyes: Anicteric sclera, conjunctiva clear, normal extraocular movements  ENT: Normocephalic and atraumatic,   Skin: Warm and dry. No rashes or lesions  Cardiac: No peripheral edema  Back/Flank: Not done  CNS/PNS: Normal musculature and movements, moves all extremities normally  Respiratory: Normal non-labored breathing  Abdomen: Soft nontender and nondistended  Peripheral Vascular: No peripheral edema  Mental Status/Psych: Alert and Oriented x 3. Normal mood and affect    Penis: Not done  Scrotal Skin: Not done  Testicles: Not done  Epididymis: Not done  Digital Rectal Exam:     Cystoscopy: Not done    Imaging: None    Urinalysis: UA RESULTS:  Recent Labs   Lab Test 12/10/18  0834  08/24/18  0902   COLOR Yellow   < > Colorless   APPEARANCE Clear   < > Clear   URINEGLC Negative   < > Negative   URINEBILI Negative   < > Negative   URINEKETONE Trace*   < > Negative   SG >1.030   < > 1.006   UBLD Negative   < > Negative   URINEPH 5.5   < > 5.0   PROTEIN Negative   < > Negative   UROBILINOGEN 2.0*   < >  --    NITRITE Negative   < > Negative   LEUKEST Negative   < > Negative   RBCU  --   --  0   WBCU  --   --  0    < > = values in this interval not displayed.       PSA: 4.02    Post Void Residual: 65mL    Other labs: None today      IMPRESSION:  Doing well    PLAN:  He is doing well after his Rezum procedure. He has already stopped taking his Flomax and finasteride and he " will remain off of those medications. I will see him back in 3 months for a follow-up on his symptoms.    Total Time: 15 min                                      Total in Consultation: 15 min      Jordan Chandra M.D.

## 2019-01-03 NOTE — PROGRESS NOTES
"Office Visit Note  [facility]  [phone]    UROLOGIC DIAGNOSES:   Elevated PSA, enlarged prostate, history of retention, history of hydrocele    CURRENT INTERVENTIONS:   S/P \"Rezum procedure\", negative prostate biopsy in 2016    HISTORY:   Jeff returns to clinic today, now 3 weeks after his Rezum procedure for enlarged prostate. He says that he had some recurrence of hematuria after restarting his Plavix but that has resolved now. He has actually stopped taking his Flomax and finasteride already.He is at the feels that he is urinating well.      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     Heart attack (H)      Hernia, abdominal      Hyperlipidaemia      Mumps      Obese      Other and unspecified hyperlipidemia      Other premature beats      Palpitations      Phlebitis and thrombophlebitis of other deep vessels of lower extremities 2000    has had 2-3 episodes     Stented coronary artery      4 stents     Syncope      Thrombosis of leg      Type II or unspecified type diabetes mellitus without mention of complication, not stated as uncontrolled      Unspecified essential hypertension        PAST SURGICAL HISTORY:   Past Surgical History:   Procedure Laterality Date     BIOPSY  8/2016     C NONSPECIFIC PROCEDURE      colonoscopy approx 1999 done elsewhere     CARDIAC SURGERY       COLONOSCOPY       CORONARY ANGIOGRAPHY ADULT ORDER  8/28/2000    75% distal LAD stenosis, 80% third diagonal stenosis, 75-80% mid ramus stenosis, ostial 60% stenosis in circumflex w/90% stenosis in distal circumflex     CORONARY ANGIOGRAPHY ADULT ORDER  2003    4 stents placed     EP ABLATION / EP STUDIES  3/25/2014     HEART CATH, ANGIOPLASTY       HYDROCELECTOMY SCROTAL Right 10/6/2016    " Procedure: HYDROCELECTOMY SCROTAL;  Surgeon: Jordan Chandra MD;  Location:  [facility]     TESTICLE SURGERY         FAMILY HISTORY:   Family History   Problem Relation Age of Onset     Musculoskeletal Disorder Mother         spinal stenosis     Cancer Mother         cancer kidney age 85     Hypertension Mother         Born      Diabetes Father         Born      Diabetes Brother      Heart Disease Brother        SOCIAL HISTORY:   Social History     Tobacco Use     Smoking status: Former Smoker     Packs/day: 3.00     Years: 6.00     Pack years: 18.00     Start date: 1961     Last attempt to quit: 1967     Years since quittin.0     Smokeless tobacco: Never Used     Tobacco comment: quit    Substance Use Topics     Alcohol use: Yes     Comment: 1 beer a week       Current Outpatient Medications   Medication     Ascorbic Acid (VITAMIN C PO)     aspirin (ASA) 81 MG chewable tablet     calcitRIOL (ROCALTROL) 0.25 MCG capsule     chlorhexidine (PERIDEX) 0.12 % solution     Cholecalciferol (VITAMIN D3 PO)     clopidogrel (PLAVIX) 75 MG tablet     Cyanocobalamin (B-12) 1000 MCG CAPS     hydrocortisone 2.5 % cream     metFORMIN (GLUCOPHAGE) 1000 MG tablet     nitroGLYcerin (NITROSTAT) 0.6 MG sublingual tablet     omega-3 acid ethyl esters (LOVAZA) 1 g capsule     simvastatin (ZOCOR) 40 MG tablet     VITAMIN E NATURAL PO     doxazosin (CARDURA) 8 MG tablet     finasteride (PROSCAR) 5 MG tablet     furosemide (LASIX) 40 MG tablet     glimepiride (AMARYL) 1 MG tablet     lisinopril (PRINIVIL/ZESTRIL) 10 MG tablet     Pyridoxine HCl (B-6) 100 MG TABS     tamsulosin (FLOMAX) 0.4 MG capsule     Current Facility-Administered Medications   Medication     ciprofloxacin (CIPRO) tablet 500 mg     gentamicin (GARAMYCIN) injection 80 mg     lidocaine 1 % 30 mL     Facility-Administered Medications Ordered in Other Visits   Medication     gadobutrol (GADAVIST) injection 10 mL         PHYSICAL EXAM:    /70  "(BP Location: Left arm, Patient Position: Sitting, Cuff Size: Adult Regular)   Pulse 58   Ht 1.778 m (5' 10\")   Wt 98.9 kg (218 lb)   SpO2 99%   BMI 31.28 kg/m      Constitutional: Well developed. Conversant and in no acute distress  Eyes: Anicteric sclera, conjunctiva clear, normal extraocular movements  ENT: Normocephalic and atraumatic,   Skin: Warm and dry. No rashes or lesions  Cardiac: No peripheral edema  Back/Flank: Not done  CNS/PNS: Normal musculature and movements, moves all extremities normally  Respiratory: Normal non-labored breathing  Abdomen: Soft nontender and nondistended  Peripheral Vascular: No peripheral edema  Mental Status/Psych: Alert and Oriented x 3. Normal mood and affect    Penis: Not done  Scrotal Skin: Not done  Testicles: Not done  Epididymis: Not done  Digital Rectal Exam:     Cystoscopy: Not done    Imaging: None    Urinalysis: UA RESULTS:  Recent Labs   Lab Test 12/10/18  0834  08/24/18  0902   COLOR Yellow   < > Colorless   APPEARANCE Clear   < > Clear   URINEGLC Negative   < > Negative   URINEBILI Negative   < > Negative   URINEKETONE Trace*   < > Negative   SG >1.030   < > 1.006   UBLD Negative   < > Negative   URINEPH 5.5   < > 5.0   PROTEIN Negative   < > Negative   UROBILINOGEN 2.0*   < >  --    NITRITE Negative   < > Negative   LEUKEST Negative   < > Negative   RBCU  --   --  0   WBCU  --   --  0    < > = values in this interval not displayed.       PSA: 4.02    Post Void Residual: 65mL    Other labs: None today      IMPRESSION:  Doing well    PLAN:  He is doing well after his Rezum procedure. He has already stopped taking his Flomax and finasteride and he will remain off of those medications. I will see him back in 3 months for a follow-up on his symptoms.    Total Time: 15 min                                      Total in Consultation: 15 min      Jordan Chandra M.D.            "

## 2019-01-04 ENCOUNTER — MYC MEDICAL ADVICE (OUTPATIENT)
Dept: INTERNAL MEDICINE | Facility: CLINIC | Age: 74
End: 2019-01-04

## 2019-01-04 DIAGNOSIS — I25.10 ASHD (ARTERIOSCLEROTIC HEART DISEASE): ICD-10-CM

## 2019-01-04 RX ORDER — CLOPIDOGREL BISULFATE 75 MG/1
75 TABLET ORAL DAILY
Qty: 90 TABLET | Refills: 3 | Status: SHIPPED | OUTPATIENT
Start: 2019-01-04 | End: 2019-02-01

## 2019-01-04 NOTE — TELEPHONE ENCOUNTER
Pt advised one year supply has been sent in. Message sent to pt asking if he will need a short-term supply sent in to a local pharmacy so he will not run out of this in the meantime.

## 2019-01-07 RX ORDER — CLOPIDOGREL BISULFATE 75 MG/1
75 TABLET ORAL DAILY
Qty: 7 TABLET | Refills: 0 | Status: SHIPPED | OUTPATIENT
Start: 2019-01-07 | End: 2019-02-26

## 2019-01-31 DIAGNOSIS — I25.10 ASHD (ARTERIOSCLEROTIC HEART DISEASE): ICD-10-CM

## 2019-01-31 NOTE — TELEPHONE ENCOUNTER
"Requested Prescriptions   Pending Prescriptions Disp Refills     clopidogrel (PLAVIX) 75 MG tablet  Last Written Prescription Date:  01/07/19  Last Fill Quantity: 7,  # refills: 0   Last office visit: 11/9/2018 with prescribing provider:  11/09/18   Future Office Visit:     90 tablet 3     Sig: Take 1 tablet (75 mg) by mouth daily    Plavix Failed - 1/31/2019  3:06 PM       Failed - Normal HGB on file in past 12 months    Recent Labs   Lab Test 08/24/18  0930   HGB 9.6*              Passed - No active PPI on record unless is Protonix       Passed - Normal Platelets on file in past 12 months    Recent Labs   Lab Test 08/24/18  0930                 Passed - Recent (12 mo) or future (30 days) visit within the authorizing provider's specialty    Patient had office visit in the last 12 months or has a visit in the next 30 days with authorizing provider or within the authorizing provider's specialty.  See \"Patient Info\" tab in inbasket, or \"Choose Columns\" in Meds & Orders section of the refill encounter.             Passed - Medication is active on med list       Passed - Patient is age 18 or older          "

## 2019-02-01 RX ORDER — CLOPIDOGREL BISULFATE 75 MG/1
75 TABLET ORAL DAILY
Qty: 90 TABLET | Refills: 3 | Status: SHIPPED | OUTPATIENT
Start: 2019-02-01 | End: 2020-03-24

## 2019-02-01 NOTE — TELEPHONE ENCOUNTER
Routing refill request to provider for review/approval because:  Labs out of range:  Hgb.    CHYNA SinghN, RN  [employer] [facility]

## 2019-02-25 ENCOUNTER — E-VISIT (OUTPATIENT)
Dept: INTERNAL MEDICINE | Facility: CLINIC | Age: 74
End: 2019-02-25
Payer: MEDICARE

## 2019-02-25 DIAGNOSIS — M25.552 HIP PAIN, LEFT: Primary | ICD-10-CM

## 2019-02-25 PROCEDURE — 99207 ZZC NO BILLABLE SERVICE THIS VISIT: CPT | Performed by: NURSE PRACTITIONER

## 2019-02-26 ENCOUNTER — OFFICE VISIT (OUTPATIENT)
Dept: INTERNAL MEDICINE | Facility: CLINIC | Age: 74
End: 2019-02-26
Payer: MEDICARE

## 2019-02-26 ENCOUNTER — THERAPY VISIT (OUTPATIENT)
Dept: PHYSICAL THERAPY | Facility: CLINIC | Age: 74
End: 2019-02-26
Attending: INTERNAL MEDICINE
Payer: MEDICARE

## 2019-02-26 VITALS
RESPIRATION RATE: 16 BRPM | HEART RATE: 72 BPM | DIASTOLIC BLOOD PRESSURE: 58 MMHG | BODY MASS INDEX: 30.99 KG/M2 | OXYGEN SATURATION: 98 % | HEIGHT: 70 IN | WEIGHT: 216.5 LBS | TEMPERATURE: 98.6 F | SYSTOLIC BLOOD PRESSURE: 110 MMHG

## 2019-02-26 DIAGNOSIS — M54.42 ACUTE LEFT-SIDED LOW BACK PAIN WITH LEFT-SIDED SCIATICA: ICD-10-CM

## 2019-02-26 DIAGNOSIS — Z23 NEED FOR PROPHYLACTIC VACCINATION USING TETANUS AND DIPHTHERIA TOXOIDS ADSORBED (TD) VACCINE: ICD-10-CM

## 2019-02-26 DIAGNOSIS — E11.21 TYPE 2 DIABETES MELLITUS WITH DIABETIC NEPHROPATHY, WITHOUT LONG-TERM CURRENT USE OF INSULIN (H): Primary | ICD-10-CM

## 2019-02-26 LAB — HBA1C MFR BLD: 5.4 % (ref 0–5.6)

## 2019-02-26 PROCEDURE — 83036 HEMOGLOBIN GLYCOSYLATED A1C: CPT | Performed by: INTERNAL MEDICINE

## 2019-02-26 PROCEDURE — 99214 OFFICE O/P EST MOD 30 MIN: CPT | Performed by: INTERNAL MEDICINE

## 2019-02-26 PROCEDURE — 97110 THERAPEUTIC EXERCISES: CPT | Mod: GP | Performed by: PHYSICAL THERAPIST

## 2019-02-26 PROCEDURE — 97112 NEUROMUSCULAR REEDUCATION: CPT | Mod: GP | Performed by: PHYSICAL THERAPIST

## 2019-02-26 PROCEDURE — 36415 COLL VENOUS BLD VENIPUNCTURE: CPT | Performed by: INTERNAL MEDICINE

## 2019-02-26 PROCEDURE — 97162 PT EVAL MOD COMPLEX 30 MIN: CPT | Mod: GP | Performed by: PHYSICAL THERAPIST

## 2019-02-26 ASSESSMENT — MIFFLIN-ST. JEOR: SCORE: 1723.29

## 2019-02-26 NOTE — PATIENT INSTRUCTIONS
Tetanus- call insurance    Physical therapy for sciatica pain    Heating- muscles    Ice inflammation

## 2019-02-26 NOTE — PROGRESS NOTES
SUBJECTIVE:   Jeff Ricks is a 73 year old male who presents to clinic today for the following health issues:    Left back/hip pain. The patient reports that he has had mild pain in the left hip for one week.  The patient will walk 10-15 steps and then it resolves.  The patient reports that the pain radiated down his left leg.  Denies any weakness or numbness or tingling.  No bowel or bladder incontinence.  He does have degenerative arthritis of his spine.   No trauma or unusual activity one week ago.  He slept on the heating pad, and has not had the symptoms.   Patient denies pain today.  He is worried about his upcoming trip to [place] this weekend.       Diabetes Follow-up      Patient is checking blood sugars: not at all    Diabetic concerns: None     Symptoms of hypoglycemia (low blood sugar): shaky     Paresthesias (numbness or burning in feet) or sores: No     Date of last diabetic eye exam: April 2018    BP Readings from Last 2 Encounters:   01/03/19 132/70   12/22/18 130/78     Hemoglobin A1C (%)   Date Value   11/09/2018 6.4 (H)   05/18/2018 6.0 (H)     LDL Cholesterol Calculated (mg/dL)   Date Value   11/09/2018 33   09/06/2017 17       Diabetes Management Resources  Hyperlipidemia Follow-Up      Rate your low fat/cholesterol diet?: good    Taking statin?  Yes, no muscle aches from statin    Other lipid medications/supplements?:  Fish oil/Omega 3, dose  without side effects    Hypertension Follow-up      Outpatient blood pressures are not being checked.    Low Salt Diet: low salt      Amount of exercise or physical activity: 6-7 days/week for an average of 30-45 minutes    Problems taking medications regularly: No    Medication side effects: none    Diet: low salt and low fat/cholesterol            Problem list and histories reviewed & adjusted, as indicated.    Reviewed and updated as needed this visit by clinical staff       Reviewed and updated as needed this visit by Provider      "    ROS:  CONSTITUTIONAL: NEGATIVE for fever, chills, change in weight  RESP: NEGATIVE for significant cough or SOB  CV: NEGATIVE for chest pain, palpitations or peripheral edema  MSK: hip and back pain    OBJECTIVE:     /58   Pulse 72   Temp 98.6  F (37  C) (Oral)   Resp 16   Ht 1.778 m (5' 10\")   Wt 98.2 kg (216 lb 8 oz)   SpO2 98%   BMI 31.06 kg/m    Body mass index is 31.06 kg/m .  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

## 2019-02-26 NOTE — LETTER
"DEPARTMENT OF HEALTH AND HUMAN SERVICES  CENTERS FOR MEDICARE & MEDICAID SERVICES    PLAN/UPDATED PLAN OF PROGRESS FOR OUTPATIENT REHABILITATION    PATIENTS NAME:  Jeff Ricks   : 1945  PR[facility] NUMBER:    [id number]  [id number]   PROVIDER NAME: CHRISTOPHER ROCIO HASSAN PT    MEDICAL RECORD NUMBER: [id number]     START OF CARE DATE:  SOC Date: 19   TYPE:  PT    PRIMARY/TREATMENT DIAGNOSIS: (Pertinent Medical Diagnosis)  Acute left-sided low back pain with left-sided sciatica    VISITS FROM START OF CARE:  Rxs Used: 1     [facility] Initial Evaluation -- Lumbar    Date: 2019  Jeff Ricks is a 73 year old male with a lumbar condition.   Referral: Dr. Pearson  Work mechanical stresses:  computer  Employment status:  Part time  Leisure mechanical stresses: daily exercise, walking/stretching 30'; gym 1 hr/3-4 x week  Functional disability score (CHARMAINE/STarT Back):  See flow sheet  VAS score (0-10): 0/10  Patient goals/expectations:  \"to get rid of the pain\"  HISTORY:  Present symptoms: L low back, L hip, L lateral hip to knee  Pain quality (sharp/shooting/stabbing/aching/burning/cramping):  aching   Paresthesia (yes/no):  no  Present since (onset date): 2019 ( a week ago).     Symptoms (improving/unchanging/worsening):  improving.   Symptoms commenced as a result of: no apparent reason   Condition occurred in the following environment:   home   Symptoms at onset (back/thigh/leg): back/hip  Constant symptoms (back/thigh/leg):   Intermittent symptoms (back/thigh/leg): back/hip  Symptoms are made worse with the following: Sometimes Sitting, Sometimes Rising, Sometimes Standing and Time of day - No effect, first few steps  Symptoms are made better with the following: Always Walking and Always On the move  Disturbed sleep (yes/no):  no Sleeping postures (prone/sup/side R/L): sides  Previous episodes (0/1-5/6-10/11+): 0 Year of first episode:   Previous history: " episodic back pain (always feels stiff)  Previous treatments: PT previous which was helpful.   Specific Questions:  Cough/Sneeze/Strain (pos/neg): neg  Bowel/Bladder (normal/abnormal): normal  Gait (normal/abnormal): normal  Medications (nil/NSAIDS/analg/steroids/anticoag/other):  Other - aspirin; HBP, cardiac, Metformin  Medical allergies:  none  General health (excellent/good/fair/poor):  good  PATIENTS NAME:  Jeff Ricks   : 1945    Pertinent medical history:  Diabetes, Heart problems, High blood pressure and Overweight  Imaging (None/Xray/MRI/Other):  none  Recent or major surgery (yes/no):  no  Night pain (yes/no): no  Accidents (yes/no): no  Unexplained weight loss (yes/no): no  Barriers at home: none  Other red flags: none  EXAMINATION  Posture:   Sitting (good/fair/poor): poor  Standing (good/fair/poor):fair/poor  Lordosis (red/acc/normal): red  Correction of posture (better/worse/no effect): no effect  Lateral Shift (right/left/nil): nil  Relevant (yes/no):  no  Other Observations: none  Neurological:  Motor deficit:    Reflexes:    Sensory deficit:    Dural signs:    Movement Loss:   Luis Mod Min Nil Pain   Flexion    x    Extension  x   erp   Side Gliding R  x x  pdm   Side Gliding L  x x  pdm     Test Movements:   During: produces, abolishes, increases, decreases, no effect, centralizing, peripheralizing   After: better, worse, no better, no worse, no effect, centralized, peripheralized    Pretest symptoms standing:    Symptoms During Symptoms After ROM increased ROM decreased No Effect   FIS        Rep FIS        EIS        Rep EIS        Pretest symptoms lying: central     Symptoms During Symptoms After ROM increased ROM decreased No Effect   ANNE        Rep ANNE        EIL Increases    No Worse         Rep EIL Increases    No Worse     X, incr pain with SG    If required, pretest symptoms:    Symptoms During Symptoms After ROM increased ROM decreased No Effect   SGIS - R        Rep SGIS - R       "  SGIS - L        Rep SGIS - L        PATIENTS NAME:  Jeff Ricks   : 1945    Static Tests:  Sitting slouched:    Sitting erect:    Standing slouched   Standing erect:    Lying prone in extension:  Incr/NW/incr B SG, EXT 2 x 5' Long sitting:    Other Tests:   Provisional Classification:  Derangement - Asymmetrical, unilateral, symptoms above knee  Principle of Management:  Education:  Posture, DP EXT, therapeutic dose of exercise, \"cut knuckle\" analogy, traffic light   Equipment provided:  Lumbar roll  Mechanical therapy (Y/N):  Y   Extension principle:  Sustained DEJA 5' every few hours;   Lateral Principle:    Flexion principle:    Other:    ASSESSMENT/PLAN:  Patient is a 73 year old male with lumbar complaints.    Patient has the following significant findings with corresponding treatment plan.                Diagnosis 1:  Mechanical low back pain  Pain -  hot/cold therapy, manual therapy, self management, education, directional preference exercise and home program  Decreased ROM/flexibility - manual therapy and therapeutic exercise  Decreased function - therapeutic activities  Impaired posture - neuro re-education    Therapy Evaluation Codes:   1) History comprised of:   Personal factors that impact the plan of care:      None.    Comorbidity factors that impact the plan of care are:      Depression, Heart problems and High blood pressure.     Medications impacting care: Cardiac, High blood pressure and Metformin.  2) Examination of Body Systems comprised of:   Body structures and functions that impact the plan of care:      Lumbar spine.   Activity limitations that impact the plan of care are:      Sitting.  3) Clinical presentation characteristics are:   Evolving/Changing.  4) Decision-Making    Moderate complexity using standardized patient assessment instrument and/or measureable assessment of functional outcome.  Cumulative Therapy Evaluation is: Moderate complexity.    Previous and current " "functional limitations:  (See Goal Flow Sheet for this information)    Short term and Long term goals: (See Goal Flow Sheet for this information)     Communication ability:  Patient appears to be able to clearly communicate and understand verbal and written communication and follow directions correctly.  Treatment Explanation - The following has been discussed with the patient:   RX ordered/plan of care  Anticipated outcomes  Possible risks and side effects              PATIENTS NAME:  Jeff Ricks   : 1945    This patient would benefit from PT intervention to resume normal activities.   Rehab potential is good.    Frequency:  1 X week, once daily  Duration:  for 2 weeks tapering to 2 x month for 1 month.   Discharge Plan:  Achieve all LTG.  Independent in home treatment program.  Reach maximal therapeutic benefit.    Caregiver Signature/Credentials _____________________________ Date ________      Treating Provider: Shari Jernigan, PT, Cert, MDT    I have reviewed and certified the need for these services and plan of treatment while under my care.        PHYSICIAN'S SIGNATURE:   _____________________________________  Date___________      Joseline Pearson MD    Certification period:  Beginning of Cert date period: 19 to  End of Cert period date: 19     Functional Level Progress Report: Please see attached \"Goal Flow sheet for Functional level.\"    ____X____ Continue Services or       ________ DC Services                Service dates: From  SOC Date: 19 date to present                         "

## 2019-02-26 NOTE — PROGRESS NOTES
"[facility] Initial Evaluation -- Lumbar    Date: February 26, 2019  Jeff Ricks is a 73 year old male with a lumbar condition.   Referral: Dr. Pearson  Work mechanical stresses:  computer  Employment status:  Part time  Leisure mechanical stresses: daily exercise, walking/stretching 30'; gym 1 hr/3-4 x week  Functional disability score (CHARMAINE/STarT Back):  See flow sheet  VAS score (0-10): 0/10  Patient goals/expectations:  \"to get rid of the pain\"    HISTORY:    Present symptoms: L low back, L hip, L lateral hip to knee  Pain quality (sharp/shooting/stabbing/aching/burning/cramping):  aching   Paresthesia (yes/no):  no    Present since (onset date): February 2019 ( a week ago).     Symptoms (improving/unchanging/worsening):  improving.     Symptoms commenced as a result of: no apparent reason   Condition occurred in the following environment:   home     Symptoms at onset (back/thigh/leg): back/hip  Constant symptoms (back/thigh/leg):   Intermittent symptoms (back/thigh/leg): back/hip    Symptoms are made worse with the following: Sometimes Sitting, Sometimes Rising, Sometimes Standing and Time of day - No effect, first few steps  Symptoms are made better with the following: Always Walking and Always On the move    Disturbed sleep (yes/no):  no Sleeping postures (prone/sup/side R/L): sides    Previous episodes (0/1-5/6-10/11+): 0 Year of first episode:     Previous history: episodic back pain (always feels stiff)  Previous treatments: PT previous which was helpful.       Specific Questions:  Cough/Sneeze/Strain (pos/neg): neg  Bowel/Bladder (normal/abnormal): normal  Gait (normal/abnormal): normal  Medications (nil/NSAIDS/analg/steroids/anticoag/other):  Other - aspirin; HBP, cardiac, Metformin  Medical allergies:  none  General health (excellent/good/fair/poor):  good  Pertinent medical history:  Diabetes, Heart problems, High blood pressure and Overweight  Imaging (None/Xray/MRI/Other):  " "none  Recent or major surgery (yes/no):  no  Night pain (yes/no): no  Accidents (yes/no): no  Unexplained weight loss (yes/no): no  Barriers at home: none  Other red flags: none    EXAMINATION    Posture:   Sitting (good/fair/poor): poor  Standing (good/fair/poor):fair/poor  Lordosis (red/acc/normal): red  Correction of posture (better/worse/no effect): no effect    Lateral Shift (right/left/nil): nil  Relevant (yes/no):  no  Other Observations: none    Neurological:    Motor deficit:    Reflexes:    Sensory deficit:    Dural signs:      Movement Loss:   Luis Mod Min Nil Pain   Flexion    x    Extension  x   erp   Side Gliding R  x x  pdm   Side Gliding L  x x  pdm     Test Movements:   During: produces, abolishes, increases, decreases, no effect, centralizing, peripheralizing   After: better, worse, no better, no worse, no effect, centralized, peripheralized    Pretest symptoms standing:    Symptoms During Symptoms After ROM increased ROM decreased No Effect   FIS        Rep FIS        EIS        Rep EIS        Pretest symptoms lying: central     Symptoms During Symptoms After ROM increased ROM decreased No Effect   ANNE        Rep ANNE        EIL Increases    No Worse         Rep EIL Increases    No Worse     X, incr pain with SG    If required, pretest symptoms:    Symptoms During Symptoms After ROM increased ROM decreased No Effect   SGIS - R        Rep SGIS - R        SGIS - L        Rep SGIS - L          Static Tests:  Sitting slouched:    Sitting erect:    Standing slouched   Standing erect:    Lying prone in extension:  Incr/NW/incr B SG, EXT 2 x 5' Long sitting:      Other Tests:     Provisional Classification:  Derangement - Asymmetrical, unilateral, symptoms above knee    Principle of Management:  Education:  Posture, DP EXT, therapeutic dose of exercise, \"cut knuckle\" analogy, traffic light   Equipment provided:  Lumbar roll  Mechanical therapy (Y/N):  Y   Extension principle:  Sustained DEJA 5' every few " hours;   Lateral Principle:    Flexion principle:    Other:      ASSESSMENT/PLAN:    Patient is a 73 year old male with lumbar complaints.    Patient has the following significant findings with corresponding treatment plan.                Diagnosis 1:  Mechanical low back pain  Pain -  hot/cold therapy, manual therapy, self management, education, directional preference exercise and home program  Decreased ROM/flexibility - manual therapy and therapeutic exercise  Decreased function - therapeutic activities  Impaired posture - neuro re-education    Therapy Evaluation Codes:   1) History comprised of:   Personal factors that impact the plan of care:      None.    Comorbidity factors that impact the plan of care are:      Depression, Heart problems and High blood pressure.     Medications impacting care: Cardiac, High blood pressure and Metformin.  2) Examination of Body Systems comprised of:   Body structures and functions that impact the plan of care:      Lumbar spine.   Activity limitations that impact the plan of care are:      Sitting.  3) Clinical presentation characteristics are:   Evolving/Changing.  4) Decision-Making    Moderate complexity using standardized patient assessment instrument and/or measureable assessment of functional outcome.  Cumulative Therapy Evaluation is: Moderate complexity.    Previous and current functional limitations:  (See Goal Flow Sheet for this information)    Short term and Long term goals: (See Goal Flow Sheet for this information)     Communication ability:  Patient appears to be able to clearly communicate and understand verbal and written communication and follow directions correctly.  Treatment Explanation - The following has been discussed with the patient:   RX ordered/plan of care  Anticipated outcomes  Possible risks and side effects  This patient would benefit from PT intervention to resume normal activities.   Rehab potential is good.    Frequency:  1 X week, once  daily  Duration:  for 2 weeks tapering to 2 x month for 1 month.   Discharge Plan:  Achieve all LTG.  Independent in home treatment program.  Reach maximal therapeutic benefit.    Please refer to the daily flowsheet for treatment today, total treatment time and time spent performing 1:1 timed codes.

## 2019-03-01 ENCOUNTER — TELEPHONE (OUTPATIENT)
Dept: INTERNAL MEDICINE | Facility: CLINIC | Age: 74
End: 2019-03-01

## 2019-03-27 DIAGNOSIS — E78.5 HYPERLIPIDEMIA LDL GOAL <100: ICD-10-CM

## 2019-03-27 DIAGNOSIS — R60.0 BILATERAL LEG EDEMA: ICD-10-CM

## 2019-03-27 DIAGNOSIS — I25.10 ASHD (ARTERIOSCLEROTIC HEART DISEASE): ICD-10-CM

## 2019-03-27 DIAGNOSIS — E11.21 TYPE 2 DIABETES MELLITUS WITH DIABETIC NEPHROPATHY, WITHOUT LONG-TERM CURRENT USE OF INSULIN (H): ICD-10-CM

## 2019-03-27 NOTE — TELEPHONE ENCOUNTER
"Requested Prescriptions   Pending Prescriptions Disp Refills     simvastatin (ZOCOR) 40 MG tablet [Pharmacy Med Name: SIMVASTATIN  TAB 40MG] 90 tablet 1    Last Written Prescription Date:  11/20/2018  Last Fill Quantity: 90,  # refills: 1   Last office visit: 2/26/2019 with prescribing provider:     Future Office Visit:   Sig: TAKE 1 TABLET AT BEDTIME    Statins Protocol Passed - 3/27/2019  9:35 AM       Passed - LDL on file in past 12 months    Recent Labs   Lab Test 11/09/18  1604   LDL 33            Passed - No abnormal creatine kinase in past 12 months    No lab results found.            Passed - Recent (12 mo) or future (30 days) visit within the authorizing provider's specialty    Patient had office visit in the last 12 months or has a visit in the next 30 days with authorizing provider or within the authorizing provider's specialty.  See \"Patient Info\" tab in inbasket, or \"Choose Columns\" in Meds & Orders section of the refill encounter.             Passed - Medication is active on med list       Passed - Patient is age 18 or older        omega-3 acid ethyl esters (LOVAZA) 1 g capsule [Pharmacy Med Name: OMEGA-3-ACID CAP 1GM(L)] 180 capsule 1    Last Written Prescription Date:  11/19/2018  Last Fill Quantity: 180,  # refills: 1   Last office visit: 2/26/2019 with prescribing provider:     Future Office Visit:   Sig: TAKE 1 CAPSULE TWICE DAILY    Antihyperlipidemic agents Passed - 3/27/2019  9:35 AM       Passed - Lipid panel on file in past 12 mos    Recent Labs   Lab Test 11/09/18  1604  05/13/15  1030   CHOL 118   < > 95   TRIG 30   < > 28   HDL 79   < > 65   LDL 33   < > 24   NHDL 39   < >  --    VLDL  --   --  6   CHOLHDLRATIO  --   --  1.5    < > = values in this interval not displayed.              Passed - Normal serum ALT on record in past 12 mos    Recent Labs   Lab Test 05/18/18  1539   ALT 21            Passed - Recent (12 mo) or future (30 days) visit within the authorizing provider's specialty    " "Patient had office visit in the last 12 months or has a visit in the next 30 days with authorizing provider or within the authorizing provider's specialty.  See \"Patient Info\" tab in inbasket, or \"Choose Columns\" in Meds & Orders section of the refill encounter.             Passed - Medication is active on med list       Passed - Patient is age 18 years or older        lisinopril (PRINIVIL/ZESTRIL) 10 MG tablet [Pharmacy Med Name: LISINOPRIL TAB 10MG] 90 tablet 0    Last Written Prescription Date:  07/20/2018  Last Fill Quantity: 90,  # refills: 0   Last office visit: 2/26/2019 with prescribing provider:     Future Office Visit:   Sig: TAKE 1 TABLET DAILY    ACE Inhibitors (Including Combos) Protocol Failed - 3/27/2019  9:35 AM       Failed - Normal serum creatinine on file in past 12 months    Recent Labs   Lab Test 10/31/18  0903   CR 1.70*            Passed - Blood pressure under 140/90 in past 12 months    BP Readings from Last 3 Encounters:   02/26/19 110/58   01/03/19 132/70   12/22/18 130/78                Passed - Recent (12 mo) or future (30 days) visit within the authorizing provider's specialty    Patient had office visit in the last 12 months or has a visit in the next 30 days with authorizing provider or within the authorizing provider's specialty.  See \"Patient Info\" tab in inbasket, or \"Choose Columns\" in Meds & Orders section of the refill encounter.             Passed - Medication is active on med list       Passed - Patient is age 18 or older       Passed - Normal serum potassium on file in past 12 months    Recent Labs   Lab Test 10/31/18  0903   POTASSIUM 3.9             metFORMIN (GLUCOPHAGE) 1000 MG tablet [Pharmacy Med Name: METFORMIN TAB 1000MG] 180 tablet 0    Last Written Prescription Date:  07/20/2018  Last Fill Quantity: 180,  # refills: 0   Last office visit: 2/26/2019 with prescribing provider:     Future Office Visit:   Sig: TAKE 1 TABLET TWICE A DAY  WITH FOOD (WITH MEALS)    " "Biguanide Agents Failed - 3/27/2019  9:35 AM       Failed - Patient's CR is NOT>1.4 OR Patient's EGFR is NOT<45 within past 12 mos.    Recent Labs   Lab Test 10/31/18  0903   GFRESTIMATED 39*   GFRESTBLACK 48*       Recent Labs   Lab Test 10/31/18  0903   CR 1.70*            Passed - Blood pressure less than 140/90 in past 6 months    BP Readings from Last 3 Encounters:   02/26/19 110/58   01/03/19 132/70   12/22/18 130/78                Passed - Patient has documented LDL within the past 12 mos.    Recent Labs   Lab Test 11/09/18  1604   LDL 33            Passed - Patient has had a Microalbumin in the past 15 mos.    Recent Labs   Lab Test 10/31/18  0903   MICROL 10   UMALCR 2.99            Passed - Patient is age 10 or older       Passed - Patient has documented A1c within the specified period of time.    If HgbA1C is 8 or greater, it needs to be on file within the past 3 months.  If less than 8, must be on file within the past 6 months.     Recent Labs   Lab Test 02/26/19  1035   A1C 5.4            Passed - Patient does NOT have a diagnosis of CHF.       Passed - Medication is active on med list       Passed - Recent (6 mo) or future (30 days) visit within the authorizing provider's specialty    Patient had office visit in the last 6 months or has a visit in the next 30 days with authorizing provider or within the authorizing provider's specialty.  See \"Patient Info\" tab in inbasket, or \"Choose Columns\" in Meds & Orders section of the refill encounter.            furosemide (LASIX) 40 MG tablet [Pharmacy Med Name: FUROSEMIDE TAB 40MG] 90 tablet 1    Last Written Prescription Date:  11/20/2018  Last Fill Quantity: 90,  # refills: 1   Last office visit: 2/26/2019 with prescribing provider:     Future Office Visit:   Sig: TAKE 1 TABLET DAILY    Diuretics (Including Combos) Protocol Failed - 3/27/2019  9:35 AM       Failed - Normal serum creatinine on file in past 12 months    Recent Labs   Lab Test 10/31/18  0903 " "  CR 1.70*             Passed - Blood pressure under 140/90 in past 12 months    BP Readings from Last 3 Encounters:   02/26/19 110/58   01/03/19 132/70   12/22/18 130/78                Passed - Recent (12 mo) or future (30 days) visit within the authorizing provider's specialty    Patient had office visit in the last 12 months or has a visit in the next 30 days with authorizing provider or within the authorizing provider's specialty.  See \"Patient Info\" tab in inbasket, or \"Choose Columns\" in Meds & Orders section of the refill encounter.             Passed - Medication is active on med list       Passed - Patient is age 18 or older       Passed - Normal serum potassium on file in past 12 months    Recent Labs   Lab Test 10/31/18  0903   POTASSIUM 3.9                   Passed - Normal serum sodium on file in past 12 months    Recent Labs   Lab Test 10/31/18  0903                 "

## 2019-03-30 RX ORDER — OMEGA-3-ACID ETHYL ESTERS 1 G/1
CAPSULE, LIQUID FILLED ORAL
Qty: 180 CAPSULE | Refills: 1 | Status: SHIPPED | OUTPATIENT
Start: 2019-03-30 | End: 2019-08-27

## 2019-03-30 RX ORDER — SIMVASTATIN 40 MG
TABLET ORAL
Qty: 90 TABLET | Refills: 1 | Status: SHIPPED | OUTPATIENT
Start: 2019-03-30 | End: 2019-08-26

## 2019-03-30 NOTE — TELEPHONE ENCOUNTER
Simvastatin and omega 3:  Prescription approved per [facility] Refill Protocol.    Lisinopril, lasix and metformin:  Routing refill request to provider for review/approval because:  Labs out of range:  COLLIN Lambert RN

## 2019-03-31 RX ORDER — LISINOPRIL 10 MG/1
TABLET ORAL
Qty: 90 TABLET | Refills: 0 | Status: SHIPPED | OUTPATIENT
Start: 2019-03-31 | End: 2019-06-13

## 2019-03-31 RX ORDER — FUROSEMIDE 40 MG
TABLET ORAL
Qty: 90 TABLET | Refills: 1 | Status: SHIPPED | OUTPATIENT
Start: 2019-03-31 | End: 2019-12-15

## 2019-04-10 ENCOUNTER — TRANSFERRED RECORDS (OUTPATIENT)
Dept: HEALTH INFORMATION MANAGEMENT | Facility: CLINIC | Age: 74
End: 2019-04-10

## 2019-04-19 ENCOUNTER — MYC MEDICAL ADVICE (OUTPATIENT)
Dept: INTERNAL MEDICINE | Facility: CLINIC | Age: 74
End: 2019-04-19

## 2019-04-22 ENCOUNTER — OFFICE VISIT (OUTPATIENT)
Dept: INTERNAL MEDICINE | Facility: CLINIC | Age: 74
End: 2019-04-22
Payer: MEDICARE

## 2019-04-22 VITALS
OXYGEN SATURATION: 99 % | DIASTOLIC BLOOD PRESSURE: 60 MMHG | TEMPERATURE: 97.5 F | HEIGHT: 70 IN | WEIGHT: 214 LBS | HEART RATE: 86 BPM | SYSTOLIC BLOOD PRESSURE: 118 MMHG | BODY MASS INDEX: 30.64 KG/M2 | RESPIRATION RATE: 16 BRPM

## 2019-04-22 DIAGNOSIS — M54.16 LUMBAR RADICULOPATHY: Primary | ICD-10-CM

## 2019-04-22 PROCEDURE — 99213 OFFICE O/P EST LOW 20 MIN: CPT | Performed by: INTERNAL MEDICINE

## 2019-04-22 ASSESSMENT — MIFFLIN-ST. JEOR: SCORE: 1711.95

## 2019-04-22 NOTE — PROGRESS NOTES
SUBJECTIVE:   Jeff Ricks is a 73 year old male who presents to clinic today for the following   health issues:      F/U on left LBP:    Presents with LBP, for 2 months. Radiating to the left leg. Associated with numbness. Has h/o DDD. Has has similar symptoms on the right.   No injury, no falls.   Has had PT. Uses topical analgesics.   No stool or urine incontinence. No fevers. Able to ambulate.         Additional history: as documented    Reviewed  and updated as needed this visit by clinical staff         Reviewed and updated as needed this visit by Provider         Patient Active Problem List   Diagnosis     Essential hypertension     Coronary atherosclerosis     Benign localized hyperplasia of prostate with urinary obstruction and other lower urinary tract symptoms (LUTS)(600.21)     HYPERLIPIDEMIA LDL GOAL <100     Advanced directives, counseling/discussion     Low back pain     RBBB with left anterior fascicular block     Premature beats     Type 2 diabetes mellitus with diabetic nephropathy (H)     CKD (chronic kidney disease) stage 3, GFR 30-59 ml/min (H)     Benign prostatic hyperplasia with lower urinary tract symptoms, symptom details unspecified     Past Surgical History:   Procedure Laterality Date     BIOPSY  8/2016     C NONSPECIFIC PROCEDURE      colonoscopy approx 1999 done elsewhere     CARDIAC SURGERY       COLONOSCOPY       CORONARY ANGIOGRAPHY ADULT ORDER  8/28/2000    75% distal LAD stenosis, 80% third diagonal stenosis, 75-80% mid ramus stenosis, ostial 60% stenosis in circumflex w/90% stenosis in distal circumflex     CORONARY ANGIOGRAPHY ADULT ORDER  2003    4 stents placed     EP ABLATION / EP STUDIES  3/25/2014     HEART CATH, ANGIOPLASTY       HYDROCELECTOMY SCROTAL Right 10/6/2016    Procedure: HYDROCELECTOMY SCROTAL;  Surgeon: Jordan Chandra MD;  Location: [facility]     TESTICLE SURGERY         Social History     Tobacco Use     Smoking status: Former Smoker     Packs/day: 3.00      Years: 6.00     Pack years: 18.00     Start date: 1961     Last attempt to quit: 1967     Years since quittin.3     Smokeless tobacco: Never Used     Tobacco comment: quit    Substance Use Topics     Alcohol use: Yes     Comment: 1 beer a week     Family History   Problem Relation Age of Onset     Musculoskeletal Disorder Mother         spinal stenosis     Cancer Mother         cancer kidney age 85     Hypertension Mother         Born 1923     Diabetes Father         Born 1923     Diabetes Brother      Heart Disease Brother          Current Outpatient Medications   Medication Sig Dispense Refill     Ascorbic Acid (VITAMIN C PO) Take by mouth daily       aspirin (ASA) 81 MG tablet Take 81 mg by mouth 2 times daily       calcitRIOL (ROCALTROL) 0.25 MCG capsule Take 0.25 mcg by mouth every other day       chlorhexidine (PERIDEX) 0.12 % solution USE 1/2 OZ TO SWISH FOR 30 SECONDS ONCE D  3     Cholecalciferol (VITAMIN D3 PO) Take 1,000 Units by mouth 2 times daily        clopidogrel (PLAVIX) 75 MG tablet Take 1 tablet (75 mg) by mouth daily 90 tablet 3     Cyanocobalamin (B-12) 1000 MCG CAPS Take by mouth daily       furosemide (LASIX) 40 MG tablet TAKE 1 TABLET DAILY 90 tablet 1     glimepiride (AMARYL) 1 MG tablet Take 1 tablet (1 mg) by mouth every morning (before breakfast) 90 tablet 3     hydrocortisone 2.5 % cream Apply topically 2 times daily 30 g 11     lisinopril (PRINIVIL/ZESTRIL) 10 MG tablet TAKE 1 TABLET DAILY 90 tablet 0     metFORMIN (GLUCOPHAGE) 1000 MG tablet TAKE 1 TABLET TWICE A DAY  WITH FOOD (WITH MEALS) 180 tablet 0     nitroGLYcerin (NITROSTAT) 0.6 MG sublingual tablet DISSOLVE 1 TABLET UNDER THETONGUE AS NEEDED. (Patient not taking: Reported on 2019) 100 tablet 0     omega-3 acid ethyl esters (LOVAZA) 1 g capsule TAKE 1 CAPSULE TWICE DAILY 180 capsule 1     Pyridoxine HCl (B-6) 100 MG TABS Take by mouth daily       simvastatin (ZOCOR) 40 MG tablet TAKE 1 TABLET AT  "BEDTIME 90 tablet 1     VITAMIN E NATURAL PO Take by mouth daily         ROS:  Constitutional, HEENT, cardiovascular, pulmonary, gi and gu systems are negative, except as otherwise noted.    OBJECTIVE:     /60   Pulse 86   Temp 97.5  F (36.4  C) (Oral)   Resp 16   Ht 1.778 m (5' 10\")   Wt 97.1 kg (214 lb)   SpO2 99%   BMI 30.71 kg/m    Body mass index is 30.71 kg/m .   GENERAL: healthy, alert and no distress  EYES: Eyes grossly normal to inspection, PERRL and conjunctivae and sclerae normal  HENT: ear canals and TM's normal, nose and mouth without ulcers or lesions  NECK: no adenopathy, no asymmetry, masses, or scars and thyroid normal to palpation  RESP: lungs clear to auscultation - no rales, rhonchi or wheezes  CV: regular rate and rhythm, normal S1 S2, no S3 or S4, no murmur, click or rub, no peripheral edema and peripheral pulses strong  ABDOMEN: soft, nontender, no hepatosplenomegaly, no masses and bowel sounds normal  MS: no gross musculoskeletal defects noted, no edema  SKIN: no suspicious lesions or rashes  NEURO: Normal strength and tone, mentation intact and speech normal  PSYCH: mentation appears normal, affect normal/bright    Diagnostic Test Results:  none     ASSESSMENT/PLAN:     Problem List Items Addressed This Visit     None      Visit Diagnoses     Lumbar radiculopathy    -  Primary    Relevant Orders    MR Lumbar Spine w/o Contrast           Assess LS spine MRI   Cont topical analgesics, Tylenol, stretching     Follow-Up:with results     Guillermo Deleon MD  [facility]        "

## 2019-05-03 ENCOUNTER — TRANSFERRED RECORDS (OUTPATIENT)
Dept: HEALTH INFORMATION MANAGEMENT | Facility: CLINIC | Age: 74
End: 2019-05-03

## 2019-05-09 ENCOUNTER — MYC MEDICAL ADVICE (OUTPATIENT)
Dept: INTERNAL MEDICINE | Facility: CLINIC | Age: 74
End: 2019-05-09

## 2019-05-09 DIAGNOSIS — M51.369 DDD (DEGENERATIVE DISC DISEASE), LUMBAR: Primary | ICD-10-CM

## 2019-05-09 NOTE — TELEPHONE ENCOUNTER
Call to [facility], they are going to fax over the MRI results.    Results received and placed in Dr Deleon's basket.     Please review and advise.

## 2019-05-10 NOTE — TELEPHONE ENCOUNTER
MRI result reviewed.   Has moderate to severe lumbar spine osteoarthritis and degenerative disc disease, discs prolapses cause nerve impingement.   Suggest to see ortho /spine specialist.   Will make a referral.

## 2019-05-29 ENCOUNTER — OFFICE VISIT (OUTPATIENT)
Dept: NEUROSURGERY | Facility: CLINIC | Age: 74
End: 2019-05-29
Attending: NURSE PRACTITIONER
Payer: MEDICARE

## 2019-05-29 VITALS
HEIGHT: 70 IN | OXYGEN SATURATION: 98 % | HEART RATE: 54 BPM | TEMPERATURE: 97.4 F | DIASTOLIC BLOOD PRESSURE: 54 MMHG | BODY MASS INDEX: 30.78 KG/M2 | SYSTOLIC BLOOD PRESSURE: 127 MMHG | WEIGHT: 215 LBS

## 2019-05-29 DIAGNOSIS — M54.16 LUMBAR RADICULOPATHY: Primary | ICD-10-CM

## 2019-05-29 DIAGNOSIS — M51.369 DDD (DEGENERATIVE DISC DISEASE), LUMBAR: ICD-10-CM

## 2019-05-29 PROCEDURE — 99203 OFFICE O/P NEW LOW 30 MIN: CPT | Performed by: NURSE PRACTITIONER

## 2019-05-29 PROCEDURE — G0463 HOSPITAL OUTPT CLINIC VISIT: HCPCS

## 2019-05-29 ASSESSMENT — PAIN SCALES - GENERAL: PAINLEVEL: NO PAIN (1)

## 2019-05-29 ASSESSMENT — MIFFLIN-ST. JEOR: SCORE: 1716.48

## 2019-05-29 NOTE — NURSING NOTE
"Jeff Ricks is a 73 year old male who presents for:  Chief Complaint   Patient presents with     Back Pain     DDD of the lumbar region.         Initial Vitals:  /54 (BP Location: Right arm, Patient Position: Sitting, Cuff Size: Adult Large)   Pulse 54   Temp 97.4  F (36.3  C) (Oral)   Ht 5' 10\" (1.778 m)   Wt 215 lb (97.5 kg)   SpO2 98%   BMI 30.85 kg/m   Estimated body mass index is 30.85 kg/m  as calculated from the following:    Height as of this encounter: 5' 10\" (1.778 m).    Weight as of this encounter: 215 lb (97.5 kg).. Body surface area is 2.19 meters squared. BP completed using cuff size: large  No Pain (1)        Nursing Comments: Pain level of a 0 today.         Marizol Jarrett    "

## 2019-05-29 NOTE — LETTER
"    5/29/2019         RE: Jeff Ricks  [address]  [place] [address]        Dear Colleague,    Thank you for referring your patient, Jeff Ricks, to the [facility]. Please see a copy of my visit note below.    [facility]  Neurosurgery Clinic Visit      CC: low back pain    Primary care Provider: Guillermo Deleon      Reason For Visit:   I was asked by Dr. Guillermo Deleon to consult on the patient for DDD lumbar.      HPI: Jeff Ricks is a 73 year old male with a history of chronic low back pain presents with a 3-4 month history of low back pain that radiates down his left lateral thigh to the anterior shin. He denies injury at the onset of pain. He states he stopped going to the gym and rested for 2 weeks and noticed decrease in leg and back pain. Today he states he doesn't have low back or radicular pain. He states he gets a \"catch\" in his left back/hip when getting up from a seated position. He states prolonged rest aggravates the pain and exercise alleviates the pain. He has been to PT and incorporates them into his workout routine. He has been doing body weight exercises and plans to initiate circuit training with weights next week. He states pain is much improved and doesn't affect his ADLs. He denies paresthesias, bowel/bladder incontinence, and foot drop. He denies falls. He has not had injections or surgery.    Pain at its worst 5-6 (not recently)  Pain right now:  0    Past Medical History:   Diagnosis Date     Chronic kidney disease      Coronary atherosclerosis of unspecified type of vessel, native or graft 10/03    at 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    Past Surgical History:   Procedure Laterality Date     BIOPSY  8/2016     C NONSPECIFIC PROCEDURE      colonoscopy approx 1999 done elsewhere     CARDIAC SURGERY       COLONOSCOPY       CORONARY ANGIOGRAPHY ADULT ORDER  8/28/2000    75% distal LAD stenosis, 80% third diagonal stenosis, 75-80% mid ramus stenosis, ostial 60% stenosis in circumflex w/90% stenosis in distal circumflex     CORONARY ANGIOGRAPHY ADULT ORDER  2003    4 stents placed     EP ABLATION / EP STUDIES  3/25/2014     HEART CATH, ANGIOPLASTY       HYDROCELECTOMY SCROTAL Right 10/6/2016    Procedure: HYDROCELECTOMY SCROTAL;  Surgeon: Jordan Chandra MD;  Location: [facility]     TESTICLE SURGERY       Past Surgical History reviewed with patient during visit.    Current Outpatient Medications   Medication     Ascorbic Acid (VITAMIN C PO)     aspirin (ASA) 81 MG tablet     calcitRIOL (ROCALTROL) 0.25 MCG capsule     chlorhexidine (PERIDEX) 0.12 % solution     Cholecalciferol (VITAMIN D3 PO)     clopidogrel (PLAVIX) 75 MG tablet     Cyanocobalamin (B-12) 1000 MCG CAPS     furosemide (LASIX) 40 MG tablet     glimepiride (AMARYL) 1 MG tablet     hydrocortisone 2.5 % cream     lisinopril (PRINIVIL/ZESTRIL) 10 MG tablet     metFORMIN (GLUCOPHAGE) 1000 MG tablet     nitroGLYcerin (NITROSTAT) 0.6 MG sublingual tablet     omega-3 acid ethyl esters (LOVAZA) 1 g capsule     Pyridoxine HCl (B-6) 100 MG TABS     simvastatin (ZOCOR) 40 MG tablet     VITAMIN E NATURAL PO     No current facility-administered medications for this visit.      Facility-Administered Medications Ordered in Other Visits   Medication      gadobutrol (GADAVIST) injection 10 mL       Allergies   Allergen Reactions     No Known Drug Allergies        Social History     Socioeconomic History     Marital status:      Spouse name: Bethany     Number of children: 3     Years of education: None     Highest education level: None   Occupational History     Occupation: Computers/     Employer: [employer]      Comment: [employer]   Social Needs     Financial resource strain: None     Food insecurity:     Worry: None     Inability: None     Transportation needs:     Medical: None     Non-medical: None   Tobacco Use     Smoking status: Former Smoker     Packs/day: 3.00     Years: 6.00     Pack years: 18.00     Start date: 1961     Last attempt to quit: 1967     Years since quittin.4     Smokeless tobacco: Never Used     Tobacco comment: quit    Substance and Sexual Activity     Alcohol use: Yes     Comment: 1 beer a week     Drug use: No     Sexual activity: Never   Lifestyle     Physical activity:     Days per week: None     Minutes per session: None     Stress: None   Relationships     Social connections:     Talks on phone: None     Gets together: None     Attends Qu[employer] service: None     Active member of club or organization: None     Attends meetings of clubs or organizations: None     Relationship status: None     Intimate partner violence:     Fear of current or ex partner: None     Emotionally abused: None     Physically abused: None     Forced sexual activity: None   Other Topics Concern     Parent/sibling w/ CABG, MI or angioplasty before 65F 55M? No   Social History Narrative     None       Family History   Problem Relation Age of Onset     Musculoskeletal Disorder Mother         spinal stenosis     Cancer Mother         cancer kidney age 85     Hypertension Mother         Born 1923     Diabetes Father         Born 1925     Diabetes Brother      Heart Disease Brother          ROS: 10 point ROS neg other than the symptoms  noted above in the HPI.    Vital Signs:       Examination:  Constitutional:  Alert, well nourished, NAD.  Memory: recent and remote memory   HEENT: Normocephalic, atraumatic.   Pulm:  Without shortness of breath   CV:  No pitting edema of BLE.    Neurological:  Awake  Alert  Oriented x 3  Speech clear  Motor exam    Hip Flexor:                Right: 5/5  Left:  5/5  Hip Adductor:             Right:  5/5  Left:  5/5  Hip Abductor:             Right:  5/5  Left:  5/5  Gastroc Soleus:        Right:  5/5  Left:  5/5  Tib/Ant:                      Right:  5/5  Left:  5/5  EHL:                          Right:  5/5  Left:  5/5   Sensation normal to bilateral upper and lower extremities  Muscle tone to bilateral upper and lower extremities   Gait: Able to stand from a seated position. Normal non-antalgic, non-myelopathic gait.  Able to heel/toe walk without loss of balance    Lumbar examination reveals no tenderness of the spine or paraspinous muscles.  Hip height is symmetrical. Negative SI joint, sciatic notch or greater trochanteric tenderness to palpation bilaterally.  Straight leg raise is negative bilaterally.      Imaging: Lumbar MRI 5/3/2019    CONCLUSION: Multiple level degenerative lower lumbar spondylosis with the following major findings:  1. Advanced L5-S1 disc degeneration with grade I degenerative spondyloisthesis and foraminal disc protrusion laterally on the right. A 10mm synovial cyst projects medially from the left L5-S1 facet joint and displaces the left L5 nerve root as it decends and enters the neural foramen.  2. Grade I degenerative spondylolisthesis, L4-5 with lateral-foraminal disc protrusion on the right. Moderate central spinal canal stenosis. Facet joint effusion on the left.   3. Posterior and right-sided caudal disc extrusion, L3-4 with severe right L4 neural impingement. Bilateral facet joint effusions. Bilateral foraminal and lateral disc protrusions with L3 ganglionic impingement. Moderate  "stenosis involving the right L3-4 neural foramen.     Assessment/Plan:   Jeff Ricks is a 73 year old male with a history of chronic low back pain presents with a 3-4 month history of low back pain that radiates down his left lateral thigh to the anterior shin. He denies injury at the onset of pain. He states he stopped going to the gym and rested for 2 weeks and noticed decrease in leg and back pain. Today he states he doesn't have low back or radicular pain. He states he gets a \"catch\" in his left back/hip when getting up from a seated position. He presented today to review lumbar MRI results. Discussed lumbar MRI. No additional intervention recommended at this time. Recommended him to continue to gradually increase activity as tolerated and incorporate PT exercises. Recommended him to follow up if new symptoms arise or pain persists. He verbalized understanding and agreement.     Patient Instructions   -Please contact the clinic if pain persists at [phone].        Maia Delgadillo CNP  [facility]  [address] [address]    Tel [phone]  Pager [phone]      Again, thank you for allowing me to participate in the care of your patient.        Sincerely,        Maia Delgadillo NP    "

## 2019-05-29 NOTE — PROGRESS NOTES
"[facility]  Neurosurgery Clinic Visit      CC: low back pain    Primary care Provider: Guillermo Deleon      Reason For Visit:   I was asked by Dr. Guillermo Deleon to consult on the patient for DDD lumbar.      HPI: Jeff Ricks is a 73 year old male with a history of chronic low back pain presents with a 3-4 month history of low back pain that radiates down his left lateral thigh to the anterior shin. He denies injury at the onset of pain. He states he stopped going to the gym and rested for 2 weeks and noticed decrease in leg and back pain. Today he states he doesn't have low back or radicular pain. He states he gets a \"catch\" in his left back/hip when getting up from a seated position. He states prolonged rest aggravates the pain and exercise alleviates the pain. He has been to PT and incorporates them into his workout routine. He has been doing body weight exercises and plans to initiate circuit training with weights next week. He states pain is much improved and doesn't affect his ADLs. He denies paresthesias, bowel/bladder incontinence, and foot drop. He denies falls. He has not had injections or surgery.    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    Past Surgical History:   Procedure Laterality Date     BIOPSY  8/2016     C NONSPECIFIC PROCEDURE      colonoscopy approx 1999 done elsewhere     CARDIAC SURGERY       COLONOSCOPY       CORONARY ANGIOGRAPHY ADULT ORDER  8/28/2000    75% distal LAD stenosis, 80% third diagonal stenosis, 75-80% mid ramus stenosis, ostial 60% stenosis in circumflex w/90% stenosis in distal circumflex     CORONARY ANGIOGRAPHY ADULT ORDER  2003    4 stents placed     EP ABLATION / EP STUDIES  3/25/2014     HEART CATH, ANGIOPLASTY       HYDROCELECTOMY SCROTAL Right 10/6/2016    Procedure: HYDROCELECTOMY SCROTAL;  Surgeon: Jordan Chandra MD;  Location: [facility]     TESTICLE SURGERY       Past Surgical History reviewed with patient during visit.    Current Outpatient Medications   Medication     Ascorbic Acid (VITAMIN C PO)     aspirin (ASA) 81 MG tablet     calcitRIOL (ROCALTROL) 0.25 MCG capsule     chlorhexidine (PERIDEX) 0.12 % solution     Cholecalciferol (VITAMIN D3 PO)     clopidogrel (PLAVIX) 75 MG tablet     Cyanocobalamin (B-12) 1000 MCG CAPS     furosemide (LASIX) 40 MG tablet     glimepiride (AMARYL) 1 MG tablet     hydrocortisone 2.5 % cream     lisinopril (PRINIVIL/ZESTRIL) 10 MG tablet     metFORMIN (GLUCOPHAGE) 1000 MG tablet     nitroGLYcerin (NITROSTAT) 0.6 MG sublingual tablet     omega-3 acid ethyl esters (LOVAZA) 1 g capsule     Pyridoxine HCl (B-6) 100 MG TABS     simvastatin (ZOCOR) 40 MG tablet     VITAMIN E NATURAL PO     No current facility-administered medications for this visit.      Facility-Administered Medications Ordered in Other Visits   Medication     gadobutrol (GADAVIST) injection 10 mL       Allergies   Allergen Reactions     No Known Drug Allergies        Social History     Socioeconomic History     Marital status:      Spouse name: Bethany     Number of children: 3     Years of education:  None     Highest education level: None   Occupational History     Occupation: Computers/     Employer: [employer]      Comment: [employer]   Social Needs     Financial resource strain: None     Food insecurity:     Worry: None     Inability: None     Transportation needs:     Medical: None     Non-medical: None   Tobacco Use     Smoking status: Former Smoker     Packs/day: 3.00     Years: 6.00     Pack years: 18.00     Start date: 1961     Last attempt to quit: 1967     Years since quittin.4     Smokeless tobacco: Never Used     Tobacco comment: quit    Substance and Sexual Activity     Alcohol use: Yes     Comment: 1 beer a week     Drug use: No     Sexual activity: Never   Lifestyle     Physical activity:     Days per week: None     Minutes per session: None     Stress: None   Relationships     Social connections:     Talks on phone: None     Gets together: None     Attends Cheondoism service: None     Active member of club or organization: None     Attends meetings of clubs or organizations: None     Relationship status: None     Intimate partner violence:     Fear of current or ex partner: None     Emotionally abused: None     Physically abused: None     Forced sexual activity: None   Other Topics Concern     Parent/sibling w/ CABG, MI or angioplasty before 65F 55M? No   Social History Narrative     None       Family History   Problem Relation Age of Onset     Musculoskeletal Disorder Mother         spinal stenosis     Cancer Mother         cancer kidney age 85     Hypertension Mother         Born 1923     Diabetes Father         Born 1923     Diabetes Brother      Heart Disease Brother          ROS: 10 point ROS neg other than the symptoms noted above in the HPI.    Vital Signs:       Examination:  Constitutional:  Alert, well nourished, NAD.  Memory: recent and remote memory   HEENT: Normocephalic, atraumatic.   Pulm:  Without shortness of breath   CV:  No pitting edema of BLE.     Neurological:  Awake  Alert  Oriented x 3  Speech clear  Motor exam    Hip Flexor:                Right: 5/5  Left:  5/5  Hip Adductor:             Right:  5/5  Left:  5/5  Hip Abductor:             Right:  5/5  Left:  5/5  Gastroc Soleus:        Right:  5/5  Left:  5/5  Tib/Ant:                      Right:  5/5  Left:  5/5  EHL:                          Right:  5/5  Left:  5/5   Sensation normal to bilateral upper and lower extremities  Muscle tone to bilateral upper and lower extremities   Gait: Able to stand from a seated position. Normal non-antalgic, non-myelopathic gait.  Able to heel/toe walk without loss of balance    Lumbar examination reveals no tenderness of the spine or paraspinous muscles.  Hip height is symmetrical. Negative SI joint, sciatic notch or greater trochanteric tenderness to palpation bilaterally.  Straight leg raise is negative bilaterally.      Imaging: Lumbar MRI 5/3/2019    CONCLUSION: Multiple level degenerative lower lumbar spondylosis with the following major findings:  1. Advanced L5-S1 disc degeneration with grade I degenerative spondyloisthesis and foraminal disc protrusion laterally on the right. A 10mm synovial cyst projects medially from the left L5-S1 facet joint and displaces the left L5 nerve root as it decends and enters the neural foramen.  2. Grade I degenerative spondylolisthesis, L4-5 with lateral-foraminal disc protrusion on the right. Moderate central spinal canal stenosis. Facet joint effusion on the left.   3. Posterior and right-sided caudal disc extrusion, L3-4 with severe right L4 neural impingement. Bilateral facet joint effusions. Bilateral foraminal and lateral disc protrusions with L3 ganglionic impingement. Moderate stenosis involving the right L3-4 neural foramen.     Assessment/Plan:   Jeff Ricks is a 73 year old male with a history of chronic low back pain presents with a 3-4 month history of low back pain that radiates down his left lateral thigh to  "the anterior shin. He denies injury at the onset of pain. He states he stopped going to the gym and rested for 2 weeks and noticed decrease in leg and back pain. Today he states he doesn't have low back or radicular pain. He states he gets a \"catch\" in his left back/hip when getting up from a seated position. He presented today to review lumbar MRI results. Discussed lumbar MRI. No additional intervention recommended at this time. Recommended him to continue to gradually increase activity as tolerated and incorporate PT exercises. Recommended him to follow up if new symptoms arise or pain persists. He verbalized understanding and agreement.     Patient Instructions   -Please contact the clinic if pain persists at [phone].        Maia Delgadillo, CNP  [facility]  [address] [address]    Tel [phone]  Pager [phone]    "

## 2019-06-07 ENCOUNTER — MYC MEDICAL ADVICE (OUTPATIENT)
Dept: INTERNAL MEDICINE | Facility: CLINIC | Age: 74
End: 2019-06-07

## 2019-06-07 DIAGNOSIS — M25.562 ACUTE PAIN OF LEFT KNEE: Primary | ICD-10-CM

## 2019-06-07 NOTE — TELEPHONE ENCOUNTER
Please see patient's mychart message below.    Refer to ortho? Or do you want to see patient in clinic for his left knee issue?    Last office visit 4-22-19 back issue    Please advise, thanks.

## 2019-06-07 NOTE — TELEPHONE ENCOUNTER
[facility] [phone]     Patient informed of referral info via [employer].

## 2019-06-11 ENCOUNTER — ANCILLARY PROCEDURE (OUTPATIENT)
Dept: GENERAL RADIOLOGY | Facility: CLINIC | Age: 74
End: 2019-06-11
Attending: FAMILY MEDICINE
Payer: MEDICARE

## 2019-06-11 ENCOUNTER — OFFICE VISIT (OUTPATIENT)
Dept: ORTHOPEDICS | Facility: CLINIC | Age: 74
End: 2019-06-11
Payer: MEDICARE

## 2019-06-11 VITALS
WEIGHT: 215 LBS | BODY MASS INDEX: 30.78 KG/M2 | DIASTOLIC BLOOD PRESSURE: 62 MMHG | HEIGHT: 70 IN | SYSTOLIC BLOOD PRESSURE: 138 MMHG

## 2019-06-11 DIAGNOSIS — G89.29 CHRONIC PAIN OF LEFT KNEE: ICD-10-CM

## 2019-06-11 DIAGNOSIS — M17.12 PRIMARY OSTEOARTHRITIS OF LEFT KNEE: ICD-10-CM

## 2019-06-11 DIAGNOSIS — M25.562 CHRONIC PAIN OF LEFT KNEE: Primary | ICD-10-CM

## 2019-06-11 DIAGNOSIS — M25.562 CHRONIC PAIN OF LEFT KNEE: ICD-10-CM

## 2019-06-11 DIAGNOSIS — G89.29 CHRONIC PAIN OF LEFT KNEE: Primary | ICD-10-CM

## 2019-06-11 DIAGNOSIS — M25.462 KNEE EFFUSION, LEFT: ICD-10-CM

## 2019-06-11 PROCEDURE — 99203 OFFICE O/P NEW LOW 30 MIN: CPT | Performed by: FAMILY MEDICINE

## 2019-06-11 PROCEDURE — 73562 X-RAY EXAM OF KNEE 3: CPT

## 2019-06-11 ASSESSMENT — MIFFLIN-ST. JEOR: SCORE: 1716.48

## 2019-06-11 NOTE — PROGRESS NOTES
ASSESSMENT & PLAN    1. Chronic pain of left knee    2. Primary osteoarthritis of left knee    3. Knee effusion, left      Presents in consultation for chronic left knee pain with an acute exacerbation secondary to increased activity  Reviewed xray which shows moderate/advanced arthritis   Continue to stay active.  Activity modification discussed  Can use Tylenol 2 tablets (1,000mg) three times a day for pain.  Would ice your knees because you have fluid in them.  Explained that effusion can give a sense of instability  If pain prevents you from being active can return for ultrasound guided cortisone injection    Follow-up as needed.    -----    SUBJECTIVE  Jeff Ricks is a/an 73 year old male who is seen in consultation at the request of  Guillermo Dleeon M.D. for evaluation of left knee pain. The patient is seen by themselves.    Onset: chronic underlying knee pain that has been getting worse/more frequent over the past 1.5 months without new injury/trauma.   Location of Pain: left anterior knee  Rating of Pain at worst: 7/10  Rating of Pain Currently: 2/10  Worsened by: getting up from sitting, walking  Better with: pain is sudden and fleeting and then goes away on its own with time  Treatments tried: knee brace  Associated symptoms: weakness, sudden sharp/shooting pain that makes it feel like his knee will buckle  Orthopedic history: none  Relevant surgical history: left knee arthroscopy 25+ years ago  Patient Social History: retired    Patient's past medical, surgical, social, and family histories were reviewed today and no changes are noted.    REVIEW OF SYSTEMS:  10 point ROS is negative other than symptoms noted above in HPI, Past Medical History or as stated below  Constitutional: NEGATIVE for fever, chills, change in weight  Skin: NEGATIVE for worrisome rashes, moles or lesions  GI/: NEGATIVE for bowel or bladder changes  Neuro: NEGATIVE for weakness, dizziness or paresthesias    OBJECTIVE:  /62  "  Ht 1.778 m (5' 10\")   Wt 97.5 kg (215 lb)   BMI 30.85 kg/m     General: healthy, alert and in no distress  HEENT: no scleral icterus or conjunctival erythema  Skin: no suspicious lesions or rash. No jaundice.  CV: no pedal edema  Resp: normal respiratory effort without conversational dyspnea   Psych: normal mood and affect  Gait: normal steady gait with appropriate coordination and balance  Neuro: Normal light sensory exam of lower extremity  MSK:  LEFT KNEE  Inspection:    normal alignment  Palpation:    Tender about the lateral joint line and medial joint line. Remainder of bony and ligamentous landmarks are nontender.    Mild effusion is present    Patellofemoral crepitus is Present  Range of Motion:     00 extension to 1200 flexion  Strength:    Extensor mechanism intact  Special Tests:    Negative: Lachman's, posterior drawer, Maritza's    Independent visualization of the below image:  Recent Results (from the past 24 hour(s))   XR Knee Standing AP Bilat [place] Bilat Lat Left    Narrative    XR KNEE STANDING AP BILATERAL [place] BILATERAL LATERAL LEFT   6/11/2019 3:03 PM     HISTORY: Chronic pain of left knee.      COMPARISON: None.      Impression    IMPRESSION: Left: Mild left patellofemoral degenerative changes.  Insertional spurs on the left superior and inferior pole of the  patella. Vascular calcifications posteriorly. No evidence of fracture  or subluxation.    Right: Mild medial compartment joint space narrowing. Mild  patellofemoral degenerative changes. No evidence of fracture or  subluxation.    MD Shania HOLLAND, DO [facility]    "

## 2019-06-11 NOTE — PATIENT INSTRUCTIONS
1. Chronic pain of left knee    2. Primary osteoarthritis of left knee    3. Knee effusion, left      Reviewed your xray which shows arthritis  Continue to stay active. Would walk flat on the treadmill. Would not do squats, lunges or stairmaster for exercise. Would not do knee extension but ok for straight leg lifts.  Can use Tylenol 2 tablets (1,000mg) three times a day for pain.  Would ice your knees because you have fluid in them.  If pain prevents you from being active can return for ultrasound guided cortisone injection    Follow-up as needed.

## 2019-06-11 NOTE — LETTER
6/11/2019         RE: Jeff Ricks  [address]  [place] [address]        Dear Colleague,    Thank you for referring your patient, Jeff Ricks, to the [facility]. Please see a copy of my visit note below.    ASSESSMENT & PLAN    1. Chronic pain of left knee    2. Primary osteoarthritis of left knee    3. Knee effusion, left      Presents in consultation for chronic left knee pain with an acute exacerbation secondary to increased activity  Reviewed xray which shows moderate/advanced arthritis   Continue to stay active.  Activity modification discussed  Can use Tylenol 2 tablets (1,000mg) three times a day for pain.  Would ice your knees because you have fluid in them.  Explained that effusion can give a sense of instability  If pain prevents you from being active can return for ultrasound guided cortisone injection    Follow-up as needed.    -----    SUBJECTIVE  Jeff Ricks is a/an 73 year old male who is seen in consultation at the request of  Guillermo Deleon M.D. for evaluation of left knee pain. The patient is seen by themselves.    Onset: chronic underlying knee pain that has been getting worse/more frequent over the past 1.5 months without new injury/trauma.   Location of Pain: left anterior knee  Rating of Pain at worst: 7/10  Rating of Pain Currently: 2/10  Worsened by: getting up from sitting, walking  Better with: pain is sudden and fleeting and then goes away on its own with time  Treatments tried: knee brace  Associated symptoms: weakness, sudden sharp/shooting pain that makes it feel like his knee will buckle  Orthopedic history: none  Relevant surgical history: left knee arthroscopy 25+ years ago  Patient Social History: retired    Patient's past medical, surgical, social, and family histories were reviewed today and no changes are noted.    REVIEW OF SYSTEMS:  10 point ROS is negative other than symptoms noted above in HPI, Past Medical History or as  "stated below  Constitutional: NEGATIVE for fever, chills, change in weight  Skin: NEGATIVE for worrisome rashes, moles or lesions  GI/: NEGATIVE for bowel or bladder changes  Neuro: NEGATIVE for weakness, dizziness or paresthesias    OBJECTIVE:  /62   Ht 1.778 m (5' 10\")   Wt 97.5 kg (215 lb)   BMI 30.85 kg/m      General: healthy, alert and in no distress  HEENT: no scleral icterus or conjunctival erythema  Skin: no suspicious lesions or rash. No jaundice.  CV: no pedal edema  Resp: normal respiratory effort without conversational dyspnea   Psych: normal mood and affect  Gait: normal steady gait with appropriate coordination and balance  Neuro: Normal light sensory exam of lower extremity  MSK:  LEFT KNEE  Inspection:    normal alignment  Palpation:    Tender about the lateral joint line and medial joint line. Remainder of bony and ligamentous landmarks are nontender.    Mild effusion is present    Patellofemoral crepitus is Present  Range of Motion:     00 extension to 1200 flexion  Strength:    Extensor mechanism intact  Special Tests:    Negative: Lachman's, posterior drawer, Maritza's    Independent visualization of the below image:  Recent Results (from the past 24 hour(s))   XR Knee Standing AP Bilat [place] Bilat Lat Left    Narrative    XR KNEE STANDING AP BILATERAL [place] BILATERAL LATERAL LEFT   6/11/2019 3:03 PM     HISTORY: Chronic pain of left knee.      COMPARISON: None.      Impression    IMPRESSION: Left: Mild left patellofemoral degenerative changes.  Insertional spurs on the left superior and inferior pole of the  patella. Vascular calcifications posteriorly. No evidence of fracture  or subluxation.    Right: Mild medial compartment joint space narrowing. Mild  patellofemoral degenerative changes. No evidence of fracture or  subluxation.    MD Shania HOLLAND, DO [facility]      Again, thank you for allowing me to participate in the " care of your patient.        Sincerely,        Shania Sena, DO

## 2019-06-13 DIAGNOSIS — I25.10 ASHD (ARTERIOSCLEROTIC HEART DISEASE): ICD-10-CM

## 2019-06-13 DIAGNOSIS — E11.21 TYPE 2 DIABETES MELLITUS WITH DIABETIC NEPHROPATHY, WITHOUT LONG-TERM CURRENT USE OF INSULIN (H): ICD-10-CM

## 2019-06-13 NOTE — TELEPHONE ENCOUNTER
"Requested Prescriptions   Pending Prescriptions Disp Refills     lisinopril (PRINIVIL/ZESTRIL) 10 MG tablet [Pharmacy Med Name: LISINOPRIL TAB 10MG] 90 tablet 0     Sig: TAKE 1 TABLET DAILY   Last Written Prescription Date:  03/31/2019  Last Fill Quantity: 90,  # refills: 0   Last office visit: 4/22/2019 with prescribing provider:     Future Office Visit:      ACE Inhibitors (Including Combos) Protocol Failed - 6/13/2019  8:11 AM        Failed - Normal serum creatinine on file in past 12 months     Recent Labs   Lab Test 10/31/18  0903   CR 1.70*             Passed - Blood pressure under 140/90 in past 12 months     BP Readings from Last 3 Encounters:   06/11/19 138/62   05/29/19 127/54   04/22/19 118/60                 Passed - Recent (12 mo) or future (30 days) visit within the authorizing provider's specialty     Patient had office visit in the last 12 months or has a visit in the next 30 days with authorizing provider or within the authorizing provider's specialty.  See \"Patient Info\" tab in inbasket, or \"Choose Columns\" in Meds & Orders section of the refill encounter.              Passed - Medication is active on med list        Passed - Patient is age 18 or older        Passed - Normal serum potassium on file in past 12 months     Recent Labs   Lab Test 10/31/18  0903   POTASSIUM 3.9             metFORMIN (GLUCOPHAGE) 1000 MG tablet [Pharmacy Med Name: METFORMIN TAB 1000MG] 180 tablet 0     Sig: TAKE 1 TABLET TWICE A DAY  WITH FOOD (WITH MEALS)   Last Written Prescription Date:  03/31/2019  Last Fill Quantity: 180,  # refills: 0   Last office visit: 4/22/2019 with prescribing provider:     Future Office Visit:      Biguanide Agents Failed - 6/13/2019  8:11 AM        Failed - Patient's CR is NOT>1.4 OR Patient's EGFR is NOT<45 within past 12 mos.     Recent Labs   Lab Test 10/31/18  0903   GFRESTIMATED 39*   GFRESTBLACK 48*       Recent Labs   Lab Test 10/31/18  0903   CR 1.70*             Passed - Blood " "pressure less than 140/90 in past 6 months     BP Readings from Last 3 Encounters:   06/11/19 138/62   05/29/19 127/54   04/22/19 118/60                 Passed - Patient has documented LDL within the past 12 mos.     Recent Labs   Lab Test 11/09/18  1604   LDL 33             Passed - Patient has had a Microalbumin in the past 15 mos.     Recent Labs   Lab Test 10/31/18  0903   MICROL 10   UMALCR 2.99             Passed - Patient is age 10 or older        Passed - Patient has documented A1c within the specified period of time.     If HgbA1C is 8 or greater, it needs to be on file within the past 3 months.  If less than 8, must be on file within the past 6 months.     Recent Labs   Lab Test 02/26/19  1035   A1C 5.4             Passed - Patient does NOT have a diagnosis of CHF.        Passed - Medication is active on med list        Passed - Recent (6 mo) or future (30 days) visit within the authorizing provider's specialty     Patient had office visit in the last 6 months or has a visit in the next 30 days with authorizing provider or within the authorizing provider's specialty.  See \"Patient Info\" tab in inbasket, or \"Choose Columns\" in Meds & Orders section of the refill encounter.            "

## 2019-06-14 ENCOUNTER — MYC MEDICAL ADVICE (OUTPATIENT)
Dept: INTERNAL MEDICINE | Facility: CLINIC | Age: 74
End: 2019-06-14

## 2019-06-14 DIAGNOSIS — M21.969 FOOT DEFORMITY, UNSPECIFIED LATERALITY: Primary | ICD-10-CM

## 2019-06-14 RX ORDER — LISINOPRIL 10 MG/1
TABLET ORAL
Qty: 90 TABLET | Refills: 0 | Status: SHIPPED | OUTPATIENT
Start: 2019-06-14 | End: 2019-08-27

## 2019-06-14 NOTE — TELEPHONE ENCOUNTER
Metformin & lisinopril  Routing refill request to provider for review/approval because:  Labs out of range

## 2019-06-14 NOTE — TELEPHONE ENCOUNTER
See my chart message. Per previous Orthotics referral, has 'Foot deformity' diagnosis.     Referral pended.

## 2019-07-09 ENCOUNTER — TELEPHONE (OUTPATIENT)
Dept: INTERNAL MEDICINE | Facility: CLINIC | Age: 74
End: 2019-07-09

## 2019-07-10 ENCOUNTER — MEDICAL CORRESPONDENCE (OUTPATIENT)
Dept: HEALTH INFORMATION MANAGEMENT | Facility: CLINIC | Age: 74
End: 2019-07-10

## 2019-07-29 ENCOUNTER — MYC MEDICAL ADVICE (OUTPATIENT)
Dept: INTERNAL MEDICINE | Facility: CLINIC | Age: 74
End: 2019-07-29

## 2019-07-29 NOTE — TELEPHONE ENCOUNTER
Please abstract the following data from this visit with this patient into the appropriate field in Epic:    Eye exam with ophthalmology on this date: 06/19/2019 with [facility] in [place].

## 2019-08-06 ENCOUNTER — OFFICE VISIT (OUTPATIENT)
Dept: INTERNAL MEDICINE | Facility: CLINIC | Age: 74
End: 2019-08-06
Payer: MEDICARE

## 2019-08-06 ENCOUNTER — ANCILLARY PROCEDURE (OUTPATIENT)
Dept: GENERAL RADIOLOGY | Facility: CLINIC | Age: 74
End: 2019-08-06
Attending: INTERNAL MEDICINE
Payer: MEDICARE

## 2019-08-06 VITALS
SYSTOLIC BLOOD PRESSURE: 122 MMHG | OXYGEN SATURATION: 99 % | RESPIRATION RATE: 14 BRPM | TEMPERATURE: 98.1 F | BODY MASS INDEX: 29.94 KG/M2 | HEIGHT: 70 IN | WEIGHT: 209.1 LBS | HEART RATE: 83 BPM | DIASTOLIC BLOOD PRESSURE: 64 MMHG

## 2019-08-06 DIAGNOSIS — R68.84 MANDIBULAR PAIN: Primary | ICD-10-CM

## 2019-08-06 DIAGNOSIS — L29.9 EAR ITCHING: ICD-10-CM

## 2019-08-06 DIAGNOSIS — R68.84 MANDIBULAR PAIN: ICD-10-CM

## 2019-08-06 PROCEDURE — 99214 OFFICE O/P EST MOD 30 MIN: CPT | Performed by: INTERNAL MEDICINE

## 2019-08-06 PROCEDURE — 70100 X-RAY EXAM OF JAW <4VIEWS: CPT

## 2019-08-06 RX ORDER — HYDROCORTISONE AND ACETIC ACID 20.75; 10.375 MG/ML; MG/ML
1 SOLUTION AURICULAR (OTIC) 2 TIMES DAILY
Qty: 10 ML | Refills: 0 | Status: SHIPPED | OUTPATIENT
Start: 2019-08-06 | End: 2020-01-07

## 2019-08-06 ASSESSMENT — MIFFLIN-ST. JEOR: SCORE: 1689.72

## 2019-08-06 NOTE — PROGRESS NOTES
Subjective     Jeff Ricks is a 73 year old male who presents to clinic today for the following health issues:    HPI   Jaw pain and itchy ears:  Presents with symptoms of locking of the jaw recurrently for the past month. Resolves spontaneously but is painful. Has happened with wider opening of the mouth , but also with just drinking water. No current symptoms.   Has had ear canals itching, irritation, no pain, no hearing changes.         Patient Active Problem List   Diagnosis     Essential hypertension     Coronary atherosclerosis     Benign localized hyperplasia of prostate with urinary obstruction and other lower urinary tract symptoms (LUTS)(600.21)     HYPERLIPIDEMIA LDL GOAL <100     Advanced directives, counseling/discussion     RBBB with left anterior fascicular block     Premature beats     Type 2 diabetes mellitus with diabetic nephropathy (H)     CKD (chronic kidney disease) stage 3, GFR 30-59 ml/min (H)     Benign prostatic hyperplasia with lower urinary tract symptoms, symptom details unspecified     Past Surgical History:   Procedure Laterality Date     ABDOMEN SURGERY      Hernia naval approx 25 years ago     BIOPSY  8/2016     C NONSPECIFIC PROCEDURE      colonoscopy approx 1999 done elsewhere     CARDIAC SURGERY       COLONOSCOPY       CORONARY ANGIOGRAPHY ADULT ORDER  8/28/2000    75% distal LAD stenosis, 80% third diagonal stenosis, 75-80% mid ramus stenosis, ostial 60% stenosis in circumflex w/90% stenosis in distal circumflex     CORONARY ANGIOGRAPHY ADULT ORDER  2003    4 stents placed     EP ABLATION / EP STUDIES  3/25/2014     HEART CATH, ANGIOPLASTY       HYDROCELECTOMY SCROTAL Right 10/6/2016    Procedure: HYDROCELECTOMY SCROTAL;  Surgeon: Jordan Chandra MD;  Location: [facility]     ORTHOPEDIC SURGERY  1990    Meniscus Orthoscopic surgery left knee.     TESTICLE SURGERY         Social History     Tobacco Use     Smoking status: Former Smoker     Packs/day: 3.00     Years: 6.00      Pack years: 18.00     Types: Cigarettes, Cigars, Pipe     Start date: 1961     Last attempt to quit: 1967     Years since quittin.6     Smokeless tobacco: Never Used     Tobacco comment: quit    Substance Use Topics     Alcohol use: Yes     Comment: 1 beer a week     Family History   Problem Relation Age of Onset     Musculoskeletal Disorder Mother         spinal stenosis     Cancer Mother         cancer kidney age 85     Hypertension Mother         Dead     Diabetes Father         Dead     Diabetes Brother         Type 1     Heart Disease Brother          Current Outpatient Medications   Medication Sig Dispense Refill     acetic acid-hydrocortisone (VOSOL-HC) 1-2 % otic solution Place 1 drop into both ears 2 times daily 10 mL 0     Ascorbic Acid (VITAMIN C PO) Take by mouth daily       aspirin (ASA) 81 MG tablet Take 81 mg by mouth 2 times daily       calcitRIOL (ROCALTROL) 0.25 MCG capsule Take 0.25 mcg by mouth every other day       chlorhexidine (PERIDEX) 0.12 % solution USE 1/2 OZ TO SWISH FOR 30 SECONDS ONCE D  3     Cholecalciferol (VITAMIN D3 PO) Take 1,000 Units by mouth 2 times daily        clopidogrel (PLAVIX) 75 MG tablet Take 1 tablet (75 mg) by mouth daily 90 tablet 3     Cyanocobalamin (B-12) 1000 MCG CAPS Take by mouth daily       furosemide (LASIX) 40 MG tablet TAKE 1 TABLET DAILY 90 tablet 1     glimepiride (AMARYL) 1 MG tablet Take 1 tablet (1 mg) by mouth every morning (before breakfast) 90 tablet 3     hydrocortisone 2.5 % cream Apply topically 2 times daily 30 g 11     lisinopril (PRINIVIL/ZESTRIL) 10 MG tablet TAKE 1 TABLET DAILY 90 tablet 0     metFORMIN (GLUCOPHAGE) 1000 MG tablet TAKE 1 TABLET TWICE A DAY  WITH FOOD (WITH MEALS) 180 tablet 0     nitroGLYcerin (NITROSTAT) 0.6 MG sublingual tablet DISSOLVE 1 TABLET UNDER THETONGUE AS NEEDED. 100 tablet 0     omega-3 acid ethyl esters (LOVAZA) 1 g capsule TAKE 1 CAPSULE TWICE DAILY 180 capsule 1     Pyridoxine HCl (B-6) 100  "MG TABS Take by mouth daily       simvastatin (ZOCOR) 40 MG tablet TAKE 1 TABLET AT BEDTIME 90 tablet 1     VITAMIN E NATURAL PO Take by mouth daily           Reviewed and updated as needed this visit by Provider         Review of Systems   ROS COMP: Constitutional, HEENT, cardiovascular, pulmonary, gi and gu systems are negative, except as otherwise noted.      Objective    Ht 1.778 m (5' 10\")   BMI 30.85 kg/m    Body mass index is 30.85 kg/m .  Physical Exam   GENERAL: healthy, alert and no distress  HENT: ear canals- mild erythema,  and TM's normal, nose and mouth without ulcers or lesions  NECK: no adenopathy, no asymmetry, masses, or scars and thyroid normal to palpation  MS: no gross musculoskeletal defects noted, no edema    Diagnostic Test Results:  Labs reviewed in Epic        Assessment & Plan   Problem List Items Addressed This Visit     None      Visit Diagnoses     Mandibular pain    -  Primary    Relevant Orders    XR Mandible 1/3 Views    Ear itching        Relevant Medications    acetic acid-hydrocortisone (VOSOL-HC) 1-2 % otic solution         Assess X rays of the mandible   Consider dental surgery assessment   Use of smaller bites, softer diet advised   Start topical otic steroid acetic acid, use PRN     BMI:   Estimated body mass index is 30 kg/m  as calculated from the following:    Height as of this encounter: 1.778 m (5' 10\").    Weight as of this encounter: 94.8 kg (209 lb 1.6 oz).           See Patient Instructions  Return in about 4 weeks (around 9/3/2019) for follow up on acute problem if persists.    Guillermo Deleon MD  [facility]        "

## 2019-08-07 ENCOUNTER — TELEPHONE (OUTPATIENT)
Dept: INTERNAL MEDICINE | Facility: CLINIC | Age: 74
End: 2019-08-07

## 2019-08-12 ENCOUNTER — MEDICAL CORRESPONDENCE (OUTPATIENT)
Dept: HEALTH INFORMATION MANAGEMENT | Facility: CLINIC | Age: 74
End: 2019-08-12

## 2019-08-14 ENCOUNTER — DOCUMENTATION ONLY (OUTPATIENT)
Dept: LAB | Facility: CLINIC | Age: 74
End: 2019-08-14

## 2019-08-14 DIAGNOSIS — E11.21 TYPE 2 DIABETES MELLITUS WITH DIABETIC NEPHROPATHY, WITHOUT LONG-TERM CURRENT USE OF INSULIN (H): ICD-10-CM

## 2019-08-14 DIAGNOSIS — I10 ESSENTIAL HYPERTENSION: ICD-10-CM

## 2019-08-14 DIAGNOSIS — E78.5 HYPERLIPIDEMIA LDL GOAL <100: Primary | ICD-10-CM

## 2019-08-14 DIAGNOSIS — Z12.5 SCREENING FOR PROSTATE CANCER: ICD-10-CM

## 2019-08-23 DIAGNOSIS — I10 ESSENTIAL HYPERTENSION: ICD-10-CM

## 2019-08-23 DIAGNOSIS — E78.5 HYPERLIPIDEMIA LDL GOAL <100: ICD-10-CM

## 2019-08-23 DIAGNOSIS — Z12.5 SCREENING FOR PROSTATE CANCER: ICD-10-CM

## 2019-08-23 DIAGNOSIS — E11.21 TYPE 2 DIABETES MELLITUS WITH DIABETIC NEPHROPATHY, WITHOUT LONG-TERM CURRENT USE OF INSULIN (H): ICD-10-CM

## 2019-08-23 LAB
ALBUMIN SERPL-MCNC: 3.1 G/DL (ref 3.4–5)
ALP SERPL-CCNC: 50 U/L (ref 40–150)
ALT SERPL W P-5'-P-CCNC: 22 U/L (ref 0–70)
ANION GAP SERPL CALCULATED.3IONS-SCNC: 7 MMOL/L (ref 3–14)
AST SERPL W P-5'-P-CCNC: 21 U/L (ref 0–45)
BILIRUB SERPL-MCNC: 0.5 MG/DL (ref 0.2–1.3)
BUN SERPL-MCNC: 21 MG/DL (ref 7–30)
CALCIUM SERPL-MCNC: 8.6 MG/DL (ref 8.5–10.1)
CHLORIDE SERPL-SCNC: 109 MMOL/L (ref 94–109)
CO2 SERPL-SCNC: 25 MMOL/L (ref 20–32)
CREAT SERPL-MCNC: 1.43 MG/DL (ref 0.66–1.25)
CREAT UR-MCNC: 211 MG/DL
ERYTHROCYTE [DISTWIDTH] IN BLOOD BY AUTOMATED COUNT: 12.7 % (ref 10–15)
GFR SERPL CREATININE-BSD FRML MDRD: 47 ML/MIN/{1.73_M2}
GLUCOSE SERPL-MCNC: 91 MG/DL (ref 70–99)
HBA1C MFR BLD: 5.3 % (ref 0–5.6)
HCT VFR BLD AUTO: 32.5 % (ref 40–53)
HGB BLD-MCNC: 11.1 G/DL (ref 13.3–17.7)
MCH RBC QN AUTO: 31.8 PG (ref 26.5–33)
MCHC RBC AUTO-ENTMCNC: 34.2 G/DL (ref 31.5–36.5)
MCV RBC AUTO: 93 FL (ref 78–100)
MICROALBUMIN UR-MCNC: 35 MG/L
MICROALBUMIN/CREAT UR: 16.68 MG/G CR (ref 0–17)
PLATELET # BLD AUTO: 204 10E9/L (ref 150–450)
POTASSIUM SERPL-SCNC: 3.9 MMOL/L (ref 3.4–5.3)
PROT SERPL-MCNC: 5.8 G/DL (ref 6.8–8.8)
PSA SERPL-ACNC: 2.1 UG/L (ref 0–4)
RBC # BLD AUTO: 3.49 10E12/L (ref 4.4–5.9)
SODIUM SERPL-SCNC: 141 MMOL/L (ref 133–144)
TSH SERPL DL<=0.005 MIU/L-ACNC: 2.54 MU/L (ref 0.4–4)
WBC # BLD AUTO: 4.5 10E9/L (ref 4–11)

## 2019-08-23 PROCEDURE — 83036 HEMOGLOBIN GLYCOSYLATED A1C: CPT | Performed by: INTERNAL MEDICINE

## 2019-08-23 PROCEDURE — 84443 ASSAY THYROID STIM HORMONE: CPT | Performed by: INTERNAL MEDICINE

## 2019-08-23 PROCEDURE — 82043 UR ALBUMIN QUANTITATIVE: CPT | Performed by: INTERNAL MEDICINE

## 2019-08-23 PROCEDURE — G0103 PSA SCREENING: HCPCS | Performed by: INTERNAL MEDICINE

## 2019-08-23 PROCEDURE — 36415 COLL VENOUS BLD VENIPUNCTURE: CPT | Performed by: INTERNAL MEDICINE

## 2019-08-23 PROCEDURE — 80053 COMPREHEN METABOLIC PANEL: CPT | Performed by: INTERNAL MEDICINE

## 2019-08-23 PROCEDURE — 85027 COMPLETE CBC AUTOMATED: CPT | Performed by: INTERNAL MEDICINE

## 2019-08-24 ASSESSMENT — ENCOUNTER SYMPTOMS
CHILLS: 0
NAUSEA: 0
PARESTHESIAS: 0
MYALGIAS: 0
HEADACHES: 0
FEVER: 0
ARTHRALGIAS: 1
JOINT SWELLING: 1
CONSTIPATION: 0
HEMATURIA: 0
SHORTNESS OF BREATH: 0
FREQUENCY: 0
DIZZINESS: 0
HEMATOCHEZIA: 0
DYSURIA: 0
NERVOUS/ANXIOUS: 0
COUGH: 0
ABDOMINAL PAIN: 0
SORE THROAT: 0
HEARTBURN: 0
EYE PAIN: 1
PALPITATIONS: 0
WEAKNESS: 0
DIARRHEA: 0

## 2019-08-24 ASSESSMENT — ACTIVITIES OF DAILY LIVING (ADL): CURRENT_FUNCTION: NO ASSISTANCE NEEDED

## 2019-08-26 ENCOUNTER — OFFICE VISIT (OUTPATIENT)
Dept: INTERNAL MEDICINE | Facility: CLINIC | Age: 74
End: 2019-08-26
Payer: MEDICARE

## 2019-08-26 VITALS
RESPIRATION RATE: 17 BRPM | TEMPERATURE: 98.6 F | DIASTOLIC BLOOD PRESSURE: 60 MMHG | HEIGHT: 70 IN | OXYGEN SATURATION: 95 % | HEART RATE: 75 BPM | SYSTOLIC BLOOD PRESSURE: 114 MMHG | BODY MASS INDEX: 29.62 KG/M2 | WEIGHT: 206.9 LBS

## 2019-08-26 DIAGNOSIS — E78.5 HYPERLIPIDEMIA LDL GOAL <100: ICD-10-CM

## 2019-08-26 DIAGNOSIS — Z23 NEED FOR TDAP VACCINATION: ICD-10-CM

## 2019-08-26 DIAGNOSIS — N18.30 CKD (CHRONIC KIDNEY DISEASE) STAGE 3, GFR 30-59 ML/MIN (H): ICD-10-CM

## 2019-08-26 DIAGNOSIS — I65.23 CALCIFICATION OF BOTH CAROTID ARTERIES: ICD-10-CM

## 2019-08-26 DIAGNOSIS — E11.21 TYPE 2 DIABETES MELLITUS WITH DIABETIC NEPHROPATHY, WITHOUT LONG-TERM CURRENT USE OF INSULIN (H): ICD-10-CM

## 2019-08-26 DIAGNOSIS — Z23 NEED FOR SHINGLES VACCINE: ICD-10-CM

## 2019-08-26 DIAGNOSIS — I10 ESSENTIAL HYPERTENSION: ICD-10-CM

## 2019-08-26 DIAGNOSIS — Z00.00 ENCOUNTER FOR PREVENTIVE HEALTH EXAMINATION: Primary | ICD-10-CM

## 2019-08-26 DIAGNOSIS — M26.609 TMJ (TEMPOROMANDIBULAR JOINT SYNDROME): ICD-10-CM

## 2019-08-26 PROCEDURE — 90472 IMMUNIZATION ADMIN EACH ADD: CPT | Performed by: INTERNAL MEDICINE

## 2019-08-26 PROCEDURE — 90750 HZV VACC RECOMBINANT IM: CPT | Performed by: INTERNAL MEDICINE

## 2019-08-26 PROCEDURE — 90471 IMMUNIZATION ADMIN: CPT | Performed by: INTERNAL MEDICINE

## 2019-08-26 PROCEDURE — G0439 PPPS, SUBSEQ VISIT: HCPCS | Performed by: INTERNAL MEDICINE

## 2019-08-26 PROCEDURE — 90715 TDAP VACCINE 7 YRS/> IM: CPT | Performed by: INTERNAL MEDICINE

## 2019-08-26 ASSESSMENT — ENCOUNTER SYMPTOMS
COUGH: 0
FEVER: 0
CHILLS: 0
SHORTNESS OF BREATH: 0
JOINT SWELLING: 1
CONSTIPATION: 0
PARESTHESIAS: 0
DIARRHEA: 0
HEADACHES: 0
MYALGIAS: 0
HEARTBURN: 0
HEMATOCHEZIA: 0
DYSURIA: 0
NERVOUS/ANXIOUS: 0
PALPITATIONS: 0
EYE PAIN: 1
ABDOMINAL PAIN: 0
FREQUENCY: 0
DIZZINESS: 0
NAUSEA: 0
ARTHRALGIAS: 1
WEAKNESS: 0
SORE THROAT: 0
HEMATURIA: 0

## 2019-08-26 ASSESSMENT — MIFFLIN-ST. JEOR: SCORE: 1679.74

## 2019-08-26 ASSESSMENT — ACTIVITIES OF DAILY LIVING (ADL): CURRENT_FUNCTION: NO ASSISTANCE NEEDED

## 2019-08-26 NOTE — NURSING NOTE
"/60   Pulse 75   Temp 98.6  F (37  C) (Oral)   Resp 17   Ht 1.778 m (5' 10\")   Wt 93.8 kg (206 lb 14.4 oz)   SpO2 95%   BMI 29.69 kg/m    Patient in for annual Medicare Visit.  Jennifer Pepper, BRIDGET    "

## 2019-08-26 NOTE — TELEPHONE ENCOUNTER
"Requested Prescriptions   Pending Prescriptions Disp Refills     simvastatin (ZOCOR) 40 MG tablet  Last Written Prescription Date:  3/30/2019  Last Fill Quantity: 90,  # refills: 1   Last office visit: 8/6/2019 with prescribing provider:     Future Office Visit:   Next 5 appointments (look out 90 days)    Aug 26, 2019  2:20 PM CDT  PHYS[facility] with Guill[facility]leon [facility] [address]  [facility] [address]  [phone]        90 tablet 1     Sig: Take 1 tablet (40 mg) by mouth At Bedtime       Statins Protocol Passed - 8/26/2019 11:44 AM        Passed - LDL on file in past 12 months     Recent Labs   Lab Test 11/09/18  1604   LDL 33             Passed - No abnormal creatine kinase in past 12 months     No lab results found.             Passed - Recent (12 mo) or future (30 days) visit within the authorizing provider's specialty     Patient had office visit in the last 12 months or has a visit in the next 30 days with authorizing provider or within the authorizing provider's specialty.  See \"Patient Info\" tab in inbasket, or \"Choose Columns\" in Meds & Orders section of the refill encounter.              Passed - Medication is active on med list        Passed - Patient is age 18 or older        "

## 2019-08-26 NOTE — PROGRESS NOTES
"SUBJECTIVE:   Jeff Ricks is a 73 year old male who presents for Preventive Visit.      Are you in the first 12 months of your Medicare coverage?  No    Healthy Habits:     In general, how would you rate your overall health?  Good    Frequency of exercise:  4-5 days/week    Duration of exercise:  15-30 minutes    Do you usually eat at least 4 servings of fruit and vegetables a day, include whole grains    & fiber and avoid regularly eating high fat or \"junk\" foods?  Yes    Taking medications regularly:  Yes    Medication side effects:  None    Ability to successfully perform activities of daily living:  No assistance needed    Home Safety:  No safety concerns identified    Hearing Impairment:  No hearing concerns    In the past 6 months, have you been bothered by leaking of urine?  No    In general, how would you rate your overall mental or emotional health?  Good      PHQ-2 Total Score: 0    Additional concerns today:  Yes    Do you feel safe in your environment? Yes    Do you have a Health Care Directive? Yes: Advance Directive has been received and scanned.      Fall risk  Fallen 2 or more times in the past year?: No  Any fall with injury in the past year?: No    Cognitive Screening   1) Repeat 3 items (Leader, Season, Table)    2) Clock draw: NORMAL  3) 3 item recall: Recalls 3 objects  Results: 3 items recalled: COGNITIVE IMPAIRMENT LESS LIKELY    Mini-CogTM Copyright JANICE Sánchez. Licensed by the author for use in [facility]; reprinted with permission (derek@.[email or web address]). All rights reserved.      Do you have sleep apnea, excessive snoring or daytime drowsiness?: no    Reviewed and updated as needed this visit by clinical staff  Tobacco  Allergies  Meds  Med Hx  Surg Hx  Fam Hx  Soc Hx        Reviewed and updated as needed this visit by Provider        Social History     Tobacco Use     Smoking status: Former Smoker     Packs/day: 3.00     Years: 6.00     Pack years: 18.00     Types: Cigarettes, " Cigars, Pipe     Start date: 1961     Last attempt to quit: 1967     Years since quittin.6     Smokeless tobacco: Never Used     Tobacco comment: quit    Substance Use Topics     Alcohol use: Yes     Comment: 1 beer a week     If you drink alcohol do you typically have >3 drinks per day or >7 drinks per week? No    Alcohol Use 2019   Prescreen: >3 drinks/day or >7 drinks/week? No   Prescreen: >3 drinks/day or >7 drinks/week? -   No flowsheet data found.        PROBLEMS TO ADD ON...  Has H/O DM. On diet , exercise and PO treatment . Blood sugars are controlled. No parestesias. No hypoglycemias.  Has H/O hyperlipidemia. On medical treatment and diet. No side effects. No muscle weakness, myalgias or upset stomach.   Has h/o HTN. on medical treatment. BP has been controlled. No side effects from medications. No CP, HA, dizziness. good compliance with medications and low salt diet.  Has h/o CRF. Symptoms include none. Monitoring BP, BG, medications, avoiding OTC NSAIDs. Needs periodic recheck of kidney function.    Concern for left jaw pain with eating, negative X rays       Current providers sharing in care for this patient include:   Patient Care Team:  Guillermo Deleon MD as PCP - General (Internal Medicine)  Guillermo Deleon MD as Assigned PCP    The following health maintenance items are reviewed in Epic and correct as of today:  Health Maintenance   Topic Date Due     ZOSTER IMMUNIZATION (1 of 2) 10/24/1993     AORTIC ANEURYSM SCREENING (SYSTEM ASSIGNED)  10/24/2008     DTAP/TDAP/TD IMMUNIZATION (2 - Td) 2019     MEDICARE ANNUAL WELLNESS VISIT  2019     INFLUENZA VACCINE (1) 2019     LIPID  2019     DIABETIC FOOT EXAM  2019     A1C  2019     FALL RISK ASSESSMENT  2020     EYE EXAM  2020     BMP  2020     MICROALBUMIN  2020     ADVANCE CARE PLANNING  2021     TSH W/FREE T4 REFLEX  2021     COLONOSCOPY  2025      "PHQ-2  Completed     PNEUMOCOCCAL IMMUNIZATION 65+ LOW/MEDIUM RISK  Completed     IPV IMMUNIZATION  Aged Out     MENINGITIS IMMUNIZATION  Aged Out     Lab work is in process  Labs reviewed in EPIC      Review of Systems   Constitutional: Negative for chills and fever.   HENT: Positive for ear pain. Negative for congestion, hearing loss and sore throat.    Eyes: Positive for pain. Negative for visual disturbance.   Respiratory: Negative for cough and shortness of breath.    Cardiovascular: Positive for peripheral edema. Negative for chest pain and palpitations.   Gastrointestinal: Negative for abdominal pain, constipation, diarrhea, heartburn, hematochezia and nausea.   Genitourinary: Positive for impotence. Negative for discharge, dysuria, frequency, genital sores, hematuria and urgency.   Musculoskeletal: Positive for arthralgias and joint swelling. Negative for myalgias.   Skin: Negative for rash.   Neurological: Negative for dizziness, weakness, headaches and paresthesias.   Psychiatric/Behavioral: Negative for mood changes. The patient is not nervous/anxious.          OBJECTIVE:   There were no vitals taken for this visit. Estimated body mass index is 30 kg/m  as calculated from the following:    Height as of 8/6/19: 1.778 m (5' 10\").    Weight as of 8/6/19: 94.8 kg (209 lb 1.6 oz).  Physical Exam  GENERAL: healthy, alert and no distress  EYES: Eyes grossly normal to inspection, PERRL and conjunctivae and sclerae normal  HENT: ear canals and TM's normal, nose and mouth without ulcers or lesions  NECK: no adenopathy, no asymmetry, masses, or scars and thyroid normal to palpation  RESP: lungs clear to auscultation - no rales, rhonchi or wheezes  CV: regular rate and rhythm, normal S1 S2, no S3 or S4, no murmur, click or rub, no peripheral edema and peripheral pulses strong  ABDOMEN: soft, nontender, no hepatosplenomegaly, no masses and bowel sounds normal  MS: no gross musculoskeletal defects noted, no " "edema  SKIN: no suspicious lesions or rashes  NEURO: Normal strength and tone, mentation intact and speech normal  PSYCH: mentation appears normal, affect normal/bright    Diagnostic Test Results:  Labs reviewed in Epic    ASSESSMENT / PLAN:       ICD-10-CM    1. Encounter for preventive health examination Z00.00    2. TMJ (temporomandibular joint syndrome) M26.609 CT Facial Bones without Contrast   3. Need for Tdap vaccination Z23 TDAP VACCINE   4. Need for shingles vaccine Z23 ZOSTER VACCINE RECOMBINANT ADJUVANTED IM NJX   5. Type 2 diabetes mellitus with diabetic nephropathy, without long-term current use of insulin (H) E11.21    6. Essential hypertension I10    7. CKD (chronic kidney disease) stage 3, GFR 30-59 ml/min (H) N18.3        End of Life Planning:  Patient currently has an advanced directive: Yes.  Practitioner is supportive of decision.    COUNSELING:  Reviewed preventive health counseling, as reflected in patient instructions       Regular exercise       Healthy diet/nutrition       Vision screening       Hearing screening       Colon cancer screening       Prostate cancer screening    Estimated body mass index is 30 kg/m  as calculated from the following:    Height as of 8/6/19: 1.778 m (5' 10\").    Weight as of 8/6/19: 94.8 kg (209 lb 1.6 oz).         reports that he quit smoking about 52 years ago. His smoking use included cigarettes, cigars, and pipe. He started smoking about 58 years ago. He has a 18.00 pack-year smoking history. He has never used smokeless tobacco.      Appropriate preventive services were discussed with this patient, including applicable screening as appropriate for cardiovascular disease, diabetes, osteopenia/osteoporosis, and glaucoma.  As appropriate for age/gender, discussed screening for colorectal cancer, prostate cancer, breast cancer, and cervical cancer. Checklist reviewing preventive services available has been given to the patient.    Reviewed patients plan of care " and provided an AVS. The Intermediate Care Plan ( asthma action plan, low back pain action plan, and migraine action plan) for Jeff meets the Care Plan requirement. This Care Plan has been established and reviewed with the Patient.    Counseling Resources:  ATP IV Guidelines  Pooled Cohorts Equation Calculator  Breast Cancer Risk Calculator  FRAX Risk Assessment  ICSI Preventive Guidelines  Dietary Guidelines for Americans, 2010  [employer] MyPlate  ASA Prophylaxis  Lung CA Screening    Guillermo Deleon MD  [facility]    Identified Health Risks:

## 2019-08-26 NOTE — TELEPHONE ENCOUNTER
"Requested Prescriptions   Pending Prescriptions Disp Refills     metFORMIN (GLUCOPHAGE) 1000 MG tablet  Last Written Prescription Date:  6/14/2019  Last Fill Quantity: 180,  # refills: 0   Last office visit: 8/6/2019 with prescribing provider:     Future Office Visit:   Next 5 appointments (look out 90 days)    Aug 26, 2019  2:20 PM CDT  PHYSICAL with Guillermo Deleon MD  [facility] ([facility]) [address]  [facility] [address]  [phone]        180 tablet 0       Biguanide Agents Passed - 8/26/2019 11:35 AM        Passed - Blood pressure less than 140/90 in past 6 months     BP Readings from Last 3 Encounters:   08/06/19 122/64   06/11/19 138/62   05/29/19 127/54                 Passed - Patient has documented LDL within the past 12 mos.     Recent Labs   Lab Test 11/09/18  1604   LDL 33             Passed - Patient has had a Microalbumin in the past 15 mos.     Recent Labs   Lab Test 08/23/19  0810   MICROL 35   UMALCR 16.68             Passed - Patient is age 10 or older        Passed - Patient has documented A1c within the specified period of time.     If HgbA1C is 8 or greater, it needs to be on file within the past 3 months.  If less than 8, must be on file within the past 6 months.     Recent Labs   Lab Test 08/23/19  0809   A1C 5.3             Passed - Patient's CR is NOT>1.4 OR Patient's EGFR is NOT<45 within past 12 mos.     Recent Labs   Lab Test 08/23/19  0809   GFRESTIMATED 47*   GFRESTBLACK 55*       Recent Labs   Lab Test 08/23/19  0809   CR 1.43*             Passed - Patient does NOT have a diagnosis of CHF.        Passed - Medication is active on med list        Passed - Recent (6 mo) or future (30 days) visit within the authorizing provider's specialty     Patient had office visit in the last 6 months or has a visit in the next 30 days with authorizing provider or within the authorizing provider's specialty.  See \"Patient Info\" tab in inbasket, " "or \"Choose Columns\" in Meds & Orders section of the refill encounter.            "

## 2019-08-27 ENCOUNTER — TELEPHONE (OUTPATIENT)
Dept: INTERNAL MEDICINE | Facility: CLINIC | Age: 74
End: 2019-08-27

## 2019-08-27 DIAGNOSIS — E78.5 HYPERLIPIDEMIA LDL GOAL <100: ICD-10-CM

## 2019-08-27 DIAGNOSIS — I25.10 ASHD (ARTERIOSCLEROTIC HEART DISEASE): ICD-10-CM

## 2019-08-27 NOTE — TELEPHONE ENCOUNTER
"Requested Prescriptions   Pending Prescriptions Disp Refills     lisinopril (PRINIVIL/ZESTRIL) 10 MG tablet    Last Written Prescription Date:  6/14/19  Last Fill Quantity: 90,  # refills: 0   Last office visit: No previous visit found with prescribing provider:  Adrienne   Future Office Visit:     90 tablet 0     Sig: Take 1 tablet (10 mg) by mouth daily       ACE Inhibitors (Including Combos) Protocol Failed - 8/27/2019  5:26 PM        Failed - Normal serum creatinine on file in past 12 months     Recent Labs   Lab Test 08/23/19  0809   CR 1.43*             Passed - Blood pressure under 140/90 in past 12 months     BP Readings from Last 3 Encounters:   08/26/19 114/60   08/06/19 122/64   06/11/19 138/62                 Passed - Recent (12 mo) or future (30 days) visit within the authorizing provider's specialty     Patient had office visit in the last 12 months or has a visit in the next 30 days with authorizing provider or within the authorizing provider's specialty.  See \"Patient Info\" tab in inbasket, or \"Choose Columns\" in Meds & Orders section of the refill encounter.              Passed - Medication is active on med list        Passed - Patient is age 18 or older        Passed - Normal serum potassium on file in past 12 months     Recent Labs   Lab Test 08/23/19  0809   POTASSIUM 3.9             omega-3 acid ethyl esters (LOVAZA) 1 g capsule    Last Written Prescription Date:  3/30/19  Last Fill Quantity: 180,  # refills: 1   Last office visit: No previous visit found with prescribing provider:  Adrienne   Future Office Visit:     180 capsule 1       Antihyperlipidemic agents Passed - 8/27/2019  5:26 PM        Passed - Lipid panel on file in past 12 mos     Recent Labs   Lab Test 11/09/18  1604  05/13/15  1030   CHOL 118   < > 95   TRIG 30   < > 28   HDL 79   < > 65   LDL 33   < > 24   NHDL 39   < >  --    VLDL  --   --  6   CHOLHDLRATIO  --   --  1.5    < > = values in this interval not displayed.           " "    Passed - Normal serum ALT on record in past 12 mos     Recent Labs   Lab Test 08/23/19  0809   ALT 22             Passed - Recent (12 mo) or future (30 days) visit within the authorizing provider's specialty     Patient had office visit in the last 12 months or has a visit in the next 30 days with authorizing provider or within the authorizing provider's specialty.  See \"Patient Info\" tab in inbasket, or \"Choose Columns\" in Meds & Orders section of the refill encounter.              Passed - Medication is active on med list        Passed - Patient is age 18 years or older          "

## 2019-08-27 NOTE — TELEPHONE ENCOUNTER
There are no medications in this encounter.     NOT IN MED LIST:  Finasteride Tab 5MG    Last Written Prescription Date:  na  Last Fill Quantity: -,  # refills: -   Last office visit: No previous visit found with prescribing provider:  Adrienne   Future Office Visit:

## 2019-08-27 NOTE — TELEPHONE ENCOUNTER
NOT IN MED LIST:    AMARYL TAB 1MG  Last Written Prescription Date:  NA  Last Fill Quantity: -,  # refills: -   Last office visit: No previous visit found with prescribing provider:  ASHLI   Future Office Visit:

## 2019-08-28 RX ORDER — SIMVASTATIN 40 MG
40 TABLET ORAL AT BEDTIME
Qty: 90 TABLET | Refills: 1 | Status: SHIPPED | OUTPATIENT
Start: 2019-08-28 | End: 2020-02-06

## 2019-08-28 NOTE — TELEPHONE ENCOUNTER
Simvastatin refill request.   Last OV 8/26/19  Prescription approved per [facility] Refill Protocol.      Recent Labs   Lab Test 11/09/18  1604 09/06/17  0954  05/13/15  1030 04/30/14  0859   CHOL 118 92   < > 95 112   HDL 79 70   < > 65 64   LDL 33 17   < > 24 42   TRIG 30 25   < > 28 29   CHOLHDLRATIO  --   --   --  1.5 1.7    < > = values in this interval not displayed.

## 2019-08-28 NOTE — TELEPHONE ENCOUNTER
Attempted to contact pharmacy to cancel the Amaryl. Was on hold for several minutes, then phone was disconnected.

## 2019-08-28 NOTE — TELEPHONE ENCOUNTER
Pt saw Dr Chandra, Urology on 1/3/19 and has stopped this medication.     Medication denied. This is probably on automatic refill.   Attempted to call pharmacy earlier but was on hold and then disconnected.     PLAN:  He is doing well after his Rezum procedure. He has already stopped taking his Flomax and finasteride and he will remain off of those medications. I will see him back in 3 months for a follow-up on his symptoms.  Jordan Chandra M.D.

## 2019-08-28 NOTE — TELEPHONE ENCOUNTER
Pt to return in February    Provider approval needed.       Creatinine   Date Value Ref Range Status   08/23/2019 1.43 (H) 0.66 - 1.25 mg/dL Final

## 2019-08-28 NOTE — TELEPHONE ENCOUNTER
Glimepiride was discontinued from med list at OV on 8/26/19.    Please advise if this was stopped.

## 2019-08-29 ENCOUNTER — TELEPHONE (OUTPATIENT)
Dept: INTERNAL MEDICINE | Facility: CLINIC | Age: 74
End: 2019-08-29

## 2019-08-29 DIAGNOSIS — E78.5 HYPERLIPIDEMIA LDL GOAL <100: ICD-10-CM

## 2019-08-29 RX ORDER — SIMVASTATIN 40 MG
40 TABLET ORAL AT BEDTIME
Qty: 90 TABLET | Refills: 1 | Status: CANCELLED | OUTPATIENT
Start: 2019-08-29

## 2019-08-29 NOTE — TELEPHONE ENCOUNTER
Lisinopril and Lovaza refill request.     Last OV 8/26/19    Creatinine   Date Value Ref Range Status   08/23/2019 1.43 (H) 0.66 - 1.25 mg/dL Final     BP Readings from Last 3 Encounters:   08/26/19 114/60   08/06/19 122/64   06/11/19 138/62     Recent Labs   Lab Test 11/09/18  1604 09/06/17  0954  05/13/15  1030 04/30/14  0859   CHOL 118 92   < > 95 112   HDL 79 70   < > 65 64   LDL 33 17   < > 24 42   TRIG 30 25   < > 28 29   CHOLHDLRATIO  --   --   --  1.5 1.7    < > = values in this interval not displayed.

## 2019-08-29 NOTE — TELEPHONE ENCOUNTER
"Requested Prescriptions   Pending Prescriptions Disp Refills     simvastatin (ZOCOR) 40 MG tablet  Last Written Prescription Date:  8/28/2019  Last Fill Quantity: 90,  # refills: 1   Last office visit: 8/26/2019 with prescribing provider:     Future Office Visit:   90 tablet 1     Sig: Take 1 tablet (40 mg) by mouth At Bedtime       Statins Protocol Passed - 8/29/2019  3:57 PM        Passed - LDL on file in past 12 months     Recent Labs   Lab Test 11/09/18  1604   LDL 33             Passed - No abnormal creatine kinase in past 12 months     No lab results found.             Passed - Recent (12 mo) or future (30 days) visit within the authorizing provider's specialty     Patient had office visit in the last 12 months or has a visit in the next 30 days with authorizing provider or within the authorizing provider's specialty.  See \"Patient Info\" tab in inbasket, or \"Choose Columns\" in Meds & Orders section of the refill encounter.              Passed - Medication is active on med list        Passed - Patient is age 18 or older        "

## 2019-08-29 NOTE — TELEPHONE ENCOUNTER
Call from [facility] stating that radiologist, Dr. Le, requesting that patient receive MRI for TMJ instead of CT, states that this is usually done as MRI instead. Patient is scheduled for tomorrow at 2. They can do the CT if this is what primary care provider would like, please let them know asa, can call CT directly at [phone]. Please advise,     Thank you.

## 2019-08-30 ENCOUNTER — HOSPITAL ENCOUNTER (OUTPATIENT)
Dept: CT IMAGING | Facility: CLINIC | Age: 74
Discharge: HOME | End: 2019-08-30
Attending: INTERNAL MEDICINE | Admitting: INTERNAL MEDICINE
Payer: MEDICARE

## 2019-08-30 DIAGNOSIS — M26.609 TMJ (TEMPOROMANDIBULAR JOINT SYNDROME): ICD-10-CM

## 2019-08-30 PROCEDURE — 70486 CT MAXILLOFACIAL W/O DYE: CPT

## 2019-08-30 RX ORDER — LISINOPRIL 10 MG/1
10 TABLET ORAL DAILY
Qty: 90 TABLET | Refills: 3 | Status: SHIPPED | OUTPATIENT
Start: 2019-08-30 | End: 2020-06-03

## 2019-08-30 RX ORDER — OMEGA-3-ACID ETHYL ESTERS 1 G/1
CAPSULE, LIQUID FILLED ORAL
Qty: 180 CAPSULE | Refills: 0 | Status: SHIPPED | OUTPATIENT
Start: 2019-08-30 | End: 2020-04-20

## 2019-09-10 ENCOUNTER — HOSPITAL ENCOUNTER (OUTPATIENT)
Dept: ULTRASOUND IMAGING | Facility: CLINIC | Age: 74
Discharge: HOME | End: 2019-09-10
Attending: INTERNAL MEDICINE | Admitting: INTERNAL MEDICINE
Payer: MEDICARE

## 2019-09-10 DIAGNOSIS — I65.23 CALCIFICATION OF BOTH CAROTID ARTERIES: ICD-10-CM

## 2019-09-10 PROCEDURE — 93880 EXTRACRANIAL BILAT STUDY: CPT

## 2019-09-28 ENCOUNTER — HEALTH MAINTENANCE LETTER (OUTPATIENT)
Age: 74
End: 2019-09-28

## 2019-10-25 ENCOUNTER — TRANSFERRED RECORDS (OUTPATIENT)
Dept: HEALTH INFORMATION MANAGEMENT | Facility: CLINIC | Age: 74
End: 2019-10-25

## 2019-11-16 DIAGNOSIS — E11.21 TYPE 2 DIABETES MELLITUS WITH DIABETIC NEPHROPATHY, WITHOUT LONG-TERM CURRENT USE OF INSULIN (H): ICD-10-CM

## 2019-11-18 NOTE — TELEPHONE ENCOUNTER
"Requested Prescriptions   Pending Prescriptions Disp Refills     glimepiride (AMARYL) 1 MG tablet [Pharmacy Med Name: GLIMEPIRIDE  TAB 1MG] 90 tablet 0     Sig: TAKE 1 TABLET EVERY MORNINGBEFORE BREAKFAST   Last Written Prescription Date:  Not on meds list  Last Fill Quantity: n/a,  # refills: n/a   Last office visit: 8/26/2019 with prescribing provider:     Future Office Visit:      Sulfonylurea Agents Failed - 11/16/2019  2:25 PM        Failed - Patient has documented LDL within the past 12 mos.     Recent Labs   Lab Test 11/09/18  1604   LDL 33             Failed - Medication is active on med list        Failed - Patient has a recent creatinine (normal) within the past 12 mos.     Recent Labs   Lab Test 08/23/19  0809   CR 1.43*             Passed - Blood pressure less than 140/90 in past 6 months     BP Readings from Last 3 Encounters:   08/26/19 114/60   08/06/19 122/64   06/11/19 138/62                 Passed - Patient has had a Microalbumin in the past 15 mos.     Recent Labs   Lab Test 08/23/19  0810   MICROL 35   UMALCR 16.68             Passed - Patient has documented A1c within the specified period of time.     If HgbA1C is 8 or greater, it needs to be on file within the past 3 months.  If less than 8, must be on file within the past 6 months.     Recent Labs   Lab Test 08/23/19  0809   A1C 5.3             Passed - Patient is age 18 or older        Passed - Recent (6 mo) or future (30 days) visit within the authorizing provider's specialty     Patient had office visit in the last 6 months or has a visit in the next 30 days with authorizing provider or within the authorizing provider's specialty.  See \"Patient Info\" tab in inbasket, or \"Choose Columns\" in Meds & Orders section of the refill encounter.            "

## 2019-11-19 RX ORDER — GLIMEPIRIDE 1 MG/1
TABLET ORAL
Qty: 90 TABLET | Refills: 0 | Status: SHIPPED | OUTPATIENT
Start: 2019-11-19 | End: 2020-02-01

## 2019-12-06 ENCOUNTER — ALLIED HEALTH/NURSE VISIT (OUTPATIENT)
Dept: NURSING | Facility: CLINIC | Age: 74
End: 2019-12-06
Payer: MEDICARE

## 2019-12-06 DIAGNOSIS — Z23 NEED FOR VACCINATION: Primary | ICD-10-CM

## 2019-12-06 PROCEDURE — 90471 IMMUNIZATION ADMIN: CPT

## 2019-12-06 PROCEDURE — 90750 HZV VACC RECOMBINANT IM: CPT

## 2019-12-06 PROCEDURE — 99207 ZZC NO CHARGE NURSE ONLY: CPT

## 2019-12-06 NOTE — NURSING NOTE
Prior to injection, verified patient identity using patient's name and date of birth.  Due to injection administration, patient instructed to remain in clinic for 15 minutes afterwards, and to report any adverse reaction to me or the  staff immediately.      Screening Questionnaire for Adult Immunization     Are you sick today?   No    Do you have allergies to medications, food or any vaccine?   No    Have you ever had a serious reaction after receiving a vaccination?   No    Do you have a long-term health problem with heart disease, lung disease,  asthma, kidney disease, diabetes, anemia, metabolic or blood disease?   No    Do you have cancer, leukemia, AIDS, or any immune system problem?   No    Do you take cortisone, prednisone, other steroids, or anticancer drugs, or  have you had any x-ray (radiation) treatments?   No    Have you had a seizure, brain, or other nervous system problem?   No    During the past year, have you received a transfusion of blood or blood       products, or been given a medicine called immune (gamma) globulin?   No    For women: Are you pregnant or is there a chance you could become         pregnant during the next month?   No    Have you received any vaccinations in the past 4 weeks?   No     Immunization questionnaire answers were all negative.      MNVFC doesn't apply on this patient     Screening performed by Sushma Shaw MA on 12/6/2019 at 2:12 PM.

## 2019-12-15 DIAGNOSIS — R60.0 BILATERAL LEG EDEMA: ICD-10-CM

## 2019-12-16 RX ORDER — FUROSEMIDE 40 MG
TABLET ORAL
Qty: 90 TABLET | Refills: 0 | Status: SHIPPED | OUTPATIENT
Start: 2019-12-16 | End: 2020-03-24

## 2019-12-18 ENCOUNTER — TRANSFERRED RECORDS (OUTPATIENT)
Dept: HEALTH INFORMATION MANAGEMENT | Facility: CLINIC | Age: 74
End: 2019-12-18

## 2019-12-18 LAB — RETINOPATHY: NORMAL

## 2019-12-20 ENCOUNTER — DOCUMENTATION ONLY (OUTPATIENT)
Dept: INTERNAL MEDICINE | Facility: CLINIC | Age: 74
End: 2019-12-20

## 2019-12-20 DIAGNOSIS — E11.21 TYPE 2 DIABETES MELLITUS WITH DIABETIC NEPHROPATHY, WITHOUT LONG-TERM CURRENT USE OF INSULIN (H): ICD-10-CM

## 2019-12-20 DIAGNOSIS — N18.30 CKD (CHRONIC KIDNEY DISEASE) STAGE 3, GFR 30-59 ML/MIN (H): Primary | ICD-10-CM

## 2019-12-23 DIAGNOSIS — E11.21 TYPE 2 DIABETES MELLITUS WITH DIABETIC NEPHROPATHY, WITHOUT LONG-TERM CURRENT USE OF INSULIN (H): ICD-10-CM

## 2019-12-23 DIAGNOSIS — N18.30 CKD (CHRONIC KIDNEY DISEASE) STAGE 3, GFR 30-59 ML/MIN (H): ICD-10-CM

## 2019-12-23 LAB
ERYTHROCYTE [DISTWIDTH] IN BLOOD BY AUTOMATED COUNT: 13.2 % (ref 10–15)
HBA1C MFR BLD: 5.5 % (ref 0–5.6)
HCT VFR BLD AUTO: 33 % (ref 40–53)
HGB BLD-MCNC: 11 G/DL (ref 13.3–17.7)
MCH RBC QN AUTO: 32.1 PG (ref 26.5–33)
MCHC RBC AUTO-ENTMCNC: 33.3 G/DL (ref 31.5–36.5)
MCV RBC AUTO: 96 FL (ref 78–100)
PLATELET # BLD AUTO: 241 10E9/L (ref 150–450)
RBC # BLD AUTO: 3.43 10E12/L (ref 4.4–5.9)
WBC # BLD AUTO: 5.6 10E9/L (ref 4–11)

## 2019-12-23 PROCEDURE — 36415 COLL VENOUS BLD VENIPUNCTURE: CPT | Performed by: INTERNAL MEDICINE

## 2019-12-23 PROCEDURE — 80053 COMPREHEN METABOLIC PANEL: CPT | Performed by: INTERNAL MEDICINE

## 2019-12-23 PROCEDURE — 85027 COMPLETE CBC AUTOMATED: CPT | Performed by: INTERNAL MEDICINE

## 2019-12-23 PROCEDURE — 83036 HEMOGLOBIN GLYCOSYLATED A1C: CPT | Performed by: INTERNAL MEDICINE

## 2019-12-24 LAB
ALBUMIN SERPL-MCNC: 3.1 G/DL (ref 3.4–5)
ALP SERPL-CCNC: 54 U/L (ref 40–150)
ALT SERPL W P-5'-P-CCNC: 21 U/L (ref 0–70)
ANION GAP SERPL CALCULATED.3IONS-SCNC: 5 MMOL/L (ref 3–14)
AST SERPL W P-5'-P-CCNC: 20 U/L (ref 0–45)
BILIRUB SERPL-MCNC: 0.4 MG/DL (ref 0.2–1.3)
BUN SERPL-MCNC: 23 MG/DL (ref 7–30)
CALCIUM SERPL-MCNC: 8.3 MG/DL (ref 8.5–10.1)
CHLORIDE SERPL-SCNC: 108 MMOL/L (ref 94–109)
CO2 SERPL-SCNC: 28 MMOL/L (ref 20–32)
CREAT SERPL-MCNC: 1.66 MG/DL (ref 0.66–1.25)
GFR SERPL CREATININE-BSD FRML MDRD: 39 ML/MIN/{1.73_M2}
GLUCOSE SERPL-MCNC: 101 MG/DL (ref 70–99)
POTASSIUM SERPL-SCNC: 3.9 MMOL/L (ref 3.4–5.3)
PROT SERPL-MCNC: 5.7 G/DL (ref 6.8–8.8)
SODIUM SERPL-SCNC: 141 MMOL/L (ref 133–144)

## 2019-12-31 ENCOUNTER — DOCUMENTATION ONLY (OUTPATIENT)
Dept: LAB | Facility: CLINIC | Age: 74
End: 2019-12-31

## 2020-01-07 ENCOUNTER — OFFICE VISIT (OUTPATIENT)
Dept: INTERNAL MEDICINE | Facility: CLINIC | Age: 75
End: 2020-01-07
Payer: MEDICARE

## 2020-01-07 VITALS
HEART RATE: 78 BPM | SYSTOLIC BLOOD PRESSURE: 122 MMHG | HEIGHT: 70 IN | TEMPERATURE: 98.3 F | RESPIRATION RATE: 11 BRPM | WEIGHT: 200.7 LBS | DIASTOLIC BLOOD PRESSURE: 64 MMHG | BODY MASS INDEX: 28.73 KG/M2 | OXYGEN SATURATION: 99 %

## 2020-01-07 DIAGNOSIS — L29.9 EAR ITCHING: ICD-10-CM

## 2020-01-07 DIAGNOSIS — N18.30 CKD (CHRONIC KIDNEY DISEASE) STAGE 3, GFR 30-59 ML/MIN (H): ICD-10-CM

## 2020-01-07 DIAGNOSIS — H61.22 IMPACTED CERUMEN OF LEFT EAR: ICD-10-CM

## 2020-01-07 DIAGNOSIS — I10 ESSENTIAL HYPERTENSION: ICD-10-CM

## 2020-01-07 DIAGNOSIS — E11.21 TYPE 2 DIABETES MELLITUS WITH DIABETIC NEPHROPATHY, WITHOUT LONG-TERM CURRENT USE OF INSULIN (H): Primary | ICD-10-CM

## 2020-01-07 PROCEDURE — 99214 OFFICE O/P EST MOD 30 MIN: CPT | Mod: 25 | Performed by: INTERNAL MEDICINE

## 2020-01-07 PROCEDURE — 99207 C FOOT EXAM  NO CHARGE: CPT | Performed by: INTERNAL MEDICINE

## 2020-01-07 PROCEDURE — 69210 REMOVE IMPACTED EAR WAX UNI: CPT | Performed by: INTERNAL MEDICINE

## 2020-01-07 RX ORDER — HYDROCORTISONE AND ACETIC ACID 20.75; 10.375 MG/ML; MG/ML
1 SOLUTION AURICULAR (OTIC) 2 TIMES DAILY
Qty: 10 ML | Refills: 0 | Status: SHIPPED | OUTPATIENT
Start: 2020-01-07 | End: 2021-03-23

## 2020-01-07 ASSESSMENT — MIFFLIN-ST. JEOR: SCORE: 1646.62

## 2020-01-07 NOTE — PROGRESS NOTES
Subjective     Jeff Ricks is a 74 year old male who presents to clinic today for the following health issues:    Patient is seen for a follow up visit.  No acute complaints, no medication change or new medical conditions.    Has H/O DM. On diet , exercise and PO treatment. Blood sugars are controlled. No parestesias. No hypoglycemias.  Has h/o HTN. on medical treatment. BP has been controlled. No side effects from medications. No CP, HA, dizziness. good compliance with medications and low salt diet.  Has H/O hyperlipidemia. On medical treatment and diet. No side effects. No muscle weakness, myalgias or upset stomach.   Has h/o CRF. Symptoms include none. Monitoring BP, BG, medications, avoiding OTC NSAIDs. Needs periodic recheck of kidney function.      Diabetes:   He presents for follow up of diabetes.  He is not checking blood glucose. He has no concerns regarding his diabetes at this time.  He is not experiencing numbness or burning in feet, excessive thirst, blurry vision, weight changes or redness, sores or blisters on feet. The patient has had a diabetic eye exam in the last 12 months.     Diabetes Management Resources    Hypertension: He presents for follow up of hypertension.  He does not check blood pressure  regularly outside of the clinic. Outpatient blood pressures have not been over 140/90. He follows a low salt diet.     Hyperlipidemia:  He presents for follow up of hyperlipidemia.  He is taking medication to lower cholesterol. He is not having myalgia or other side effects to statin medications.       PROBLEMS TO ADD ON...  Concern for feeling of ears itching and plugged left ear.     Patient Active Problem List   Diagnosis     Essential hypertension     Coronary atherosclerosis     Benign localized hyperplasia of prostate with urinary obstruction and other lower urinary tract symptoms (LUTS)(600.21)     HYPERLIPIDEMIA LDL GOAL <100     Advanced directives, counseling/discussion     RBBB with left  anterior fascicular block     Premature beats     Type 2 diabetes mellitus with diabetic nephropathy (H)     CKD (chronic kidney disease) stage 3, GFR 30-59 ml/min (H)     Benign prostatic hyperplasia with lower urinary tract symptoms, symptom details unspecified     Past Surgical History:   Procedure Laterality Date     ABDOMEN SURGERY      Hernia naval approx 25 years ago     BIOPSY  2016     C NONSPECIFIC PROCEDURE      colonoscopy approx  done elsewhere     CARDIAC SURGERY       COLONOSCOPY       CORONARY ANGIOGRAPHY ADULT ORDER  2000    75% distal LAD stenosis, 80% third diagonal stenosis, 75-80% mid ramus stenosis, ostial 60% stenosis in circumflex w/90% stenosis in distal circumflex     CORONARY ANGIOGRAPHY ADULT ORDER      4 stents placed     EP ABLATION / EP STUDIES  3/25/2014     HEART CATH, ANGIOPLASTY       HYDROCELECTOMY SCROTAL Right 10/6/2016    Procedure: HYDROCELECTOMY SCROTAL;  Surgeon: Jordan Chandra MD;  Location: [facility]     ORTHOPEDIC SURGERY      Meniscus Orthoscopic surgery left knee.     TESTICLE SURGERY         Social History     Tobacco Use     Smoking status: Former Smoker     Packs/day: 3.00     Years: 6.00     Pack years: 18.00     Types: Cigarettes, Cigars, Pipe     Start date: 1961     Last attempt to quit: 1967     Years since quittin.0     Smokeless tobacco: Never Used     Tobacco comment: quit    Substance Use Topics     Alcohol use: Yes     Comment: 1 beer a week     Family History   Problem Relation Age of Onset     Musculoskeletal Disorder Mother         spinal stenosis     Cancer Mother         cancer kidney age 85     Hypertension Mother         Dead     Diabetes Father         Dead     Diabetes Brother         Type 1     Heart Disease Brother          Current Outpatient Medications   Medication Sig Dispense Refill     acetic acid-hydrocortisone (VOSOL-HC) 1-2 % otic solution Place 1 drop into both ears 2 times daily 10 mL 0      Ascorbic Acid (VITAMIN C PO) Take by mouth daily       aspirin (ASA) 81 MG tablet Take 81 mg by mouth 2 times daily       calcitRIOL (ROCALTROL) 0.25 MCG capsule Take 0.25 mcg by mouth every other day       chlorhexidine (PERIDEX) 0.12 % solution USE 1/2 OZ TO SWISH FOR 30 SECONDS ONCE D  3     Cholecalciferol (VITAMIN D3 PO) Take 1,000 Units by mouth 2 times daily        clopidogrel (PLAVIX) 75 MG tablet Take 1 tablet (75 mg) by mouth daily 90 tablet 3     Cyanocobalamin (B-12) 1000 MCG CAPS Take by mouth daily       furosemide (LASIX) 40 MG tablet TAKE 1 TABLET DAILY 90 tablet 0     glimepiride (AMARYL) 1 MG tablet TAKE 1 TABLET EVERY MORNINGBEFORE BREAKFAST 90 tablet 0     hydrocortisone 2.5 % cream Apply topically 2 times daily 30 g 11     lisinopril (PRINIVIL/ZESTRIL) 10 MG tablet Take 1 tablet (10 mg) by mouth daily 90 tablet 3     metFORMIN (GLUCOPHAGE) 1000 MG tablet TAKE 1 TABLET TWICE A DAY  WITH FOOD (WITH MEALS) 180 tablet 1     nitroGLYcerin (NITROSTAT) 0.6 MG sublingual tablet DISSOLVE 1 TABLET UNDER THETONGUE AS NEEDED. 100 tablet 0     omega-3 acid ethyl esters (LOVAZA) 1 g capsule TAKE 1 CAPSULE TWICE DAILY 180 capsule 0     Pyridoxine HCl (B-6) 100 MG TABS Take by mouth daily       simvastatin (ZOCOR) 40 MG tablet Take 1 tablet (40 mg) by mouth At Bedtime 90 tablet 1     VITAMIN E NATURAL PO Take by mouth daily         Reviewed and updated as needed this visit by Provider         Review of Systems   ROS COMP: Constitutional, HEENT, cardiovascular, pulmonary, gi and gu systems are negative, except as otherwise noted.      Objective    There were no vitals taken for this visit.  There is no height or weight on file to calculate BMI.  Physical Exam   GENERAL: healthy, alert and no distress  EYES: Eyes grossly normal to inspection, PERRL and conjunctivae and sclerae normal  HENT: ear canals- left obstructed with brown cerumen and TM's normal, nose and mouth without ulcers or lesions  NECK: no  adenopathy, no asymmetry, masses, or scars and thyroid normal to palpation  RESP: lungs clear to auscultation - no rales, rhonchi or wheezes  CV: regular rate and rhythm, normal S1 S2, no S3 or S4, no murmur, click or rub, no peripheral edema and peripheral pulses strong  ABDOMEN: soft, nontender, no hepatosplenomegaly, no masses and bowel sounds normal  MS: no gross musculoskeletal defects noted, no edema  SKIN: no suspicious lesions or rashes  Diabetic foot exam: normal DP and PT pulses, no trophic changes or ulcerative lesions and normal sensory exam    Diagnostic Test Results:  Labs reviewed in Epic        Assessment & Plan   Problem List Items Addressed This Visit     Essential hypertension    Type 2 diabetes mellitus with diabetic nephropathy (H) - Primary    Relevant Orders    FOOT EXAM (Completed)    CKD (chronic kidney disease) stage 3, GFR 30-59 ml/min (H)      Other Visit Diagnoses     Ear itching        Relevant Medications    acetic acid-hydrocortisone (VOSOL-HC) 1-2 % otic solution    Impacted cerumen of left ear        Relevant Orders    REMOVE IMPACTED CERUMEN (Completed)         Left ear canal cerumen removed with curette       Continue treatment  Monitor lab      See Patient Instructions  Return in about 6 months (around 7/7/2020) for Physical Exam.    Guillermo Deleon MD  [facility]

## 2020-01-16 ENCOUNTER — MYC MEDICAL ADVICE (OUTPATIENT)
Dept: INTERNAL MEDICINE | Facility: CLINIC | Age: 75
End: 2020-01-16

## 2020-01-16 DIAGNOSIS — E78.5 HYPERLIPIDEMIA LDL GOAL <100: Primary | ICD-10-CM

## 2020-01-16 DIAGNOSIS — L29.9 EAR ITCHING: ICD-10-CM

## 2020-01-17 ENCOUNTER — MYC MEDICAL ADVICE (OUTPATIENT)
Dept: INTERNAL MEDICINE | Facility: CLINIC | Age: 75
End: 2020-01-17

## 2020-01-17 DIAGNOSIS — H60.393 INFECTIVE OTITIS EXTERNA, BILATERAL: Primary | ICD-10-CM

## 2020-01-17 RX ORDER — FLUOCINOLONE ACETONIDE 0.11 MG/ML
OIL TOPICAL 2 TIMES DAILY
Qty: 1 BOTTLE | Refills: 0 | Status: SHIPPED | OUTPATIENT
Start: 2020-01-17 | End: 2022-06-06

## 2020-01-17 NOTE — TELEPHONE ENCOUNTER
Patient sent another myc message stating that this medication is not covered under his insurance.  Per pharmacist, this medication was covered under his insurance.  Is there a different alternative for this medication or should I sent it to the PA department?  Please advise, thanks.      Myc message sent to patient.

## 2020-01-17 NOTE — TELEPHONE ENCOUNTER
My chart message sent by patient.    My chart message sent to patient.   Future lipid panel placed.  See my chart message.     Called pharmacy to see what alternative medication are available.     Vosol is on back order.  The pharmacist stated that the below medication is covered under patients insurance.         Fluocinolone oil could be an alternative. Please advise, thanks.   medication is pended below.  Please fill out and sign if you agree.

## 2020-01-20 ENCOUNTER — TELEPHONE (OUTPATIENT)
Dept: INTERNAL MEDICINE | Facility: CLINIC | Age: 75
End: 2020-01-20

## 2020-01-20 RX ORDER — NEOMYCIN SULFATE, POLYMYXIN B SULFATE, HYDROCORTISONE 3.5; 10000; 1 MG/ML; [USP'U]/ML; MG/ML
3 SOLUTION/ DROPS AURICULAR (OTIC) 4 TIMES DAILY
Qty: 10 ML | Refills: 0 | Status: SHIPPED | OUTPATIENT
Start: 2020-01-20 | End: 2021-12-20

## 2020-01-20 NOTE — TELEPHONE ENCOUNTER
Prior Authorization Retail Medication Request    Medication/Dose: fluocinolone acetonide   ICD code (if different than what is on RX):  L29.9  Previously Tried and Failed:  unknown  Rationale:  unknown    Insurance Name:  Plan Telephone [phone]  Insurance ID:  [id number]      Pharmacy Information (if different than what is on RX)  Name:  [facility]  Phone:  [phone]

## 2020-01-21 NOTE — TELEPHONE ENCOUNTER
According to chart notes this medication was changed to CORTISPORIN. If a PA is still needed for fluocinolone acetonide please route back to PA team. Thank you.

## 2020-02-01 DIAGNOSIS — E11.21 TYPE 2 DIABETES MELLITUS WITH DIABETIC NEPHROPATHY, WITHOUT LONG-TERM CURRENT USE OF INSULIN (H): ICD-10-CM

## 2020-02-01 RX ORDER — GLIMEPIRIDE 1 MG/1
1 TABLET ORAL
Qty: 90 TABLET | Refills: 1 | Status: SHIPPED | OUTPATIENT
Start: 2020-02-01 | End: 2020-06-03

## 2020-02-01 NOTE — TELEPHONE ENCOUNTER
"Requested Prescriptions   Pending Prescriptions Disp Refills     glimepiride (AMARYL) 1 MG tablet [Pharmacy Med Name: GLIMEPIRIDE  TAB 1MG] 90 tablet 0     Sig: TAKE 1 TABLET EVERY MORNINGBEFORE BREAKFAST       Sulfonylurea Agents Failed - 2/1/2020  6:45 AM        Failed - Patient has documented LDL within the past 12 mos.     Recent Labs   Lab Test 11/09/18  1604   LDL 33             Failed - Patient has a recent creatinine (normal) within the past 12 mos.     Recent Labs   Lab Test 12/23/19  0925   CR 1.66*             Passed - Blood pressure less than 140/90 in past 6 months     BP Readings from Last 3 Encounters:   01/07/20 122/64   08/26/19 114/60   08/06/19 122/64                 Passed - Patient has had a Microalbumin in the past 15 mos.     Recent Labs   Lab Test 08/23/19  0810   MICROL 35   UMALCR 16.68             Passed - Patient has documented A1c within the specified period of time.     If HgbA1C is 8 or greater, it needs to be on file within the past 3 months.  If less than 8, must be on file within the past 6 months.     Recent Labs   Lab Test 12/23/19  0925   A1C 5.5             Passed - Medication is active on med list        Passed - Patient is age 18 or older        Passed - Recent (6 mo) or future (30 days) visit within the authorizing provider's specialty     Patient had office visit in the last 6 months or has a visit in the next 30 days with authorizing provider or within the authorizing provider's specialty.  See \"Patient Info\" tab in inbasket, or \"Choose Columns\" in Meds & Orders section of the refill encounter.              Per result note message from primary care provider 12-23-19: \"Slightly decreased kidney function and mild anemia. Stable. Follow up in 3 months in clinic recommended\".    Last office visit 1-7-20.  Per dictation:  \"Return in about 6 months (around 7/7/2020) for Physical Exam.\"    Prescription approved per [facility].                  "

## 2020-02-05 DIAGNOSIS — E78.5 HYPERLIPIDEMIA LDL GOAL <100: ICD-10-CM

## 2020-02-06 RX ORDER — SIMVASTATIN 40 MG
TABLET ORAL
Qty: 90 TABLET | Refills: 0 | Status: SHIPPED | OUTPATIENT
Start: 2020-02-06 | End: 2020-03-24

## 2020-02-06 NOTE — TELEPHONE ENCOUNTER
"Requested Prescriptions   Pending Prescriptions Disp Refills     simvastatin (ZOCOR) 40 MG tablet [Pharmacy Med Name: SIMVASTATIN  TAB 40MG] 90 tablet 1     Sig: TAKE 1 TABLET AT BEDTIME   Last Written Prescription Date:  08/28/2019  Last Fill Quantity: 90,  # refills: 01   Last office visit: 1/7/2020 with prescribing provider:     Future Office Visit:      Statins Protocol Failed - 2/5/2020  7:46 PM        Failed - LDL on file in past 12 months     Recent Labs   Lab Test 11/09/18  1604   LDL 33             Passed - No abnormal creatine kinase in past 12 months     No lab results found.             Passed - Recent (12 mo) or future (30 days) visit within the authorizing provider's specialty     Patient has had an office visit with the authorizing provider or a provider within the authorizing providers department within the previous 12 mos or has a future within next 30 days. See \"Patient Info\" tab in inbasket, or \"Choose Columns\" in Meds & Orders section of the refill encounter.              Passed - Medication is active on med list        Passed - Patient is age 18 or older        "

## 2020-02-19 NOTE — TELEPHONE ENCOUNTER
"Requested Prescriptions   Pending Prescriptions Disp Refills     furosemide (LASIX) 40 MG tablet [Pharmacy Med Name: FUROSEMIDE TAB 40MG] 90 tablet 1     Sig: TAKE 1 TABLET DAILY       Diuretics (Including Combos) Protocol Failed - 12/15/2019  8:41 AM        Failed - Normal serum creatinine on file in past 12 months     Recent Labs   Lab Test 08/23/19  0809   CR 1.43*              Passed - Blood pressure under 140/90 in past 12 months     BP Readings from Last 3 Encounters:   08/26/19 114/60   08/06/19 122/64   06/11/19 138/62                 Passed - Recent (12 mo) or future (30 days) visit within the authorizing provider's specialty     Patient has had an office visit with the authorizing provider or a provider within the authorizing providers department within the previous 12 mos or has a future within next 30 days. See \"Patient Info\" tab in inbasket, or \"Choose Columns\" in Meds & Orders section of the refill encounter.              Passed - Medication is active on med list        Passed - Patient is age 18 or older        Passed - Normal serum potassium on file in past 12 months     Recent Labs   Lab Test 08/23/19  0809   POTASSIUM 3.9                    Passed - Normal serum sodium on file in past 12 months     Recent Labs   Lab Test 08/23/19  0809               Last Written Prescription Date:  3/31/19  Last Fill Quantity: 90,  # refills: 1   Last office visit: 8/26/2019 with prescribing provider:    Future Office Visit:   Next 5 appointments (look out 90 days)    Jan 07, 2020  4:00 PM CST  SHORT with Guillermo [facility]leon MD  [facility] [address]  [facility] [id number] [phone]             "
Furosemide refill request.   Provider approval needed    Last OV 8/26/19     BP Readings from Last 3 Encounters:   08/26/19 114/60   08/06/19 122/64   06/11/19 138/62     Creatinine   Date Value Ref Range Status   08/23/2019 1.43 (H) 0.66 - 1.25 mg/dL Final     Potassium   Date Value Ref Range Status   08/23/2019 3.9 3.4 - 5.3 mmol/L Final       
patient

## 2020-03-24 ENCOUNTER — MYC MEDICAL ADVICE (OUTPATIENT)
Dept: INTERNAL MEDICINE | Facility: CLINIC | Age: 75
End: 2020-03-24

## 2020-03-24 DIAGNOSIS — R60.0 BILATERAL LEG EDEMA: ICD-10-CM

## 2020-03-24 DIAGNOSIS — I25.10 ASHD (ARTERIOSCLEROTIC HEART DISEASE): ICD-10-CM

## 2020-03-24 DIAGNOSIS — E78.5 HYPERLIPIDEMIA LDL GOAL <100: ICD-10-CM

## 2020-03-24 RX ORDER — FUROSEMIDE 40 MG
TABLET ORAL
Qty: 90 TABLET | Refills: 0 | Status: SHIPPED | OUTPATIENT
Start: 2020-03-24 | End: 2020-06-03

## 2020-03-24 RX ORDER — CLOPIDOGREL BISULFATE 75 MG/1
TABLET ORAL
Qty: 90 TABLET | Refills: 3 | Status: SHIPPED | OUTPATIENT
Start: 2020-03-24 | End: 2021-01-14

## 2020-03-24 RX ORDER — SIMVASTATIN 40 MG
TABLET ORAL
Qty: 90 TABLET | Refills: 0 | Status: SHIPPED | OUTPATIENT
Start: 2020-03-24 | End: 2020-06-03

## 2020-03-24 NOTE — TELEPHONE ENCOUNTER
"Requested Prescriptions   Pending Prescriptions Disp Refills     clopidogrel (PLAVIX) 75 MG tablet [Pharmacy Med Name: CLOPIDOGREL  TAB 75MG]    Last Written Prescription Date:  2/1/19  Last Fill Quantity: 90,  # refills: 3   Last office visit: 1/7/2020 with prescribing provider:  Adrienne   Future Office Visit:     90 tablet 3     Sig: TAKE 1 TABLET DAILY       Plavix Failed - 3/24/2020  9:10 AM        Failed - Normal HGB on file in past 12 months     Recent Labs   Lab Test 12/23/19  0925   HGB 11.0*               Passed - No active PPI on record unless is Protonix        Passed - Normal Platelets on file in past 12 months     Recent Labs   Lab Test 12/23/19  0925                  Passed - Recent (12 mo) or future (30 days) visit within the authorizing provider's specialty     Patient has had an office visit with the authorizing provider or a provider within the authorizing providers department within the previous 12 mos or has a future within next 30 days. See \"Patient Info\" tab in inbasket, or \"Choose Columns\" in Meds & Orders section of the refill encounter.              Passed - Medication is active on med list        Passed - Patient is age 18 or older           simvastatin (ZOCOR) 40 MG tablet [Pharmacy Med Name: SIMVASTATIN  TAB 40MG]    Last Written Prescription Date:  2/6/20  Last Fill Quantity: 90,  # refills: 0   Last office visit: 1/7/2020 with prescribing provider:  Adrienne   Future Office Visit:     90 tablet 0     Sig: TAKE 1 TABLET AT BEDTIME       Statins Protocol Failed - 3/24/2020  9:10 AM        Failed - LDL on file in past 12 months     Recent Labs   Lab Test 11/09/18  1604   LDL 33             Passed - No abnormal creatine kinase in past 12 months     No lab results found.             Passed - Recent (12 mo) or future (30 days) visit within the authorizing provider's specialty     Patient has had an office visit with the authorizing provider or a provider within the authorizing providers " "department within the previous 12 mos or has a future within next 30 days. See \"Patient Info\" tab in inbasket, or \"Choose Columns\" in Meds & Orders section of the refill encounter.              Passed - Medication is active on med list        Passed - Patient is age 18 or older           furosemide (LASIX) 40 MG tablet [Pharmacy Med Name: FUROSEMIDE TAB 40MG]    Last Written Prescription Date:  12/16/19  Last Fill Quantity: 90,  # refills: 0   Last office visit: 1/7/2020 with prescribing provider:  Adrienne   Future Office Visit:     90 tablet 0     Sig: TAKE 1 TABLET DAILY       Diuretics (Including Combos) Protocol Failed - 3/24/2020  9:10 AM        Failed - Normal serum creatinine on file in past 12 months     Recent Labs   Lab Test 12/23/19  0925   CR 1.66*              Passed - Blood pressure under 140/90 in past 12 months     BP Readings from Last 3 Encounters:   01/07/20 122/64   08/26/19 114/60   08/06/19 122/64                 Passed - Recent (12 mo) or future (30 days) visit within the authorizing provider's specialty     Patient has had an office visit with the authorizing provider or a provider within the authorizing providers department within the previous 12 mos or has a future within next 30 days. See \"Patient Info\" tab in inbasket, or \"Choose Columns\" in Meds & Orders section of the refill encounter.              Passed - Medication is active on med list        Passed - Patient is age 18 or older        Passed - Normal serum potassium on file in past 12 months     Recent Labs   Lab Test 12/23/19  0925   POTASSIUM 3.9                    Passed - Normal serum sodium on file in past 12 months     Recent Labs   Lab Test 12/23/19  0925                       "

## 2020-03-24 NOTE — TELEPHONE ENCOUNTER
"Requested Prescriptions   Pending Prescriptions Disp Refills     clopidogrel (PLAVIX) 75 MG tablet [Pharmacy Med Name: CLOPIDOGREL  TAB 75MG] 90 tablet 3     Sig: TAKE 1 TABLET DAILY       Plavix Failed - 3/24/2020  1:04 PM        Failed - Normal HGB on file in past 12 months     Recent Labs   Lab Test 12/23/19  0925   HGB 11.0*               Passed - No active PPI on record unless is Protonix        Passed - Normal Platelets on file in past 12 months     Recent Labs   Lab Test 12/23/19  0925                  Passed - Recent (12 mo) or future (30 days) visit within the authorizing provider's specialty     Patient has had an office visit with the authorizing provider or a provider within the authorizing providers department within the previous 12 mos or has a future within next 30 days. See \"Patient Info\" tab in inbasket, or \"Choose Columns\" in Meds & Orders section of the refill encounter.              Passed - Medication is active on med list        Passed - Patient is age 18 or older           simvastatin (ZOCOR) 40 MG tablet [Pharmacy Med Name: SIMVASTATIN  TAB 40MG] 90 tablet 0     Sig: TAKE 1 TABLET AT BEDTIME       Statins Protocol Failed - 3/24/2020  1:04 PM        Failed - LDL on file in past 12 months     Recent Labs   Lab Test 11/09/18  1604   LDL 33             Passed - No abnormal creatine kinase in past 12 months     No lab results found.             Passed - Recent (12 mo) or future (30 days) visit within the authorizing provider's specialty     Patient has had an office visit with the authorizing provider or a provider within the authorizing providers department within the previous 12 mos or has a future within next 30 days. See \"Patient Info\" tab in inbasket, or \"Choose Columns\" in Meds & Orders section of the refill encounter.              Passed - Medication is active on med list        Passed - Patient is age 18 or older           furosemide (LASIX) 40 MG tablet [Pharmacy Med Name: " "FUROSEMIDE TAB 40MG] 90 tablet 0     Sig: TAKE 1 TABLET DAILY       Diuretics (Including Combos) Protocol Failed - 3/24/2020  1:04 PM        Failed - Normal serum creatinine on file in past 12 months     Recent Labs   Lab Test 12/23/19  0925   CR 1.66*              Passed - Blood pressure under 140/90 in past 12 months     BP Readings from Last 3 Encounters:   01/07/20 122/64   08/26/19 114/60   08/06/19 122/64                 Passed - Recent (12 mo) or future (30 days) visit within the authorizing provider's specialty     Patient has had an office visit with the authorizing provider or a provider within the authorizing providers department within the previous 12 mos or has a future within next 30 days. See \"Patient Info\" tab in inbasket, or \"Choose Columns\" in Meds & Orders section of the refill encounter.              Passed - Medication is active on med list        Passed - Patient is age 18 or older        Passed - Normal serum potassium on file in past 12 months     Recent Labs   Lab Test 12/23/19  0925   POTASSIUM 3.9                    Passed - Normal serum sodium on file in past 12 months     Recent Labs   Lab Test 12/23/19  0925                      Routing refill request to provider for review/approval because:  Labs not current:   LDL    Labs not in range: Hgb, creatinine    Gabriela Humphrey RN BSN, Pap Tracking          "

## 2020-04-18 DIAGNOSIS — E78.5 HYPERLIPIDEMIA LDL GOAL <100: ICD-10-CM

## 2020-04-20 RX ORDER — OMEGA-3-ACID ETHYL ESTERS 1 G/1
CAPSULE, LIQUID FILLED ORAL
Qty: 180 CAPSULE | Refills: 0 | Status: SHIPPED | OUTPATIENT
Start: 2020-04-20 | End: 2020-06-03

## 2020-04-20 NOTE — TELEPHONE ENCOUNTER
Pending Prescriptions:                       Disp   Refills    omega-3 acid ethyl esters (LOVAZA) 1 g cap*180 ca*0        Sig: TAKE 1 CAPSULE TWICE DAILY    Routing refill request to provider for review/approval because:  A break in medication, labs not current: LDL

## 2020-05-12 DIAGNOSIS — E11.21 TYPE 2 DIABETES MELLITUS WITH DIABETIC NEPHROPATHY, WITHOUT LONG-TERM CURRENT USE OF INSULIN (H): ICD-10-CM

## 2020-05-13 NOTE — TELEPHONE ENCOUNTER
Routing refill request to provider for review/approval because:  Labs out of range:  Creatinine

## 2020-06-02 DIAGNOSIS — I25.10 ASHD (ARTERIOSCLEROTIC HEART DISEASE): ICD-10-CM

## 2020-06-02 DIAGNOSIS — E11.21 TYPE 2 DIABETES MELLITUS WITH DIABETIC NEPHROPATHY, WITHOUT LONG-TERM CURRENT USE OF INSULIN (H): ICD-10-CM

## 2020-06-02 DIAGNOSIS — E78.5 HYPERLIPIDEMIA LDL GOAL <100: ICD-10-CM

## 2020-06-02 DIAGNOSIS — L30.9 ECZEMA, UNSPECIFIED TYPE: ICD-10-CM

## 2020-06-02 DIAGNOSIS — R60.0 BILATERAL LEG EDEMA: ICD-10-CM

## 2020-06-03 RX ORDER — OMEGA-3-ACID ETHYL ESTERS 1 G/1
CAPSULE, LIQUID FILLED ORAL
Qty: 180 CAPSULE | Refills: 0 | Status: SHIPPED | OUTPATIENT
Start: 2020-06-03 | End: 2020-09-08

## 2020-06-03 RX ORDER — HYDROCORTISONE 2.5 %
CREAM (GRAM) TOPICAL
Qty: 28.35 G | Refills: 11 | Status: SHIPPED | OUTPATIENT
Start: 2020-06-03

## 2020-06-03 RX ORDER — GLIMEPIRIDE 1 MG/1
TABLET ORAL
Qty: 90 TABLET | Refills: 1 | Status: SHIPPED | OUTPATIENT
Start: 2020-06-03 | End: 2020-09-30

## 2020-06-03 RX ORDER — SIMVASTATIN 40 MG
TABLET ORAL
Qty: 90 TABLET | Refills: 0 | Status: SHIPPED | OUTPATIENT
Start: 2020-06-03 | End: 2020-09-02

## 2020-06-03 RX ORDER — LISINOPRIL 10 MG/1
TABLET ORAL
Qty: 90 TABLET | Refills: 3 | Status: SHIPPED | OUTPATIENT
Start: 2020-06-03 | End: 2021-03-26

## 2020-06-03 RX ORDER — FUROSEMIDE 40 MG
TABLET ORAL
Qty: 90 TABLET | Refills: 0 | Status: SHIPPED | OUTPATIENT
Start: 2020-06-03 | End: 2020-09-30

## 2020-06-03 NOTE — TELEPHONE ENCOUNTER
"Routing refill request to provider for review/approval because:  Labs out of range:    Labs not current:              Requested Prescriptions   Pending Prescriptions Disp Refills     furosemide (LASIX) 40 MG tablet [Pharmacy Med Name: FUROSEMIDE TAB 40MG] 90 tablet 0     Sig: TAKE 1 TABLET DAILY       Diuretics (Including Combos) Protocol Failed - 6/2/2020  2:16 PM        Failed - Normal serum creatinine on file in past 12 months     Recent Labs   Lab Test 12/23/19  0925   CR 1.66*              Passed - Blood pressure under 140/90 in past 12 months     BP Readings from Last 3 Encounters:   01/07/20 122/64   08/26/19 114/60   08/06/19 122/64                 Passed - Recent (12 mo) or future (30 days) visit within the authorizing provider's specialty     Patient has had an office visit with the authorizing provider or a provider within the authorizing providers department within the previous 12 mos or has a future within next 30 days. See \"Patient Info\" tab in inbasket, or \"Choose Columns\" in Meds & Orders section of the refill encounter.              Passed - Medication is active on med list        Passed - Patient is age 18 or older        Passed - Normal serum potassium on file in past 12 months     Recent Labs   Lab Test 12/23/19  0925   POTASSIUM 3.9                    Passed - Normal serum sodium on file in past 12 months     Recent Labs   Lab Test 12/23/19  0925                    simvastatin (ZOCOR) 40 MG tablet [Pharmacy Med Name: SIMVASTATIN  TAB 40MG] 90 tablet 0     Sig: TAKE 1 TABLET AT BEDTIME       Statins Protocol Failed - 6/2/2020  2:16 PM        Failed - LDL on file in past 12 months     Recent Labs   Lab Test 11/09/18  1604   LDL 33             Passed - No abnormal creatine kinase in past 12 months     No lab results found.             Passed - Recent (12 mo) or future (30 days) visit within the authorizing provider's specialty     Patient has had an office visit with the authorizing provider " "or a provider within the authorizing providers department within the previous 12 mos or has a future within next 30 days. See \"Patient Info\" tab in inbasket, or \"Choose Columns\" in Meds & Orders section of the refill encounter.              Passed - Medication is active on med list        Passed - Patient is age 18 or older           metFORMIN (GLUCOPHAGE) 1000 MG tablet [Pharmacy Med Name: METFORMIN TAB 1000MG] 90 tablet 0     Sig: TAKE 1 TABLET TWICE A DAY  WITH FOOD (WITH MEALS)     PATIENT IS OVERDUE FOR AN  OFFICE VISIT       Biguanide Agents Failed - 6/2/2020  2:16 PM        Failed - Patient's CR is NOT>1.4 OR Patient's EGFR is NOT<45 within past 12 mos.     Recent Labs   Lab Test 12/23/19  0925   GFRESTIMATED 39*   GFRESTBLACK 46*       Recent Labs   Lab Test 12/23/19  0925   CR 1.66*             Passed - Patient is age 10 or older        Passed - Patient has documented A1c within the specified period of time.     If HgbA1C is 8 or greater, it needs to be on file within the past 3 months.  If less than 8, must be on file within the past 6 months.     Recent Labs   Lab Test 12/23/19  0925   A1C 5.5             Passed - Patient does NOT have a diagnosis of CHF.        Passed - Medication is active on med list        Passed - Recent (6 mo) or future (30 days) visit within the authorizing provider's specialty     Patient had office visit in the last 6 months or has a visit in the next 30 days with authorizing provider or within the authorizing provider's specialty.  See \"Patient Info\" tab in inbasket, or \"Choose Columns\" in Meds & Orders section of the refill encounter.                 "

## 2020-06-03 NOTE — TELEPHONE ENCOUNTER
Routing refill request to provider for review/approval because:  Labs out of range:    Labs not current:

## 2020-06-24 ENCOUNTER — MYC MEDICAL ADVICE (OUTPATIENT)
Dept: INTERNAL MEDICINE | Facility: CLINIC | Age: 75
End: 2020-06-24

## 2020-06-24 DIAGNOSIS — N18.30 CKD (CHRONIC KIDNEY DISEASE) STAGE 3, GFR 30-59 ML/MIN (H): ICD-10-CM

## 2020-06-24 DIAGNOSIS — E11.21 TYPE 2 DIABETES MELLITUS WITH DIABETIC NEPHROPATHY, WITHOUT LONG-TERM CURRENT USE OF INSULIN (H): Primary | ICD-10-CM

## 2020-06-24 NOTE — TELEPHONE ENCOUNTER
Pt asking for lab orders.     Last OV on 1/7/20      Lab Results   Component Value Date    A1C 5.5 12/23/2019    A1C 5.3 08/23/2019    A1C 5.4 02/26/2019    A1C 6.4 11/09/2018    A1C 6.0 05/18/2018     CBC RESULTS:   Recent Labs   Lab Test 12/23/19  0925   WBC 5.6   RBC 3.43*   HGB 11.0*   HCT 33.0*   MCV 96   MCH 32.1   MCHC 33.3   RDW 13.2

## 2020-07-01 DIAGNOSIS — N18.30 CKD (CHRONIC KIDNEY DISEASE) STAGE 3, GFR 30-59 ML/MIN (H): ICD-10-CM

## 2020-07-01 DIAGNOSIS — E78.5 HYPERLIPIDEMIA LDL GOAL <100: ICD-10-CM

## 2020-07-01 DIAGNOSIS — E11.21 TYPE 2 DIABETES MELLITUS WITH DIABETIC NEPHROPATHY, WITHOUT LONG-TERM CURRENT USE OF INSULIN (H): ICD-10-CM

## 2020-07-01 LAB
ALBUMIN SERPL-MCNC: 2.9 G/DL (ref 3.4–5)
ALP SERPL-CCNC: 46 U/L (ref 40–150)
ALT SERPL W P-5'-P-CCNC: 21 U/L (ref 0–70)
ANION GAP SERPL CALCULATED.3IONS-SCNC: 7 MMOL/L (ref 3–14)
AST SERPL W P-5'-P-CCNC: 22 U/L (ref 0–45)
BILIRUB SERPL-MCNC: 0.5 MG/DL (ref 0.2–1.3)
BUN SERPL-MCNC: 22 MG/DL (ref 7–30)
CALCIUM SERPL-MCNC: 8.3 MG/DL (ref 8.5–10.1)
CHLORIDE SERPL-SCNC: 107 MMOL/L (ref 94–109)
CHOLEST SERPL-MCNC: 86 MG/DL
CO2 SERPL-SCNC: 25 MMOL/L (ref 20–32)
CREAT SERPL-MCNC: 1.65 MG/DL (ref 0.66–1.25)
ERYTHROCYTE [DISTWIDTH] IN BLOOD BY AUTOMATED COUNT: 13.2 % (ref 10–15)
GFR SERPL CREATININE-BSD FRML MDRD: 40 ML/MIN/{1.73_M2}
GLUCOSE SERPL-MCNC: 83 MG/DL (ref 70–99)
HBA1C MFR BLD: 5.7 % (ref 0–5.6)
HCT VFR BLD AUTO: 31.9 % (ref 40–53)
HDLC SERPL-MCNC: 51 MG/DL
HGB BLD-MCNC: 10.8 G/DL (ref 13.3–17.7)
LDLC SERPL CALC-MCNC: 28 MG/DL
MCH RBC QN AUTO: 32.4 PG (ref 26.5–33)
MCHC RBC AUTO-ENTMCNC: 33.9 G/DL (ref 31.5–36.5)
MCV RBC AUTO: 96 FL (ref 78–100)
NONHDLC SERPL-MCNC: 35 MG/DL
PLATELET # BLD AUTO: 210 10E9/L (ref 150–450)
POTASSIUM SERPL-SCNC: 3.6 MMOL/L (ref 3.4–5.3)
PROT SERPL-MCNC: 5.9 G/DL (ref 6.8–8.8)
RBC # BLD AUTO: 3.33 10E12/L (ref 4.4–5.9)
SODIUM SERPL-SCNC: 139 MMOL/L (ref 133–144)
TRIGL SERPL-MCNC: 33 MG/DL
WBC # BLD AUTO: 6 10E9/L (ref 4–11)

## 2020-07-01 PROCEDURE — 85027 COMPLETE CBC AUTOMATED: CPT | Performed by: INTERNAL MEDICINE

## 2020-07-01 PROCEDURE — 83036 HEMOGLOBIN GLYCOSYLATED A1C: CPT | Performed by: INTERNAL MEDICINE

## 2020-07-01 PROCEDURE — 80061 LIPID PANEL: CPT | Performed by: INTERNAL MEDICINE

## 2020-07-01 PROCEDURE — 80053 COMPREHEN METABOLIC PANEL: CPT | Performed by: INTERNAL MEDICINE

## 2020-07-01 PROCEDURE — 36415 COLL VENOUS BLD VENIPUNCTURE: CPT | Performed by: INTERNAL MEDICINE

## 2020-08-10 ENCOUNTER — TRANSFERRED RECORDS (OUTPATIENT)
Dept: HEALTH INFORMATION MANAGEMENT | Facility: CLINIC | Age: 75
End: 2020-08-10

## 2020-08-10 LAB — RETINOPATHY: NORMAL

## 2020-09-01 DIAGNOSIS — E78.5 HYPERLIPIDEMIA LDL GOAL <100: ICD-10-CM

## 2020-09-02 RX ORDER — SIMVASTATIN 40 MG
TABLET ORAL
Qty: 90 TABLET | Refills: 0 | Status: SHIPPED | OUTPATIENT
Start: 2020-09-02 | End: 2020-11-13

## 2020-09-02 NOTE — TELEPHONE ENCOUNTER
Prescription approved per [facility] Refill Protocol.  Pt has upcoming appt

## 2020-09-09 ENCOUNTER — DOCUMENTATION ONLY (OUTPATIENT)
Dept: LAB | Facility: CLINIC | Age: 75
End: 2020-09-09

## 2020-09-09 ENCOUNTER — ALLIED HEALTH/NURSE VISIT (OUTPATIENT)
Dept: NURSING | Facility: CLINIC | Age: 75
End: 2020-09-09
Payer: MEDICARE

## 2020-09-09 DIAGNOSIS — E11.21 TYPE 2 DIABETES MELLITUS WITH DIABETIC NEPHROPATHY, WITHOUT LONG-TERM CURRENT USE OF INSULIN (H): ICD-10-CM

## 2020-09-09 DIAGNOSIS — Z23 NEED FOR PROPHYLACTIC VACCINATION AND INOCULATION AGAINST INFLUENZA: ICD-10-CM

## 2020-09-09 DIAGNOSIS — I10 ESSENTIAL HYPERTENSION: Primary | ICD-10-CM

## 2020-09-09 DIAGNOSIS — Z12.5 SCREENING FOR PROSTATE CANCER: ICD-10-CM

## 2020-09-09 DIAGNOSIS — Z23 NEED FOR VACCINATION: Primary | ICD-10-CM

## 2020-09-09 PROCEDURE — 90662 IIV NO PRSV INCREASED AG IM: CPT

## 2020-09-09 PROCEDURE — G0008 ADMIN INFLUENZA VIRUS VAC: HCPCS

## 2020-09-11 DIAGNOSIS — I10 ESSENTIAL HYPERTENSION: ICD-10-CM

## 2020-09-11 DIAGNOSIS — E11.21 TYPE 2 DIABETES MELLITUS WITH DIABETIC NEPHROPATHY, WITHOUT LONG-TERM CURRENT USE OF INSULIN (H): ICD-10-CM

## 2020-09-11 DIAGNOSIS — Z12.5 SCREENING FOR PROSTATE CANCER: ICD-10-CM

## 2020-09-11 LAB
ALBUMIN UR-MCNC: NEGATIVE MG/DL
APPEARANCE UR: CLEAR
BILIRUB UR QL STRIP: NEGATIVE
COLOR UR AUTO: YELLOW
CREAT UR-MCNC: 178 MG/DL
ERYTHROCYTE [DISTWIDTH] IN BLOOD BY AUTOMATED COUNT: 13.5 % (ref 10–15)
GLUCOSE UR STRIP-MCNC: NEGATIVE MG/DL
HBA1C MFR BLD: 5.5 % (ref 0–5.6)
HCT VFR BLD AUTO: 32.8 % (ref 40–53)
HGB BLD-MCNC: 11.1 G/DL (ref 13.3–17.7)
HGB UR QL STRIP: NEGATIVE
KETONES UR STRIP-MCNC: ABNORMAL MG/DL
LEUKOCYTE ESTERASE UR QL STRIP: NEGATIVE
MCH RBC QN AUTO: 32.6 PG (ref 26.5–33)
MCHC RBC AUTO-ENTMCNC: 33.8 G/DL (ref 31.5–36.5)
MCV RBC AUTO: 96 FL (ref 78–100)
MICROALBUMIN UR-MCNC: 29 MG/L
MICROALBUMIN/CREAT UR: 16.07 MG/G CR (ref 0–17)
NITRATE UR QL: NEGATIVE
PH UR STRIP: 5 PH (ref 5–7)
PLATELET # BLD AUTO: 242 10E9/L (ref 150–450)
PSA SERPL-ACNC: 3.08 UG/L (ref 0–4)
RBC # BLD AUTO: 3.41 10E12/L (ref 4.4–5.9)
SOURCE: ABNORMAL
SP GR UR STRIP: 1.02 (ref 1–1.03)
UROBILINOGEN UR STRIP-ACNC: 0.2 EU/DL (ref 0.2–1)
WBC # BLD AUTO: 5 10E9/L (ref 4–11)

## 2020-09-11 PROCEDURE — 36415 COLL VENOUS BLD VENIPUNCTURE: CPT | Performed by: INTERNAL MEDICINE

## 2020-09-11 PROCEDURE — 84443 ASSAY THYROID STIM HORMONE: CPT | Performed by: INTERNAL MEDICINE

## 2020-09-11 PROCEDURE — 81003 URINALYSIS AUTO W/O SCOPE: CPT | Performed by: INTERNAL MEDICINE

## 2020-09-11 PROCEDURE — G0103 PSA SCREENING: HCPCS | Performed by: INTERNAL MEDICINE

## 2020-09-11 PROCEDURE — 82043 UR ALBUMIN QUANTITATIVE: CPT | Performed by: INTERNAL MEDICINE

## 2020-09-11 PROCEDURE — 83036 HEMOGLOBIN GLYCOSYLATED A1C: CPT | Performed by: INTERNAL MEDICINE

## 2020-09-11 PROCEDURE — 80053 COMPREHEN METABOLIC PANEL: CPT | Performed by: INTERNAL MEDICINE

## 2020-09-11 PROCEDURE — 85027 COMPLETE CBC AUTOMATED: CPT | Performed by: INTERNAL MEDICINE

## 2020-09-12 LAB
ALBUMIN SERPL-MCNC: 3.2 G/DL (ref 3.4–5)
ALP SERPL-CCNC: 52 U/L (ref 40–150)
ALT SERPL W P-5'-P-CCNC: 28 U/L (ref 0–70)
ANION GAP SERPL CALCULATED.3IONS-SCNC: 8 MMOL/L (ref 3–14)
AST SERPL W P-5'-P-CCNC: 24 U/L (ref 0–45)
BILIRUB SERPL-MCNC: 0.3 MG/DL (ref 0.2–1.3)
BUN SERPL-MCNC: 21 MG/DL (ref 7–30)
CALCIUM SERPL-MCNC: 8.8 MG/DL (ref 8.5–10.1)
CHLORIDE SERPL-SCNC: 106 MMOL/L (ref 94–109)
CO2 SERPL-SCNC: 25 MMOL/L (ref 20–32)
CREAT SERPL-MCNC: 1.59 MG/DL (ref 0.66–1.25)
GFR SERPL CREATININE-BSD FRML MDRD: 41 ML/MIN/{1.73_M2}
GLUCOSE SERPL-MCNC: 60 MG/DL (ref 70–99)
POTASSIUM SERPL-SCNC: 3.8 MMOL/L (ref 3.4–5.3)
PROT SERPL-MCNC: 6 G/DL (ref 6.8–8.8)
SODIUM SERPL-SCNC: 139 MMOL/L (ref 133–144)
TSH SERPL DL<=0.005 MIU/L-ACNC: 1.78 MU/L (ref 0.4–4)

## 2020-09-29 ENCOUNTER — MYC REFILL (OUTPATIENT)
Dept: INTERNAL MEDICINE | Facility: CLINIC | Age: 75
End: 2020-09-29

## 2020-09-29 DIAGNOSIS — R60.0 BILATERAL LEG EDEMA: ICD-10-CM

## 2020-09-29 DIAGNOSIS — E11.21 TYPE 2 DIABETES MELLITUS WITH DIABETIC NEPHROPATHY, WITHOUT LONG-TERM CURRENT USE OF INSULIN (H): ICD-10-CM

## 2020-09-30 RX ORDER — GLIMEPIRIDE 1 MG/1
TABLET ORAL
Qty: 90 TABLET | Refills: 1 | Status: SHIPPED | OUTPATIENT
Start: 2020-09-30 | End: 2021-03-26

## 2020-09-30 RX ORDER — FUROSEMIDE 40 MG
40 TABLET ORAL DAILY
Qty: 7 TABLET | Refills: 0 | Status: SHIPPED | OUTPATIENT
Start: 2020-09-30 | End: 2020-11-06

## 2020-09-30 RX ORDER — FUROSEMIDE 40 MG
TABLET ORAL
Qty: 90 TABLET | Refills: 0 | Status: SHIPPED | OUTPATIENT
Start: 2020-09-30 | End: 2020-12-15

## 2020-09-30 NOTE — TELEPHONE ENCOUNTER
Pending Prescriptions:                       Disp   Refills    furosemide (LASIX) 40 MG tablet [Pharmacy *90 tab*0        Sig: TAKE 1 TABLET DAILY    glimepiride (AMARYL) 1 MG tablet [Pharmacy*90 tab*1        Sig: TAKE 1 TABLET EVERY MORNINGBEFORE BREAKFAST    Routing refill request to provider for review/approval because:  Patient fails protocol

## 2020-09-30 NOTE — TELEPHONE ENCOUNTER
Pt calling to check on status of temporary supply of Furosemide. He would like it to be sent to pharmacy that is teed up. Quantity of 90 has already been sent to mail order pharmacy under a different encounter. Please advise. Thanks.

## 2020-09-30 NOTE — TELEPHONE ENCOUNTER
Pt has upcoming appt.     Provider approval needed. Abnormal creatinine.     Also needs #7 pills sent to local pharmacy, after mail order.     Creatinine   Date Value Ref Range Status   09/11/2020 1.59 (H) 0.66 - 1.25 mg/dL Final

## 2020-10-27 DIAGNOSIS — E21.3 HYPERPARATHYROIDISM (H): ICD-10-CM

## 2020-10-27 DIAGNOSIS — E11.9 DIABETES MELLITUS (H): ICD-10-CM

## 2020-10-27 DIAGNOSIS — D64.9 ANEMIA, UNSPECIFIED: ICD-10-CM

## 2020-10-27 DIAGNOSIS — N18.30 CHRONIC KIDNEY DISEASE, STAGE III (MODERATE) (H): Primary | ICD-10-CM

## 2020-11-06 ENCOUNTER — OFFICE VISIT (OUTPATIENT)
Dept: INTERNAL MEDICINE | Facility: CLINIC | Age: 75
End: 2020-11-06
Payer: MEDICARE

## 2020-11-06 VITALS
HEART RATE: 68 BPM | OXYGEN SATURATION: 99 % | BODY MASS INDEX: 28.2 KG/M2 | DIASTOLIC BLOOD PRESSURE: 69 MMHG | RESPIRATION RATE: 16 BRPM | SYSTOLIC BLOOD PRESSURE: 143 MMHG | WEIGHT: 197 LBS | TEMPERATURE: 98.1 F | HEIGHT: 70 IN

## 2020-11-06 DIAGNOSIS — M25.512 LEFT SHOULDER PAIN, UNSPECIFIED CHRONICITY: ICD-10-CM

## 2020-11-06 DIAGNOSIS — N18.32 STAGE 3B CHRONIC KIDNEY DISEASE (H): ICD-10-CM

## 2020-11-06 DIAGNOSIS — Z00.00 ENCOUNTER FOR PREVENTATIVE ADULT HEALTH CARE EXAMINATION: Primary | ICD-10-CM

## 2020-11-06 DIAGNOSIS — I10 ESSENTIAL HYPERTENSION: ICD-10-CM

## 2020-11-06 DIAGNOSIS — E78.5 HYPERLIPIDEMIA LDL GOAL <100: ICD-10-CM

## 2020-11-06 DIAGNOSIS — M54.50 MIDLINE LOW BACK PAIN WITHOUT SCIATICA, UNSPECIFIED CHRONICITY: ICD-10-CM

## 2020-11-06 DIAGNOSIS — E11.21 TYPE 2 DIABETES MELLITUS WITH DIABETIC NEPHROPATHY, WITHOUT LONG-TERM CURRENT USE OF INSULIN (H): ICD-10-CM

## 2020-11-06 DIAGNOSIS — I25.10 ATHEROSCLEROSIS OF CORONARY ARTERY OF NATIVE HEART WITHOUT ANGINA PECTORIS, UNSPECIFIED VESSEL OR LESION TYPE: ICD-10-CM

## 2020-11-06 PROCEDURE — G0439 PPPS, SUBSEQ VISIT: HCPCS | Performed by: INTERNAL MEDICINE

## 2020-11-06 PROCEDURE — 80048 BASIC METABOLIC PNL TOTAL CA: CPT | Performed by: INTERNAL MEDICINE

## 2020-11-06 PROCEDURE — 99213 OFFICE O/P EST LOW 20 MIN: CPT | Mod: 25 | Performed by: INTERNAL MEDICINE

## 2020-11-06 PROCEDURE — 36415 COLL VENOUS BLD VENIPUNCTURE: CPT | Performed by: INTERNAL MEDICINE

## 2020-11-06 RX ORDER — METOPROLOL SUCCINATE 25 MG/1
25 TABLET, EXTENDED RELEASE ORAL DAILY
Qty: 90 TABLET | Refills: 3 | Status: SHIPPED | OUTPATIENT
Start: 2020-11-06 | End: 2021-12-20

## 2020-11-06 ASSESSMENT — ENCOUNTER SYMPTOMS
PARESTHESIAS: 0
WEAKNESS: 0
CONSTIPATION: 0
SHORTNESS OF BREATH: 0
ABDOMINAL PAIN: 0
FREQUENCY: 1
DYSURIA: 0
DIZZINESS: 0
JOINT SWELLING: 0
FEVER: 0
ARTHRALGIAS: 0
HEMATURIA: 0
NAUSEA: 0
DIARRHEA: 0
MYALGIAS: 0
HEARTBURN: 0
HEMATOCHEZIA: 0
SORE THROAT: 0
HEADACHES: 0
NERVOUS/ANXIOUS: 0
EYE PAIN: 0
CHILLS: 0
COUGH: 0
PALPITATIONS: 0

## 2020-11-06 ASSESSMENT — ACTIVITIES OF DAILY LIVING (ADL): CURRENT_FUNCTION: NO ASSISTANCE NEEDED

## 2020-11-06 ASSESSMENT — MIFFLIN-ST. JEOR: SCORE: 1624.84

## 2020-11-06 NOTE — PROGRESS NOTES
"SUBJECTIVE:   Jeff Ricks is a 75 year old male who presents for Preventive Visit.      Patient has been advised of split billing requirements and indicates understanding: Yes   Are you in the first 12 months of your Medicare coverage?  No    Healthy Habits:     In general, how would you rate your overall health?  Good    Frequency of exercise:  6-7 days/week    Duration of exercise:  45-60 minutes    Do you usually eat at least 4 servings of fruit and vegetables a day, include whole grains    & fiber and avoid regularly eating high fat or \"junk\" foods?  Yes    Taking medications regularly:  Yes    Medication side effects:  None    Ability to successfully perform activities of daily living:  No assistance needed    Home Safety:  No safety concerns identified    Hearing Impairment:  No hearing concerns    In the past 6 months, have you been bothered by leaking of urine?  No    In general, how would you rate your overall mental or emotional health?  Good      PHQ-2 Total Score: 2    Additional concerns today:  Yes    Do you feel safe in your environment? Yes    Have you ever done Advance Care Planning? (For example, a Health Directive, POLST, or a discussion with a medical provider or your loved ones about your wishes): No, advance care planning information given to patient to review.  Advanced care planning was discussed at today's visit.      Fall risk       Fallen two or more times in the last year   No   Any fall with an injury in the last year  No    Cognitive Screening   1) Repeat 3 items (Leader, Season, Table)    2) Clock draw: normal   3) 3 item recall: Recalls 2 objects   Results: NORMAL clock, 1-2 items recalled: COGNITIVE IMPAIRMENT LESS LIKELY    Mini-CogTM Copyright JANICE Sánchez. Licensed by the author for use in [facility]; reprinted with permission ([email or web address]). All rights reserved.      Do you have sleep apnea, excessive snoring or daytime drowsiness?: no    Reviewed and updated as needed " this visit by clinical staff   Allergies               Reviewed and updated as needed this visit by Provider                Social History     Tobacco Use     Smoking status: Former Smoker     Packs/day: 3.00     Years: 6.00     Pack years: 18.00     Types: Cigarettes, Cigars, Pipe     Start date: 1961     Quit date: 1967     Years since quittin.8     Smokeless tobacco: Never Used     Tobacco comment: quit    Substance Use Topics     Alcohol use: Yes     Comment: 1 beer a week         Alcohol Use 2020   Prescreen: >3 drinks/day or >7 drinks/week? No   Prescreen: >3 drinks/day or >7 drinks/week? -           Diabetes Follow-up  Has H/O DM. On diet , exercise and oral treatment. Blood sugars are controlled. No parestesias. No hypoglycemias.      How often are you checking your blood sugar? Not at all    What concerns do you have today about your diabetes? None     Do you have any of these symptoms? (Select all that apply)  No numbness or tingling in feet.  No redness, sores or blisters on feet.  No complaints of excessive thirst.  No reports of blurry vision.  No significant changes to weight.        Hyperlipidemia Follow-Up  Has H/O hyperlipidemia. On medical treatment and diet. No side effects. No muscle weakness, myalgias or upset stomach.         Are you regularly taking any medication or supplement to lower your cholesterol?   Yes- zocor    Are you having muscle aches or other side effects that you think could be caused by your cholesterol lowering medication?  No    Hypertension Follow-up  Has h/o HTN. on medical treatment. BP has been controlled. No side effects from medications. No CP, HA, dizziness. good compliance with medications and low salt diet.      Do you check your blood pressure regularly outside of the clinic? Yes     Are you following a low salt diet? Yes    Are your blood pressures ever more than 140 on the top number (systolic) OR more   than 90 on the bottom number  (diastolic), for example 140/90? No    BP Readings from Last 2 Encounters:   01/07/20 122/64   08/26/19 114/60     Hemoglobin A1C (%)   Date Value   09/11/2020 5.5   07/01/2020 5.7 (H)     LDL Cholesterol Calculated (mg/dL)   Date Value   07/01/2020 28   11/09/2018 33     Has h/o ischemic heart disease, asymptomatic regarding chest pains, SOB,palpitations. Has good compliance with treatment, diet and exercise.  Has h/o CRF. Monitoring BP, BG, medications, avoiding OTC NSAIDs. Needs periodic recheck of kidney function.      Current providers sharing in care for this patient include:   Patient Care Team:  Guillermo Deleon MD as PCP - General (Internal Medicine)  Guillermo Deleon MD as Assigned PCP    The following health maintenance items are reviewed in Epic and correct as of today:  Health Maintenance   Topic Date Due     HEPATITIS C SCREENING  10/24/1963     HEPATITIS B IMMUNIZATION (1 of 3 - Risk 3-dose series) 10/24/1962     MEDICARE ANNUAL WELLNESS VISIT  08/26/2020     FALL RISK ASSESSMENT  08/26/2020     DIABETIC FOOT EXAM  01/07/2021     A1C  01/11/2021     ADVANCE CARE PLANNING  05/16/2021     LIPID  07/01/2021     EYE EXAM  08/10/2021     BMP  09/11/2021     MICROALBUMIN  09/11/2021     COLORECTAL CANCER SCREENING  09/01/2025     DTAP/TDAP/TD IMMUNIZATION (3 - Td) 08/26/2029     PHQ-2  Completed     INFLUENZA VACCINE  Completed     Pneumococcal Vaccine: 65+ Years  Completed     ZOSTER IMMUNIZATION  Completed     Pneumococcal Vaccine: Pediatrics (0 to 5 Years) and At-Risk Patients (6 to 64 Years)  Aged Out     IPV IMMUNIZATION  Aged Out     MENINGITIS IMMUNIZATION  Aged Out     Lab work is in process  Labs reviewed in EPIC      Review of Systems   Constitutional: Negative for chills and fever.   HENT: Negative for congestion, ear pain, hearing loss and sore throat.    Eyes: Negative for pain and visual disturbance.   Respiratory: Negative for cough and shortness of breath.    Cardiovascular: Negative  "for chest pain, palpitations and peripheral edema.   Gastrointestinal: Negative for abdominal pain, constipation, diarrhea, heartburn, hematochezia and nausea.   Genitourinary: Positive for frequency and impotence. Negative for discharge, dysuria, genital sores, hematuria and urgency.   Musculoskeletal: Negative for arthralgias, joint swelling and myalgias.   Skin: Positive for rash.   Neurological: Negative for dizziness, weakness, headaches and paresthesias.   Psychiatric/Behavioral: Negative for mood changes. The patient is not nervous/anxious.          OBJECTIVE:   There were no vitals taken for this visit. Estimated body mass index is 28.8 kg/m  as calculated from the following:    Height as of 1/7/20: 1.778 m (5' 10\").    Weight as of 1/7/20: 91 kg (200 lb 11.2 oz).  Physical Exam  GENERAL: healthy, alert and no distress  EYES: Eyes grossly normal to inspection, PERRL and conjunctivae and sclerae normal  HENT: ear canals and TM's normal, nose and mouth without ulcers or lesions  NECK: no adenopathy, no asymmetry, masses, or scars and thyroid normal to palpation  RESP: lungs clear to auscultation - no rales, rhonchi or wheezes  CV: regular rate and rhythm, normal S1 S2, no S3 or S4, no murmur, click or rub, peripheral pulses strong  ABDOMEN: soft, nontender, no hepatosplenomegaly, no masses and bowel sounds normal  MS: no gross musculoskeletal defects noted, trace LE edema,   Left lateral shoulder pain with abduction, normal ROM, no crepitus   SKIN: no suspicious lesions or rashes  NEURO: Normal strength and tone, mentation intact and speech normal  PSYCH: mentation appears normal, affect normal/bright    Diagnostic Test Results:  Labs reviewed in Epic  Results for orders placed or performed in visit on 11/06/20   Basic metabolic panel     Status: Abnormal   Result Value Ref Range    Sodium 140 133 - 144 mmol/L    Potassium 3.5 3.4 - 5.3 mmol/L    Chloride 105 94 - 109 mmol/L    Carbon Dioxide 29 20 - 32 mmol/L " "   Anion Gap 6 3 - 14 mmol/L    Glucose 74 70 - 99 mg/dL    Urea Nitrogen 28 7 - 30 mg/dL    Creatinine 1.88 (H) 0.66 - 1.25 mg/dL    GFR Estimate 34 (L) >60 mL/min/[1.73_m2]    GFR Estimate If Black 39 (L) >60 mL/min/[1.73_m2]    Calcium 9.0 8.5 - 10.1 mg/dL       ASSESSMENT / PLAN:       ICD-10-CM    1. Encounter for preventative adult health care examination  Z00.00    2. Essential hypertension  I10 metoprolol succinate ER (TOPROL-XL) 25 MG 24 hr tablet     Basic metabolic panel     OFFICE/OUTPT VISIT,EST,LEVL III   3. Hyperlipidemia LDL goal <100  E78.5 OFFICE/OUTPT VISIT,EST,LEVL III   4. Type 2 diabetes mellitus with diabetic nephropathy, without long-term current use of insulin (H)  E11.21 OFFICE/OUTPT VISIT,EST,LEVL III   5. Atherosclerosis of coronary artery of native heart without angina pectoris, unspecified vessel or lesion type  I25.10 OFFICE/OUTPT VISIT,EST,LEVL III   6. Stage 3b chronic kidney disease  N18.32 OFFICE/OUTPT VISIT,EST,LEVL III   7. Midline low back pain without sciatica, unspecified chronicity  M54.5    8. Left shoulder pain, unspecified chronicity  M25.512        Patient has been advised of split billing requirements and indicates understanding: Yes  COUNSELING:  Reviewed preventive health counseling, as reflected in patient instructions       Regular exercise       Healthy diet/nutrition       Vision screening       Hearing screening       Colon cancer screening       Prostate cancer screening    Estimated body mass index is 28.8 kg/m  as calculated from the following:    Height as of 1/7/20: 1.778 m (5' 10\").    Weight as of 1/7/20: 91 kg (200 lb 11.2 oz).        He reports that he quit smoking about 53 years ago. His smoking use included cigarettes, cigars, and pipe. He started smoking about 59 years ago. He has a 18.00 pack-year smoking history. He has never used smokeless tobacco.      Appropriate preventive services were discussed with this patient, including applicable screening " as appropriate for cardiovascular disease, diabetes, osteopenia/osteoporosis, and glaucoma.  As appropriate for age/gender, discussed screening for colorectal cancer, prostate cancer, breast cancer, and cervical cancer. Checklist reviewing preventive services available has been given to the patient.    Reviewed patients plan of care and provided an AVS. The Intermediate Care Plan ( asthma action plan, low back pain action plan, and migraine action plan) for Jeff meets the Care Plan requirement. This Care Plan has been established and reviewed with the Patient.    Counseling Resources:  ATP IV Guidelines  Pooled Cohorts Equation Calculator  Breast Cancer Risk Calculator  Breast Cancer: Medication to Reduce Risk  FRAX Risk Assessment  ICSI Preventive Guidelines  Dietary Guidelines for Americans, 2010  USDA's MyPlate  ASA Prophylaxis  Lung CA Screening    Guillermo Deleon MD  [facility]    Identified Health Risks:

## 2020-11-07 LAB
ANION GAP SERPL CALCULATED.3IONS-SCNC: 6 MMOL/L (ref 3–14)
BUN SERPL-MCNC: 28 MG/DL (ref 7–30)
CALCIUM SERPL-MCNC: 9 MG/DL (ref 8.5–10.1)
CHLORIDE SERPL-SCNC: 105 MMOL/L (ref 94–109)
CO2 SERPL-SCNC: 29 MMOL/L (ref 20–32)
CREAT SERPL-MCNC: 1.88 MG/DL (ref 0.66–1.25)
GFR SERPL CREATININE-BSD FRML MDRD: 34 ML/MIN/{1.73_M2}
GLUCOSE SERPL-MCNC: 74 MG/DL (ref 70–99)
POTASSIUM SERPL-SCNC: 3.5 MMOL/L (ref 3.4–5.3)
SODIUM SERPL-SCNC: 140 MMOL/L (ref 133–144)

## 2020-11-12 DIAGNOSIS — E78.5 HYPERLIPIDEMIA LDL GOAL <100: ICD-10-CM

## 2020-11-13 RX ORDER — SIMVASTATIN 40 MG
TABLET ORAL
Qty: 90 TABLET | Refills: 1 | Status: SHIPPED | OUTPATIENT
Start: 2020-11-13 | End: 2021-03-26

## 2020-11-13 NOTE — TELEPHONE ENCOUNTER
Prescription approved per [facility] Refill Protocol  Nahid Alvarez RN, BSN

## 2020-11-16 ENCOUNTER — MYC MEDICAL ADVICE (OUTPATIENT)
Dept: INTERNAL MEDICINE | Facility: CLINIC | Age: 75
End: 2020-11-16

## 2020-11-17 NOTE — TELEPHONE ENCOUNTER
It looks like hives.   Recommend to start on Claritin 10 mg daily   If not controlled will refer to allergist.   Try for 10 days treatment.

## 2020-12-13 DIAGNOSIS — R60.0 BILATERAL LEG EDEMA: ICD-10-CM

## 2020-12-15 RX ORDER — FUROSEMIDE 40 MG
TABLET ORAL
Qty: 90 TABLET | Refills: 0 | Status: SHIPPED | OUTPATIENT
Start: 2020-12-15 | End: 2021-03-25

## 2020-12-15 NOTE — TELEPHONE ENCOUNTER
Routing refill request to provider for review/approval because:  Labs out of range:  Bp, cr  Nahid Alvarez RN, BSN

## 2020-12-19 DIAGNOSIS — E11.21 TYPE 2 DIABETES MELLITUS WITH DIABETIC NEPHROPATHY, WITHOUT LONG-TERM CURRENT USE OF INSULIN (H): ICD-10-CM

## 2020-12-22 NOTE — TELEPHONE ENCOUNTER
Routing refill request to provider for review/approval because:  Labs out of range:  Cr/gfr  Nahid Alvarez RN, BSN

## 2021-01-13 DIAGNOSIS — I25.10 ASHD (ARTERIOSCLEROTIC HEART DISEASE): ICD-10-CM

## 2021-01-14 RX ORDER — CLOPIDOGREL BISULFATE 75 MG/1
TABLET ORAL
Qty: 90 TABLET | Refills: 3 | Status: SHIPPED | OUTPATIENT
Start: 2021-01-14 | End: 2021-12-31

## 2021-01-14 NOTE — TELEPHONE ENCOUNTER
Pending Prescriptions:                       Disp   Refills    clopidogrel (PLAVIX) 75 MG tablet [Pharmac*90 tab*3        Sig: TAKE 1 TABLET DAILY    Routing refill request to provider for review/approval because:  Patient fails protocol

## 2021-01-16 ENCOUNTER — HEALTH MAINTENANCE LETTER (OUTPATIENT)
Age: 76
End: 2021-01-16

## 2021-01-20 ENCOUNTER — OFFICE VISIT (OUTPATIENT)
Dept: INTERNAL MEDICINE | Facility: CLINIC | Age: 76
End: 2021-01-20
Payer: MEDICARE

## 2021-01-20 VITALS
OXYGEN SATURATION: 99 % | HEART RATE: 63 BPM | WEIGHT: 199.5 LBS | TEMPERATURE: 97.8 F | BODY MASS INDEX: 28.56 KG/M2 | DIASTOLIC BLOOD PRESSURE: 64 MMHG | SYSTOLIC BLOOD PRESSURE: 138 MMHG | HEIGHT: 70 IN

## 2021-01-20 DIAGNOSIS — R13.12 OROPHARYNGEAL DYSPHAGIA: ICD-10-CM

## 2021-01-20 DIAGNOSIS — N18.32 STAGE 3B CHRONIC KIDNEY DISEASE (H): ICD-10-CM

## 2021-01-20 DIAGNOSIS — E11.21 TYPE 2 DIABETES MELLITUS WITH DIABETIC NEPHROPATHY, WITHOUT LONG-TERM CURRENT USE OF INSULIN (H): Primary | ICD-10-CM

## 2021-01-20 DIAGNOSIS — I10 ESSENTIAL HYPERTENSION: ICD-10-CM

## 2021-01-20 LAB
ERYTHROCYTE [DISTWIDTH] IN BLOOD BY AUTOMATED COUNT: 13.4 % (ref 10–15)
HBA1C MFR BLD: 5.7 % (ref 0–5.6)
HCT VFR BLD AUTO: 31.5 % (ref 40–53)
HGB BLD-MCNC: 10.5 G/DL (ref 13.3–17.7)
MCH RBC QN AUTO: 32.2 PG (ref 26.5–33)
MCHC RBC AUTO-ENTMCNC: 33.3 G/DL (ref 31.5–36.5)
MCV RBC AUTO: 97 FL (ref 78–100)
PLATELET # BLD AUTO: 204 10E9/L (ref 150–450)
RBC # BLD AUTO: 3.26 10E12/L (ref 4.4–5.9)
WBC # BLD AUTO: 6.4 10E9/L (ref 4–11)

## 2021-01-20 PROCEDURE — 85027 COMPLETE CBC AUTOMATED: CPT | Performed by: INTERNAL MEDICINE

## 2021-01-20 PROCEDURE — 36415 COLL VENOUS BLD VENIPUNCTURE: CPT | Performed by: INTERNAL MEDICINE

## 2021-01-20 PROCEDURE — 83036 HEMOGLOBIN GLYCOSYLATED A1C: CPT | Performed by: INTERNAL MEDICINE

## 2021-01-20 PROCEDURE — 82306 VITAMIN D 25 HYDROXY: CPT | Performed by: INTERNAL MEDICINE

## 2021-01-20 PROCEDURE — 80053 COMPREHEN METABOLIC PANEL: CPT | Performed by: INTERNAL MEDICINE

## 2021-01-20 PROCEDURE — 99214 OFFICE O/P EST MOD 30 MIN: CPT | Performed by: INTERNAL MEDICINE

## 2021-01-20 RX ORDER — FAMOTIDINE 40 MG/1
40 TABLET, FILM COATED ORAL DAILY
Qty: 90 TABLET | Refills: 3 | Status: SHIPPED | OUTPATIENT
Start: 2021-01-20 | End: 2022-01-31

## 2021-01-20 ASSESSMENT — MIFFLIN-ST. JEOR: SCORE: 1636.18

## 2021-01-20 NOTE — PROGRESS NOTES
"  Assessment & Plan     Type 2 diabetes mellitus with diabetic nephropathy, without long-term current use of insulin (H)  Assess DM control   - Hemoglobin A1c    Essential hypertension  Continue treatment, monitor BP   - CBC with platelets    Stage 3b chronic kidney disease  Follow up renal function, seeing nephrology   - Comprehensive metabolic panel  - Vitamin D Deficiency    Oropharyngeal dysphagia  Consider EGD, if increased symptoms.   Start Pepcid, has h/o hiatal hernia, possible GERD related symptoms   - famotidine (PEPCID) 40 MG tablet; Take 1 tablet (40 mg) by mouth daily  56}       See Patient Instructions    Return in about 6 months (around 7/20/2021) for Routine Visit.    Guillermo Deleon MD  [facility]    Jared Aguilar is a 75 year old who presents to clinic today for the following health issues     HPI   Chief Complaint   Patient presents with     RECHECK     2 month F/U         Patient is seen for a follow up visit.        Has had episode of upset stomach , diarrhea, took pepto bismol, had darker stools 2 weeks ago. Now is back to normal.     Has h/o HTN. on medical treatment. BP has been controlled. No side effects from medications. No CP, HA, dizziness. good compliance with medications and low salt diet.    Has H/O DM. On diet , exercise and oral treatment. Blood sugars are controlled. No parestesias. No hypoglycemias.    Has h/o CRF. Symptoms include mild LE edema. Monitoring BP, BG, medications, avoiding OTC NSAIDs. Needs periodic recheck of kidney function.          Review of Systems   Constitutional, HEENT, cardiovascular, pulmonary, gi and gu systems are negative, except as otherwise noted.      Objective    BP (!) 161/67 (BP Location: Right arm, Patient Position: Sitting, Cuff Size: Adult Regular)   Pulse 63   Temp 97.8  F (36.6  C) (Oral)   Ht 1.778 m (5' 10\")   Wt 90.5 kg (199 lb 8 oz)   SpO2 99%   BMI 28.63 kg/m    Body mass index is 28.63 " kg/m .  Physical Exam   GENERAL: healthy, alert and no distress  EYES: Eyes grossly normal to inspection, PERRL and conjunctivae and sclerae normal  HENT: ear canals and TM's normal, nose and mouth without ulcers or lesions  NECK: no adenopathy, no asymmetry, masses, or scars and thyroid normal to palpation  RESP: lungs clear to auscultation - no rales, rhonchi or wheezes  CV: regular rate and rhythm, normal S1 S2, no S3 or S4, no murmur, click or rub,  peripheral pulses strong  ABDOMEN: soft, nontender, no hepatosplenomegaly, no masses and bowel sounds normal  MS: no gross musculoskeletal defects noted, 1+ LE edema  SKIN: no suspicious lesions or rashes  NEURO: Normal strength and tone, mentation intact and speech normal

## 2021-01-21 LAB
ALBUMIN SERPL-MCNC: 2.9 G/DL (ref 3.4–5)
ALP SERPL-CCNC: 55 U/L (ref 40–150)
ALT SERPL W P-5'-P-CCNC: 20 U/L (ref 0–70)
ANION GAP SERPL CALCULATED.3IONS-SCNC: 4 MMOL/L (ref 3–14)
AST SERPL W P-5'-P-CCNC: 23 U/L (ref 0–45)
BILIRUB SERPL-MCNC: 0.3 MG/DL (ref 0.2–1.3)
BUN SERPL-MCNC: 23 MG/DL (ref 7–30)
CALCIUM SERPL-MCNC: 8.4 MG/DL (ref 8.5–10.1)
CHLORIDE SERPL-SCNC: 108 MMOL/L (ref 94–109)
CO2 SERPL-SCNC: 29 MMOL/L (ref 20–32)
CREAT SERPL-MCNC: 1.61 MG/DL (ref 0.66–1.25)
DEPRECATED CALCIDIOL+CALCIFEROL SERPL-MC: 59 UG/L (ref 20–75)
GFR SERPL CREATININE-BSD FRML MDRD: 41 ML/MIN/{1.73_M2}
GLUCOSE SERPL-MCNC: 111 MG/DL (ref 70–99)
POTASSIUM SERPL-SCNC: 3.8 MMOL/L (ref 3.4–5.3)
PROT SERPL-MCNC: 5.8 G/DL (ref 6.8–8.8)
SODIUM SERPL-SCNC: 141 MMOL/L (ref 133–144)

## 2021-01-28 ENCOUNTER — IMMUNIZATION (OUTPATIENT)
Dept: NURSING | Facility: CLINIC | Age: 76
End: 2021-01-28
Payer: MEDICARE

## 2021-01-28 PROCEDURE — 0001A PR COVID VAC PFIZER DIL RECON 30 MCG/0.3 ML IM: CPT

## 2021-01-28 PROCEDURE — 91300 PR COVID VAC PFIZER DIL RECON 30 MCG/0.3 ML IM: CPT

## 2021-01-29 ENCOUNTER — MYC MEDICAL ADVICE (OUTPATIENT)
Dept: INTERNAL MEDICINE | Facility: CLINIC | Age: 76
End: 2021-01-29

## 2021-02-10 DIAGNOSIS — E78.5 HYPERLIPIDEMIA LDL GOAL <100: ICD-10-CM

## 2021-02-11 RX ORDER — OMEGA-3-ACID ETHYL ESTERS 1 G/1
CAPSULE, LIQUID FILLED ORAL
Qty: 180 CAPSULE | Refills: 1 | Status: SHIPPED | OUTPATIENT
Start: 2021-02-11 | End: 2021-09-30

## 2021-02-11 NOTE — TELEPHONE ENCOUNTER
Pending Prescriptions:                       Disp   Refills    omega-3 acid ethyl esters (LOVAZA) 1 g ca*180 ca*1            Sig: TAKE 1 CAPSULE TWICE DAILY    Prescription approved per [facility] Refill Protocol.

## 2021-02-18 ENCOUNTER — IMMUNIZATION (OUTPATIENT)
Dept: NURSING | Facility: CLINIC | Age: 76
End: 2021-02-18
Attending: PHYSICIAN ASSISTANT
Payer: MEDICARE

## 2021-02-18 PROCEDURE — 0002A PR COVID VAC PFIZER DIL RECON 30 MCG/0.3 ML IM: CPT

## 2021-02-18 PROCEDURE — 91300 PR COVID VAC PFIZER DIL RECON 30 MCG/0.3 ML IM: CPT

## 2021-03-23 ENCOUNTER — MYC REFILL (OUTPATIENT)
Dept: INTERNAL MEDICINE | Facility: CLINIC | Age: 76
End: 2021-03-23

## 2021-03-23 DIAGNOSIS — L29.9 EAR ITCHING: ICD-10-CM

## 2021-03-25 ENCOUNTER — MYC REFILL (OUTPATIENT)
Dept: INTERNAL MEDICINE | Facility: CLINIC | Age: 76
End: 2021-03-25

## 2021-03-25 DIAGNOSIS — R60.0 BILATERAL LEG EDEMA: ICD-10-CM

## 2021-03-25 RX ORDER — HYDROCORTISONE AND ACETIC ACID 20.75; 10.375 MG/ML; MG/ML
1 SOLUTION AURICULAR (OTIC) 2 TIMES DAILY
Qty: 10 ML | Refills: 0 | Status: SHIPPED | OUTPATIENT
Start: 2021-03-25 | End: 2021-05-18

## 2021-03-25 NOTE — TELEPHONE ENCOUNTER
Please see patient's mychart message below    Routing refill request to provider for review/approval because:  Drug not on the [facility] refill protocol

## 2021-03-26 RX ORDER — FUROSEMIDE 40 MG
40 TABLET ORAL DAILY
Qty: 90 TABLET | Refills: 0 | Status: SHIPPED | OUTPATIENT
Start: 2021-03-26 | End: 2021-05-18

## 2021-03-26 NOTE — TELEPHONE ENCOUNTER
Pending Prescriptions:                       Disp   Refills    furosemide (LASIX) 40 MG tablet            90 tab*0        Sig: Take 1 tablet (40 mg) by mouth daily    Routing refill request to provider for review/approval because:  Creatinine   Date Value Ref Range Status   01/20/2021 1.61 (H) 0.66 - 1.25 mg/dL Final

## 2021-04-25 ENCOUNTER — MYC MEDICAL ADVICE (OUTPATIENT)
Dept: INTERNAL MEDICINE | Facility: CLINIC | Age: 76
End: 2021-04-25

## 2021-04-25 DIAGNOSIS — M25.562 LEFT KNEE PAIN, UNSPECIFIED CHRONICITY: Primary | ICD-10-CM

## 2021-05-03 ENCOUNTER — MYC MEDICAL ADVICE (OUTPATIENT)
Dept: INTERNAL MEDICINE | Facility: CLINIC | Age: 76
End: 2021-05-03

## 2021-05-03 NOTE — TELEPHONE ENCOUNTER
Patient called back, has been monitoring his heart rate all day which has been in the mid 40's, given message from PCP. He will continue to monitor his heart rate and send message if he develops symptoms or if it drops to 40.

## 2021-05-18 ENCOUNTER — MYC REFILL (OUTPATIENT)
Dept: INTERNAL MEDICINE | Facility: CLINIC | Age: 76
End: 2021-05-18

## 2021-05-18 DIAGNOSIS — L29.9 EAR ITCHING: ICD-10-CM

## 2021-05-18 DIAGNOSIS — E11.21 TYPE 2 DIABETES MELLITUS WITH DIABETIC NEPHROPATHY, WITHOUT LONG-TERM CURRENT USE OF INSULIN (H): ICD-10-CM

## 2021-05-18 DIAGNOSIS — R60.0 BILATERAL LEG EDEMA: ICD-10-CM

## 2021-05-18 DIAGNOSIS — E78.5 HYPERLIPIDEMIA LDL GOAL <100: ICD-10-CM

## 2021-05-18 DIAGNOSIS — I25.10 ASHD (ARTERIOSCLEROTIC HEART DISEASE): ICD-10-CM

## 2021-05-18 RX ORDER — SIMVASTATIN 40 MG
40 TABLET ORAL AT BEDTIME
Qty: 90 TABLET | Refills: 0 | Status: SHIPPED | OUTPATIENT
Start: 2021-05-18 | End: 2021-10-01

## 2021-05-18 NOTE — TELEPHONE ENCOUNTER
Routing refill request to provider for review/approval because:  Labs out of range:  GFR, cr  Nahid Alvarez RN, BSN

## 2021-05-18 NOTE — TELEPHONE ENCOUNTER
Routing refill request to provider for review/approval because:  Labs out of range:  Cr  Nahid Alvarez RN, BSN

## 2021-05-19 ENCOUNTER — ANCILLARY PROCEDURE (OUTPATIENT)
Dept: GENERAL RADIOLOGY | Facility: CLINIC | Age: 76
End: 2021-05-19
Attending: STUDENT IN AN ORGANIZED HEALTH CARE EDUCATION/TRAINING PROGRAM
Payer: MEDICARE

## 2021-05-19 ENCOUNTER — OFFICE VISIT (OUTPATIENT)
Dept: ORTHOPEDICS | Facility: CLINIC | Age: 76
End: 2021-05-19
Payer: MEDICARE

## 2021-05-19 VITALS
HEIGHT: 70 IN | SYSTOLIC BLOOD PRESSURE: 134 MMHG | BODY MASS INDEX: 27.77 KG/M2 | DIASTOLIC BLOOD PRESSURE: 54 MMHG | WEIGHT: 194 LBS

## 2021-05-19 DIAGNOSIS — G89.29 CHRONIC PAIN OF LEFT KNEE: ICD-10-CM

## 2021-05-19 DIAGNOSIS — G89.29 CHRONIC RIGHT HIP PAIN: ICD-10-CM

## 2021-05-19 DIAGNOSIS — M25.562 LEFT KNEE PAIN, UNSPECIFIED CHRONICITY: ICD-10-CM

## 2021-05-19 DIAGNOSIS — M25.562 CHRONIC PAIN OF LEFT KNEE: ICD-10-CM

## 2021-05-19 DIAGNOSIS — G89.29 CHRONIC RIGHT HIP PAIN: Primary | ICD-10-CM

## 2021-05-19 DIAGNOSIS — M25.551 CHRONIC RIGHT HIP PAIN: Primary | ICD-10-CM

## 2021-05-19 DIAGNOSIS — M25.551 CHRONIC RIGHT HIP PAIN: ICD-10-CM

## 2021-05-19 PROCEDURE — 73502 X-RAY EXAM HIP UNI 2-3 VIEWS: CPT | Mod: RT | Performed by: RADIOLOGY

## 2021-05-19 PROCEDURE — 73562 X-RAY EXAM OF KNEE 3: CPT | Performed by: RADIOLOGY

## 2021-05-19 PROCEDURE — 99203 OFFICE O/P NEW LOW 30 MIN: CPT | Performed by: STUDENT IN AN ORGANIZED HEALTH CARE EDUCATION/TRAINING PROGRAM

## 2021-05-19 RX ORDER — LISINOPRIL 10 MG/1
10 TABLET ORAL DAILY
Qty: 90 TABLET | Refills: 0 | Status: SHIPPED | OUTPATIENT
Start: 2021-05-19 | End: 2021-10-01

## 2021-05-19 RX ORDER — FUROSEMIDE 40 MG
40 TABLET ORAL DAILY
Qty: 90 TABLET | Refills: 0 | Status: SHIPPED | OUTPATIENT
Start: 2021-05-19 | End: 2021-10-01

## 2021-05-19 RX ORDER — GLIMEPIRIDE 1 MG/1
1 TABLET ORAL
Qty: 90 TABLET | Refills: 0 | Status: SHIPPED | OUTPATIENT
Start: 2021-05-19 | End: 2021-10-01

## 2021-05-19 ASSESSMENT — MIFFLIN-ST. JEOR: SCORE: 1611.23

## 2021-05-19 NOTE — PATIENT INSTRUCTIONS
Continue to stay as active as possible     Use your brace and cream for your knee as needed    Let us know if knee becomes more bothersome, follow up anytime    Schedule appt with Dr. Claros to further evaluate your back

## 2021-05-19 NOTE — LETTER
5/19/2021         RE: Jeff Ricks  [address]  [place] [address]        Dear Colleague,    Thank you for referring your patient, Jeff Ricks, to the [facility] [place]. Please see a copy of my visit note below.    ASSESSMENT & PLAN    Chronic right hip pain  Chronic right hip pain that has progressed recently without acute mechanism.  X-rays obtained today and reviewed with patient, demonstrating osteoarthritis of the right hip.  History and exam consistent with this condition.  We discussed the nature of this condition as well as available treatment options.    - XR Pelvis and Hip Right 1 View; Future    Chronic pain of left knee  Chronic intermittent left knee pain that has become more constant and severe in recent weeks.  Again no acute injury.  X-rays obtained today and reviewed patient, demonstrating mild tricompartmental DJ.  History and exam consistent with this condition, suspect degenerative meniscal tear and deconditioning contributing to perceived instability.  We discussed the nature of knee osteoarthritis and available treatment options.  - XR Knee Standing AP Bilat [place] Bilat Lat Left; Future     Patient elects to continue his current symptom management for both his hip and knee, encouraged to follow-up at any time if symptoms worsen or become more lifestyle limiting.  Patient also raised concerns regarding chronic low back pain, recommend follow-up with Dr. Claros to further address.     Patient Instructions   Continue to stay as active as possible     Use your brace and cream for your knee as needed    Let us know if knee becomes more bothersome, follow up anytime    Schedule appt with Dr. Claros to further evaluate your back      Mohamud Olivares MD, [facility]  Primary Care Sports Medicine   -----    SUBJECTIVE:  Jeff Ricks is a 75 year old male who is seen in follow-up for left knee pain.They were last seen on 6/11/2019 by Dr. Sena.  "    Patient reports pain has progressively gotten worse over the last 3-4 weeks. Patient notes pain usually intermittent. He notes he is able to walk or exercise with little discomfort. Pain increases with walking up stairs. He also notes a feeling of instability in the left knee. They indicate that their current pain level is 2/10.      Patient also complains of right hip pain ongoing for many years. He notes pain has improved over the years but pain in the hip became more severe a few weeks ago. Pain is located in right lateral hip and buttocks. He denies acute injury or trauma.     They have tried rest/activity avoidance, knee sleeve, previous imaging (xray left knee 6/11/2019, hips 2/28/2013, lumbar spine 6/22/2018, MRI lumbar spine 5/3/2019) and CBD cream.    The patient is seen by themselves.    Patient's past medical, surgical, social, and family histories were reviewed today and no changes are noted.    OBJECTIVE:  /54   Ht 1.778 m (5' 10\")   Wt 88 kg (194 lb)   BMI 27.84 kg/m     General: healthy, alert and in no distress  HEENT: no scleral icterus or conjunctival erythema  Resp: normal respiratory effort without conversational dyspnea   Psych: normal mood and affect    MSK:  Left hip flexion to 100 degrees, 20 degrees IR with pain 40 degrees ER  Positive FADIR  Negative NOVA  Negative logroll    Left knee with trace effusion.  Skin unremarkable.  Range of motion 0-110 degrees  Mildly tender over medial and lateral joint lines  Extensor mechanism intact   CMS intact distally      Imaging:  Xr Knee Standing Ap Bilat [place] Bilat Lat Left    Result Date: 5/19/2021  KNEE STANDING AP BILATERAL [place] BILATERAL LATERAL LEFT DATE/TIME: 5/19/2021 2:00 PM INDICATION: Left-sided knee pain. COMPARISON: 6/11/2019.     IMPRESSION: 1.  Left knee: Mild degenerative arthrosis, progressed. This includes medial compartment narrowing and subtle tricompartmental osteophytosis. Small joint effusion. No fracture. " Atherosclerotic calcification. 2.  Right knee: Mild degenerative arthrosis, stable. No fracture. JOSÉ MIGUEL SALDAÑA MD    Xr Pelvis And Hip Right 1 View    Result Date: 5/19/2021  PELVIS AND HIP RIGHT ONE VIEW DATE/TIME: 5/19/2021 1:59 PM INDICATION: Right-sided hip pain. COMPARISON: 2/28/2013.     IMPRESSION: 1.  Moderate right hip degenerative arthrosis, stable. 2.  Mild left hip degenerative arthrosis. 3.  Degenerative changes in the lower lumbar spine. 4.  No fracture or joint malalignment. 5.  Atherosclerotic calcification.  JOSÉ MIGUEL SALDAÑA MD                     Again, thank you for allowing me to participate in the care of your patient.        Sincerely,        Mohamud Olivares MD

## 2021-05-19 NOTE — PROGRESS NOTES
ASSESSMENT & PLAN    Chronic right hip pain  Chronic right hip pain that has progressed recently without acute mechanism.  X-rays obtained today and reviewed with patient, demonstrating osteoarthritis of the right hip.  History and exam consistent with this condition.  We discussed the nature of this condition as well as available treatment options.    - XR Pelvis and Hip Right 1 View; Future    Chronic pain of left knee  Chronic intermittent left knee pain that has become more constant and severe in recent weeks.  Again no acute injury.  X-rays obtained today and reviewed patient, demonstrating mild tricompartmental DJ.  History and exam consistent with this condition, suspect degenerative meniscal tear and deconditioning contributing to perceived instability.  We discussed the nature of knee osteoarthritis and available treatment options.  - XR Knee Standing AP Bilat [place] Bilat Lat Left; Future     Patient elects to continue his current symptom management for both his hip and knee, encouraged to follow-up at any time if symptoms worsen or become more lifestyle limiting.  Patient also raised concerns regarding chronic low back pain, recommend follow-up with Dr. Claros to further address.     Patient Instructions   Continue to stay as active as possible     Use your brace and cream for your knee as needed    Let us know if knee becomes more bothersome, follow up anytime    Schedule appt with Dr. Claros to further evaluate your back      Mohamud Olivares MD, [facility]  Primary Care Sports Medicine   -----    SUBJECTIVE:  Jeff Ricks is a 75 year old male who is seen in follow-up for left knee pain.They were last seen on 6/11/2019 by Dr. Sena.     Patient reports pain has progressively gotten worse over the last 3-4 weeks. Patient notes pain usually intermittent. He notes he is able to walk or exercise with little discomfort. Pain increases with walking up stairs. He also notes a feeling of instability in the left  "knee. They indicate that their current pain level is 2/10.      Patient also complains of right hip pain ongoing for many years. He notes pain has improved over the years but pain in the hip became more severe a few weeks ago. Pain is located in right lateral hip and buttocks. He denies acute injury or trauma.     They have tried rest/activity avoidance, knee sleeve, previous imaging (xray left knee 6/11/2019, hips 2/28/2013, lumbar spine 6/22/2018, MRI lumbar spine 5/3/2019) and CBD cream.    The patient is seen by themselves.    Patient's past medical, surgical, social, and family histories were reviewed today and no changes are noted.    OBJECTIVE:  /54   Ht 1.778 m (5' 10\")   Wt 88 kg (194 lb)   BMI 27.84 kg/m     General: healthy, alert and in no distress  HEENT: no scleral icterus or conjunctival erythema  Resp: normal respiratory effort without conversational dyspnea   Psych: normal mood and affect    MSK:  Left hip flexion to 100 degrees, 20 degrees IR with pain 40 degrees ER  Positive FADIR  Negative NOVA  Negative logroll    Left knee with trace effusion.  Skin unremarkable.  Range of motion 0-110 degrees  Mildly tender over medial and lateral joint lines  Extensor mechanism intact   CMS intact distally      Imaging:  Xr Knee Standing Ap Bilat [place] Bilat Lat Left    Result Date: 5/19/2021  KNEE STANDING AP BILATERAL SUNR[place] BILATERAL LATERAL LEFT DATE/TIME: 5/19/2021 2:00 PM INDICATION: Left-sided knee pain. COMPARISON: 6/11/2019.     IMPRESSION: 1.  Left knee: Mild degenerative arthrosis, progressed. This includes medial compartment narrowing and subtle tricompartmental osteophytosis. Small joint effusion. No fracture. Atherosclerotic calcification. 2.  Right knee: Mild degenerative arthrosis, stable. No fracture. JOSÉ MIGUEL SALDAÑA MD    Xr Pelvis And Hip Right 1 View    Result Date: 5/19/2021  PELVIS AND HIP RIGHT ONE VIEW DATE/TIME: 5/19/2021 1:59 PM INDICATION: Right-sided hip pain. " COMPARISON: 2/28/2013.     IMPRESSION: 1.  Moderate right hip degenerative arthrosis, stable. 2.  Mild left hip degenerative arthrosis. 3.  Degenerative changes in the lower lumbar spine. 4.  No fracture or joint malalignment. 5.  Atherosclerotic calcification.  JOSÉ MIGUEL SALDAÑA MD

## 2021-05-20 RX ORDER — HYDROCORTISONE AND ACETIC ACID 20.75; 10.375 MG/ML; MG/ML
1 SOLUTION AURICULAR (OTIC) 2 TIMES DAILY
Qty: 10 ML | Refills: 0 | Status: SHIPPED | OUTPATIENT
Start: 2021-05-20 | End: 2021-10-01

## 2021-05-20 NOTE — TELEPHONE ENCOUNTER
Pending Prescriptions:                       Disp   Refills    acetic acid-hydrocortisone (VOSOL-HC) 1-2 *10 mL  0        Sig: Place 1 drop into both ears 2 times daily    Routing refill request to provider for review/approval because:  Drug not on the [facility] refill protocol

## 2021-05-21 ENCOUNTER — OFFICE VISIT (OUTPATIENT)
Dept: ORTHOPEDICS | Facility: CLINIC | Age: 76
End: 2021-05-21
Payer: MEDICARE

## 2021-05-21 VITALS
WEIGHT: 194 LBS | HEIGHT: 70 IN | BODY MASS INDEX: 27.77 KG/M2 | OXYGEN SATURATION: 99 % | DIASTOLIC BLOOD PRESSURE: 74 MMHG | HEART RATE: 55 BPM | SYSTOLIC BLOOD PRESSURE: 147 MMHG | TEMPERATURE: 98.5 F

## 2021-05-21 DIAGNOSIS — M51.379 DDD (DEGENERATIVE DISC DISEASE), LUMBOSACRAL: ICD-10-CM

## 2021-05-21 DIAGNOSIS — M47.817 ARTHROPATHY OF LUMBOSACRAL FACET JOINT: Primary | ICD-10-CM

## 2021-05-21 DIAGNOSIS — M43.17 SPONDYLOLISTHESIS OF LUMBOSACRAL REGION: ICD-10-CM

## 2021-05-21 PROCEDURE — 99204 OFFICE O/P NEW MOD 45 MIN: CPT | Performed by: PHYSICAL MEDICINE & REHABILITATION

## 2021-05-21 ASSESSMENT — ENCOUNTER SYMPTOMS
BRUISES/BLEEDS EASILY: 0
DIFFICULTY URINATING: 0
TROUBLE SWALLOWING: 0
PALPITATIONS: 0
WEIGHT LOSS: 0
POOR WOUND HEALING: 0
BOWEL INCONTINENCE: 0
ARTHRALGIAS: 1
NAUSEA: 0
COUGH: 0
FATIGUE: 1
HEARTBURN: 0
SEIZURES: 0
FEVER: 0
LEG SWELLING: 1
HOARSE VOICE: 0
WHEEZING: 0
DIZZINESS: 0
SWOLLEN GLANDS: 0
INSOMNIA: 0
NERVOUS/ANXIOUS: 0
MYALGIAS: 1
LOSS OF CONSCIOUSNESS: 0
CONSTIPATION: 0
WEIGHT GAIN: 0
DEPRESSION: 0
ABDOMINAL PAIN: 0
VOMITING: 0
SHORTNESS OF BREATH: 0
BLOOD IN STOOL: 0
DYSURIA: 0
DIARRHEA: 0
HEMATURIA: 0
EYE PAIN: 0
HEADACHES: 0

## 2021-05-21 ASSESSMENT — MIFFLIN-ST. JEOR: SCORE: 1611.23

## 2021-05-21 ASSESSMENT — PAIN SCALES - GENERAL: PAINLEVEL: SEVERE PAIN (6)

## 2021-05-21 NOTE — LETTER
"    5/21/2021         RE: Jeff Ricks  [address]  [facility] [address]        Dear Colleague,    Thank you for referring your patient, Jeff Ricks, to the [facility] [place]. Please see a copy of my visit note below.    PHYSICAL MEDICINE & REHABILITATION / MEDICAL SPINE        Date:  May 21, 2021    Name:  Jeff Ricks  YOB: 1945  MRN:  [id number]        REASON FOR CONSULTATION:  Back pain.      REFERRING PROVIDER:  Mohamud Olivares MD        HISTORY OF PRESENT ILLNESS:  Mr. Jeff Ricks is a 75-year-old male.  He is  has children, grandchildren, and great-grandchildren.  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  He states in terms of fine he has not been doing a whole lot because of the COVID-19 pandemic.    Mr. Jeff Ricks works out for about 1 hour/day.  He does a combination of walking and floor exercises.    Mr. Jeff Ricks has had low back pain off and on for 30-40 years.  Pain is equal between the right and left sides.  Intermittently there is radiation of the pain extending down his posterior lateral thighs and lateral legs stopping about the mid lateral legs.  Pain is currently rated a 6 out of 10.  Pain varies from 0 out of 10 to an 8 out of 10.  He is not taking any pain medications.    Mr. Jeff Ricks notes when he gets up in the morning it takes him a little bit before he can stand up straight.  He does note some worsening of his pain with standing, but Mr. Jeff Ricks cannot really identify any aggravating or alleviating factors.  His lower extremities do not get weak and tired with walking.  Mr. Jeff Ricks has constant bilateral low back pain for the past 5 years.  Pain has been gradually worsening.  Pain is described as \"ache, always stiff.\"    Mr. Jeff Ricks had a left knee meniscus tear in the past and had arthroscopy for this.  He does have some left knee pain.  He denies any other injuries or " surgeries of his low back, pelvis, hips, thighs, knees, legs, ankles, feet, toes.    Mr. Jeff Ricks denies any weakness.  He denies any give way episodes of his lower extremities but feels that his left knee may go out at times.  Mr. Jeff Ricks denies any tripping, stumbling, falling.  He is not using any assistive devices for ambulation.  Mr. Jeff Ricks denies any numbness, tingling, pins-and-needles.  He denies any saddle anesthesia, bowel incontinence, bladder incontinence.        REVIEW OF SYSTEMS:  Review of Systems     Constitutional:  Positive for fatigue. Negative for fever, weight loss and weight gain.   HENT:  Positive for tinnitus. Negative for trouble swallowing and hoarse voice.    Eyes:  Negative for pain, eye pain and decreased vision.   Respiratory:   Negative for cough, shortness of breath and wheezing.    Cardiovascular:  Positive for leg swelling. Negative for chest pain and palpitations.   Gastrointestinal:  Negative for heartburn, nausea, vomiting, abdominal pain, diarrhea, constipation, blood in stool and bowel incontinence.   Genitourinary:  Negative for bladder incontinence, dysuria, hematuria and difficulty urinating.   Musculoskeletal:  Positive for myalgias and arthralgias.   Skin:  Positive for itching. Negative for poor wound healing and poor wound healing.   Neurological:  Negative for dizziness, seizures, loss of consciousness, headaches and difficulty walking.   Endo/Heme:  Negative for anemia, swollen glands and bruises/bleeds easily.   Psychiatric/Behavioral:  Negative for depression.          ALLERGIES:  Allergies   Allergen Reactions     No Known Drug Allergies          MEDICATIONS:  Current Outpatient Medications   Medication Sig Dispense Refill     acetic acid-hydrocortisone (VOSOL-HC) 1-2 % otic solution Place 1 drop into both ears 2 times daily 10 mL 0     Ascorbic Acid (VITAMIN C PO) Take by mouth daily       aspirin (ASA) 81 MG tablet Take 81 mg by mouth 2 times daily        calcitRIOL (ROCALTROL) 0.25 MCG capsule Take 0.25 mcg by mouth every other day       chlorhexidine (PERIDEX) 0.12 % solution USE 1/2 OZ TO SWISH FOR 30 SECONDS ONCE D  3     clopidogrel (PLAVIX) 75 MG tablet TAKE 1 TABLET DAILY 90 tablet 3     Cyanocobalamin (B-12) 1000 MCG CAPS Take by mouth daily       famotidine (PEPCID) 40 MG tablet Take 1 tablet (40 mg) by mouth daily 90 tablet 3     fluocinolone acetonide (DERMA SMOOTHE/FS BODY) 0.01 % external oil Apply topically 2 times daily 1 Bottle 0     furosemide (LASIX) 40 MG tablet Take 1 tablet (40 mg) by mouth daily 90 tablet 0     glimepiride (AMARYL) 1 MG tablet Take 1 tablet (1 mg) by mouth every morning (before breakfast) TAKE 1 TABLET EVERY MORNINGBEFORE BREAKFAST 90 tablet 0     hydrocortisone 2.5 % cream APPLY TOPICALLY TWO TIMES ADAY 28.35 g 11     lisinopril (ZESTRIL) 10 MG tablet Take 1 tablet (10 mg) by mouth daily 90 tablet 0     metFORMIN (GLUCOPHAGE) 1000 MG tablet TAKE 1 TABLET TWICE A DAY  WITH FOOD (MEALS) - OVERDUEFOR AN OFFICE VISIT 180 tablet 0     metoprolol succinate ER (TOPROL-XL) 25 MG 24 hr tablet Take 1 tablet (25 mg) by mouth daily 90 tablet 3     neomycin-polymyxin-hydrocortisone (CORTISPORIN) 3.5-30557-0 otic solution Place 3 drops in ear(s) 4 times daily 10 mL 0     nitroGLYcerin (NITROSTAT) 0.6 MG sublingual tablet DISSOLVE 1 TABLET UNDER THETONGUE AS NEEDED. 100 tablet 0     omega-3 acid ethyl esters (LOVAZA) 1 g capsule TAKE 1 CAPSULE TWICE DAILY 180 capsule 1     Pyridoxine HCl (B-6) 100 MG TABS Take by mouth daily       simvastatin (ZOCOR) 40 MG tablet Take 1 tablet (40 mg) by mouth At Bedtime 90 tablet 0     VITAMIN E NATURAL PO Take by mouth daily           PAST MEDICAL HISTORY:  Past Medical History:   Diagnosis Date     Chronic kidney disease      Coronary atherosclerosis of unspecified type of vessel, native or graft 10/03    at [facility]:  had 4 stents done following an MI     DDD (degenerative disc disease),  lumbosacral 5/21/2021     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     Heart attack (H)      Hernia, abdominal      Hyperlipidaemia      Mumps      Obese      Other and unspecified hyperlipidemia      Other premature beats      Palpitations      Phlebitis and thrombophlebitis of other deep vessels of lower extremities 2000    has had 2-3 episodes     Stented coronary artery      4 stents     Syncope      Thrombosis of leg      Type II or unspecified type diabetes mellitus without mention of complication, not stated as uncontrolled      Unspecified essential hypertension          PAST SURGICAL HISTORY:  Past Surgical History:   Procedure Laterality Date     ABDOMEN SURGERY      Hernia naval approx 25 years ago     BIOPSY  8/2016     CARDIAC SURGERY       COLONOSCOPY       CORONARY ANGIOGRAPHY ADULT ORDER  8/28/2000    75% distal LAD stenosis, 80% third diagonal stenosis, 75-80% mid ramus stenosis, ostial 60% stenosis in circumflex w/90% stenosis in distal circumflex     CORONARY ANGIOGRAPHY ADULT ORDER  2003    4 stents placed     EP ABLATION / EP STUDIES  3/25/2014     HEART CATH, ANGIOPLASTY       HYDROCELECTOMY SCROTAL Right 10/6/2016    Procedure: HYDROCELECTOMY SCROTAL;  Surgeon: Jordan Chandra MD;  Location: [facility]     ORTHOPEDIC SURGERY  1990    Meniscus Orthoscopic surgery left knee.     TESTICLE SURGERY       ZZC NONSPECIFIC PROCEDURE      colonoscopy approx 1999 done elsewhere         FAMILY HISTORY:  Family History   Problem Relation Age of Onset     Musculoskeletal Disorder Mother         spinal stenosis     Cancer Mother         cancer kidney age 85     Hypertension Mother         Dead     Diabetes Father         Dead     Diabetes Brother         Type 1     Heart Disease Brother          SOCIAL HISTORY:  Social History     Socioeconomic History     Marital status:      Spouse name: Bethayn     Number of children: 3     Years of education: Not on file     Highest education level: Not on  "file   Occupational History     Occupation: Computers/     Employer: [employer]      Comment: [employer]   Social Needs     Financial resource strain: Not on file     Food insecurity     Worry: Not on file     Inability: Not on file     Transportation needs     Medical: Not on file     Non-medical: Not on file   Tobacco Use     Smoking status: Former Smoker     Packs/day: 3.00     Years: 6.00     Pack years: 18.00     Types: Cigarettes, Cigars, Pipe     Start date: 1961     Quit date: 1967     Years since quittin.4     Smokeless tobacco: Never Used     Tobacco comment: quit    Substance and Sexual Activity     Alcohol use: Yes     Comment: 1 beer a week     Drug use: No     Sexual activity: Not Currently   Lifestyle     Physical activity     Days per week: Not on file     Minutes per session: Not on file     Stress: Not on file   Relationships     Social connections     Talks on phone: Not on file     Gets together: Not on file     Attends Church service: Not on file     Active member of club or organization: Not on file     Attends meetings of clubs or organizations: Not on file     Relationship status: Not on file     Intimate partner violence     Fear of current or ex partner: Not on file     Emotionally abused: Not on file     Physically abused: Not on file     Forced sexual activity: Not on file   Other Topics Concern     Parent/sibling w/ CABG, MI or angioplasty before 65F 55M? No   Social History Narrative     Not on file         PHYSICAL EXAMINATION:  Vitals:    21 1429   BP: (!) 147/74   Pulse: 55   Temp: 98.5  F (36.9  C)   SpO2: 99%   Weight: 88 kg (194 lb)   Height: 1.778 m (5' 10\")       Thoracolumbar Musculoskeletal Exam    General        Constitutional: appears stated age, well-developed and well-nourished    Scleral icterus: no    Labored breathing: no    Psychiatric: normal mood and affect    Neurological: alert    Gait      Gait is normal.      Gait - Right        " Heel walk: able to heel walk      Toe walk: able to toe walk      Gait - Left        Heel walk: able to heel walk      Toe walk: able to toe walk    Inspection      Erythema: none    Edema (right lower extremity) comment: 2+    Edema (left lower extremity) comment: 2+    Ecchymosis: none    Deformity: none    Leg length disparity: no discrepancy        Prior incision: none    Palpation      Masses: none    Spasms: none    Crepitus: none    Tenderness: present      Palpation additional comments: Paraspinal muscles: Right mild tenderness at L5, left mild tenderness at L5.  Posterior superior iliac spine: Right mild tenderness, left mild tenderness.    Range of Motion      Thoracolumbar flexion: normal and 75%    Thoracolumbar flexion - associated detail: no pain    Thoracolumbar extension: normal and 75%    Thoracolumbar extension - associated detail: no pain       Range of Motion - Right        Lateral bending: normal and 75%      Lateral bending - associated detail: no pain      Lateral rotation: normal and 75%      Lateral rotation - associated detail: no pain      Range of Motion - Left        Lateral bending: normal and 75%      Lateral bending - associated detail: no pain      Lateral rotation: normal and 75%      Lateral rotation - associated detail: no pain      Range of motion additional comments: Hip range of motion:  Flexion: Right 100 degrees, left 100 degrees.  Extension: Right 10 degrees, left 10 degrees.  External rotation: Right 50 degrees, left 50 degrees.  Internal rotation: Right 25 degrees, left 25 degrees.    Knee range of motion:  Extension: Right 0 degrees, left 0 degrees.  Flexion: Right 130 degrees, left 130 degrees.    Strength      Strength - Right        Extensor hallucis longus: 5/5      Extensor hallucis longus: not affected by pain      Tibialis anterior: 5/5      Tibialis anterior: not affected by pain      Plantar flexion: 5/5      Plantar flexion: not affected by pain      Quadriceps:  5/5      Quadriceps: not affected by pain      Hamstrin/5      Hamstring: not affected by pain      Hip abductors: 5/5      Hip abductors: not affected by pain      Strength - Left        Extensor hallucis longus: 5/5      Extensor hallucis longus: not affected by pain      Tibialis anterior: 5/5      Tibialis anterior: not affected by pain      Plantar flexion: 5/5      Plantar flexion: not affected by pain      Quadriceps: 5/5      Quadriceps: not affected by pain      Hamstrin/5      Hamstring: not affected by pain      Hip abductors: 5/5      Hip abductors: not affected by pain     Sensory        Sensory - Right        Anterior thigh: normal      Medial thigh: normal      Medial leg: normal      Posterior leg: normal      Lateral foot: normal      First web space: normal      Sensory - Left        Anterior thigh: normal      Medial thigh: normal      Medial leg: normal      Posterior leg: normal      Lateral foot: normal      First web space: normal    Reflexes        Reflexes - Right        Quadriceps: 0/4      Achilles: 0/4       Babinski: equivocal      Clonus: normal      Reflexes -  Left        Quadriceps: 0/4      Achilles: 0/4      Babinski: equivocal      Clonus: normal    Neurovascular       Neurovascular - Right        Pulses - DP: 2+       Neurovascular - Left        Pulses - DP: 2+    Special Tests         Special Tests - Right        NOVA test: negative      SLR: no back or leg pain       Special Tests - Left        NOVA test: negative      SLR: no back or leg pain      Special tests additional comments: Logroll: Right negative, left negative.  Crossover straight leg raise: Right negative, left negative.  Present straight leg raise: Right negative, left negative.  FADIR: Right negative, left negative.  Hip scour: Right negative, left negative..  Lateral sacral compression: Right negative, left negative.  Clamshell: Right negative, left negative.  Ely's: Right positive, left  positive.  Yeomans: Right negative, left negative.  Distraction: Right negative, left negative.  Sacral thrust: Right negative, left negative.  Noble compression: Right negative, left negative.        IMAGING:  I reviewed MRI of the lumbar spine from 05/03/2019.  At L1-L2, there is mild bilateral facet arthropathy.  At L2-L3, there is mild bilateral facet arthropathy.  At L3-L4, there is right annular tear, disc extrusion bilateral facet joint fusions, mild to moderate facet arthropathy, mild central stenosis.  At L4-L5, there is anterolisthesis of L4 on L5, severe bilateral facet arthropathy with infusion of the left facet joint, annular tear, moderate central stenosis.  At L5-S1, there is anterolisthesis of L5 on S1, severe bilateral facet arthropathy with facet joint effusions, broad-based disc protrusion asymmetric to the right, left anteromedial synovial cyst.        ASSESSMENT/PLAN:  Mr. Jeff Ricks is a 75-year-old male.  History and exam are most consistent with bilateral lumbosacral facet arthropathy.  Mr. Jeff Ricks has anterolisthesis of L4 on L5 and L5 on S1.  He does have some degenerative disc disease.  Given the effusions in his facet joints, I have some concern regarding dynamic instability.  Discussed diagnosis, pathophysiology, and treatment options with Mr. Jeff Ricks.  Discussed the options of doing nothing/living with it, weight loss, core strengthening, physical therapy, chiropractic care, lumbosacral facet joint medial branch blocks with following radiofrequency ablations, oral medications such as gabapentin and pregabalin.  Mr. Ricks would like to proceed with chiropractic care.  A referral for this was made.  Mr. Ricks is follow-up in this clinic in 2 months.        Brant Claros MD          Again, thank you for allowing me to participate in the care of your patient.        Sincerely,        Brant Claros MD

## 2021-05-21 NOTE — PROGRESS NOTES
"PHYSICAL MEDICINE & REHABILITATION / MEDICAL SPINE        Date:  May 21, 2021    Name:  Jeff Ricks  YOB: 1945  MRN:  [id number]        REASON FOR CONSULTATION:  Back pain.      REFERRING PROVIDER:  Mohamud Olivares MD        HISTORY OF PRESENT ILLNESS:  Mr. Jeff Ricks is a 75-year-old male.  He is  has children, grandchildren, and great-grandchildren.  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  He states in terms of fine he has not been doing a whole lot because of the COVID-19 pandemic.    Mr. Jeff Ricks works out for about 1 hour/day.  He does a combination of walking and floor exercises.    Mr. Jeff Ricks has had low back pain off and on for 30-40 years.  Pain is equal between the right and left sides.  Intermittently there is radiation of the pain extending down his posterior lateral thighs and lateral legs stopping about the mid lateral legs.  Pain is currently rated a 6 out of 10.  Pain varies from 0 out of 10 to an 8 out of 10.  He is not taking any pain medications.    Mr. Jeff Ricks notes when he gets up in the morning it takes him a little bit before he can stand up straight.  He does note some worsening of his pain with standing, but Mr. Jeff Ricks cannot really identify any aggravating or alleviating factors.  His lower extremities do not get weak and tired with walking.  Mr. Jeff Ricks has constant bilateral low back pain for the past 5 years.  Pain has been gradually worsening.  Pain is described as \"ache, always stiff.\"    Mr. Jeff Ricks had a left knee meniscus tear in the past and had arthroscopy for this.  He does have some left knee pain.  He denies any other injuries or surgeries of his low back, pelvis, hips, thighs, knees, legs, ankles, feet, toes.    Mr. Jeff Ricks denies any weakness.  He denies any give way episodes of his lower extremities but feels that his left knee may go out at times.  Mr. Jeff Ricks denies any tripping, " stumbling, falling.  He is not using any assistive devices for ambulation.  Mr. Jeff Ricks denies any numbness, tingling, pins-and-needles.  He denies any saddle anesthesia, bowel incontinence, bladder incontinence.        REVIEW OF SYSTEMS:  Review of Systems     Constitutional:  Positive for fatigue. Negative for fever, weight loss and weight gain.   HENT:  Positive for tinnitus. Negative for trouble swallowing and hoarse voice.    Eyes:  Negative for pain, eye pain and decreased vision.   Respiratory:   Negative for cough, shortness of breath and wheezing.    Cardiovascular:  Positive for leg swelling. Negative for chest pain and palpitations.   Gastrointestinal:  Negative for heartburn, nausea, vomiting, abdominal pain, diarrhea, constipation, blood in stool and bowel incontinence.   Genitourinary:  Negative for bladder incontinence, dysuria, hematuria and difficulty urinating.   Musculoskeletal:  Positive for myalgias and arthralgias.   Skin:  Positive for itching. Negative for poor wound healing and poor wound healing.   Neurological:  Negative for dizziness, seizures, loss of consciousness, headaches and difficulty walking.   Endo/Heme:  Negative for anemia, swollen glands and bruises/bleeds easily.   Psychiatric/Behavioral:  Negative for depression.          ALLERGIES:  Allergies   Allergen Reactions     No Known Drug Allergies          MEDICATIONS:  Current Outpatient Medications   Medication Sig Dispense Refill     acetic acid-hydrocortisone (VOSOL-HC) 1-2 % otic solution Place 1 drop into both ears 2 times daily 10 mL 0     Ascorbic Acid (VITAMIN C PO) Take by mouth daily       aspirin (ASA) 81 MG tablet Take 81 mg by mouth 2 times daily       calcitRIOL (ROCALTROL) 0.25 MCG capsule Take 0.25 mcg by mouth every other day       chlorhexidine (PERIDEX) 0.12 % solution USE 1/2 OZ TO SWISH FOR 30 SECONDS ONCE D  3     clopidogrel (PLAVIX) 75 MG tablet TAKE 1 TABLET DAILY 90 tablet 3     Cyanocobalamin  (B-12) 1000 MCG CAPS Take by mouth daily       famotidine (PEPCID) 40 MG tablet Take 1 tablet (40 mg) by mouth daily 90 tablet 3     fluocinolone acetonide (DERMA SMOOTHE/FS BODY) 0.01 % external oil Apply topically 2 times daily 1 Bottle 0     furosemide (LASIX) 40 MG tablet Take 1 tablet (40 mg) by mouth daily 90 tablet 0     glimepiride (AMARYL) 1 MG tablet Take 1 tablet (1 mg) by mouth every morning (before breakfast) TAKE 1 TABLET EVERY MORNINGBEFORE BREAKFAST 90 tablet 0     hydrocortisone 2.5 % cream APPLY TOPICALLY TWO TIMES ADAY 28.35 g 11     lisinopril (ZESTRIL) 10 MG tablet Take 1 tablet (10 mg) by mouth daily 90 tablet 0     metFORMIN (GLUCOPHAGE) 1000 MG tablet TAKE 1 TABLET TWICE A DAY  WITH FOOD (MEALS) - OVERDUEFOR AN OFFICE VISIT 180 tablet 0     metoprolol succinate ER (TOPROL-XL) 25 MG 24 hr tablet Take 1 tablet (25 mg) by mouth daily 90 tablet 3     neomycin-polymyxin-hydrocortisone (CORTISPORIN) 3.5-25008-7 otic solution Place 3 drops in ear(s) 4 times daily 10 mL 0     nitroGLYcerin (NITROSTAT) 0.6 MG sublingual tablet DISSOLVE 1 TABLET UNDER THETONGUE AS NEEDED. 100 tablet 0     omega-3 acid ethyl esters (LOVAZA) 1 g capsule TAKE 1 CAPSULE TWICE DAILY 180 capsule 1     Pyridoxine HCl (B-6) 100 MG TABS Take by mouth daily       simvastatin (ZOCOR) 40 MG tablet Take 1 tablet (40 mg) by mouth At Bedtime 90 tablet 0     VITAMIN E NATURAL PO Take by mouth daily           PAST MEDICAL HISTORY:  Past Medical History:   Diagnosis Date     Chronic kidney disease      Coronary atherosclerosis of unspecified type of vessel, native or graft 10/03    at [facility]:  had 4 stents done following an MI     DDD (degenerative disc disease), lumbosacral 5/21/2021     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     Heart attack (H)      Hernia, abdominal      Hyperlipidaemia      Mumps      Obese      Other and unspecified hyperlipidemia      Other premature beats      Palpitations      Phlebitis and  thrombophlebitis of other deep vessels of lower extremities 2000    has had 2-3 episodes     Stented coronary artery      4 stents     Syncope      Thrombosis of leg      Type II or unspecified type diabetes mellitus without mention of complication, not stated as uncontrolled      Unspecified essential hypertension          PAST SURGICAL HISTORY:  Past Surgical History:   Procedure Laterality Date     ABDOMEN SURGERY      Hernia naval approx 25 years ago     BIOPSY  8/2016     CARDIAC SURGERY       COLONOSCOPY       CORONARY ANGIOGRAPHY ADULT ORDER  8/28/2000    75% distal LAD stenosis, 80% third diagonal stenosis, 75-80% mid ramus stenosis, ostial 60% stenosis in circumflex w/90% stenosis in distal circumflex     CORONARY ANGIOGRAPHY ADULT ORDER  2003    4 stents placed     EP ABLATION / EP STUDIES  3/25/2014     HEART CATH, ANGIOPLASTY       HYDROCELECTOMY SCROTAL Right 10/6/2016    Procedure: HYDROCELECTOMY SCROTAL;  Surgeon: Jordan Chandra MD;  Location: [facility]     ORTHOPEDIC SURGERY  1990    Meniscus Orthoscopic surgery left knee.     TESTICLE SURGERY       ZZC NONSPECIFIC PROCEDURE      colonoscopy approx 1999 done elsewhere         FAMILY HISTORY:  Family History   Problem Relation Age of Onset     Musculoskeletal Disorder Mother         spinal stenosis     Cancer Mother         cancer kidney age 85     Hypertension Mother         Dead     Diabetes Father         Dead     Diabetes Brother         Type 1     Heart Disease Brother          SOCIAL HISTORY:  Social History     Socioeconomic History     Marital status:      Spouse name: Bethany     Number of children: 3     Years of education: Not on file     Highest education level: Not on file   Occupational History     Occupation: Computers/     Employer: [employer]      Comment: [employer]   Social Needs     Financial resource strain: Not on file     Food insecurity     Worry: Not on file     Inability: Not on file     Transportation needs  "    Medical: Not on file     Non-medical: Not on file   Tobacco Use     Smoking status: Former Smoker     Packs/day: 3.00     Years: 6.00     Pack years: 18.00     Types: Cigarettes, Cigars, Pipe     Start date: 1961     Quit date: 1967     Years since quittin.4     Smokeless tobacco: Never Used     Tobacco comment: quit    Substance and Sexual Activity     Alcohol use: Yes     Comment: 1 beer a week     Drug use: No     Sexual activity: Not Currently   Lifestyle     Physical activity     Days per week: Not on file     Minutes per session: Not on file     Stress: Not on file   Relationships     Social connections     Talks on phone: Not on file     Gets together: Not on file     Attends Mosque service: Not on file     Active member of club or organization: Not on file     Attends meetings of clubs or organizations: Not on file     Relationship status: Not on file     Intimate partner violence     Fear of current or ex partner: Not on file     Emotionally abused: Not on file     Physically abused: Not on file     Forced sexual activity: Not on file   Other Topics Concern     Parent/sibling w/ CABG, MI or angioplasty before 65F 55M? No   Social History Narrative     Not on file         PHYSICAL EXAMINATION:  Vitals:    21 1429   BP: (!) 147/74   Pulse: 55   Temp: 98.5  F (36.9  C)   SpO2: 99%   Weight: 88 kg (194 lb)   Height: 1.778 m (5' 10\")       Thoracolumbar Musculoskeletal Exam    General        Constitutional: appears stated age, well-developed and well-nourished    Scleral icterus: no    Labored breathing: no    Psychiatric: normal mood and affect    Neurological: alert    Gait      Gait is normal.      Gait - Right        Heel walk: able to heel walk      Toe walk: able to toe walk      Gait - Left        Heel walk: able to heel walk      Toe walk: able to toe walk    Inspection      Erythema: none    Edema (right lower extremity) comment: 2+    Edema (left lower extremity) comment: " 2+    Ecchymosis: none    Deformity: none    Leg length disparity: no discrepancy        Prior incision: none    Palpation      Masses: none    Spasms: none    Crepitus: none    Tenderness: present      Palpation additional comments: Paraspinal muscles: Right mild tenderness at L5, left mild tenderness at L5.  Posterior superior iliac spine: Right mild tenderness, left mild tenderness.    Range of Motion      Thoracolumbar flexion: normal and 75%    Thoracolumbar flexion - associated detail: no pain    Thoracolumbar extension: normal and 75%    Thoracolumbar extension - associated detail: no pain       Range of Motion - Right        Lateral bending: normal and 75%      Lateral bending - associated detail: no pain      Lateral rotation: normal and 75%      Lateral rotation - associated detail: no pain      Range of Motion - Left        Lateral bending: normal and 75%      Lateral bending - associated detail: no pain      Lateral rotation: normal and 75%      Lateral rotation - associated detail: no pain      Range of motion additional comments: Hip range of motion:  Flexion: Right 100 degrees, left 100 degrees.  Extension: Right 10 degrees, left 10 degrees.  External rotation: Right 50 degrees, left 50 degrees.  Internal rotation: Right 25 degrees, left 25 degrees.    Knee range of motion:  Extension: Right 0 degrees, left 0 degrees.  Flexion: Right 130 degrees, left 130 degrees.    Strength      Strength - Right        Extensor hallucis longus: 5/5      Extensor hallucis longus: not affected by pain      Tibialis anterior: 5/5      Tibialis anterior: not affected by pain      Plantar flexion: 5/5      Plantar flexion: not affected by pain      Quadriceps: 5/5      Quadriceps: not affected by pain      Hamstrin/5      Hamstring: not affected by pain      Hip abductors: 5/5      Hip abductors: not affected by pain      Strength - Left        Extensor hallucis longus: 5/5      Extensor hallucis longus: not affected  by pain      Tibialis anterior: 5/5      Tibialis anterior: not affected by pain      Plantar flexion: 5/5      Plantar flexion: not affected by pain      Quadriceps: 5/5      Quadriceps: not affected by pain      Hamstrin/5      Hamstring: not affected by pain      Hip abductors: 5/5      Hip abductors: not affected by pain     Sensory        Sensory - Right        Anterior thigh: normal      Medial thigh: normal      Medial leg: normal      Posterior leg: normal      Lateral foot: normal      First web space: normal      Sensory - Left        Anterior thigh: normal      Medial thigh: normal      Medial leg: normal      Posterior leg: normal      Lateral foot: normal      First web space: normal    Reflexes        Reflexes - Right        Quadriceps: 0/4      Achilles: 0/4       Babinski: equivocal      Clonus: normal      Reflexes -  Left        Quadriceps: 0/4      Achilles: 0/4      Babinski: equivocal      Clonus: normal    Neurovascular       Neurovascular - Right        Pulses - DP: 2+       Neurovascular - Left        Pulses - DP: 2+    Special Tests         Special Tests - Right        NOVA test: negative      SLR: no back or leg pain       Special Tests - Left        NOVA test: negative      SLR: no back or leg pain      Special tests additional comments: Logroll: Right negative, left negative.  Crossover straight leg raise: Right negative, left negative.  Present straight leg raise: Right negative, left negative.  FADIR: Right negative, left negative.  Hip scour: Right negative, left negative..  Lateral sacral compression: Right negative, left negative.  Clamshell: Right negative, left negative.  Ely's: Right positive, left positive.  Yeomans: Right negative, left negative.  Distraction: Right negative, left negative.  Sacral thrust: Right negative, left negative.  Noble compression: Right negative, left negative.        IMAGING:  I reviewed MRI of the lumbar spine from 2019.  At L1-L2, there  is mild bilateral facet arthropathy.  At L2-L3, there is mild bilateral facet arthropathy.  At L3-L4, there is right annular tear, disc extrusion bilateral facet joint fusions, mild to moderate facet arthropathy, mild central stenosis.  At L4-L5, there is anterolisthesis of L4 on L5, severe bilateral facet arthropathy with infusion of the left facet joint, annular tear, moderate central stenosis.  At L5-S1, there is anterolisthesis of L5 on S1, severe bilateral facet arthropathy with facet joint effusions, broad-based disc protrusion asymmetric to the right, left anteromedial synovial cyst.        ASSESSMENT/PLAN:  Mr. Jeff Ricks is a 75-year-old male.  History and exam are most consistent with bilateral lumbosacral facet arthropathy.  Mr. Jeff Ricks has anterolisthesis of L4 on L5 and L5 on S1.  He does have some degenerative disc disease.  Given the effusions in his facet joints, I have some concern regarding dynamic instability.  Discussed diagnosis, pathophysiology, and treatment options with Mr. Jeff Ricks.  Discussed the options of doing nothing/living with it, weight loss, core strengthening, physical therapy, chiropractic care, lumbosacral facet joint medial branch blocks with following radiofrequency ablations, oral medications such as gabapentin and pregabalin.  Mr. Ricks would like to proceed with chiropractic care.  A referral for this was made.  Mr. Ricks is follow-up in this clinic in 2 months.        Brant Claros MD

## 2021-05-21 NOTE — NURSING NOTE
"Jfef Ricks is a 75 year old male who presents for:  No chief complaint on file.       Initial Vitals:  BP (!) 147/74   Pulse 55   Temp 98.5  F (36.9  C)   Ht 5' 10\" (1.778 m)   Wt 194 lb (88 kg)   SpO2 99%   BMI 27.84 kg/m   Estimated body mass index is 27.84 kg/m  as calculated from the following:    Height as of this encounter: 5' 10\" (1.778 m).    Weight as of this encounter: 194 lb (88 kg).. Body surface area is 2.08 meters squared. BP completed using cuff size: large  Severe Pain (6)    Nursing Comments: Patient presents for Lower back pain    Marshall Guthrie MA    "

## 2021-06-01 ENCOUNTER — TRANSFERRED RECORDS (OUTPATIENT)
Dept: MULTI SPECIALTY CLINIC | Facility: CLINIC | Age: 76
End: 2021-06-01
Payer: MEDICARE

## 2021-06-01 LAB — RETINOPATHY: NORMAL

## 2021-06-06 ENCOUNTER — MYC MEDICAL ADVICE (OUTPATIENT)
Dept: INTERNAL MEDICINE | Facility: CLINIC | Age: 76
End: 2021-06-06

## 2021-06-07 ENCOUNTER — MYC MEDICAL ADVICE (OUTPATIENT)
Dept: INTERNAL MEDICINE | Facility: CLINIC | Age: 76
End: 2021-06-07

## 2021-06-07 DIAGNOSIS — I25.10 ATHEROSCLEROSIS OF CORONARY ARTERY OF NATIVE HEART WITHOUT ANGINA PECTORIS, UNSPECIFIED VESSEL OR LESION TYPE: Primary | ICD-10-CM

## 2021-06-07 DIAGNOSIS — R00.1 BRADYCARDIA: ICD-10-CM

## 2021-06-10 ENCOUNTER — MYC MEDICAL ADVICE (OUTPATIENT)
Dept: INTERNAL MEDICINE | Facility: CLINIC | Age: 76
End: 2021-06-10

## 2021-06-10 ENCOUNTER — DOCUMENTATION ONLY (OUTPATIENT)
Dept: CARDIOLOGY | Facility: CLINIC | Age: 76
End: 2021-06-10

## 2021-06-10 DIAGNOSIS — E78.5 HYPERLIPIDEMIA LDL GOAL <100: ICD-10-CM

## 2021-06-10 DIAGNOSIS — E11.9 DIABETES MELLITUS (H): Primary | ICD-10-CM

## 2021-06-10 DIAGNOSIS — D64.9 ANEMIA, UNSPECIFIED: ICD-10-CM

## 2021-06-10 DIAGNOSIS — N18.32 STAGE 3B CHRONIC KIDNEY DISEASE (H): ICD-10-CM

## 2021-06-10 LAB
ALBUMIN SERPL-MCNC: 3 G/DL (ref 3.4–5)
ALP SERPL-CCNC: 69 U/L (ref 40–150)
ALT SERPL W P-5'-P-CCNC: 23 U/L (ref 0–70)
ANION GAP SERPL CALCULATED.3IONS-SCNC: 4 MMOL/L (ref 3–14)
AST SERPL W P-5'-P-CCNC: 24 U/L (ref 0–45)
BILIRUB SERPL-MCNC: 0.7 MG/DL (ref 0.2–1.3)
BUN SERPL-MCNC: 16 MG/DL (ref 7–30)
CALCIUM SERPL-MCNC: 8.8 MG/DL (ref 8.5–10.1)
CHLORIDE SERPL-SCNC: 106 MMOL/L (ref 94–109)
CHOLEST SERPL-MCNC: 100 MG/DL
CO2 SERPL-SCNC: 31 MMOL/L (ref 20–32)
CREAT SERPL-MCNC: 1.7 MG/DL (ref 0.66–1.25)
ERYTHROCYTE [DISTWIDTH] IN BLOOD BY AUTOMATED COUNT: 12.5 % (ref 10–15)
GFR SERPL CREATININE-BSD FRML MDRD: 38 ML/MIN/{1.73_M2}
GLUCOSE SERPL-MCNC: 138 MG/DL (ref 70–99)
HBA1C MFR BLD: 7.3 % (ref 0–5.6)
HCT VFR BLD AUTO: 33.3 % (ref 40–53)
HDLC SERPL-MCNC: 60 MG/DL
HGB BLD-MCNC: 11.3 G/DL (ref 13.3–17.7)
LDLC SERPL CALC-MCNC: 30 MG/DL
MCH RBC QN AUTO: 31.5 PG (ref 26.5–33)
MCHC RBC AUTO-ENTMCNC: 33.9 G/DL (ref 31.5–36.5)
MCV RBC AUTO: 93 FL (ref 78–100)
NONHDLC SERPL-MCNC: 40 MG/DL
PLATELET # BLD AUTO: 176 10E9/L (ref 150–450)
POTASSIUM SERPL-SCNC: 3.6 MMOL/L (ref 3.4–5.3)
PROT SERPL-MCNC: 5.7 G/DL (ref 6.8–8.8)
RBC # BLD AUTO: 3.59 10E12/L (ref 4.4–5.9)
SODIUM SERPL-SCNC: 141 MMOL/L (ref 133–144)
TRIGL SERPL-MCNC: 52 MG/DL
WBC # BLD AUTO: 4.8 10E9/L (ref 4–11)

## 2021-06-10 PROCEDURE — 36415 COLL VENOUS BLD VENIPUNCTURE: CPT | Performed by: INTERNAL MEDICINE

## 2021-06-10 PROCEDURE — 83036 HEMOGLOBIN GLYCOSYLATED A1C: CPT | Performed by: INTERNAL MEDICINE

## 2021-06-10 PROCEDURE — 80061 LIPID PANEL: CPT | Performed by: INTERNAL MEDICINE

## 2021-06-10 PROCEDURE — 85027 COMPLETE CBC AUTOMATED: CPT | Performed by: INTERNAL MEDICINE

## 2021-06-10 PROCEDURE — 80053 COMPREHEN METABOLIC PANEL: CPT | Performed by: INTERNAL MEDICINE

## 2021-06-10 NOTE — PROGRESS NOTES
Jeff called the triage line requesting a return call.  Attempted to reach him but no answer and unable to leave a message as his voice mailbox is full.

## 2021-06-11 NOTE — TELEPHONE ENCOUNTER
See his [employer] message. Should he recheck A1C sooner?     Lab Results   Component Value Date    A1C 7.3 06/10/2021    A1C 5.7 01/20/2021    A1C 5.5 09/11/2020    A1C 5.7 07/01/2020    A1C 5.5 12/23/2019     Lab Results   Component Value Date     06/10/2021     01/20/2021

## 2021-06-16 NOTE — TELEPHONE ENCOUNTER
Please see [employer] message.  Yes, patient would like a cardiology referral after all.  Thank you.

## 2021-06-28 ENCOUNTER — TELEPHONE (OUTPATIENT)
Dept: CARDIOLOGY | Facility: CLINIC | Age: 76
End: 2021-06-28

## 2021-06-28 DIAGNOSIS — R00.1 BRADYCARDIA: Primary | ICD-10-CM

## 2021-06-28 NOTE — TELEPHONE ENCOUNTER
----- Message from Talat Cisneros MD sent at 6/28/2021 10:47 AM CDT -----  Regarding: RE: new patient 7/6  We need data. Please obtain 24 h holter. qp  ----- Message -----  From: Bethany Bobby RN  Sent: 6/23/2021   4:06 PM CDT  To: Talat Cisneros MD  Subject: new patient 7/6                                  Dr Borrego,  Patient was referred per Dr Deleon for bradycardia.  No monitor.  What would you like to have prior to him seeing you?  Meagan

## 2021-06-28 NOTE — TELEPHONE ENCOUNTER
Orders placed in epic.  Spoke to patient to let him know the rationale for need for holter prior to appt.  He provided verbal understanidng.  Sent to scheduling to set this up.  YULIANA Nicholson

## 2021-07-01 ENCOUNTER — HOSPITAL ENCOUNTER (OUTPATIENT)
Dept: CARDIOLOGY | Facility: CLINIC | Age: 76
Discharge: HOME | End: 2021-07-01
Attending: INTERNAL MEDICINE | Admitting: INTERNAL MEDICINE
Payer: MEDICARE

## 2021-07-01 DIAGNOSIS — R00.1 BRADYCARDIA: ICD-10-CM

## 2021-07-01 PROCEDURE — 93225 XTRNL ECG REC<48 HRS REC: CPT

## 2021-07-01 PROCEDURE — 93227 XTRNL ECG REC<48 HR R&I: CPT | Performed by: INTERNAL MEDICINE

## 2021-08-20 ENCOUNTER — OFFICE VISIT (OUTPATIENT)
Dept: CARDIOLOGY | Facility: CLINIC | Age: 76
End: 2021-08-20
Attending: INTERNAL MEDICINE
Payer: MEDICARE

## 2021-08-20 VITALS
SYSTOLIC BLOOD PRESSURE: 172 MMHG | BODY MASS INDEX: 29.91 KG/M2 | WEIGHT: 208.9 LBS | HEART RATE: 45 BPM | DIASTOLIC BLOOD PRESSURE: 71 MMHG | HEIGHT: 70 IN

## 2021-08-20 DIAGNOSIS — I25.10 ATHEROSCLEROSIS OF CORONARY ARTERY OF NATIVE HEART WITHOUT ANGINA PECTORIS, UNSPECIFIED VESSEL OR LESION TYPE: ICD-10-CM

## 2021-08-20 DIAGNOSIS — R00.1 BRADYCARDIA: Primary | ICD-10-CM

## 2021-08-20 PROCEDURE — 99203 OFFICE O/P NEW LOW 30 MIN: CPT | Performed by: INTERNAL MEDICINE

## 2021-08-20 PROCEDURE — 93000 ELECTROCARDIOGRAM COMPLETE: CPT | Performed by: INTERNAL MEDICINE

## 2021-08-20 ASSESSMENT — MIFFLIN-ST. JEOR: SCORE: 1678.81

## 2021-08-20 NOTE — PROGRESS NOTES
HPI and Plan:   See dictation  [id number]  Orders Placed This Encounter   Procedures     EKG 12-lead complete w/read - Clinics (performed today)       No orders of the defined types were placed in this encounter.      There are no discontinued medications.      Encounter Diagnoses   Name Primary?     Atherosclerosis of coronary artery of native heart without angina pectoris, unspecified vessel or lesion type      Bradycardia Yes       CURRENT MEDICATIONS:  Current Outpatient Medications   Medication Sig Dispense Refill     Ascorbic Acid (VITAMIN C PO) Take by mouth daily       aspirin (ASA) 81 MG tablet Take 81 mg by mouth 2 times daily       calcitRIOL (ROCALTROL) 0.25 MCG capsule Take 0.25 mcg by mouth every other day       chlorhexidine (PERIDEX) 0.12 % solution USE 1/2 OZ TO SWISH FOR 30 SECONDS ONCE D  3     clopidogrel (PLAVIX) 75 MG tablet TAKE 1 TABLET DAILY 90 tablet 3     Cyanocobalamin (B-12) 1000 MCG CAPS Take by mouth daily       famotidine (PEPCID) 40 MG tablet Take 1 tablet (40 mg) by mouth daily 90 tablet 3     furosemide (LASIX) 40 MG tablet Take 1 tablet (40 mg) by mouth daily 90 tablet 0     glimepiride (AMARYL) 1 MG tablet Take 1 tablet (1 mg) by mouth every morning (before breakfast) TAKE 1 TABLET EVERY MORNINGBEFORE BREAKFAST 90 tablet 0     hydrocortisone 2.5 % cream APPLY TOPICALLY TWO TIMES ADAY 28.35 g 11     lisinopril (ZESTRIL) 10 MG tablet Take 1 tablet (10 mg) by mouth daily 90 tablet 0     metFORMIN (GLUCOPHAGE) 1000 MG tablet TAKE 1 TABLET TWICE A DAY  WITH FOOD (MEALS) - OVERDUEFOR AN OFFICE VISIT 180 tablet 0     metoprolol succinate ER (TOPROL-XL) 25 MG 24 hr tablet Take 1 tablet (25 mg) by mouth daily 90 tablet 3     neomycin-polymyxin-hydrocortisone (CORTISPORIN) 3.5-35459-0 otic solution Place 3 drops in ear(s) 4 times daily 10 mL 0     nitroGLYcerin (NITROSTAT) 0.6 MG sublingual tablet DISSOLVE 1 TABLET UNDER THETONGUE AS NEEDED. 100 tablet 0     omega-3 acid ethyl esters  (LOVAZA) 1 g capsule TAKE 1 CAPSULE TWICE DAILY 180 capsule 1     Pyridoxine HCl (B-6) 100 MG TABS Take by mouth daily       simvastatin (ZOCOR) 40 MG tablet Take 1 tablet (40 mg) by mouth At Bedtime 90 tablet 0     VITAMIN E NATURAL PO Take by mouth daily       acetic acid-hydrocortisone (VOSOL-HC) 1-2 % otic solution Place 1 drop into both ears 2 times daily 10 mL 0     fluocinolone acetonide (DERMA SMOOTHE/FS BODY) 0.01 % external oil Apply topically 2 times daily (Patient not taking: Reported on 8/20/2021) 1 Bottle 0       ALLERGIES     Allergies   Allergen Reactions     No Known Drug Allergies        PAST MEDICAL HISTORY:  Past Medical History:   Diagnosis Date     Chronic kidney disease      Coronary atherosclerosis of unspecified type of vessel, native or graft 10/03    at [facility]:  had 4 stents done following an MI     DDD (degenerative disc disease), lumbosacral 5/21/2021     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     Heart attack (H)      Hernia, abdominal      Hyperlipidaemia      Mumps      Obese      Other and unspecified hyperlipidemia      Other premature beats      Palpitations      Phlebitis and thrombophlebitis of other deep vessels of lower extremities 2000    has had 2-3 episodes     Stented coronary artery      4 stents     Syncope      Thrombosis of leg      Type II or unspecified type diabetes mellitus without mention of complication, not stated as uncontrolled      Unspecified essential hypertension        PAST SURGICAL HISTORY:  Past Surgical History:   Procedure Laterality Date     ABDOMEN SURGERY      Hernia naval approx 25 years ago     BIOPSY  8/2016     CARDIAC SURGERY       COLONOSCOPY       CORONARY ANGIOGRAPHY ADULT ORDER  8/28/2000    75% distal LAD stenosis, 80% third diagonal stenosis, 75-80% mid ramus stenosis, ostial 60% stenosis in circumflex w/90% stenosis in distal circumflex     CORONARY ANGIOGRAPHY ADULT ORDER  2003    4 stents placed     EP ABLATION / EP STUDIES   3/25/2014     HEART CATH, ANGIOPLASTY       HYDROCELECTOMY SCROTAL Right 10/6/2016    Procedure: HYDROCELECTOMY SCROTAL;  Surgeon: Jordan Chandra MD;  Location: [facility]     ORTHOPEDIC SURGERY      Meniscus Orthoscopic surgery left knee.     TESTICLE SURGERY       ZZC NONSPECIFIC PROCEDURE      colonoscopy approx 1999 done elsewhere       FAMILY HISTORY:  Family History   Problem Relation Age of Onset     Musculoskeletal Disorder Mother         spinal stenosis     Cancer Mother         cancer kidney age 85     Hypertension Mother         Dead     Diabetes Father         Dead     Diabetes Brother         Type 1     Heart Disease Brother        SOCIAL HISTORY:  Social History     Socioeconomic History     Marital status:      Spouse name: Bethany     Number of children: 3     Years of education: None     Highest education level: None   Occupational History     Occupation: Computers/     Employer: [employer]      Comment: [employer]   Tobacco Use     Smoking status: Former Smoker     Packs/day: 3.00     Years: 6.00     Pack years: 18.00     Types: Cigarettes, Cigars, Pipe     Start date: 1961     Quit date: 1967     Years since quittin.6     Smokeless tobacco: Never Used     Tobacco comment: quit    Substance and Sexual Activity     Alcohol use: Yes     Comment: 1 beer a week     Drug use: No     Sexual activity: Not Currently   Other Topics Concern     Parent/sibling w/ CABG, MI or angioplasty before 65F 55M? No   Social History Narrative     None     Social Determinants of Health     Financial Resource Strain:      Difficulty of Paying Living Expenses:    Food Insecurity:      Worried About Running Out of Food in the Last Year:      Ran Out of Food in the Last Year:    Transportation Needs:      Lack of Transportation (Medical):      Lack of Transportation (Non-Medical):    Physical Activity:      Days of Exercise per Week:      Minutes of Exercise per Session:    Stress:       "Feeling of Stress :    Social Connections:      Frequency of Communication with Friends and Family:      Frequency of Social Gatherings with Friends and Family:      Attends Nondenominational Services:      Active Member of Clubs or Organizations:      Attends Club or Organization Meetings:      Marital Status:    Intimate Partner Violence:      Fear of Current or Ex-Partner:      Emotionally Abused:      Physically Abused:      Sexually Abused:        Review of Systems:  Skin:  Negative       Eyes:  Positive for glasses    ENT:  Positive for   itching ears  Respiratory:  Positive for sleep apnea     Cardiovascular:    edema;Positive for compression socks  Gastroenterology: Negative      Genitourinary:  Positive for prostate problem    Musculoskeletal:  Positive for back pain    Neurologic:  Negative      Psychiatric:  Negative      Heme/Lymph/Imm:  Negative      Endocrine:  Positive for diabetes      Physical Exam:  Vitals: BP (!) 172/71 (BP Location: Left arm)   Pulse (!) 45   Ht 1.778 m (5' 10\")   Wt 94.8 kg (208 lb 14.4 oz)   BMI 29.97 kg/m      Constitutional:  cooperative, alert and oriented, well developed, well nourished, in no acute distress        Skin:  warm and dry to the touch, no apparent skin lesions or masses noted          Head:  normocephalic, no masses or lesions        Eyes:  pupils equal and round, conjunctivae and lids unremarkable, sclera white, no xanthalasma, EOMS intact, no nystagmus        Lymph:No Cervical lymphadenopathy present     ENT:  no pallor or cyanosis        Neck:  carotid pulses are full and equal bilaterally, JVP normal, no carotid bruit        Respiratory:  normal breath sounds, clear to auscultation, normal A-P diameter, normal symmetry, normal respiratory excursion, no use of accessory muscles         Cardiac: regular rhythm, normal S1/S2, no S3 or S4, apical impulse not displaced, no murmurs, gallops or rubs                pulses full and equal, no bruits auscultated           "                              GI:  abdomen soft, non-tender, BS normoactive, no mass, no HSM, no bruits        Extremities and Muscular Skeletal:  no deformities, clubbing, cyanosis, erythema observed              Neurological:  no gross motor deficits        Psych:  Alert and Oriented x 3        CC  Guillermo Deleon MD  [address] [place] [address]

## 2021-08-20 NOTE — LETTER
8/20/2021    Guillermo Deleon MD  [address] [facility] [address]    RE: Jeff Ricks       Dear Colleague,    I had the pleasure of seeing Jeff Ricks in the [facility].    HPI and Plan:   See dictation  [id number]  Orders Placed This Encounter   Procedures     EKG 12-lead complete w/read - Clinics (performed today)       No orders of the defined types were placed in this encounter.      There are no discontinued medications.      Encounter Diagnoses   Name Primary?     Atherosclerosis of coronary artery of native heart without angina pectoris, unspecified vessel or lesion type      Bradycardia Yes       CURRENT MEDICATIONS:  Current Outpatient Medications   Medication Sig Dispense Refill     Ascorbic Acid (VITAMIN C PO) Take by mouth daily       aspirin (ASA) 81 MG tablet Take 81 mg by mouth 2 times daily       calcitRIOL (ROCALTROL) 0.25 MCG capsule Take 0.25 mcg by mouth every other day       chlorhexidine (PERIDEX) 0.12 % solution USE 1/2 OZ TO SWISH FOR 30 SECONDS ONCE D  3     clopidogrel (PLAVIX) 75 MG tablet TAKE 1 TABLET DAILY 90 tablet 3     Cyanocobalamin (B-12) 1000 MCG CAPS Take by mouth daily       famotidine (PEPCID) 40 MG tablet Take 1 tablet (40 mg) by mouth daily 90 tablet 3     furosemide (LASIX) 40 MG tablet Take 1 tablet (40 mg) by mouth daily 90 tablet 0     glimepiride (AMARYL) 1 MG tablet Take 1 tablet (1 mg) by mouth every morning (before breakfast) TAKE 1 TABLET EVERY MORNINGBEFORE BREAKFAST 90 tablet 0     hydrocortisone 2.5 % cream APPLY TOPICALLY TWO TIMES ADAY 28.35 g 11     lisinopril (ZESTRIL) 10 MG tablet Take 1 tablet (10 mg) by mouth daily 90 tablet 0     metFORMIN (GLUCOPHAGE) 1000 MG tablet TAKE 1 TABLET TWICE A DAY  WITH FOOD (MEALS) - OVERDUEFOR AN OFFICE VISIT 180 tablet 0     metoprolol succinate ER (TOPROL-XL) 25 MG 24 hr tablet Take 1 tablet (25 mg) by mouth daily 90 tablet 3      neomycin-polymyxin-hydrocortisone (CORTISPORIN) 3.5-66437-3 otic solution Place 3 drops in ear(s) 4 times daily 10 mL 0     nitroGLYcerin (NITROSTAT) 0.6 MG sublingual tablet DISSOLVE 1 TABLET UNDER THETONGUE AS NEEDED. 100 tablet 0     omega-3 acid ethyl esters (LOVAZA) 1 g capsule TAKE 1 CAPSULE TWICE DAILY 180 capsule 1     Pyridoxine HCl (B-6) 100 MG TABS Take by mouth daily       simvastatin (ZOCOR) 40 MG tablet Take 1 tablet (40 mg) by mouth At Bedtime 90 tablet 0     VITAMIN E NATURAL PO Take by mouth daily       acetic acid-hydrocortisone (VOSOL-HC) 1-2 % otic solution Place 1 drop into both ears 2 times daily 10 mL 0     fluocinolone acetonide (DERMA SMOOTHE/FS BODY) 0.01 % external oil Apply topically 2 times daily (Patient not taking: Reported on 8/20/2021) 1 Bottle 0       ALLERGIES     Allergies   Allergen Reactions     No Known Drug Allergies        PAST MEDICAL HISTORY:  Past Medical History:   Diagnosis Date     Chronic kidney disease      Coronary atherosclerosis of unspecified type of vessel, native or graft 10/03    at [facility]:  had 4 stents done following an MI     DDD (degenerative disc disease), lumbosacral 5/21/2021     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     Heart attack (H)      Hernia, abdominal      Hyperlipidaemia      Mumps      Obese      Other and unspecified hyperlipidemia      Other premature beats      Palpitations      Phlebitis and thrombophlebitis of other deep vessels of lower extremities 2000    has had 2-3 episodes     Stented coronary artery      4 stents     Syncope      Thrombosis of leg      Type II or unspecified type diabetes mellitus without mention of complication, not stated as uncontrolled      Unspecified essential hypertension        PAST SURGICAL HISTORY:  Past Surgical History:   Procedure Laterality Date     ABDOMEN SURGERY      Hernia naval approx 25 years ago     BIOPSY  8/2016     CARDIAC SURGERY       COLONOSCOPY       CORONARY ANGIOGRAPHY ADULT  ORDER  2000    75% distal LAD stenosis, 80% third diagonal stenosis, 75-80% mid ramus stenosis, ostial 60% stenosis in circumflex w/90% stenosis in distal circumflex     CORONARY ANGIOGRAPHY ADULT ORDER      4 stents placed     EP ABLATION / EP STUDIES  3/25/2014     HEART CATH, ANGIOPLASTY       HYDROCELECTOMY SCROTAL Right 10/6/2016    Procedure: HYDROCELECTOMY SCROTAL;  Surgeon: Jordan Chandra MD;  Location: [facility]     ORTHOPEDIC SURGERY      Meniscus Orthoscopic surgery left knee.     TESTICLE SURGERY       ZZC NONSPECIFIC PROCEDURE      colonoscopy approx  done elsewhere       FAMILY HISTORY:  Family History   Problem Relation Age of Onset     Musculoskeletal Disorder Mother         spinal stenosis     Cancer Mother         cancer kidney age 85     Hypertension Mother         Dead     Diabetes Father         Dead     Diabetes Brother         Type 1     Heart Disease Brother        SOCIAL HISTORY:  Social History     Socioeconomic History     Marital status:      Spouse name: Bethany     Number of children: 3     Years of education: None     Highest education level: None   Occupational History     Occupation: [employer]/     Employer: [employer]      Comment: [employer]   Tobacco Use     Smoking status: Former Smoker     Packs/day: 3.00     Years: 6.00     Pack years: 18.00     Types: Cigarettes, Cigars, Pipe     Start date: 1961     Quit date: 1967     Years since quittin.6     Smokeless tobacco: Never Used     Tobacco comment: quit    Substance and Sexual Activity     Alcohol use: Yes     Comment: 1 beer a week     Drug use: No     Sexual activity: Not Currently   Other Topics Concern     Parent/sibling w/ CABG, MI or angioplasty before 65F 55M? No   Social History Narrative     None     Social Determinants of Health     Financial Resource Strain:      Difficulty of Paying Living Expenses:    Food Insecurity:      Worried About Running Out of Food in the  "Last Year:      Ran Out of Food in the Last Year:    Transportation Needs:      Lack of Transportation (Medical):      Lack of Transportation (Non-Medical):    Physical Activity:      Days of Exercise per Week:      Minutes of Exercise per Session:    Stress:      Feeling of Stress :    Social Connections:      Frequency of Communication with Friends and Family:      Frequency of Social Gatherings with Friends and Family:      Attends [employer] Services:      Active Member of Clubs or Organizations:      Attends Club or Organization Meetings:      Marital Status:    Intimate Partner Violence:      Fear of Current or Ex-Partner:      Emotionally Abused:      Physically Abused:      Sexually Abused:        Review of Systems:  Skin:  Negative       Eyes:  Positive for glasses    ENT:  Positive for   itching ears  Respiratory:  Positive for sleep apnea     Cardiovascular:    edema;Positive for compression socks  Gastroenterology: Negative      Genitourinary:  Positive for prostate problem    Musculoskeletal:  Positive for back pain    Neurologic:  Negative      Psychiatric:  Negative      Heme/Lymph/Imm:  Negative      Endocrine:  Positive for diabetes      Physical Exam:  Vitals: BP (!) 172/71 (BP Location: Left arm)   Pulse (!) 45   Ht 1.778 m (5' 10\")   Wt 94.8 kg (208 lb 14.4 oz)   BMI 29.97 kg/m      Constitutional:  cooperative, alert and oriented, well developed, well nourished, in no acute distress        Skin:  warm and dry to the touch, no apparent skin lesions or masses noted          Head:  normocephalic, no masses or lesions        Eyes:  pupils equal and round, conjunctivae and lids unremarkable, sclera white, no xanthalasma, EOMS intact, no nystagmus        Lymph:No Cervical lymphadenopathy present     ENT:  no pallor or cyanosis        Neck:  carotid pulses are full and equal bilaterally, JVP normal, no carotid bruit        Respiratory:  normal breath sounds, clear to auscultation, normal A-P " diameter, normal symmetry, normal respiratory excursion, no use of accessory muscles         Cardiac: regular rhythm, normal S1/S2, no S3 or S4, apical impulse not displaced, no murmurs, gallops or rubs                pulses full and equal, no bruits auscultated                                        GI:  abdomen soft, non-tender, BS normoactive, no mass, no HSM, no bruits        Extremities and Muscular Skeletal:  no deformities, clubbing, cyanosis, erythema observed              Neurological:  no gross motor deficits        Psych:  Alert and Oriented x 3        CC  Guillermo Deleon MD  [address] [place] [address]                  Thank you for allowing me to participate in the care of your patient.      Sincerely,     Talat Cisneros MD     [facility]  cc:   Guillermo Deleon MD  [address]  [place] [id number]

## 2021-08-20 NOTE — PROGRESS NOTES
Service Date: 08/20/2021    Thank you for allowing me to participate in the care of this delightful patient.  As you know, Ferdinand is a 75-year-old gentleman with a history of coronary artery disease, status post PCI of unknown vessel in 1991 and questionable history of SVT in the past who saw my partner, Dr. Rivas, in 2014 for history of syncope.  The patient eventually underwent an EP study that showed no evidence of accessory pathway or dual AVN caroline physiology.  No SVT was inducible.  Since then, he has not had any episodes of near syncope or syncope.    I was asked to see the patient because of the bradycardia, which wakes the patient up at night.  He does wear an iWatch which would alert him when it goes below 40 beats per minute.  You subsequently had him on Holter monitoring for 24 hours showing that the average rate was about 53 beats per minute, the lowest of 41 at 6 in the morning and a maximum of 104.  The patient did not have any symptoms on that day. He is also known to have a normal TSH.  In the past, he was told to have sleep apnea because he snores quite heavily and was recommended to be on CPAP, but after losing some weight, his snoring has dissipated.  He does feel quite refreshed in the morning when he wakes up and his wife has not noticed that he stopped breathing at night.    Ferdinand mentioned that his normal rate is usually in the 50s, and therefore, I think it is quite reasonable for a person to dip below 40s at night when sleeping due to heightened vagal tone.  There is a small possibility that the patient may have untreated sleep apnea so I did suggest to Ferdinand he get a sleep study again as apnea can cause bradycardia.  Ferdinand had no symptoms, only his relatively sinus bradycardia, and therefore, there is no indication for pacemaker.  I did go over the potential symptoms of chronotropic incompetence such as reduced exercise tolerance or shortness of breath.  If that is the case, we may consider  pacemaker at that time. Ferdinand was quite glad to hear this and will come back to see us on a p.r.n. basis.    Talat Borrego MD        D: 2021   T: 2021   MT: lester    Name:     SONDRA LABOY  MRN:      [id number]        Account:      [id number]   :      1945           Service Date: 2021       Document: [id number]

## 2021-09-02 ENCOUNTER — TELEPHONE (OUTPATIENT)
Dept: INTERNAL MEDICINE | Facility: CLINIC | Age: 76
End: 2021-09-02

## 2021-09-02 NOTE — TELEPHONE ENCOUNTER
Dominique, RN with oral surgery, calling to verify if pt can take NSAIDs or not. He told them he can't take Aleve but can take ibuprofen.  In review of chart there is a note from January to avoid OTC NSAIDs and recent labs showed decreased kidney function.    Nahid Alvarez RN, BSN

## 2021-09-30 DIAGNOSIS — E11.21 TYPE 2 DIABETES MELLITUS WITH DIABETIC NEPHROPATHY, WITHOUT LONG-TERM CURRENT USE OF INSULIN (H): ICD-10-CM

## 2021-09-30 DIAGNOSIS — E78.5 HYPERLIPIDEMIA LDL GOAL <100: ICD-10-CM

## 2021-09-30 DIAGNOSIS — L29.9 EAR ITCHING: ICD-10-CM

## 2021-09-30 DIAGNOSIS — I25.10 ASHD (ARTERIOSCLEROTIC HEART DISEASE): ICD-10-CM

## 2021-09-30 DIAGNOSIS — R60.0 BILATERAL LEG EDEMA: ICD-10-CM

## 2021-09-30 NOTE — TELEPHONE ENCOUNTER
Provider approval needed.     Creatinine   Date Value Ref Range Status   06/10/2021 1.70 (H) 0.66 - 1.25 mg/dL Final     Last OV on 1/20/21

## 2021-09-30 NOTE — TELEPHONE ENCOUNTER
Provider approval needed.     Creatinine   Date Value Ref Range Status   06/10/2021 1.70 (H) 0.66 - 1.25 mg/dL Final

## 2021-10-01 RX ORDER — HYDROCORTISONE AND ACETIC ACID 20.75; 10.375 MG/ML; MG/ML
SOLUTION AURICULAR (OTIC)
Qty: 10 ML | Refills: 0 | Status: SHIPPED | OUTPATIENT
Start: 2021-10-01 | End: 2022-01-31

## 2021-10-01 RX ORDER — OMEGA-3-ACID ETHYL ESTERS 1 G/1
CAPSULE, LIQUID FILLED ORAL
Qty: 180 CAPSULE | Refills: 0 | Status: SHIPPED | OUTPATIENT
Start: 2021-10-01 | End: 2022-01-31

## 2021-10-01 RX ORDER — FUROSEMIDE 40 MG
TABLET ORAL
Qty: 90 TABLET | Refills: 0 | Status: SHIPPED | OUTPATIENT
Start: 2021-10-01 | End: 2021-12-15

## 2021-10-01 RX ORDER — LISINOPRIL 10 MG/1
TABLET ORAL
Qty: 90 TABLET | Refills: 0 | Status: SHIPPED | OUTPATIENT
Start: 2021-10-01 | End: 2021-12-20

## 2021-10-01 RX ORDER — SIMVASTATIN 40 MG
TABLET ORAL
Qty: 90 TABLET | Refills: 0 | Status: SHIPPED | OUTPATIENT
Start: 2021-10-01 | End: 2022-01-31

## 2021-10-01 RX ORDER — GLIMEPIRIDE 1 MG/1
TABLET ORAL
Qty: 90 TABLET | Refills: 0 | Status: SHIPPED | OUTPATIENT
Start: 2021-10-01 | End: 2021-11-15

## 2021-10-01 NOTE — TELEPHONE ENCOUNTER
Pending Prescriptions:                       Disp   Refills    glimepiride (AMARYL) 1 MG tablet [Pharmacy*90 tab*0        Sig: TAKE 1 TABLET EVERY MORNING(BEFORE BREAKFAST)    furosemide (LASIX) 40 MG tablet [Pharmacy *90 tab*0        Sig: TAKE 1 TABLET DAILY    acetic acid-hydrocortisone (VOSOL-HC) 1-2 *10 mL  0        Sig: INSTILL 1 DROP INTO BOTH   EARS TWO TIMES A DAY    omega-3 acid ethyl esters (LOVAZA) 1 g cap*180 ca*0        Sig: TAKE 1 CAPSULE TWICE DAILY    lisinopril (ZESTRIL) 10 MG tablet [Pharmac*90 tab*0        Sig: TAKE 1 TABLET DAILY    simvastatin (ZOCOR) 40 MG tablet [Pharmacy*90 tab*0        Sig: TAKE 1 TABLET AT BEDTIME  Routing refill request to provider for review/approval because:  Fails protocol

## 2021-10-01 NOTE — TELEPHONE ENCOUNTER
Pending Prescriptions:                       Disp   Refills    metFORMIN (GLUCOPHAGE) 1000 MG tablet [Pha*180 ta*0        Sig: TAKE 1 TABLET TWICE A DAY  WITH FOOD (MEALS)  Routing refill request to provider for review/approval because:  Fails protocol

## 2021-10-21 DIAGNOSIS — N18.30 CHRONIC KIDNEY DISEASE, STAGE III (MODERATE) (H): ICD-10-CM

## 2021-10-21 DIAGNOSIS — D64.9 ANEMIA, UNSPECIFIED: ICD-10-CM

## 2021-10-21 DIAGNOSIS — E21.3 HYPERPARATHYROIDISM, UNSPECIFIED (H): Primary | ICD-10-CM

## 2021-10-21 DIAGNOSIS — E11.9 DIABETES MELLITUS (H): ICD-10-CM

## 2021-10-23 ENCOUNTER — HEALTH MAINTENANCE LETTER (OUTPATIENT)
Age: 76
End: 2021-10-23

## 2021-11-11 ENCOUNTER — E-VISIT (OUTPATIENT)
Dept: INTERNAL MEDICINE | Facility: CLINIC | Age: 76
End: 2021-11-11
Payer: MEDICARE

## 2021-11-11 ENCOUNTER — NURSE TRIAGE (OUTPATIENT)
Dept: NURSING | Facility: CLINIC | Age: 76
End: 2021-11-11

## 2021-11-11 DIAGNOSIS — Z20.822 EXPOSURE TO 2019 NOVEL CORONAVIRUS: Primary | ICD-10-CM

## 2021-11-11 DIAGNOSIS — M25.562 LEFT KNEE PAIN, UNSPECIFIED CHRONICITY: ICD-10-CM

## 2021-11-11 PROCEDURE — 99421 OL DIG E/M SVC 5-10 MIN: CPT | Performed by: INTERNAL MEDICINE

## 2021-11-11 NOTE — TELEPHONE ENCOUNTER
"Patient says he has a lab appointment tomorrow morning and was just informed he had exposure to COVID-19 on Saturday. Patient reports he received his booster 3 weeks and has no symptoms. Patient has completed an e-visit.    Disposition: home care  Advised to come to appointment tomorrow unless he starts to have symptoms.  Reviewed care advice with caller per RN triage protocol guideline.  Advised to call back with worsening symptoms, concerns or questions. Caller verbalized understanding.          Reason for Disposition    COVID-19 vaccine, fully vaccinated, and exposure to COVID-19, Frequently Asked Questions (FAQs)    Additional Information    Negative: COVID-19 vaccine, fully vaccinated, and \"What can I do now?\", Frequently Asked Questions (FAQs)    Protocols used: CORONAVIRUS (COVID-19) VACCINE QUESTIONS AND [id number] 8.25.2021      "

## 2021-11-12 ENCOUNTER — LAB (OUTPATIENT)
Dept: LAB | Facility: CLINIC | Age: 76
End: 2021-11-12
Payer: MEDICARE

## 2021-11-12 ENCOUNTER — LAB (OUTPATIENT)
Dept: LAB | Facility: CLINIC | Age: 76
End: 2021-11-12
Attending: INTERNAL MEDICINE
Payer: MEDICARE

## 2021-11-12 DIAGNOSIS — E21.3 HYPERPARATHYROIDISM, UNSPECIFIED (H): ICD-10-CM

## 2021-11-12 DIAGNOSIS — I10 ESSENTIAL HYPERTENSION: ICD-10-CM

## 2021-11-12 DIAGNOSIS — Z20.822 EXPOSURE TO 2019 NOVEL CORONAVIRUS: ICD-10-CM

## 2021-11-12 DIAGNOSIS — E11.9 DIABETES MELLITUS (H): ICD-10-CM

## 2021-11-12 DIAGNOSIS — E11.21 TYPE 2 DIABETES MELLITUS WITH DIABETIC NEPHROPATHY, WITHOUT LONG-TERM CURRENT USE OF INSULIN (H): ICD-10-CM

## 2021-11-12 DIAGNOSIS — N18.30 CHRONIC KIDNEY DISEASE, STAGE III (MODERATE) (H): ICD-10-CM

## 2021-11-12 DIAGNOSIS — D64.9 ANEMIA, UNSPECIFIED: ICD-10-CM

## 2021-11-12 LAB
ALBUMIN UR-MCNC: 30 MG/DL
ANION GAP SERPL CALCULATED.3IONS-SCNC: 2 MMOL/L (ref 3–14)
APPEARANCE UR: CLEAR
BACTERIA #/AREA URNS HPF: ABNORMAL /HPF
BILIRUB UR QL STRIP: NEGATIVE
BUN SERPL-MCNC: 16 MG/DL (ref 7–30)
CALCIUM SERPL-MCNC: 8.3 MG/DL (ref 8.5–10.1)
CHLORIDE BLD-SCNC: 108 MMOL/L (ref 94–109)
CO2 SERPL-SCNC: 30 MMOL/L (ref 20–32)
COLOR UR AUTO: YELLOW
CREAT SERPL-MCNC: 1.7 MG/DL (ref 0.66–1.25)
ERYTHROCYTE [DISTWIDTH] IN BLOOD BY AUTOMATED COUNT: 13.3 % (ref 10–15)
FERRITIN SERPL-MCNC: 15 NG/ML (ref 26–388)
GFR SERPL CREATININE-BSD FRML MDRD: 38 ML/MIN/1.73M2
GLUCOSE BLD-MCNC: 152 MG/DL (ref 70–99)
GLUCOSE UR STRIP-MCNC: NEGATIVE MG/DL
HBA1C MFR BLD: 7.5 % (ref 0–5.6)
HCT VFR BLD AUTO: 33.2 % (ref 40–53)
HGB BLD-MCNC: 11.3 G/DL (ref 13.3–17.7)
HGB UR QL STRIP: NEGATIVE
IRON SATN MFR SERPL: 20 % (ref 15–46)
IRON SERPL-MCNC: 69 UG/DL (ref 35–180)
KETONES UR STRIP-MCNC: ABNORMAL MG/DL
LEUKOCYTE ESTERASE UR QL STRIP: NEGATIVE
MCH RBC QN AUTO: 31.7 PG (ref 26.5–33)
MCHC RBC AUTO-ENTMCNC: 34 G/DL (ref 31.5–36.5)
MCV RBC AUTO: 93 FL (ref 78–100)
NITRATE UR QL: NEGATIVE
PH UR STRIP: 5.5 [PH] (ref 5–7)
PHOSPHATE SERPL-MCNC: 2.5 MG/DL (ref 2.5–4.5)
PLATELET # BLD AUTO: 204 10E3/UL (ref 150–450)
POTASSIUM BLD-SCNC: 3.7 MMOL/L (ref 3.4–5.3)
PTH-INTACT SERPL-MCNC: 114 PG/ML (ref 18–80)
RBC # BLD AUTO: 3.56 10E6/UL (ref 4.4–5.9)
RBC #/AREA URNS AUTO: ABNORMAL /HPF
SODIUM SERPL-SCNC: 140 MMOL/L (ref 133–144)
SP GR UR STRIP: >=1.03 (ref 1–1.03)
TIBC SERPL-MCNC: 339 UG/DL (ref 240–430)
UROBILINOGEN UR STRIP-ACNC: 0.2 E.U./DL
WBC # BLD AUTO: 4.9 10E3/UL (ref 4–11)
WBC #/AREA URNS AUTO: ABNORMAL /HPF

## 2021-11-12 PROCEDURE — 83970 ASSAY OF PARATHORMONE: CPT

## 2021-11-12 PROCEDURE — 82043 UR ALBUMIN QUANTITATIVE: CPT

## 2021-11-12 PROCEDURE — 81001 URINALYSIS AUTO W/SCOPE: CPT

## 2021-11-12 PROCEDURE — 84100 ASSAY OF PHOSPHORUS: CPT

## 2021-11-12 PROCEDURE — U0005 INFEC AGEN DETEC AMPLI PROBE: HCPCS

## 2021-11-12 PROCEDURE — 85027 COMPLETE CBC AUTOMATED: CPT

## 2021-11-12 PROCEDURE — 82728 ASSAY OF FERRITIN: CPT

## 2021-11-12 PROCEDURE — 36415 COLL VENOUS BLD VENIPUNCTURE: CPT

## 2021-11-12 PROCEDURE — 83550 IRON BINDING TEST: CPT

## 2021-11-12 PROCEDURE — 83036 HEMOGLOBIN GLYCOSYLATED A1C: CPT

## 2021-11-12 PROCEDURE — 80048 BASIC METABOLIC PNL TOTAL CA: CPT

## 2021-11-13 LAB
CREAT UR-MCNC: 409 MG/DL
MICROALBUMIN UR-MCNC: 91 MG/L
MICROALBUMIN/CREAT UR: 22.25 MG/G CR (ref 0–17)
SARS-COV-2 RNA RESP QL NAA+PROBE: NEGATIVE

## 2021-11-22 DIAGNOSIS — N18.31 CHRONIC KIDNEY DISEASE (CKD) STAGE G3A/A1, MODERATELY DECREASED GLOMERULAR FILTRATION RATE (GFR) BETWEEN 45-59 ML/MIN/1.73 SQUARE METER AND ALBUMINURIA CREATININE RATIO LESS THAN 30 MG/G (H): Primary | ICD-10-CM

## 2021-11-22 DIAGNOSIS — I10 HYPERTENSION, ESSENTIAL: ICD-10-CM
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
## 2021-11-24 ENCOUNTER — OFFICE VISIT (OUTPATIENT)
Dept: ORTHOPEDICS | Facility: CLINIC | Age: 76
End: 2021-11-24
Attending: INTERNAL MEDICINE
Payer: MEDICARE

## 2021-11-24 VITALS
SYSTOLIC BLOOD PRESSURE: 140 MMHG | BODY MASS INDEX: 29.32 KG/M2 | WEIGHT: 204.8 LBS | DIASTOLIC BLOOD PRESSURE: 66 MMHG | HEIGHT: 70 IN

## 2021-11-24 DIAGNOSIS — M17.12 PRIMARY OSTEOARTHRITIS OF LEFT KNEE: Primary | ICD-10-CM

## 2021-11-24 DIAGNOSIS — M25.562 LEFT KNEE PAIN, UNSPECIFIED CHRONICITY: ICD-10-CM

## 2021-11-24 PROCEDURE — 99213 OFFICE O/P EST LOW 20 MIN: CPT | Performed by: FAMILY MEDICINE

## 2021-11-24 ASSESSMENT — MIFFLIN-ST. JEOR: SCORE: 1655.22
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
## 2021-11-24 NOTE — PATIENT INSTRUCTIONS
1. Primary osteoarthritis of left knee    2. Left knee pain, unspecified chronicity      -Patient has chronic left knee pain due to arthritis  -Patient will start formal physical therapy and home exercise program  -Patient will start Voltaren gel as needed.  Patient may continue with CBD oil as needed  -Patient will continue icing as needed as well  -Patient will follow up if pain does not improve or worsens.  To consider possible cortisone injection if necessary  -Call direct clinic number [[phone]] at any time with questions or concerns.    Albert Yeo MD [facility]  [facility] [place]

## 2021-12-10 ENCOUNTER — THERAPY VISIT (OUTPATIENT)
Dept: PHYSICAL THERAPY | Facility: CLINIC | Age: 76
End: 2021-12-10
Attending: FAMILY MEDICINE
Payer: MEDICARE

## 2021-12-10 DIAGNOSIS — M17.12 PRIMARY OSTEOARTHRITIS OF LEFT KNEE: ICD-10-CM

## 2021-12-10 DIAGNOSIS — M25.562 LEFT KNEE PAIN, UNSPECIFIED CHRONICITY: ICD-10-CM

## 2021-12-10 PROCEDURE — 97161 PT EVAL LOW COMPLEX 20 MIN: CPT | Mod: GP | Performed by: PHYSICAL THERAPIST

## 2021-12-10 PROCEDURE — 97010 HOT OR COLD PACKS THERAPY: CPT | Mod: GP | Performed by: PHYSICAL THERAPIST

## 2021-12-10 PROCEDURE — 97035 APP MDLTY 1+ULTRASOUND EA 15: CPT | Mod: GP | Performed by: PHYSICAL THERAPIST

## 2021-12-10 PROCEDURE — 97110 THERAPEUTIC EXERCISES: CPT | Mod: GP | Performed by: PHYSICAL THERAPIST

## 2021-12-10 NOTE — PROGRESS NOTES
[facility]    OUTPATIENT Physical Therapy ORTHOPEDIC EVALUATION  PLAN OF TREATMENT FOR OUTPATIENT REHABILITATION  (COMPLETE FOR INITIAL CLAIMS ONLY)  Patient's Last Name, First Name, M.I.  YOB: 1945  Jeff Ricks    Provider s Name:  [facility]   Medical Record No.  [id number]   Start of Care Date:  12/10/21   Onset Date:   11/24/21   Type:     _X__PT   ___OT Medical Diagnosis:    Encounter Diagnoses   Name Primary?     Left knee pain, unspecified chronicity      Primary osteoarthritis of left knee         Treatment Diagnosis:  Left knee OA        Goals:     12/10/21 0500   Body Part   Goals listed below are for Knee   Goal #1   Goal #1 stairs   Previous Functional Level No restrictions   Current Functional Level Ascend stairs;one step at a time;with a railing   Performance Level with knee PL of 8/10   STG [employer] Ascend stairs;in a normal reciprocal pattern;with a railing   Performance Level with knee PL of 3/10 or less   Rationale to reach lower level of home safely;for safe community ambulaton   Due Date 01/07/22   LTG [employer] Ascend stairs;in a normal reciprocal pattern;with railing   Performance Level painfree   Rationale to reach lower level of home safely;for safe community ambulaton   Due Date 02/04/22       Therapy Frequency:  1x/week  Predicted Duration of Therapy Intervention:  6 weeks    Rom Amaral, PT                 I CERTIFY THE NEED FOR THESE SERVICES FURNISHED UNDER        THIS PLAN OF TREATMENT AND WHILE UNDER MY CARE     (Physician attestation of this document indicates review and certification of the therapy plan).                       Certification Date From:  12/10/21   Certification Date To:  03/09/22    Referring Provider:  Albert Yeo    Initial Assessment        See Epic Evaluation SOC Date: 12/10/21

## 2021-12-10 NOTE — PROGRESS NOTES
Physical Therapy Initial Evaluation  Subjective:  Pt describes left medial > lateral knee pain.  Began insidiously about 6 months ago and has gotten worse.  Greatest pain is when ascending stairs.  Hx of left knee menisectomy in 1991.  Recent x-rays show mild to moderate medial compartment OA.  Referred to PT on 11/24/21.    The history is provided by the patient.   Patient Health History  Jeff Ricks being seen for Pain left knee.     Problem began: 6/8/1991.   Problem occurred: Don't know.   Pain is reported as 5/10 on pain scale.  General health as reported by patient is good.  Pertinent medical history includes: overweight and pain at night/rest.     Medical allergies: none.   Surgeries include:  Orthopedic surgery and other. Other surgery history details: Hernia.    Current medications:  High blood pressure medication.       Primary job tasks include:  Computer work.                  Therapist Generated HPI Evaluation         Type of problem:  Left knee.    This is a new condition.  Condition occurred with:  Insidious onset.    Patient reports pain:  Medial and lateral.  Pain is described as stabbing, sharp and aching and is intermittent.  Pain is the same all the time.  Since onset symptoms are gradually worsening.  Symptoms are exacerbated by descending stairs, ascending stairs, transfers and bending/squatting  and relieved by rest.  Special tests included:  X-ray.    Barriers include:  None as reported by patient.                        Objective:  Standing Alignment:              Knee:  Normal                                                            Knee Evaluation:  ROM:  AROM: normal  PROM: normal  Strength:  Normal (Mild weakness of hip abductors)            Ligament Testing:  Normal                Special Tests:     Left knee negative for the following special tests:  Meninscal and Patellar compression    Palpation:    Left knee tenderness present at:  Medial Joint Line  Left knee tenderness not  present at:  Lateral Joint Line    Edema:  Normal    Mobility Testing:  Normal                  General     ROS    Assessment/Plan:    Patient is a 76 year old male with left side knee complaints.    Patient has the following significant findings with corresponding treatment plan.                Diagnosis 1:  Left knee OA  Pain -  hot/cold therapy, US, electric stimulation and education  Decreased strength - therapeutic exercise, therapeutic activities and home program    Therapy Evaluation Codes:   1) History comprised of:   Personal factors that impact the plan of care:      None.    Comorbidity factors that impact the plan of care are:      None.     Medications impacting care: None.  2) Examination of Body Systems comprised of:   Body structures and functions that impact the plan of care:      Knee.   Activity limitations that impact the plan of care are:      Squatting/kneeling and Stairs.  3) Clinical presentation characteristics are:   Stable/Uncomplicated.  4) Decision-Making    Low complexity using standardized patient assessment instrument and/or measureable assessment of functional outcome.  Cumulative Therapy Evaluation is: Low complexity.    Previous and current functional limitations:  (See Goal Flow Sheet for this information)    Short term and Long term goals: (See Goal Flow Sheet for this information)     Communication ability:  Patient appears to be able to clearly communicate and understand verbal and written communication and follow directions correctly.  Treatment Explanation - The following has been discussed with the patient:   RX ordered/plan of care  Anticipated outcomes  Possible risks and side effects  This patient would benefit from PT intervention to resume normal activities.   Rehab potential is good.    Frequency:  1 X week, once daily  Duration:  for 6 weeks  Discharge Plan:  Achieve all LTG.  Independent in home treatment program.  Reach maximal therapeutic benefit.    Please refer  to the daily flowsheet for treatment today, total treatment time and time spent performing 1:1 timed codes.

## 2021-12-13 DIAGNOSIS — R60.0 BILATERAL LEG EDEMA: ICD-10-CM

## 2021-12-13 ASSESSMENT — ACTIVITIES OF DAILY LIVING (ADL)
STIFFNESS: I HAVE THE SYMPTOM BUT IT DOES NOT AFFECT MY ACTIVITY
GIVING WAY, BUCKLING OR SHIFTING OF KNEE: THE SYMPTOM AFFECTS MY ACTIVITY MODERATELY
KNEE_ACTIVITY_OF_DAILY_LIVING_SCORE: 63.07
STAND: ACTIVITY IS NOT DIFFICULT
KNEE_ACTIVITY_OF_DAILY_LIVING_SUM: 41
KNEEL ON THE FRONT OF YOUR KNEE: NOT ANSWERED
HOW_WOULD_YOU_RATE_THE_CURRENT_FUNCTION_OF_YOUR_KNEE_DURING_YOUR_USUAL_DAILY_ACTIVITIES_ON_A_SCALE_FROM_0_TO_100_WITH_100_BEING_YOUR_LEVEL_OF_KNEE_FUNCTION_PRIOR_TO_YOUR_INJURY_AND_0_BEING_THE_INABILITY_TO_PERFORM_ANY_OF_YOUR_USUAL_DAILY_ACTIVITIES?: 85
SWELLING: I DO NOT HAVE THE SYMPTOM
GO UP STAIRS: ACTIVITY IS VERY DIFFICULT
GO DOWN STAIRS: ACTIVITY IS FAIRLY DIFFICULT
HOW_WOULD_YOU_RATE_THE_OVERALL_FUNCTION_OF_YOUR_KNEE_DURING_YOUR_USUAL_DAILY_ACTIVITIES?: ABNORMAL
AS_A_RESULT_OF_YOUR_KNEE_INJURY,_HOW_WOULD_YOU_RATE_YOUR_CURRENT_LEVEL_OF_DAILY_ACTIVITY?: NEARLY NORMAL
RAW_SCORE: 44.15
WEAKNESS: THE SYMPTOM AFFECTS MY ACTIVITY MODERATELY
PAIN: THE SYMPTOM AFFECTS MY ACTIVITY MODERATELY
WALK: ACTIVITY IS MINIMALLY DIFFICULT
LIMPING: I HAVE THE SYMPTOM BUT IT DOES NOT AFFECT MY ACTIVITY
RISE FROM A CHAIR: ACTIVITY IS MINIMALLY DIFFICULT
SQUAT: ACTIVITY IS SOMEWHAT DIFFICULT
SIT WITH YOUR KNEE BENT: ACTIVITY IS SOMEWHAT DIFFICULT

## 2021-12-15 RX ORDER — FUROSEMIDE 40 MG
TABLET ORAL
Qty: 90 TABLET | Refills: 0 | Status: SHIPPED | OUTPATIENT
Start: 2021-12-15 | End: 2022-01-31

## 2021-12-15 NOTE — TELEPHONE ENCOUNTER
Pending Prescriptions:                       Disp   Refills    furosemide (LASIX) 40 MG tablet [Pharmacy *90 tab*0        Sig: TAKE 1 TABLET DAILY    Routing refill request to provider for review/approval because:  BP Readings from Last 3 Encounters:   11/24/21 (!) 140/66   08/20/21 (!) 172/71   05/21/21 (!) 147/74

## 2021-12-17 ENCOUNTER — THERAPY VISIT (OUTPATIENT)
Dept: PHYSICAL THERAPY | Facility: CLINIC | Age: 76
End: 2021-12-17
Payer: MEDICARE

## 2021-12-17 DIAGNOSIS — I10 ESSENTIAL HYPERTENSION: ICD-10-CM

## 2021-12-17 DIAGNOSIS — M17.12 PRIMARY OSTEOARTHRITIS OF LEFT KNEE: ICD-10-CM

## 2021-12-17 PROCEDURE — 97110 THERAPEUTIC EXERCISES: CPT | Mod: GP

## 2021-12-17 PROCEDURE — 97035 APP MDLTY 1+ULTRASOUND EA 15: CPT | Mod: GP

## 2021-12-18 ENCOUNTER — HEALTH MAINTENANCE LETTER (OUTPATIENT)
Age: 76
End: 2021-12-18

## 2021-12-20 ENCOUNTER — OFFICE VISIT (OUTPATIENT)
Dept: INTERNAL MEDICINE | Facility: CLINIC | Age: 76
End: 2021-12-20
Payer: MEDICARE

## 2021-12-20 VITALS
TEMPERATURE: 97.7 F | WEIGHT: 203 LBS | HEART RATE: 85 BPM | DIASTOLIC BLOOD PRESSURE: 76 MMHG | SYSTOLIC BLOOD PRESSURE: 136 MMHG | HEIGHT: 70 IN | OXYGEN SATURATION: 98 % | BODY MASS INDEX: 29.06 KG/M2

## 2021-12-20 DIAGNOSIS — Z00.00 ENCOUNTER FOR PREVENTATIVE ADULT HEALTH CARE EXAMINATION: Primary | ICD-10-CM

## 2021-12-20 DIAGNOSIS — M25.562 LEFT KNEE PAIN, UNSPECIFIED CHRONICITY: ICD-10-CM

## 2021-12-20 DIAGNOSIS — E11.21 TYPE 2 DIABETES MELLITUS WITH DIABETIC NEPHROPATHY, WITHOUT LONG-TERM CURRENT USE OF INSULIN (H): ICD-10-CM

## 2021-12-20 DIAGNOSIS — I10 ESSENTIAL HYPERTENSION: ICD-10-CM

## 2021-12-20 DIAGNOSIS — N18.32 STAGE 3B CHRONIC KIDNEY DISEASE (H): ICD-10-CM

## 2021-12-20 DIAGNOSIS — H60.393 INFECTIVE OTITIS EXTERNA, BILATERAL: ICD-10-CM

## 2021-12-20 PROCEDURE — 99214 OFFICE O/P EST MOD 30 MIN: CPT | Mod: 25 | Performed by: INTERNAL MEDICINE

## 2021-12-20 PROCEDURE — 99207 PR FOOT EXAM NO CHARGE: CPT | Performed by: INTERNAL MEDICINE

## 2021-12-20 PROCEDURE — G0439 PPPS, SUBSEQ VISIT: HCPCS | Performed by: INTERNAL MEDICINE

## 2021-12-20 RX ORDER — METOPROLOL SUCCINATE 25 MG/1
TABLET, EXTENDED RELEASE ORAL
Qty: 90 TABLET | Refills: 3 | Status: ON HOLD | OUTPATIENT
Start: 2021-12-20 | End: 2022-11-05

## 2021-12-20 RX ORDER — NEOMYCIN SULFATE, POLYMYXIN B SULFATE, HYDROCORTISONE 3.5; 10000; 1 MG/ML; [USP'U]/ML; MG/ML
3 SOLUTION/ DROPS AURICULAR (OTIC) 4 TIMES DAILY
Qty: 10 ML | Refills: 0 | Status: ON HOLD | OUTPATIENT
Start: 2021-12-20 | End: 2022-11-03

## 2021-12-20 RX ORDER — NEOMYCIN SULFATE, POLYMYXIN B SULFATE, HYDROCORTISONE 3.5; 10000; 1 MG/ML; [USP'U]/ML; MG/ML
3 SOLUTION/ DROPS AURICULAR (OTIC) 4 TIMES DAILY
Qty: 10 ML | Refills: 0 | Status: SHIPPED | OUTPATIENT
Start: 2021-12-20 | End: 2021-12-20

## 2021-12-20 ASSESSMENT — ACTIVITIES OF DAILY LIVING (ADL): CURRENT_FUNCTION: NO ASSISTANCE NEEDED

## 2021-12-20 ASSESSMENT — MIFFLIN-ST. JEOR: SCORE: 1647.05
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
## 2021-12-20 NOTE — TELEPHONE ENCOUNTER
Routing refill request to provider for review/approval because:  Labs out of range:  bp  Nahid LAZAR RN, BSN

## 2021-12-30 DIAGNOSIS — I25.10 ASHD (ARTERIOSCLEROTIC HEART DISEASE): ICD-10-CM

## 2021-12-31 RX ORDER — CLOPIDOGREL BISULFATE 75 MG/1
TABLET ORAL
Qty: 90 TABLET | Refills: 3 | Status: SHIPPED | OUTPATIENT
Start: 2021-12-31 | End: 2023-01-30

## 2021-12-31 NOTE — TELEPHONE ENCOUNTER
Provider approval needed.     Hemoglobin   Date Value Ref Range Status   11/12/2021 11.3 (L) 13.3 - 17.7 g/dL Final   06/10/2021 11.3 (L) 13.3 - 17.7 g/dL Final   ]

## 2022-01-06 ENCOUNTER — THERAPY VISIT (OUTPATIENT)
Dept: PHYSICAL THERAPY | Facility: CLINIC | Age: 77
End: 2022-01-06
Payer: MEDICARE

## 2022-01-06 DIAGNOSIS — M17.12 PRIMARY OSTEOARTHRITIS OF LEFT KNEE: ICD-10-CM

## 2022-01-06 PROCEDURE — 97035 APP MDLTY 1+ULTRASOUND EA 15: CPT | Mod: GP | Performed by: PHYSICAL THERAPIST

## 2022-01-06 PROCEDURE — 97010 HOT OR COLD PACKS THERAPY: CPT | Mod: GP | Performed by: PHYSICAL THERAPIST

## 2022-01-06 PROCEDURE — 97110 THERAPEUTIC EXERCISES: CPT | Mod: GP | Performed by: PHYSICAL THERAPIST

## 2022-01-06 NOTE — PROGRESS NOTES
Subjective:  HPI  Physical Exam                    Objective:  System    Physical Exam    General     ROS    Assessment/Plan:    DISCHARGE REPORT    Progress reporting period is from 12/10/21 to 1/6/22 (3 visits).       SUBJECTIVE  Subjective changes noted by patient:  Ferdinand reports that he knee has improved.  He has no knee pain with ADL's, except ascending stairs.  He is not sure if this has also improved as he is avoiding stairs because of the knee pain..       Current pain level is 0/10  .     Previous pain level was  NA  .   Changes in function:  Yes (See Goal flowsheet attached for changes in current functional level)  Adverse reaction to treatment or activity: None    OBJECTIVE  Changes noted in objective findings:  No pain to palpation.  Good strength and control with exercises.  Had mild knee pain with 20 reps of step up exercise on 5 inch step.        ASSESSMENT/PLAN  Updated problem list and treatment plan: Diagnosis 1:  Medial compartment OA of the right knee    STG/LTGs have been met or progress has been made towards goals:  Yes (See Goal flow sheet completed today.)  Assessment of Progress: The patient's condition is improving.  Self Management Plans:  Patient has been instructed in a home treatment program.    Jeff continues to require the following intervention to meet STG and LTG's:  PT intervention is no longer required to meet STG/LTG.    Recommendations:  This patient is ready to be discharged from therapy and continue their home treatment program.    Please refer to the daily flowsheet for treatment today, total treatment time and time spent performing 1:1 timed codes.

## 2022-01-30 ENCOUNTER — MYC MEDICAL ADVICE (OUTPATIENT)
Dept: INTERNAL MEDICINE | Facility: CLINIC | Age: 77
End: 2022-01-30
Payer: MEDICARE

## 2022-01-30 DIAGNOSIS — R60.0 BILATERAL LEG EDEMA: ICD-10-CM

## 2022-01-30 DIAGNOSIS — L29.9 EAR ITCHING: ICD-10-CM

## 2022-01-30 DIAGNOSIS — R13.12 OROPHARYNGEAL DYSPHAGIA: ICD-10-CM

## 2022-01-30 DIAGNOSIS — E11.21 TYPE 2 DIABETES MELLITUS WITH DIABETIC NEPHROPATHY, WITHOUT LONG-TERM CURRENT USE OF INSULIN (H): ICD-10-CM

## 2022-01-30 DIAGNOSIS — E78.5 HYPERLIPIDEMIA LDL GOAL <100: ICD-10-CM

## 2022-01-31 RX ORDER — GLIMEPIRIDE 1 MG/1
TABLET ORAL
Qty: 180 TABLET | Refills: 1 | Status: SHIPPED | OUTPATIENT
Start: 2022-01-31 | End: 2022-09-13

## 2022-01-31 RX ORDER — OMEGA-3-ACID ETHYL ESTERS 1 G/1
CAPSULE, LIQUID FILLED ORAL
Qty: 180 CAPSULE | Refills: 3 | Status: SHIPPED | OUTPATIENT
Start: 2022-01-31 | End: 2023-01-30

## 2022-01-31 RX ORDER — FUROSEMIDE 40 MG
TABLET ORAL
Qty: 90 TABLET | Refills: 0 | Status: SHIPPED | OUTPATIENT
Start: 2022-01-31 | End: 2022-06-02

## 2022-01-31 RX ORDER — HYDROCORTISONE AND ACETIC ACID 20.75; 10.375 MG/ML; MG/ML
SOLUTION AURICULAR (OTIC)
Qty: 10 ML | Refills: 0 | Status: SHIPPED | OUTPATIENT
Start: 2022-01-31 | End: 2022-06-02

## 2022-01-31 RX ORDER — SIMVASTATIN 40 MG
TABLET ORAL
Qty: 90 TABLET | Refills: 1 | Status: SHIPPED | OUTPATIENT
Start: 2022-01-31 | End: 2022-06-02

## 2022-01-31 RX ORDER — FAMOTIDINE 40 MG/1
TABLET, FILM COATED ORAL
Qty: 90 TABLET | Refills: 3 | Status: SHIPPED | OUTPATIENT
Start: 2022-01-31 | End: 2023-03-09

## 2022-01-31 NOTE — TELEPHONE ENCOUNTER
Routing refill request to provider for review/approval because:  Drug not on the [facility] refill protocol   Labs not current:

## 2022-03-11 ENCOUNTER — MYC MEDICAL ADVICE (OUTPATIENT)
Dept: INTERNAL MEDICINE | Facility: CLINIC | Age: 77
End: 2022-03-11
Payer: MEDICARE

## 2022-04-09 ENCOUNTER — HEALTH MAINTENANCE LETTER (OUTPATIENT)
Age: 77
End: 2022-04-09

## 2022-04-19 ENCOUNTER — MYC MEDICAL ADVICE (OUTPATIENT)
Dept: INTERNAL MEDICINE | Facility: CLINIC | Age: 77
End: 2022-04-19
Payer: MEDICARE

## 2022-04-19 DIAGNOSIS — E11.21 TYPE 2 DIABETES MELLITUS WITH DIABETIC NEPHROPATHY, UNSPECIFIED WHETHER LONG TERM INSULIN USE (H): Primary | ICD-10-CM

## 2022-04-20 NOTE — TELEPHONE ENCOUNTER
See Fwd: Powerhart message. Please place orders.   He had Physical exam on 12/20/21.     11/15/21:  Your labs show mild anemia and decreased kidney function, follow up with nephrology.   Slightly worsened diabetic control. Try to improve diet, exercise. Increase Glimepiride to 2 mg daily. Recheck in 6 months.    Dr. Deleon    Return in about 6 months (around 6/20/2022) for Routine Visit.

## 2022-05-28 DIAGNOSIS — R60.0 BILATERAL LEG EDEMA: ICD-10-CM

## 2022-05-28 DIAGNOSIS — E78.5 HYPERLIPIDEMIA LDL GOAL <100: ICD-10-CM

## 2022-05-28 DIAGNOSIS — L29.9 EAR ITCHING: ICD-10-CM

## 2022-06-02 RX ORDER — SIMVASTATIN 40 MG
TABLET ORAL
Qty: 90 TABLET | Refills: 1 | Status: SHIPPED | OUTPATIENT
Start: 2022-06-02 | End: 2022-11-22

## 2022-06-02 RX ORDER — HYDROCORTISONE AND ACETIC ACID 20.75; 10.375 MG/ML; MG/ML
SOLUTION AURICULAR (OTIC)
Qty: 10 ML | Refills: 0 | Status: SHIPPED | OUTPATIENT
Start: 2022-06-02 | End: 2022-09-07

## 2022-06-02 RX ORDER — FUROSEMIDE 40 MG
TABLET ORAL
Qty: 90 TABLET | Refills: 0 | Status: SHIPPED | OUTPATIENT
Start: 2022-06-02 | End: 2022-09-07

## 2022-06-02 NOTE — TELEPHONE ENCOUNTER
Pending Prescriptions:                       Disp   Refills    acetic acid-hydrocortisone (VOSOL-HC) 1-2 *10 mL  0        Sig: INSTILL 1 DROP INTO BOTH   EARS TWO TIMES A DAY    furosemide (LASIX) 40 MG tablet [Pharmacy *90 tab*0        Sig: TAKE 1 TABLET DAILY    simvastatin (ZOCOR) 40 MG tablet [Pharmacy*90 tab*1        Sig: TAKE 1 TABLET AT BEDTIME    Routing refill request to provider for review/approval because:  Needs provider review

## 2022-06-06 ENCOUNTER — OFFICE VISIT (OUTPATIENT)
Dept: INTERNAL MEDICINE | Facility: CLINIC | Age: 77
End: 2022-06-06
Payer: MEDICARE

## 2022-06-06 DIAGNOSIS — E11.21 TYPE 2 DIABETES MELLITUS WITH DIABETIC NEPHROPATHY, WITHOUT LONG-TERM CURRENT USE OF INSULIN (H): ICD-10-CM

## 2022-06-06 DIAGNOSIS — M79.662 PAIN OF LEFT LOWER LEG: Primary | ICD-10-CM

## 2022-06-06 DIAGNOSIS — N18.32 STAGE 3B CHRONIC KIDNEY DISEASE (H): ICD-10-CM

## 2022-06-06 DIAGNOSIS — N25.81 HYPERPARATHYROIDISM DUE TO RENAL INSUFFICIENCY (H): ICD-10-CM

## 2022-06-06 PROCEDURE — 99213 OFFICE O/P EST LOW 20 MIN: CPT | Performed by: INTERNAL MEDICINE

## 2022-06-06 RX ORDER — LISINOPRIL 10 MG/1
10 TABLET ORAL DAILY
Status: CANCELLED | COMMUNITY
Start: 2022-06-06
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
## 2022-06-07 VITALS
OXYGEN SATURATION: 100 % | BODY MASS INDEX: 30.06 KG/M2 | TEMPERATURE: 98.1 F | WEIGHT: 210 LBS | SYSTOLIC BLOOD PRESSURE: 136 MMHG | HEART RATE: 45 BPM | DIASTOLIC BLOOD PRESSURE: 65 MMHG | HEIGHT: 70 IN

## 2022-06-07 PROBLEM — N25.81 HYPERPARATHYROIDISM DUE TO RENAL INSUFFICIENCY (H): Status: ACTIVE | Noted: 2022-06-07

## 2022-06-08 ENCOUNTER — TRANSFERRED RECORDS (OUTPATIENT)
Dept: INTERNAL MEDICINE | Facility: CLINIC | Age: 77
End: 2022-06-08

## 2022-06-08 LAB — RETINOPATHY: POSITIVE

## 2022-07-10 ENCOUNTER — TELEPHONE (OUTPATIENT)
Dept: INTERNAL MEDICINE | Facility: CLINIC | Age: 77
End: 2022-07-10

## 2022-07-10 NOTE — TELEPHONE ENCOUNTER
----- Message from Cj Freire MD sent at 7/7/2022 12:12 PM CDT -----  Regarding: Abnormal Labs  Abnormal labs noted on visit 1 in visit to of the clinical trial he is in.   Amylase visit 1: 286 and visit 2:  274.  Normal .   Lipase visit 1: 537 and visit 2: 665. Normal 7-60.     No hx of pancreatitis that I can see. He has CKD, HT and DM.     I wanted to make you aware in case you feel he needs further work up. He has not gotten study drug yet and these abnormalities are mild.

## 2022-07-26 ENCOUNTER — OFFICE VISIT (OUTPATIENT)
Dept: INTERNAL MEDICINE | Facility: CLINIC | Age: 77
End: 2022-07-26
Payer: MEDICARE

## 2022-07-26 VITALS
SYSTOLIC BLOOD PRESSURE: 146 MMHG | RESPIRATION RATE: 16 BRPM | BODY MASS INDEX: 28.77 KG/M2 | DIASTOLIC BLOOD PRESSURE: 58 MMHG | HEART RATE: 66 BPM | HEIGHT: 70 IN | OXYGEN SATURATION: 96 % | TEMPERATURE: 97.9 F | WEIGHT: 201 LBS

## 2022-07-26 DIAGNOSIS — Z13.220 SCREENING FOR HYPERLIPIDEMIA: ICD-10-CM

## 2022-07-26 DIAGNOSIS — S90.229A CONTUSION OF TOE WITH DAMAGE TO NAIL, UNSPECIFIED LATERALITY, UNSPECIFIED TOE, INITIAL ENCOUNTER: ICD-10-CM

## 2022-07-26 DIAGNOSIS — I10 ESSENTIAL HYPERTENSION: Primary | ICD-10-CM

## 2022-07-26 DIAGNOSIS — Z11.59 NEED FOR HEPATITIS C SCREENING TEST: ICD-10-CM

## 2022-07-26 DIAGNOSIS — M76.892 HAMSTRING TENDINITIS OF LEFT THIGH: ICD-10-CM

## 2022-07-26 PROCEDURE — G0439 PPPS, SUBSEQ VISIT: HCPCS | Performed by: INTERNAL MEDICINE

## 2022-07-26 RX ORDER — AMLODIPINE BESYLATE 5 MG/1
5 TABLET ORAL DAILY
Qty: 30 TABLET | Refills: 4 | Status: ON HOLD | OUTPATIENT
Start: 2022-07-26 | End: 2022-11-03
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
## 2022-08-03 ENCOUNTER — OFFICE VISIT (OUTPATIENT)
Dept: PODIATRY | Facility: CLINIC | Age: 77
End: 2022-08-03
Payer: MEDICARE

## 2022-08-03 VITALS — SYSTOLIC BLOOD PRESSURE: 138 MMHG | BODY MASS INDEX: 29.05 KG/M2 | DIASTOLIC BLOOD PRESSURE: 68 MMHG | WEIGHT: 201 LBS

## 2022-08-03 DIAGNOSIS — S90.229A CONTUSION OF TOE WITH DAMAGE TO NAIL, UNSPECIFIED LATERALITY, UNSPECIFIED TOE, INITIAL ENCOUNTER: ICD-10-CM

## 2022-08-03 DIAGNOSIS — E11.42 DIABETIC POLYNEUROPATHY ASSOCIATED WITH TYPE 2 DIABETES MELLITUS (H): ICD-10-CM

## 2022-08-03 DIAGNOSIS — B35.1 ONYCHOMYCOSIS OF TOENAIL: Primary | ICD-10-CM

## 2022-08-03 PROCEDURE — 99203 OFFICE O/P NEW LOW 30 MIN: CPT | Performed by: PODIATRIST

## 2022-08-03 RX ORDER — CICLOPIROX OLAMINE 7.7 MG/G
CREAM TOPICAL DAILY
Qty: 30 G | Refills: 6 | Status: SHIPPED | OUTPATIENT
Start: 2022-08-03 | End: 2023-11-14

## 2022-08-03 NOTE — PATIENT INSTRUCTIONS
Thank you for choosing [facility] Podiatry / Foot & Ankle Surgery!    DR STEWART'S CLINIC:  [facility]   [address]   [place] [address]      TRIAGE LINE: [phone]  APPOINTMENTS: [phone]  RADIOLOGY: [phone]  SET UP SURGERY: [phone]  FAX NUMBER: [phone]  BILLING QUESTIONS: [phone]       Follow up: as needed.       NAIL FUNGUS / ONYCHOMYCOSIS   Nail fungus is not a hygiene problem and will likely not lead to significant medical problems. The nails may get thick causing pain and possibly local skin infection. Treatments include debridement (trimming), oral antifungals, topical antifungals and complete removal of the nail. Most fungal nails are not treated.     Topicals such as tea tree oil can be helpful for surface fungus and may, at best, limit progression. Over the counter creams (such as Lamisil) can also be used however, their effectiveness is also quite low.  Topical treatment with Pen lac is expensive and often not covered by insurance. Pen lac has an approximate 8% success rate. Topical therapy recommendations is to apply twice a day for at least 3-4 months as it takes 9 months for new nail to grow out.     Experts suggest soaking your feet for 15 to 20 minutes in a mixture of 1 cup vinegar to 4 cups warm water. Be sure to rinse well and pat your feet dry when you're done. You can soak your feet like this daily. But if your skin becomes irritated, try soaking only two to three times a week. Vicks VapoRub, as with vinegar, there have been no controlled clinical trials to assess the effectiveness of Vicks VapoRub on nail fungus, but there have been numerous anecdotal reports that it works. There's no consensus on how often to apply this product, so check with your doctor before using it on your nails.      Oral therapies include Sporanox and Lamisil. Oral therapies are also expensive and not very effective. Side effects such as liver disease are the  "main concern. Return of fungus is common even if the treatment worked.      Other Tips:  - Penlac nail medication apply daily x 4 months; remove old polish first day of each week  - Antifungal cream/powder (Zeasorb) - apply daily to feet and shoes x 2 months  - Clean shoes with Lysol or in washing machine every few weeks  - Rotate shoe gear; give them 24 hours to dry out between days wearing them  - Clean pair of socks in morning, clean pair in afternoon if your feet sweat  - Shower shoes used in public showers/pools         DIABETES AND YOUR FEET  Diabetes can result in several problems in the feet including ulcers (open sores) and amputations. Two of the most important reasons why people develop foot problems when they have diabetes is : 1. Neuropathy (loss of feeling)  2. Vascular disease (loss or decrease of blood flow).    Neuropathy is a term used to describe a loss of nerve function.  Patients with diabetes are at risk of developing neuropathy if their sugars continue to run high and are above the normal value. One theory for neuropathy is that the \"extra\" sugar in the body enters the nerves and is broken down. These by-products build up in the nerve causing it to swell and impairing nerve function. Often times, this can be prevented by controlling your sugars, dieting and exercise.    When a person develops neuropathy, they usually begin to feel numbness or tingling in their feet and sometime in their legs.  Other symptoms may include painful burning or hot feet, tingling or feeling like insects or ants are crawling on your feet or legs.  If the diabetes is sever and the sugars run high for long periods of time, neuropathy can also occur in the hands.    Vascular disease  is a term used to describe a loss or decrease in circulation (blood flow). There is a problem in getting blood and oxygen to areas that need it. Similar to neuropathy, sugars can build up in the walls of the arteries (blood vessels) and " cause them to become swollen, thickened and hardened. This decreases the amount of blood that can go to an area that needs it. Though this is common in the legs of diabetic patients, it can also affect other arteries (blood vessels) in the body such as in the heart and eyes.    In the legs, vascular disease usually results in cramping. Patients who develop leg cramps after walking the same distance every time (i.e. One block, half a mile, ect.) need to let their doctors know so that their circulation may be checked. Cramps causing severe pain in the feet and/or legs while sleeping and the cramps go away when you stand or hang your legs off the side of the bed, may also be a sign of poor blood circulation.  Occasional cramping in cold weather or on rare occasions with activity may not be due to poor circulation, but you should inform your doctor.    PREVENTION OF THESE DISEASES  The key to prevention is good blood sugar control. Poor blood sugar control is a big reason many of these problems start. Physical activity (exercise) is a very good way to help decrease your blood sugars. Exercise can lower your blood sugar, blood pressure, and cholesterol. It also reduces your risk for heart disease and stroke, relieves stress, and strengthens your heart, muscles and bones.  In addition, regular activity helps insulin work better, improves your blood circulation, and keeps your joints flexible. If you're trying to lose weight, a combination of exercise and wise food choices can help you reach your target weight and maintain it.      PAIN MANAGEMENT (**Please speak with your primary doctor about any medications**)  1.Blood Sugar Control - Most important  2. Medications such as:  Amytriptylline, duloxetine, gabapentin, lyrica, tramadol (talk with your primary care doctor about this).     NUTRITION:  Nutrition is also important to help with healing. If your body does not have what it needs, it can't heal.   Increasing your  "protein intake is important.  With wounds you need 60-90gm of protein a day to help with healing. Over the counter protein shakes such as Zhou, Glucerna, Ensure, ect... can help to supplement your daily protein intake.   It is also important to take Vitamins to help with healing.  Vitamins such as B12, B6 and Vitamin D3 are important for healing. These can be gotten over the counter at pharmacies or at stores like [employer] or the [employer].    I can also prescribe a dietary supplement called \"Rheumate\" that has a lot of essential vitamins in one capsule.  This may not be covered by insurance though.     FOOT CARE RECOMMENDATIONS   1. Wash your feet with lukewarm water and a mild soap and then dry them thoroughly, especially between the toes.     2. Examine your feet daily looking for cuts, corns, blisters, cracks, ect, especially after wearing new shoes. Make sure to look between your toes. If you cannot see the bottom of your feet, set a mirror on the floor and hold your foot over it, or ask a spouse, friend or family member to examine your feet for you. Contact your doctor immediately if new problems are noted or if sores are not healing.     3. Immediately apply moisturizer to the tops and bottoms of your feet, avoiding areas between the toes. Hand lotion (Intesive Care, Alessandra, Eucerin, Neutrogena, Curel, ect) is sufficient unless your doctor prescribes a medicated lotion. Apply sunscreen to your feet when going swimming outside.     4. Use clean comfortable shoes, wear white socks (if you have any bleeding or drainage, you will see it on white socks). Socks should not have thick seams or cut off the circulation around the leg. Break in new shoes slowly and rotate with older shoes until broken in. Check the inside of your shoes with your hand to look for areas of irritation or objects that may have fallen into your shoes.       5. Keep slippers by the side of your bed for use during the night.     6.  Shoes " should be fitted by a professional and should not cause areas of irritation.  Check your feet regularly when wearing a new pair of shoes and replace them as needed.     7.  Talk to your doctor about proper exercise. Exercise and stretching stimulate blood flow to your feet and maintain proper glucose levels.     8.  Monitor your blood glucose level as instructed by your doctor. Notify your doctor immediately if your blood sugar is abnormally high or low.    9. Cut your nails straight across, but then gently round any sharp edges with a cardboard nail file. If you have neuropathy, peripheral vascular disease or cannot see that well to trim your own toenails contact [employer] ([phone]) or [employer] ([phone]).      THINGS TO AVOID DOING   1.  Do not soak your feet if you have an open sore. Use only lukewarm water and always check the temperature with your hand as hot water can easily burn your feet.       2.  Never use a hot water bottle or heating pad on your feet. Also do not apply cold compresses to your feet. With decreased sensation, you could burn or freeze your feet.       3.  Do not apply any of these to your feet:    -  Over the counter medicine for corns or warts    -  Harsh chemicals like boric acid    -  Do not self-treat corns, cuts, blisters or infections. Always consult your doctor.       4.  Do not wear sandals, slippers or walk barefoot, especially on hot sand or concrete or other harsh surfaces.     5.  If you smoke, stop!!!

## 2022-08-03 NOTE — PROGRESS NOTES
PATIENT HISTORY:  Dr. Deleon requested I see this patient for their foot issue.  Jeff Ricks is a 76 year old male who presents to clinic for nail contusion.  Patient notes that he is diabetic.  He does not have any pain.  He right second toe 6 weeks ago.  Denies injury.  Wants to make sure nothing is infected as he is concerned that his diabetes.    Review of Systems:  Patient denies fever, chills, rash, wound, stiffness, limping, weakness, heart burn, blood in stool, chest pain with activity, calf pain when walking, shortness of breath with activity, chronic cough, easy bleeding/bruising, swelling of ankles, excessive thirst, fatigue, depression, anxiety.  Patient admits to numbness.     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     Heart attack (H)      Hernia, abdominal      Hyperlipidaemia      Mumps      Obese      Other and unspecified hyperlipidemia      Other premature beats      Palpitations      Phlebitis and thrombophlebitis of other deep vessels of lower extremities 2000    has had 2-3 episodes     Stented coronary artery      4 stents     Syncope      Syncope      Thrombosis of leg      Type II or unspecified type diabetes mellitus without mention of complication, not stated as uncontrolled      Unspecified essential hypertension         PAST SURGICAL HISTORY:   Past Surgical History:   Procedure Laterality Date     ABDOMEN SURGERY      Hernia naval approx 25 years ago     BIOPSY  8/2016     CARDIAC SURGERY       COLONOSCOPY       CORONARY ANGIOGRAPHY ADULT ORDER  8/28/2000    75% distal LAD stenosis, 80% third diagonal stenosis, 75-80% mid ramus stenosis, ostial 60% stenosis in circumflex w/90% stenosis in distal  circumflex     CORONARY ANGIOGRAPHY ADULT ORDER  2003    4 stents placed     EP ABLATION / EP STUDIES  3/25/2014     HEART CATH, ANGIOPLASTY       HYDROCELECTOMY SCROTAL Right 10/6/2016    Procedure: HYDROCELECTOMY SCROTAL;  Surgeon: Jordan Chandra MD;  Location: [facility]     ORTHOPEDIC SURGERY  1990    Meniscus Orthoscopic surgery left knee.     TESTICLE SURGERY       ZZC NONSPECIFIC PROCEDURE      colonoscopy approx 1999 done elsewhere        MEDICATIONS:   Current Outpatient Medications:      acetic acid-hydrocortisone (VOSOL-HC) 1-2 % otic solution, INSTILL 1 DROP INTO BOTH   EARS TWO TIMES A DAY, Disp: 10 mL, Rfl: 0     amLODIPine (NORVASC) 5 MG tablet, Take 1 tablet (5 mg) by mouth daily, Disp: 30 tablet, Rfl: 4     Ascorbic Acid (VITAMIN C PO), Take by mouth daily, Disp: , Rfl:      aspirin (ASA) 81 MG tablet, Take 81 mg by mouth 2 times daily, Disp: , Rfl:      calcitRIOL (ROCALTROL) 0.25 MCG capsule, Take 0.25 mcg by mouth daily , Disp: , Rfl:      chlorhexidine (PERIDEX) 0.12 % solution, USE 1/2 OZ TO SWISH FOR 30 SECONDS ONCE D, Disp: , Rfl: 3     clopidogrel (PLAVIX) 75 MG tablet, TAKE 1 TABLET DAILY, Disp: 90 tablet, Rfl: 3     Cyanocobalamin (B-12) 1000 MCG CAPS, Take by mouth daily, Disp: , Rfl:      famotidine (PEPCID) 40 MG tablet, TAKE 1 TABLET DAILY, Disp: 90 tablet, Rfl: 3     furosemide (LASIX) 40 MG tablet, TAKE 1 TABLET DAILY, Disp: 90 tablet, Rfl: 0     glimepiride (AMARYL) 1 MG tablet, TAKE 2 TABLETS EVERY       MORNING BEFORE BREAKFAST, Disp: 180 tablet, Rfl: 1     hydrocortisone 2.5 % cream, APPLY TOPICALLY TWO TIMES ADAY, Disp: 28.35 g, Rfl: 11     metFORMIN (GLUCOPHAGE) 1000 MG tablet, TAKE 1 TABLET TWICE A DAY  WITH FOOD (MEALS), Disp: 180 tablet, Rfl: 0     metoprolol succinate ER (TOPROL-XL) 25 MG 24 hr tablet, TAKE 1 TABLET DAILY, Disp: 90 tablet, Rfl: 3     neomycin-polymyxin-hydrocortisone (CORTISPORIN) 3.5-34451-6 otic solution, Place 3 drops in ear(s) 4 times daily, Disp:  10 mL, Rfl: 0     nitroGLYcerin (NITROSTAT) 0.6 MG sublingual tablet, DISSOLVE 1 TABLET UNDER THETONGUE AS NEEDED., Disp: 100 tablet, Rfl: 0     omega-3 acid ethyl esters (LOVAZA) 1 g capsule, TAKE 1 CAPSULE TWICE DAILY, Disp: 180 capsule, Rfl: 3     Pyridoxine HCl (B-6) 100 MG TABS, Take by mouth daily, Disp: , Rfl:      simvastatin (ZOCOR) 40 MG tablet, TAKE 1 TABLET AT BEDTIME, Disp: 90 tablet, Rfl: 1     sitagliptin (JANUVIA) 25 MG tablet, Take 1 tablet (25 mg) by mouth daily, Disp: 90 tablet, Rfl: 3     VITAMIN E NATURAL PO, Take by mouth daily, Disp: , Rfl:   No current facility-administered medications for this visit.    Facility-Administered Medications Ordered in Other Visits:      gadobutrol (GADAVIST) injection 10 mL, 10 mL, Intravenous, Once, Jordan Chandra MD     ALLERGIES:    Allergies   Allergen Reactions     No Known Drug Allergies         SOCIAL HISTORY:   Social History     Socioeconomic History     Marital status:      Spouse name: Bethany     Number of children: 3     Years of education: Not on file     Highest education level: Not on file   Occupational History     Occupation: [employer]/     Employer: [employer]      Comment: [employer]   Tobacco Use     Smoking status: Former Smoker     Packs/day: 3.00     Years: 6.00     Pack years: 18.00     Types: Cigarettes, Cigars, Pipe     Start date: 1961     Quit date: 1967     Years since quittin.6     Smokeless tobacco: Never Used     Tobacco comment: quit 1960s   Vaping Use     Vaping Use: Never used   Substance and Sexual Activity     Alcohol use: Yes     Comment: 1 beer a week     Drug use: No     Sexual activity: Not Currently   Other Topics Concern     Parent/sibling w/ CABG, MI or angioplasty before 65F 55M? No   Social History Narrative     Not on file     Social Determinants of Health     Financial Resource Strain: Not on file   Food Insecurity: Not on file   Transportation Needs: Not on file   Physical  Activity: Not on file   Stress: Not on file   Social Connections: Not on file   Intimate Partner Violence: Not on file   Housing Stability: Not on file        FAMILY HISTORY:   Family History   Problem Relation Age of Onset     Musculoskeletal Disorder Mother         spinal stenosis     Cancer Mother         cancer kidney age 85     Hypertension Mother         Dead     Diabetes Father         Dead     Diabetes Brother         Type 1     Heart Disease Brother         EXAM:Vitals: /68   Wt 91.2 kg (201 lb)   BMI 29.05 kg/m    BMI= Body mass index is 29.05 kg/m .    A1C: 7.5    General appearance: Patient is alert and fully cooperative with history & exam.  No sign of distress is noted during the visit.     Psychiatric: Affect is pleasant & appropriate.  Patient appears motivated to improve health.     Respiratory: Breathing is regular & unlabored while sitting.     HEENT: Hearing is intact to spoken word.  Speech is clear.  No gross evidence of visual impairment that would impact ambulation.     Dermatologic: Dried subungual hematoma to the lateral aspect of the right second toenail.  No open lesions or signs of infection noted.     Vascular: DP & PT pulses are intact & regular bilaterally.  No significant edema or varicosities noted.  CFT and skin temperature is normal to both lower extremities.     Neurologic: Lower extremity sensation is diminished to feet.      Musculoskeletal: Patient is ambulatory without assistive device or brace.  No gross ankle deformity noted.  No foot or ankle joint effusion is noted.     ASSESSMENT:    Contusion of toe with damage to nail, unspecified laterality, unspecified toe, initial encounter  Onychomycosis of toenail  Diabetic polyneuropathy associated with type 2 diabetes mellitus (H)       Medical Decision Making/Plan:  Reviewed patient's chart in epic. Discussed causes and treatments of nail fungus.  Explained that even if a culture comes back negative, a patient could  still have nail fungus.  Discussed treatment options with patient and explained that there isn't one treatment that is 100% effective.  Discussed oral lamisil which is the most effective at about 70% but which can have liver effects.  Explained that if she wanted to try this that she would need serial blood draws to test her liver function.  Discussed over the counter antifungal creams.  Explained that these are about 50% effective and need to be applied once a day for about 6-8months.  Also talked about prescription penlac which is a nail laquer.  Again this is also only 50% effective.  Also discussed that if there was damage to the nail and the nail is now dystrophic that non of the above is going to change the nail.  If there was damage, there is note anything that can be done for the nail to correct it.  Discussed that if it becomes painful, we can remove the nail in clinic.        At this time recommends antifungal cream daily to the nail.  Next 6 to 12 months.  Discussed that if he notes any redness to call otherwise clinically there are no signs of infection.  All questions were answered to patient's satisfaction and he will call for the questions or concerns.    Patient risk factor: Patient is at low risk for infection.        Aaliyah Jimenez DPM, Podiatry/Foot and Ankle Surgery

## 2022-08-03 NOTE — LETTER
8/3/2022         RE: Jeff Ricks  [address]  [facility] [address]        Dear Colleague,    Thank you for referring your patient, Jeff Ricks, to the [facility]. Please see a copy of my visit note below.    PATIENT HISTORY:  Dr. Deleon requested I see this patient for their foot issue.  Jeff Ricks is a 76 year old male who presents to clinic for nail contusion.  Patient notes that he is diabetic.  He does not have any pain.  He right second toe 6 weeks ago.  Denies injury.  Wants to make sure nothing is infected as he is concerned that his diabetes.    Review of Systems:  Patient denies fever, chills, rash, wound, stiffness, limping, weakness, heart burn, blood in stool, chest pain with activity, calf pain when walking, shortness of breath with activity, chronic cough, easy bleeding/bruising, swelling of ankles, excessive thirst, fatigue, depression, anxiety.  Patient admits to numbness.     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     Heart attack (H)      Hernia, abdominal      Hyperlipidaemia      Mumps      Obese      Other and unspecified hyperlipidemia      Other premature beats      Palpitations      Phlebitis and thrombophlebitis of other deep vessels of lower extremities 2000    has had 2-3 episodes     Stented coronary artery      4 stents     Syncope      Syncope      Thrombosis of leg      Type II or unspecified type diabetes mellitus without mention of complication, not stated as uncontrolled      Unspecified essential hypertension         PAST SURGICAL HISTORY:   Past Surgical History:   Procedure Laterality Date     ABDOMEN SURGERY      Hernia naval approx  25 years ago     BIOPSY  8/2016     CARDIAC SURGERY       COLONOSCOPY       CORONARY ANGIOGRAPHY ADULT ORDER  8/28/2000    75% distal LAD stenosis, 80% third diagonal stenosis, 75-80% mid ramus stenosis, ostial 60% stenosis in circumflex w/90% stenosis in distal circumflex     CORONARY ANGIOGRAPHY ADULT ORDER  2003    4 stents placed     EP ABLATION / EP STUDIES  3/25/2014     HEART CATH, ANGIOPLASTY       HYDROCELECTOMY SCROTAL Right 10/6/2016    Procedure: HYDROCELECTOMY SCROTAL;  Surgeon: Jordan Chandra MD;  Location: [facility]     ORTHOPEDIC SURGERY  1990    Meniscus Orthoscopic surgery left knee.     TESTICLE SURGERY       ZZC NONSPECIFIC PROCEDURE      colonoscopy approx 1999 done elsewhere        MEDICATIONS:   Current Outpatient Medications:      acetic acid-hydrocortisone (VOSOL-HC) 1-2 % otic solution, INSTILL 1 DROP INTO BOTH   EARS TWO TIMES A DAY, Disp: 10 mL, Rfl: 0     amLODIPine (NORVASC) 5 MG tablet, Take 1 tablet (5 mg) by mouth daily, Disp: 30 tablet, Rfl: 4     Ascorbic Acid (VITAMIN C PO), Take by mouth daily, Disp: , Rfl:      aspirin (ASA) 81 MG tablet, Take 81 mg by mouth 2 times daily, Disp: , Rfl:      calcitRIOL (ROCALTROL) 0.25 MCG capsule, Take 0.25 mcg by mouth daily , Disp: , Rfl:      chlorhexidine (PERIDEX) 0.12 % solution, USE 1/2 OZ TO SWISH FOR 30 SECONDS ONCE D, Disp: , Rfl: 3     clopidogrel (PLAVIX) 75 MG tablet, TAKE 1 TABLET DAILY, Disp: 90 tablet, Rfl: 3     Cyanocobalamin (B-12) 1000 MCG CAPS, Take by mouth daily, Disp: , Rfl:      famotidine (PEPCID) 40 MG tablet, TAKE 1 TABLET DAILY, Disp: 90 tablet, Rfl: 3     furosemide (LASIX) 40 MG tablet, TAKE 1 TABLET DAILY, Disp: 90 tablet, Rfl: 0     glimepiride (AMARYL) 1 MG tablet, TAKE 2 TABLETS EVERY       MORNING BEFORE BREAKFAST, Disp: 180 tablet, Rfl: 1     hydrocortisone 2.5 % cream, APPLY TOPICALLY TWO TIMES ADAY, Disp: 28.35 g, Rfl: 11     metFORMIN (GLUCOPHAGE) 1000 MG tablet, TAKE 1 TABLET TWICE A DAY  WITH FOOD  (MEALS), Disp: 180 tablet, Rfl: 0     metoprolol succinate ER (TOPROL-XL) 25 MG 24 hr tablet, TAKE 1 TABLET DAILY, Disp: 90 tablet, Rfl: 3     neomycin-polymyxin-hydrocortisone (CORTISPORIN) 3.5-58844-0 otic solution, Place 3 drops in ear(s) 4 times daily, Disp: 10 mL, Rfl: 0     nitroGLYcerin (NITROSTAT) 0.6 MG sublingual tablet, DISSOLVE 1 TABLET UNDER THETONGUE AS NEEDED., Disp: 100 tablet, Rfl: 0     omega-3 acid ethyl esters (LOVAZA) 1 g capsule, TAKE 1 CAPSULE TWICE DAILY, Disp: 180 capsule, Rfl: 3     Pyridoxine HCl (B-6) 100 MG TABS, Take by mouth daily, Disp: , Rfl:      simvastatin (ZOCOR) 40 MG tablet, TAKE 1 TABLET AT BEDTIME, Disp: 90 tablet, Rfl: 1     sitagliptin (JANUVIA) 25 MG tablet, Take 1 tablet (25 mg) by mouth daily, Disp: 90 tablet, Rfl: 3     VITAMIN E NATURAL PO, Take by mouth daily, Disp: , Rfl:   No current facility-administered medications for this visit.    Facility-Administered Medications Ordered in Other Visits:      gadobutrol (GADAVIST) injection 10 mL, 10 mL, Intravenous, Once, Jordan Chandra MD     ALLERGIES:    Allergies   Allergen Reactions     No Known Drug Allergies         SOCIAL HISTORY:   Social History     Socioeconomic History     Marital status:      Spouse name: Bethany     Number of children: 3     Years of education: Not on file     Highest education level: Not on file   Occupational History     Occupation: Computers/     Employer: [employer]      Comment: [employer]   Tobacco Use     Smoking status: Former Smoker     Packs/day: 3.00     Years: 6.00     Pack years: 18.00     Types: Cigarettes, Cigars, Pipe     Start date: 1961     Quit date: 1967     Years since quittin.6     Smokeless tobacco: Never Used     Tobacco comment: quit    Vaping Use     Vaping Use: Never used   Substance and Sexual Activity     Alcohol use: Yes     Comment: 1 beer a week     Drug use: No     Sexual activity: Not Currently   Other Topics Concern      Parent/sibling w/ CABG, MI or angioplasty before 65F 55M? No   Social History Narrative     Not on file     Social Determinants of Health     Financial Resource Strain: Not on file   Food Insecurity: Not on file   Transportation Needs: Not on file   Physical Activity: Not on file   Stress: Not on file   Social Connections: Not on file   Intimate Partner Violence: Not on file   Housing Stability: Not on file        FAMILY HISTORY:   Family History   Problem Relation Age of Onset     Musculoskeletal Disorder Mother         spinal stenosis     Cancer Mother         cancer kidney age 85     Hypertension Mother         Dead     Diabetes Father         Dead     Diabetes Brother         Type 1     Heart Disease Brother         EXAM:Vitals: /68   Wt 91.2 kg (201 lb)   BMI 29.05 kg/m    BMI= Body mass index is 29.05 kg/m .    A1C: 7.5    General appearance: Patient is alert and fully cooperative with history & exam.  No sign of distress is noted during the visit.     Psychiatric: Affect is pleasant & appropriate.  Patient appears motivated to improve health.     Respiratory: Breathing is regular & unlabored while sitting.     HEENT: Hearing is intact to spoken word.  Speech is clear.  No gross evidence of visual impairment that would impact ambulation.     Dermatologic: Dried subungual hematoma to the lateral aspect of the right second toenail.  No open lesions or signs of infection noted.     Vascular: DP & PT pulses are intact & regular bilaterally.  No significant edema or varicosities noted.  CFT and skin temperature is normal to both lower extremities.     Neurologic: Lower extremity sensation is diminished to feet.      Musculoskeletal: Patient is ambulatory without assistive device or brace.  No gross ankle deformity noted.  No foot or ankle joint effusion is noted.     ASSESSMENT:    Contusion of toe with damage to nail, unspecified laterality, unspecified toe, initial encounter  Onychomycosis of  toenail  Diabetic polyneuropathy associated with type 2 diabetes mellitus (H)       Medical Decision Making/Plan:  Reviewed patient's chart in epic. Discussed causes and treatments of nail fungus.  Explained that even if a culture comes back negative, a patient could still have nail fungus.  Discussed treatment options with patient and explained that there isn't one treatment that is 100% effective.  Discussed oral lamisil which is the most effective at about 70% but which can have liver effects.  Explained that if she wanted to try this that she would need serial blood draws to test her liver function.  Discussed over the counter antifungal creams.  Explained that these are about 50% effective and need to be applied once a day for about 6-8months.  Also talked about prescription penlac which is a nail laquer.  Again this is also only 50% effective.  Also discussed that if there was damage to the nail and the nail is now dystrophic that non of the above is going to change the nail.  If there was damage, there is note anything that can be done for the nail to correct it.  Discussed that if it becomes painful, we can remove the nail in clinic.        At this time recommends antifungal cream daily to the nail.  Next 6 to 12 months.  Discussed that if he notes any redness to call otherwise clinically there are no signs of infection.  All questions were answered to patient's satisfaction and he will call for the questions or concerns.    Patient risk factor: Patient is at low risk for infection.        Aaliyah Jimenez DPM, Podiatry/Foot and Ankle Surgery        Again, thank you for allowing me to participate in the care of your patient.        Sincerely,        Aaliyah Jimenez DPM, Podiatry/Foot and Ankle Surgery

## 2022-08-17 ENCOUNTER — THERAPY VISIT (OUTPATIENT)
Dept: PHYSICAL THERAPY | Facility: CLINIC | Age: 77
End: 2022-08-17
Attending: INTERNAL MEDICINE
Payer: MEDICARE

## 2022-08-17 DIAGNOSIS — M76.892 HAMSTRING TENDINITIS OF LEFT THIGH: ICD-10-CM

## 2022-08-17 PROCEDURE — 97110 THERAPEUTIC EXERCISES: CPT | Mod: GP | Performed by: PHYSICAL THERAPIST

## 2022-08-17 PROCEDURE — 97161 PT EVAL LOW COMPLEX 20 MIN: CPT | Mod: GP | Performed by: PHYSICAL THERAPIST

## 2022-08-17 NOTE — PROGRESS NOTES
Physical Therapy Initial Evaluation  Subjective:  Onset of left hamstring pain 3-22 while ambulating. Pt has had intermittent pain since. Pt also notes right hip/ITB pain over the past 5 days secondary to compensating for the left lower extremity. Pt referred by MD for physical therapy on 7-26-22    The history is provided by the patient. No  was used.   Patient Health History  Jeff Ricks being seen for Hip, hamstring and knee pain both legs.       Problem occurred: No idea   Pain is reported as 5/10 on pain scale.  General health as reported by patient is good.       Medical allergies: none.    Other surgery history details: navel hernia (over 30 years ago), 4 stents.    Current medications:  Cardiac medication and high blood pressure medication.    Current occupation is IT Support.   Primary job tasks include:  Computer work.                  Therapist Generated HPI Evaluation         Type of problem:  Bilateral knees (L>R).    This is a chronic condition.  Condition occurred with:  Repetition/overuse.  Where condition occurred: in the community.  Site of Pain: upper half of left thigh, pt also notes right ITB pain.  Pain is described as sharp and cramping and is intermittent.  Pain is worse during the day.  Since onset symptoms are unchanged.  Associated symptoms:  Loss of motion/stiffness and loss of strength. Symptoms are exacerbated by walking, descending stairs, ascending stairs and bending/squatting (transfers, prolonged sitting )  and relieved by rest.  Special tests included:  X-ray (1 year ago).  Previous treatment includes physical therapy. There was none improvement following previous treatment.  Restrictions due to condition include:  Working in normal job without restrictions.  Barriers include:  None as reported by patient.                        Objective:        Flexibility/Screens:       Lower Extremity:  Decreased left lower extremity flexibility:IT Band and  Hamstrings    Decreased right lower extremity flexibility:  IT Band and Hamstrings                                                      Knee Evaluation:  ROM:    AROM    Hyperextension: Left:  0    Right:  Extension: Left: 0    Right:   Flexion: Left: 115   Right:  PROM    Hyperextension: Left: 0   Right:   Extension: Left: 0   Right:   Flexion: Left: 125   Right:       Strength:     Extension:  Left: 4+/5   Pain:        Flexion:  Left: 4/5   Weak/painful  Pain:            Ligament Testing:  Normal                Special Tests: Special test for knee: pain with left hip extension and bridging.      Palpation:  Palpation of knee: tenderness to palpation upper half of left biceps femoris.          Functional Testing:  : good balance.                  General     ROS    Assessment/Plan:    Patient is a 76 year old male with left side knee complaints.    Patient has the following significant findings with corresponding treatment plan.                Diagnosis 1:  Left hamstring strain  Pain -  hot/cold therapy, US, manual therapy, self management and education  Decreased ROM/flexibility - manual therapy, therapeutic exercise, therapeutic activity and home program  Decreased strength - therapeutic exercise, therapeutic activities and home program    Therapy Evaluation Codes:   1) History comprised of:   Personal factors that impact the plan of care:      None.    Comorbidity factors that impact the plan of care are:      Heart problems, High blood pressure and Weakness.     Medications impacting care: Cardiac and High blood pressure.  2) Examination of Body Systems comprised of:   Body structures and functions that impact the plan of care:      Knee.   Activity limitations that impact the plan of care are:      Sitting, Squatting/kneeling, Stairs, Standing and transgers.  3) Clinical presentation characteristics are:   Stable/Uncomplicated.  4) Decision-Making    Low complexity using standardized patient assessment  instrument and/or measureable assessment of functional outcome.  Cumulative Therapy Evaluation is: Low complexity.    Previous and current functional limitations:  (See Goal Flow Sheet for this information)    Short term and Long term goals: (See Goal Flow Sheet for this information)     Communication ability:  Patient appears to be able to clearly communicate and understand verbal and written communication and follow directions correctly.  Treatment Explanation - The following has been discussed with the patient:   RX ordered/plan of care  Anticipated outcomes  Possible risks and side effects  This patient would benefit from PT intervention to resume normal activities.   Rehab potential is good.    Frequency:  1 X week, once daily  Duration:  for 6 weeks  Discharge Plan:  Achieve all LTG.  Independent in home treatment program.  Reach maximal therapeutic benefit.    Please refer to the daily flowsheet for treatment today, total treatment time and time spent performing 1:1 timed codes.

## 2022-08-18 PROBLEM — M76.892 HAMSTRING TENDINITIS OF LEFT THIGH: Status: ACTIVE | Noted: 2022-08-18

## 2022-08-18 NOTE — PROGRESS NOTES
[facility]    OUTPATIENT Physical Therapy ORTHOPEDIC EVALUATION  PLAN OF TREATMENT FOR OUTPATIENT REHABILITATION  (COMPLETE FOR INITIAL CLAIMS ONLY)  Patient's Last Name, First Name, M.I.  YOB: 1945  Jeff Ricks    Provider s Name:  [facility]   Medical Record No.  [id number]   Start of Care Date:  08/17/22   Onset Date:   07/26/22   Type:     _X__PT   ___OT Medical Diagnosis:    Encounter Diagnosis   Name Primary?    Hamstring tendinitis of left thigh         Treatment Diagnosis:  left hamstring strain        Goals:     08/18/22 0500   Body Part   Goals listed below are for left hamstring   Goal #1   Goal #1 ambulation   Previous Functional Level No restrictions   Current Functional Level Minutes patient can walk   Performance Level 10 pain level 5   STG Target Performance Minutes patient will be able to walk   Performance Level 10 pain level 3   Rationale for safe household ambulation;for safe community ambulation;for safe work place ambulation;to promote a healthy and active lifestyle   Due Date 09/22/22    LTG Target Performance Minutes patient will be able to  walk   Performance Level 15 pain level 1   Rationale for safe household ambulation;for safe outdoor household ambulation;for safe community ambulation;for safe work place ambulation;to promote a healthy and active lifestyle   Due Date 10/20/22       Therapy Frequency:  1x/week  Predicted Duration of Therapy Intervention:  6 weeks    Piyush Lindsey, PT                 I CERTIFY THE NEED FOR THESE SERVICES FURNISHED UNDER        THIS PLAN OF TREATMENT AND WHILE UNDER MY CARE     (Physician attestation of this document indicates review and certification of the therapy plan).                     Certification Date From:  08/17/22   Certification Date To:  11/12/22    Referring Provider:  Guillermo WINTER  Adrienne    Initial Assessment        See Epic Evaluation SOC Date: 08/17/22

## 2022-09-04 DIAGNOSIS — R60.0 BILATERAL LEG EDEMA: ICD-10-CM

## 2022-09-04 DIAGNOSIS — L29.9 EAR ITCHING: ICD-10-CM

## 2022-09-07 RX ORDER — FUROSEMIDE 40 MG
TABLET ORAL
Qty: 90 TABLET | Refills: 0 | Status: SHIPPED | OUTPATIENT
Start: 2022-09-07 | End: 2022-12-20

## 2022-09-07 RX ORDER — HYDROCORTISONE AND ACETIC ACID 20.75; 10.375 MG/ML; MG/ML
SOLUTION AURICULAR (OTIC)
Qty: 10 ML | Refills: 0 | Status: SHIPPED | OUTPATIENT
Start: 2022-09-07

## 2022-09-07 NOTE — TELEPHONE ENCOUNTER
Pending Prescriptions:                       Disp   Refills    acetic acid-hydrocortisone (VOSOL-HC) 1-2 *10 mL  0        Sig: INSTILL 1 DROP INTO BOTH   EARS TWO TIMES A DAY    furosemide (LASIX) 40 MG tablet [Pharmacy *90 tab*0        Sig: TAKE 1 TABLET DAILY    Routing refill request to provider for review/approval because:  Drug not on the [facility] refill protocol

## 2022-09-08 ENCOUNTER — THERAPY VISIT (OUTPATIENT)
Dept: PHYSICAL THERAPY | Facility: CLINIC | Age: 77
End: 2022-09-08
Payer: MEDICARE

## 2022-09-08 DIAGNOSIS — M76.892 HAMSTRING TENDINITIS OF LEFT THIGH: Primary | ICD-10-CM

## 2022-09-08 PROCEDURE — 97110 THERAPEUTIC EXERCISES: CPT | Mod: GP | Performed by: PHYSICAL THERAPIST

## 2022-09-08 PROCEDURE — 97035 APP MDLTY 1+ULTRASOUND EA 15: CPT | Mod: GP | Performed by: PHYSICAL THERAPIST

## 2022-09-08 PROCEDURE — 97140 MANUAL THERAPY 1/> REGIONS: CPT | Mod: GP | Performed by: PHYSICAL THERAPIST

## 2022-09-08 NOTE — PROGRESS NOTES
Subjective:  HPI  Physical Exam                    Objective:  System    Physical Exam    General     ROS    Assessment/Plan:    SUBJECTIVE  Subjective changes as noted by pt:  Pt notes a decrease in frequency and intensity of pain. Pt has been using a foam roller at home which has been helpful    Current pain level: 3/10     Changes in function:  Pt still notes intermittent pain with ambulation     Adverse reaction to treatment or activity:  None    OBJECTIVE  Changes in objective findings:  Pt demonstrates improved flexibility and decreased point tenderness in the left hamstring.         ASSESSMENT  Jeff continues to require intervention to meet STG and LTG's: PT  Patient's symptoms are resolving.  Response to therapy has shown an improvement in  pain level and flexibility  Progress made towards STG/LTG?  Yes,     PLAN  Current treatment program is being advanced to more complex exercises.  The following procedures have been added:  manual therapy    PTA/ATC plan:  N/A    Please refer to the daily flowsheet for treatment today, total treatment time and time spent performing 1:1 timed codes.

## 2022-09-10 DIAGNOSIS — E11.21 TYPE 2 DIABETES MELLITUS WITH DIABETIC NEPHROPATHY, WITHOUT LONG-TERM CURRENT USE OF INSULIN (H): ICD-10-CM

## 2022-09-13 RX ORDER — GLIMEPIRIDE 1 MG/1
TABLET ORAL
Qty: 180 TABLET | Refills: 1 | Status: SHIPPED | OUTPATIENT
Start: 2022-09-13 | End: 2023-03-01

## 2022-09-13 NOTE — TELEPHONE ENCOUNTER
Pending Prescriptions:                       Disp   Refills    glimepiride (AMARYL) 1 MG tablet [Pharmacy*180 ta*1        Sig: TAKE 2 TABLETS EVERY       MORNING BEFORE BREAKFAST    Routing refill request to provider for review/approval because:  Needs provider review

## 2022-09-14 ENCOUNTER — THERAPY VISIT (OUTPATIENT)
Dept: PHYSICAL THERAPY | Facility: CLINIC | Age: 77
End: 2022-09-14
Payer: MEDICARE

## 2022-09-14 DIAGNOSIS — M76.892 HAMSTRING TENDINITIS OF LEFT THIGH: Primary | ICD-10-CM

## 2022-09-14 PROCEDURE — 97140 MANUAL THERAPY 1/> REGIONS: CPT | Mod: GP | Performed by: PHYSICAL THERAPIST

## 2022-09-14 PROCEDURE — 97035 APP MDLTY 1+ULTRASOUND EA 15: CPT | Mod: GP | Performed by: PHYSICAL THERAPIST

## 2022-09-14 PROCEDURE — 97110 THERAPEUTIC EXERCISES: CPT | Mod: GP | Performed by: PHYSICAL THERAPIST

## 2022-09-22 ENCOUNTER — THERAPY VISIT (OUTPATIENT)
Dept: PHYSICAL THERAPY | Facility: CLINIC | Age: 77
End: 2022-09-22
Payer: MEDICARE

## 2022-09-22 DIAGNOSIS — M76.892 HAMSTRING TENDINITIS OF LEFT THIGH: Primary | ICD-10-CM

## 2022-09-22 PROCEDURE — 97110 THERAPEUTIC EXERCISES: CPT | Mod: GP | Performed by: PHYSICAL THERAPIST

## 2022-09-22 NOTE — PROGRESS NOTES
Subjective:  HPI  Physical Exam                    Objective:  System    Physical Exam    General     ROS    Assessment/Plan:    SUBJECTIVE  Subjective changes as noted by pt:  Frequency of sharp pain in the hamstrings has decreased      Current pain level: 1/10     Changes in function:  Pt notes increased ease with ambulation     Adverse reaction to treatment or activity:  None    OBJECTIVE  Changes in objective findings:  Pt demonstrates improved strength and flexibility in the left hamstring. Pt notes decreased tenderness to palpation left biceps femoris        ASSESSMENT  Jeff continues to require intervention to meet STG and LTG's: PT  Patient's symptoms are resolving.  Response to therapy has shown an improvement in  pain level and function  Progress made towards STG/LTG?  Yes,     PLAN  Current treatment program is being advanced to more complex exercises.    PTA/ATC plan:  N/A    Please refer to the daily flowsheet for treatment today, total treatment time and time spent performing 1:1 timed codes.

## 2022-09-29 ENCOUNTER — THERAPY VISIT (OUTPATIENT)
Dept: PHYSICAL THERAPY | Facility: CLINIC | Age: 77
End: 2022-09-29
Payer: MEDICARE

## 2022-09-29 DIAGNOSIS — M76.892 HAMSTRING TENDINITIS OF LEFT THIGH: Primary | ICD-10-CM

## 2022-09-29 PROCEDURE — 97110 THERAPEUTIC EXERCISES: CPT | Mod: GP | Performed by: PHYSICAL THERAPIST

## 2022-10-09 ENCOUNTER — HEALTH MAINTENANCE LETTER (OUTPATIENT)
Age: 77
End: 2022-10-09

## 2022-10-14 ENCOUNTER — IMMUNIZATION (OUTPATIENT)
Dept: FAMILY MEDICINE | Facility: CLINIC | Age: 77
End: 2022-10-14
Payer: MEDICARE

## 2022-10-14 DIAGNOSIS — Z23 HIGH PRIORITY FOR 2019-NCOV VACCINE: Primary | ICD-10-CM

## 2022-10-14 PROCEDURE — 0124A COVID-19,PF,PFIZER BOOSTER BIVALENT: CPT

## 2022-10-14 PROCEDURE — 99207 PR NO CHARGE NURSE ONLY: CPT

## 2022-10-14 PROCEDURE — 91312 COVID-19,PF,PFIZER BOOSTER BIVALENT: CPT

## 2022-10-17 ENCOUNTER — LAB (OUTPATIENT)
Dept: LAB | Facility: CLINIC | Age: 77
End: 2022-10-17
Payer: MEDICARE

## 2022-10-17 DIAGNOSIS — Z11.59 NEED FOR HEPATITIS C SCREENING TEST: ICD-10-CM

## 2022-10-17 DIAGNOSIS — Z13.220 SCREENING FOR HYPERLIPIDEMIA: ICD-10-CM

## 2022-10-17 DIAGNOSIS — E11.21 TYPE 2 DIABETES MELLITUS WITH DIABETIC NEPHROPATHY, UNSPECIFIED WHETHER LONG TERM INSULIN USE (H): ICD-10-CM

## 2022-10-17 LAB
CHOLEST SERPL-MCNC: 84 MG/DL
FASTING STATUS PATIENT QL REPORTED: NORMAL
HBA1C MFR BLD: 5.8 % (ref 0–5.6)
HCV AB SERPL QL IA: NONREACTIVE
HDLC SERPL-MCNC: 47 MG/DL
LDLC SERPL CALC-MCNC: 26 MG/DL
NONHDLC SERPL-MCNC: 37 MG/DL
TRIGL SERPL-MCNC: 56 MG/DL

## 2022-10-17 PROCEDURE — 83036 HEMOGLOBIN GLYCOSYLATED A1C: CPT

## 2022-10-17 PROCEDURE — 36415 COLL VENOUS BLD VENIPUNCTURE: CPT

## 2022-10-17 PROCEDURE — 80061 LIPID PANEL: CPT

## 2022-10-17 PROCEDURE — 86803 HEPATITIS C AB TEST: CPT

## 2022-10-17 NOTE — LETTER
October 18, 2022      Jeff Ricks  [address] [facility] [address]  [place] [address]        Dear ,    We are writing to inform you of your test results.    Your labs were within normal limits.    Resulted Orders   Hemoglobin A1c   Result Value Ref Range    Hemoglobin A1C 5.8 (H) 0.0 - 5.6 %      Comment:      Normal <5.7%   Prediabetes 5.7-6.4%    Diabetes 6.5% or higher     Note: Adopted from ADA consensus guidelines.   Hepatitis C Screen Reflex to HCV RNA Quant and Genotype   Result Value Ref Range    Hepatitis C Antibody Nonreactive Nonreactive    Narrative    Assay performance characteristics have not been established for newborns, infants, and children.   Lipid panel reflex to direct LDL Non-fasting   Result Value Ref Range    Cholesterol 84 <200 mg/dL    Triglycerides 56 <150 mg/dL    Direct Measure HDL 47 >=40 mg/dL    LDL Cholesterol Calculated 26 <=100 mg/dL    Non HDL Cholesterol 37 <130 mg/dL    Patient Fasting > 8hrs? Unknown     Narrative    Cholesterol  Desirable:  <200 mg/dL    Triglycerides  Normal:  Less than 150 mg/dL  Borderline High:  150-199 mg/dL  High:  200-499 mg/dL  Very High:  Greater than or equal to 500 mg/dL    Direct Measure HDL  Female:  Greater than or equal to 50 mg/dL   Male:  Greater than or equal to 40 mg/dL    LDL Cholesterol  Desirable:  <100mg/dL  Above Desirable:  100-129 mg/dL   Borderline High:  130-159 mg/dL   High:  160-189 mg/dL   Very High:  >= 190 mg/dL    Non HDL Cholesterol  Desirable:  130 mg/dL  Above Desirable:  130-159 mg/dL  Borderline High:  160-189 mg/dL  High:  190-219 mg/dL  Very High:  Greater than or equal to 220 mg/dL       If you have any questions or concerns, please call the clinic at the number listed above.       Sincerely,      Guillermo Deleon MD        manav

## 2022-10-20 ENCOUNTER — THERAPY VISIT (OUTPATIENT)
Dept: PHYSICAL THERAPY | Facility: CLINIC | Age: 77
End: 2022-10-20
Payer: MEDICARE

## 2022-10-20 DIAGNOSIS — M76.892 HAMSTRING TENDINITIS OF LEFT THIGH: Primary | ICD-10-CM

## 2022-10-20 PROCEDURE — 97110 THERAPEUTIC EXERCISES: CPT | Mod: GP | Performed by: PHYSICAL THERAPIST

## 2022-11-03 ENCOUNTER — APPOINTMENT (OUTPATIENT)
Dept: MRI IMAGING | Facility: CLINIC | Age: 77
DRG: 684 | End: 2022-11-03
Attending: EMERGENCY MEDICINE
Payer: MEDICARE

## 2022-11-03 ENCOUNTER — APPOINTMENT (OUTPATIENT)
Dept: CT IMAGING | Facility: CLINIC | Age: 77
DRG: 684 | End: 2022-11-03
Attending: EMERGENCY MEDICINE
Payer: MEDICARE

## 2022-11-03 ENCOUNTER — NURSE TRIAGE (OUTPATIENT)
Dept: INTERNAL MEDICINE | Facility: CLINIC | Age: 77
End: 2022-11-03

## 2022-11-03 ENCOUNTER — HOSPITAL ENCOUNTER (INPATIENT)
Facility: CLINIC | Age: 77
LOS: 2 days | Discharge: HOME | DRG: 684 | End: 2022-11-05
Attending: EMERGENCY MEDICINE | Admitting: STUDENT IN AN ORGANIZED HEALTH CARE EDUCATION/TRAINING PROGRAM
Payer: MEDICARE

## 2022-11-03 DIAGNOSIS — R42 DIZZINESS: ICD-10-CM

## 2022-11-03 DIAGNOSIS — E87.5 HYPERKALEMIA: Primary | ICD-10-CM

## 2022-11-03 DIAGNOSIS — I10 ESSENTIAL HYPERTENSION: ICD-10-CM

## 2022-11-03 DIAGNOSIS — N17.9 ACUTE RENAL INJURY (H): ICD-10-CM

## 2022-11-03 LAB
ALBUMIN SERPL BCG-MCNC: 3.8 G/DL (ref 3.5–5.2)
ALP SERPL-CCNC: 55 U/L (ref 40–129)
ALT SERPL W P-5'-P-CCNC: 24 U/L (ref 10–50)
ANION GAP SERPL CALCULATED.3IONS-SCNC: 10 MMOL/L (ref 7–15)
ANION GAP SERPL CALCULATED.3IONS-SCNC: 11 MMOL/L (ref 7–15)
ANION GAP SERPL CALCULATED.3IONS-SCNC: 11 MMOL/L (ref 7–15)
AST SERPL W P-5'-P-CCNC: 43 U/L (ref 10–50)
BASOPHILS # BLD AUTO: 0 10E3/UL (ref 0–0.2)
BASOPHILS NFR BLD AUTO: 0 %
BILIRUB SERPL-MCNC: 0.4 MG/DL
BUN SERPL-MCNC: 34.5 MG/DL (ref 8–23)
BUN SERPL-MCNC: 35.9 MG/DL (ref 8–23)
BUN SERPL-MCNC: 37 MG/DL (ref 8–23)
CALCIUM SERPL-MCNC: 8.6 MG/DL (ref 8.8–10.2)
CALCIUM SERPL-MCNC: 8.6 MG/DL (ref 8.8–10.2)
CALCIUM SERPL-MCNC: 9.2 MG/DL (ref 8.8–10.2)
CHLORIDE SERPL-SCNC: 102 MMOL/L (ref 98–107)
CHLORIDE SERPL-SCNC: 104 MMOL/L (ref 98–107)
CHLORIDE SERPL-SCNC: 106 MMOL/L (ref 98–107)
CREAT SERPL-MCNC: 2.1 MG/DL (ref 0.67–1.17)
CREAT SERPL-MCNC: 2.1 MG/DL (ref 0.67–1.17)
CREAT SERPL-MCNC: 2.11 MG/DL (ref 0.67–1.17)
DEPRECATED HCO3 PLAS-SCNC: 23 MMOL/L (ref 22–29)
DEPRECATED HCO3 PLAS-SCNC: 24 MMOL/L (ref 22–29)
DEPRECATED HCO3 PLAS-SCNC: 25 MMOL/L (ref 22–29)
EOSINOPHIL # BLD AUTO: 0.1 10E3/UL (ref 0–0.7)
EOSINOPHIL NFR BLD AUTO: 1 %
ERYTHROCYTE [DISTWIDTH] IN BLOOD BY AUTOMATED COUNT: 13.5 % (ref 10–15)
GFR SERPL CREATININE-BSD FRML MDRD: 31 ML/MIN/1.73M2
GLUCOSE BLDC GLUCOMTR-MCNC: 177 MG/DL (ref 70–99)
GLUCOSE BLDC GLUCOMTR-MCNC: 98 MG/DL (ref 70–99)
GLUCOSE SERPL-MCNC: 140 MG/DL (ref 70–99)
GLUCOSE SERPL-MCNC: 72 MG/DL (ref 70–99)
GLUCOSE SERPL-MCNC: 93 MG/DL (ref 70–99)
HCT VFR BLD AUTO: 35.4 % (ref 40–53)
HGB BLD-MCNC: 11.3 G/DL (ref 13.3–17.7)
IMM GRANULOCYTES # BLD: 0 10E3/UL
IMM GRANULOCYTES NFR BLD: 1 %
LYMPHOCYTES # BLD AUTO: 1.1 10E3/UL (ref 0.8–5.3)
LYMPHOCYTES NFR BLD AUTO: 15 %
MAGNESIUM SERPL-MCNC: 1.4 MG/DL (ref 1.7–2.3)
MAGNESIUM SERPL-MCNC: 1.4 MG/DL (ref 1.7–2.3)
MAGNESIUM SERPL-MCNC: 1.5 MG/DL (ref 1.7–2.3)
MCH RBC QN AUTO: 30.8 PG (ref 26.5–33)
MCHC RBC AUTO-ENTMCNC: 31.9 G/DL (ref 31.5–36.5)
MCV RBC AUTO: 97 FL (ref 78–100)
MONOCYTES # BLD AUTO: 0.6 10E3/UL (ref 0–1.3)
MONOCYTES NFR BLD AUTO: 8 %
NEUTROPHILS # BLD AUTO: 5.5 10E3/UL (ref 1.6–8.3)
NEUTROPHILS NFR BLD AUTO: 75 %
NRBC # BLD AUTO: 0 10E3/UL
NRBC BLD AUTO-RTO: 0 /100
PLATELET # BLD AUTO: 199 10E3/UL (ref 150–450)
POTASSIUM SERPL-SCNC: 4.3 MMOL/L (ref 3.4–5.3)
POTASSIUM SERPL-SCNC: 4.7 MMOL/L (ref 3.4–5.3)
POTASSIUM SERPL-SCNC: 5.7 MMOL/L (ref 3.4–5.3)
PROT SERPL-MCNC: 6 G/DL (ref 6.4–8.3)
RBC # BLD AUTO: 3.67 10E6/UL (ref 4.4–5.9)
SARS-COV-2 RNA RESP QL NAA+PROBE: NEGATIVE
SODIUM SERPL-SCNC: 137 MMOL/L (ref 136–145)
SODIUM SERPL-SCNC: 138 MMOL/L (ref 136–145)
SODIUM SERPL-SCNC: 141 MMOL/L (ref 136–145)
TROPONIN T SERPL HS-MCNC: 47 NG/L
TROPONIN T SERPL HS-MCNC: 47 NG/L
WBC # BLD AUTO: 7.3 10E3/UL (ref 4–11)

## 2022-11-03 PROCEDURE — U0005 INFEC AGEN DETEC AMPLI PROBE: HCPCS | Performed by: EMERGENCY MEDICINE

## 2022-11-03 PROCEDURE — 93005 ELECTROCARDIOGRAM TRACING: CPT

## 2022-11-03 PROCEDURE — 250N000011 HC RX IP 250 OP 636: Performed by: STUDENT IN AN ORGANIZED HEALTH CARE EDUCATION/TRAINING PROGRAM

## 2022-11-03 PROCEDURE — C9803 HOPD COVID-19 SPEC COLLECT: HCPCS

## 2022-11-03 PROCEDURE — 250N000013 HC RX MED GY IP 250 OP 250 PS 637: Performed by: STUDENT IN AN ORGANIZED HEALTH CARE EDUCATION/TRAINING PROGRAM

## 2022-11-03 PROCEDURE — 96374 THER/PROPH/DIAG INJ IV PUSH: CPT

## 2022-11-03 PROCEDURE — 83735 ASSAY OF MAGNESIUM: CPT | Performed by: STUDENT IN AN ORGANIZED HEALTH CARE EDUCATION/TRAINING PROGRAM

## 2022-11-03 PROCEDURE — 258N000003 HC RX IP 258 OP 636: Performed by: EMERGENCY MEDICINE

## 2022-11-03 PROCEDURE — 36415 COLL VENOUS BLD VENIPUNCTURE: CPT | Performed by: EMERGENCY MEDICINE

## 2022-11-03 PROCEDURE — 36415 COLL VENOUS BLD VENIPUNCTURE: CPT | Performed by: STUDENT IN AN ORGANIZED HEALTH CARE EDUCATION/TRAINING PROGRAM

## 2022-11-03 PROCEDURE — G1010 CDSM STANSON: HCPCS

## 2022-11-03 PROCEDURE — 258N000003 HC RX IP 258 OP 636: Performed by: STUDENT IN AN ORGANIZED HEALTH CARE EDUCATION/TRAINING PROGRAM

## 2022-11-03 PROCEDURE — 82310 ASSAY OF CALCIUM: CPT | Performed by: STUDENT IN AN ORGANIZED HEALTH CARE EDUCATION/TRAINING PROGRAM

## 2022-11-03 PROCEDURE — 120N000001 HC R&B MED SURG/OB

## 2022-11-03 PROCEDURE — 70544 MR ANGIOGRAPHY HEAD W/O DYE: CPT | Mod: MG

## 2022-11-03 PROCEDURE — 84484 ASSAY OF TROPONIN QUANT: CPT | Performed by: EMERGENCY MEDICINE

## 2022-11-03 PROCEDURE — 96361 HYDRATE IV INFUSION ADD-ON: CPT

## 2022-11-03 PROCEDURE — 80053 COMPREHEN METABOLIC PANEL: CPT | Performed by: EMERGENCY MEDICINE

## 2022-11-03 PROCEDURE — 99285 EMERGENCY DEPT VISIT HI MDM: CPT | Mod: 25

## 2022-11-03 PROCEDURE — 250N000011 HC RX IP 250 OP 636: Performed by: EMERGENCY MEDICINE

## 2022-11-03 PROCEDURE — 83735 ASSAY OF MAGNESIUM: CPT | Performed by: EMERGENCY MEDICINE

## 2022-11-03 PROCEDURE — 99223 1ST HOSP IP/OBS HIGH 75: CPT | Mod: AI | Performed by: STUDENT IN AN ORGANIZED HEALTH CARE EDUCATION/TRAINING PROGRAM

## 2022-11-03 PROCEDURE — 85025 COMPLETE CBC W/AUTO DIFF WBC: CPT | Performed by: EMERGENCY MEDICINE

## 2022-11-03 RX ORDER — SODIUM CHLORIDE 9 MG/ML
INJECTION, SOLUTION INTRAVENOUS CONTINUOUS
Status: DISCONTINUED | OUTPATIENT
Start: 2022-11-03 | End: 2022-11-04

## 2022-11-03 RX ORDER — CLOPIDOGREL BISULFATE 75 MG/1
75 TABLET ORAL DAILY
Status: DISCONTINUED | OUTPATIENT
Start: 2022-11-04 | End: 2022-11-05 | Stop reason: HOSPADM

## 2022-11-03 RX ORDER — ONDANSETRON 4 MG/1
4 TABLET, ORALLY DISINTEGRATING ORAL EVERY 6 HOURS PRN
Status: DISCONTINUED | OUTPATIENT
Start: 2022-11-03 | End: 2022-11-05 | Stop reason: HOSPADM

## 2022-11-03 RX ORDER — GLIMEPIRIDE 1 MG/1
2 TABLET ORAL
Status: DISCONTINUED | OUTPATIENT
Start: 2022-11-04 | End: 2022-11-05 | Stop reason: HOSPADM

## 2022-11-03 RX ORDER — NICOTINE POLACRILEX 4 MG
15-30 LOZENGE BUCCAL
Status: DISCONTINUED | OUTPATIENT
Start: 2022-11-03 | End: 2022-11-05 | Stop reason: HOSPADM

## 2022-11-03 RX ORDER — SIMVASTATIN 20 MG
40 TABLET ORAL AT BEDTIME
Status: DISCONTINUED | OUTPATIENT
Start: 2022-11-03 | End: 2022-11-05 | Stop reason: HOSPADM

## 2022-11-03 RX ORDER — MAGNESIUM SULFATE HEPTAHYDRATE 40 MG/ML
4 INJECTION, SOLUTION INTRAVENOUS ONCE
Status: COMPLETED | OUTPATIENT
Start: 2022-11-03 | End: 2022-11-04

## 2022-11-03 RX ORDER — SODIUM CHLORIDE 9 MG/ML
INJECTION, SOLUTION INTRAVENOUS CONTINUOUS
Status: DISCONTINUED | OUTPATIENT
Start: 2022-11-03 | End: 2022-11-05 | Stop reason: HOSPADM

## 2022-11-03 RX ORDER — AMOXICILLIN 250 MG
2 CAPSULE ORAL 2 TIMES DAILY PRN
Status: DISCONTINUED | OUTPATIENT
Start: 2022-11-03 | End: 2022-11-05 | Stop reason: HOSPADM

## 2022-11-03 RX ORDER — ACETAMINOPHEN 650 MG/1
650 SUPPOSITORY RECTAL EVERY 6 HOURS PRN
Status: DISCONTINUED | OUTPATIENT
Start: 2022-11-03 | End: 2022-11-05 | Stop reason: HOSPADM

## 2022-11-03 RX ORDER — ACETAMINOPHEN 325 MG/1
650 TABLET ORAL EVERY 6 HOURS PRN
Status: DISCONTINUED | OUTPATIENT
Start: 2022-11-03 | End: 2022-11-05 | Stop reason: HOSPADM

## 2022-11-03 RX ORDER — AMOXICILLIN 250 MG
1 CAPSULE ORAL 2 TIMES DAILY PRN
Status: DISCONTINUED | OUTPATIENT
Start: 2022-11-03 | End: 2022-11-05 | Stop reason: HOSPADM

## 2022-11-03 RX ORDER — ASPIRIN 81 MG/1
81 TABLET ORAL 2 TIMES DAILY
Status: DISCONTINUED | OUTPATIENT
Start: 2022-11-03 | End: 2022-11-05 | Stop reason: HOSPADM

## 2022-11-03 RX ORDER — SODIUM CHLORIDE 9 MG/ML
INJECTION, SOLUTION INTRAVENOUS CONTINUOUS
Status: DISCONTINUED | OUTPATIENT
Start: 2022-11-03 | End: 2022-11-03

## 2022-11-03 RX ORDER — AMLODIPINE BESYLATE 5 MG/1
5 TABLET ORAL
Status: DISCONTINUED | OUTPATIENT
Start: 2022-11-03 | End: 2022-11-03

## 2022-11-03 RX ORDER — LIDOCAINE 40 MG/G
CREAM TOPICAL
Status: DISCONTINUED | OUTPATIENT
Start: 2022-11-03 | End: 2022-11-05 | Stop reason: HOSPADM

## 2022-11-03 RX ORDER — ONDANSETRON 2 MG/ML
4 INJECTION INTRAMUSCULAR; INTRAVENOUS EVERY 6 HOURS PRN
Status: DISCONTINUED | OUTPATIENT
Start: 2022-11-03 | End: 2022-11-05 | Stop reason: HOSPADM

## 2022-11-03 RX ORDER — LORAZEPAM 2 MG/ML
0.5 INJECTION INTRAMUSCULAR ONCE
Status: COMPLETED | OUTPATIENT
Start: 2022-11-03 | End: 2022-11-03

## 2022-11-03 RX ORDER — FAMOTIDINE 20 MG/1
40 TABLET, FILM COATED ORAL DAILY
Status: DISCONTINUED | OUTPATIENT
Start: 2022-11-04 | End: 2022-11-05 | Stop reason: HOSPADM

## 2022-11-03 RX ORDER — DEXTROSE MONOHYDRATE 25 G/50ML
25-50 INJECTION, SOLUTION INTRAVENOUS
Status: DISCONTINUED | OUTPATIENT
Start: 2022-11-03 | End: 2022-11-05 | Stop reason: HOSPADM

## 2022-11-03 RX ADMIN — SODIUM CHLORIDE: 9 INJECTION, SOLUTION INTRAVENOUS at 21:18

## 2022-11-03 RX ADMIN — SIMVASTATIN 40 MG: 20 TABLET, FILM COATED ORAL at 23:03

## 2022-11-03 RX ADMIN — ASPIRIN 81 MG: 81 TABLET, COATED ORAL at 23:03

## 2022-11-03 RX ADMIN — SODIUM ZIRCONIUM CYCLOSILICATE 5 G: 5 POWDER, FOR SUSPENSION ORAL at 21:36

## 2022-11-03 RX ADMIN — SODIUM CHLORIDE 1000 ML: 9 INJECTION, SOLUTION INTRAVENOUS at 14:34

## 2022-11-03 RX ADMIN — MAGNESIUM SULFATE HEPTAHYDRATE 4 G: 40 INJECTION, SOLUTION INTRAVENOUS at 22:54

## 2022-11-03 RX ADMIN — LORAZEPAM 0.5 MG: 2 INJECTION INTRAMUSCULAR; INTRAVENOUS at 18:21

## 2022-11-03 ASSESSMENT — ENCOUNTER SYMPTOMS
AGITATION: 0
NAUSEA: 1
EYE PAIN: 0
COLOR CHANGE: 0
SORE THROAT: 0
ABDOMINAL PAIN: 0
FEVER: 0
SHORTNESS OF BREATH: 0
CHILLS: 0
HEADACHES: 0
HEMATURIA: 0
DYSURIA: 0
VOMITING: 1
BLOOD IN STOOL: 0
COUGH: 0
NECK PAIN: 0
MYALGIAS: 1
DIAPHORESIS: 1
DIZZINESS: 1
RHINORRHEA: 1

## 2022-11-03 ASSESSMENT — ACTIVITIES OF DAILY LIVING (ADL)
TRANSFERRING: 0-->INDEPENDENT
ADLS_ACUITY_SCORE: 35
DRESS: 0-->INDEPENDENT
TRANSFERRING: 0-->ASSISTANCE NEEDED (DEVELOPMENTALLY APPROPRIATE)
DIFFICULTY_EATING/SWALLOWING: NO
HEARING_DIFFICULTY_OR_DEAF: NO
DRESSING/BATHING_DIFFICULTY: NO
DIFFICULTY_COMMUNICATING: NO
ADLS_ACUITY_SCORE: 35
CONCENTRATING,_REMEMBERING_OR_MAKING_DECISIONS_DIFFICULTY: NO
TOILETING_ISSUES: NO
ADLS_ACUITY_SCORE: 35
ADLS_ACUITY_SCORE: 35
VISION_MANAGEMENT: GLASSES
BATHING: 0-->INDEPENDENT
DRESS: 0-->INDEPENDENT
WEAR_GLASSES_OR_BLIND: YES
DOING_ERRANDS_INDEPENDENTLY_DIFFICULTY: NO
ADLS_ACUITY_SCORE: 20
CHANGE_IN_FUNCTIONAL_STATUS_SINCE_ONSET_OF_CURRENT_ILLNESS/INJURY: NO
ADLS_ACUITY_SCORE: 33
WALKING_OR_CLIMBING_STAIRS_DIFFICULTY: YES
WALKING_OR_CLIMBING_STAIRS: OTHER (SEE COMMENTS)
FALL_HISTORY_WITHIN_LAST_SIX_MONTHS: NO

## 2022-11-03 NOTE — ED TRIAGE NOTES
"Woke up at 0400 today to use the restroom and reports the room was spinning. Nausea. Diabetic, does not check sugars at home. Pt reports wife gave him a piece of candy at time of dizziness, symptoms \"somewhat resolved.\"  Pt reports symptoms are intermittent now. Denies blurred vision, weakness. Pt has eaten breakfast and candy PTA. Blood sugar 177 in triage. VSS. ABC's intact. A/Ox4.      "

## 2022-11-03 NOTE — ED PROVIDER NOTES
History   Chief Complaint:  Dizziness    The history is provided by the patient.      Jeff Ricks is a 77 year old male, on Plavix, with history of hypertension, type II diabetes, stage 3 CKD, prostate hyperplasia, and coronary atherosclerosis who presents with dizziness. The patient reports he woke up around 0400 and the room was spinning. States that he felt dizzy, was diaphoretic, and had one episode of emesis. Notes that his dizziness is worse with ambulation but not with isolated head movements. Adds that he has a runny nose, nausea, and has had more muscle cramps than his baseline recently. Of note, the patient has history of heart attack and has had 4 stents placements. Denies history of stroke, smoking, excessive alcohol consumptoin, and drug use. Denies fever, chills, congestion, cough, sore throat, chest pain, shortness of breath, abdominal pain, dysuria, hematuria, and blood in stool.    Review of Systems   Constitutional: Positive for diaphoresis. Negative for chills and fever.   HENT: Positive for rhinorrhea. Negative for congestion and sore throat.    Eyes: Negative for pain and visual disturbance.   Respiratory: Negative for cough and shortness of breath.    Cardiovascular: Negative for chest pain.   Gastrointestinal: Positive for nausea and vomiting. Negative for abdominal pain and blood in stool.   Genitourinary: Negative for dysuria and hematuria.   Musculoskeletal: Positive for myalgias (cramps). Negative for neck pain.   Skin: Negative for color change.   Neurological: Positive for dizziness. Negative for headaches.   Psychiatric/Behavioral: Negative for agitation and behavioral problems.   All other systems reviewed and are negative.    Allergies:  No Known Drug Allergies    Medications:  Amlodipine  Plavix  Famotidine  Furosemide  Glimepiride   Metformin  Metoprolol  Nitroglycerin  Simvastatin  Januvia  Aspirin 81 MG    Past Medical History:     Hypertension  Coronary atherosclerosis  Benign  localized hyperplasia of prostate with urinary obstruction  Hyperlipidemia  RBBB with left anterior fascicular block  Type II diabetes  Stage 3 CKD  Arthropathy of lumbosacral facet joint  Spondylolisthesis of lumbosacral region  DDD, lumbosacral  Hyperparathyroidism due to renal insufficiency   Hamstring tendinitis of left thigh  Anemia    Past Surgical History:    Hernia naval surgery  Biopsy  Coronary angiography  Coronary stent placement x 4  EP ablation/EP studies  Hydrocelectomy scrotal  Meniscus, left  Testicle surgery     Family History:    Mother- spinal stenosis, renal cancer, hypertension  Father- type I diabetes  Brother- heart disease    Social History:  Presents with spouse  Presents via private vehicle     Physical Exam     Patient Vitals for the past 24 hrs:   BP Temp Temp src Pulse Resp SpO2   11/03/22 1515 -- -- -- 57 19 100 %   11/03/22 1500 -- -- -- 53 15 99 %   11/03/22 1445 -- -- -- (!) 49 15 99 %   11/03/22 1435 -- -- -- (!) 47 25 99 %   11/03/22 1433 -- -- -- -- 23 100 %   11/03/22 1432 -- -- -- -- -- 99 %   11/03/22 1431 -- -- -- -- -- 97 %   11/03/22 1430 (!) 140/76 -- -- (!) 49 -- --   11/03/22 1103 127/58 98.7  F (37.1  C) Temporal 56 18 100 %     Physical Exam  Constitutional:       General: Not in acute distress.     Appearance: Normal appearance  HENT:      Head: Normocephalic and atraumatic.   Eyes:     Extraocular Movements: Extraocular movements intact.      Conjunctiva/sclera: Conjunctivae normal.      Pupils: Pupils are equal, round, and reactive to light.   Cardiovascular:     Rate and Rhythm: Normal rate and regular rhythm.   Pulmonary:      Effort: Pulmonary effort is normal.      Breath sounds: Normal breath sounds.   Abdominal:      General: Abdomen is flat. There is no distension.      Palpations: Abdomen is soft.      Tenderness: There is no abdominal tenderness.   Musculoskeletal:      Cervical back: Normal range of motion and neck supple. No rigidity.      General: No  swelling or deformity.   Skin:     General: Skin is warm and dry.   Neurological:      General: No focal deficit present.     NIHSS: Patient alert and keenly responsive. Able to answer month and age. Able and blink eyes and squeeze hands. No gaze palsy. No visual field deficits. No facial palsy. No arm drift bilaterally. No leg drift bilaterally. No limb ataxia. Able to complete finger nose finger and heel to shin. No sensory deficits. No language deficits/aphasia. No dysarthria. No extinction/inattention.      NIHSS score of 0.        Mental Status: Alert and oriented to person, place, and time.   Psychiatric:         Mood and Affect: Mood normal.         Behavior: Behavior normal.     Emergency Department Course   ECG  ECG taken at 1344, ECG read at 1345  Sinus bradycardia  Right bundle branch block  Left anterior fascicular block  Moderate voltage criteria for LVH, may be normal variant   Rate 50 bpm. ID interval 172 ms. QRS duration 142 ms. QT/QTc 444/404 ms. P-R-T axes 54 -53 -16.     Imaging:  CT Head w/o Contrast   Final Result   IMPRESSION:    1. No evidence of hemorrhage.   2. Scattered atherosclerotic plaquing including focal calcification   involving the basilar artery.      ADELSO HADLEY MD            SYSTEM ID:  [id number]      MR Brain w/o Contrast    (Results Pending)   MRA Neck (Carotids) wo Contrast    (Results Pending)   MRA Brain ([place]) wo Contrast    (Results Pending)     Report per radiology    Laboratory:  Labs Ordered and Resulted from Time of ED Arrival to Time of ED Departure   GLUCOSE BY METER - Abnormal       Result Value    GLUCOSE BY METER POCT 177 (*)    COMPREHENSIVE METABOLIC PANEL - Abnormal    Sodium 137      Potassium 5.7 (*)     Chloride 102      Carbon Dioxide (CO2) 24      Anion Gap 11      Urea Nitrogen 37.0 (*)     Creatinine 2.10 (*)     Calcium 9.2      Glucose 140 (*)     Alkaline Phosphatase 55      AST 43      ALT 24      Protein Total 6.0 (*)     Albumin 3.8       Bilirubin Total 0.4      GFR Estimate 31 (*)    TROPONIN T, HIGH SENSITIVITY - Abnormal    Troponin T, High Sensitivity 47 (*)    MAGNESIUM - Abnormal    Magnesium 1.5 (*)    CBC WITH PLATELETS AND DIFFERENTIAL - Abnormal    WBC Count 7.3      RBC Count 3.67 (*)     Hemoglobin 11.3 (*)     Hematocrit 35.4 (*)     MCV 97      MCH 30.8      MCHC 31.9      RDW 13.5      Platelet Count 199      % Neutrophils 75      % Lymphocytes 15      % Monocytes 8      % Eosinophils 1      % Basophils 0      % Immature Granulocytes 1      NRBCs per 100 WBC 0      Absolute Neutrophils 5.5      Absolute Lymphocytes 1.1      Absolute Monocytes 0.6      Absolute Eosinophils 0.1      Absolute Basophils 0.0      Absolute Immature Granulocytes 0.0      Absolute NRBCs 0.0     TROPONIN T, HIGH SENSITIVITY - Abnormal    Troponin T, High Sensitivity 47 (*)       Emergency Department Course:  Reviewed:  I reviewed nursing notes, vitals, past medical history and Care Everywhere    ED Course as of 22 191   Thu 2022   1400 Potassium(!): 5.7   1400 Creatinine(!): 2.10   1400 Troponin T, High Sensitivity(!): 47   1440 Pulse(!): 47   1440 CT Head w/o Contrast  1. No evidence of hemorrhage.  2. Scattered atherosclerotic plaquing including focal calcification  involving the basilar artery.   1604 Troponin T, High Sensitivity(!): 47  Plateaued    Updated patient on lab findings plan for admission.  Patient and wife voiced understanding agreement with plan.  MRI and MRA pending.    Discussed patient with hospitalist (Dr. Del Valle) who will admit patient to observation.  MRI imaging pending at time of admission.     Interventions:  1434 0.9% NS Bolus, 1000 mL, IV    Disposition:  The patient was admitted to the hospital under the care of Dr. Del Valle.     Impression & Plan   Medical Decision Makin-year-old male as described above presents to the emergency department with episode of room spinning dizziness upon waking, nausea,  vomiting, and associated diaphoresis.  Patient was amply stable to evaluation.  Afebrile.  Patient states that the dizziness does not appear to be associated with head movement or eye movement.  Patient states that he currently does not have dizziness, but it can come on unexpectedly.  NIHSS score of 0 at this time.  No truncal ataxia.  Patient has history of 4 prior cardiac stents.  Patient has been having some epigastric discomfort as well.  Differential diagnosis considered includes, but not limited to, ACS, posterior CVA, and BPPV versus vestibular neuritis/lumbar otitis.  Posterior CVA work-up ordered.  Cardiac work-up ordered.  Discussed care plan with patient and wife at bedside who voiced understanding and agreement with plan.  Answered all questions.  Additional work-up and orders as listed in chart.    Diagnosis:    ICD-10-CM    1. Hyperkalemia  E87.5       2. Acute renal injury (H)  N17.9       3. Dizziness  R42           Scribe Disclosure:  I, Maryjo Chu, am serving as a scribe at 2:08 PM on 11/3/2022 to document services personally performed by Bryant Baig DO based on my observations and the provider's statements to me.      Bryant Baig DO  11/03/22 1919

## 2022-11-03 NOTE — H&P
[facility]    History and Physical - Hospitalist Service       Date of Admission:  11/3/2022    Assessment & Plan      Jeff Ricks is a 77 year old male admitted on 11/3/2022.    77-year-old male with history of diabetes mellitus type 2, CAD with 4 coronary stents following an MI in 2003, DVT in 2000 and CKD who woke up this morning with dizziness, worse when standing up and associated with feeling of things spinning.  He had 1 episode of emesis.  He continued to have the symptoms throughout the day and decided to come to the ER.  He denies any headache, chest pain or ongoing abdominal pain.    Patient had mild stomach upset for the last 2 to 3 days but no associated fever, diarrhea or emesis.    Vitals on presentation: Heart rate 47, blood pressure 129/60, respiratory rate 13 with oxygen saturation of 99% on room air.  EKG shows sinus bradycardia with heart rate of 47.  BMP showed BUN/creatinine of 37/2.1 (baseline from a year ago was 16/1.7) with potassium of 5.7.  Troponin was 47, flat on recheck.  Blood glucose 177.  CBC unremarkable.  CT head showed scattered atherosclerotic plaquing with focal calcification involving the basilar artery.  MRI brain did not show any acute stroke.    Plan      1. Vertigo/dizziness.    Likely due to bradycardia and volume depletion due to overdiuresis as patient was not eating or drinking well over over the last few days due to stomach upset.    MRI brain did not show any acute stroke but showed only chronic microvascular changes.    Got NS bolus x1 L in the ER, continue NS@100 mL/h.    2. History of CAD    S/p 4 stents in 2003.    Continue aspirin and Plavix.    Troponin mildly elevated at 47 but flat on recheck.  I do not suspect ACS.    3. Diabetes mellitus..    Prior to admission on glimepiride 2 mg before breakfast, metformin 1000 mg twice daily and Januvia 25 mg daily.  Last HbA1c 5.8.    We will continue glimepiride plus sliding scale.  Hold  metformin and Januvia till a JOSE MIGUEL improves.    4. JOSE MIGUEL on CKD.    BMP showed BUN/creatinine of 37/2.1 (baseline from a year ago was 16/1.7)     Continue NS at 100 mL/h, recheck in the morning.    Hold Lasix.    5. Hyperkalemia.    Secondary to JOSE MIGUEL.    Recheck in the morning, not on any ACEI/ARB's    We will give Lokelma 5 mg in 2 days.    6. Essential hypertension.    Prior to admission on metoprolol 25 mg daily, amlodipine 5 mg daily and Lasix 40 mg daily.    Continue amlodipine, hold Lasix till JOSE MIGUEL improves.  With we will also decrease metoprolol to 12.5 mg daily due to bradycardia.    7. Sinus bradycardia.    Heart rate of 47, this might be responsible for the symptoms.  As per patient his heart rate is usually in 50s.  If he has repeated episodes of dizziness, he might need a Zio patch or event monitor.    Prior to admission on metoprolol 25 mg daily, will decrease to 12.5 mg daily with holding parameters to hold for heart rate of less than 50.    8. Abdominal upset.    Was likely stomach bug and does not have any symptoms at this point.  Does not need any further work-up.         Diet:  Regular diet  DVT Prophylaxis: Pneumatic Compression Devices  Garza Catheter: Not present  Central Lines: None  Cardiac Monitoring: None  Code Status:  Full code    Clinically Significant Risk Factors Present on Admission        # Hyperkalemia: Highest K = 5.7 mmol/L (Ref range: 3.4-5.3) in last 2 days, will monitor as appropriate     # Hypomagnesemia: Lowest Mg = 1.5 mg/dL (Ref range: 1.7-2.3) in last 2 days, will replace as needed     # Drug Induced Platelet Defect: home medication list includes an antiplatelet medication               Disposition Plan         The patient's care was discussed with the Patient.    Adolfo Del Valle MD  Hospitalist Service  [facility]  Securely message with the Vocera Web Console (learn more here)  Text page via [employer]Ed[employer] Paging/Directory    ______________________________________________________________________    Chief Complaint   Dizziness    History is obtained from the patient    History of Present Illness   Jeff Ricks is a 77 year old male admitted on 11/3/2022.    77-year-old male with history of diabetes mellitus type 2, CAD with 4 coronary stents following an MI in 2003, DVT in 2000 and CKD who woke up this morning with dizziness, worse when standing up and associated with feeling of things spinning.  He had 1 episode of emesis.  He continued to have the symptoms throughout the day and decided to come to the ER.  He denies any headache, chest pain or ongoing abdominal pain.    Patient had mild stomach upset for the last 2 to 3 days but no associated fever, diarrhea or emesis.    Vitals on presentation: Heart rate 47, blood pressure 129/60, respiratory rate 13 with oxygen saturation of 99% on room air.  EKG shows sinus bradycardia with heart rate of 47.  BMP showed BUN/creatinine of 37/2.1 (baseline from a year ago was 16/1.7) with potassium of 5.7.  Troponin was 47, flat on recheck.  Blood glucose 177.  CBC unremarkable.  CT head showed scattered atherosclerotic plaquing with focal calcification involving the basilar artery.  MRI brain did not show any acute stroke.        Review of Systems    The 10 point Review of Systems is negative other than noted in the HPI or here.     Past Medical History    I have reviewed this patient's medical history and updated it with pertinent information if needed.   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     Heart attack (H)      Hernia, abdominal      Hyperlipidaemia      Mumps      Obese      Other and unspecified hyperlipidemia      Other  premature beats      Palpitations      Phlebitis and thrombophlebitis of other deep vessels of lower extremities     has had 2-3 episodes     Stented coronary artery      4 stents     Syncope      Syncope      Thrombosis of leg      Type II or unspecified type diabetes mellitus without mention of complication, not stated as uncontrolled      Unspecified essential hypertension        Past Surgical History   I have reviewed this patient's surgical history and updated it with pertinent information if needed.  Past Surgical History:   Procedure Laterality Date     ABDOMEN SURGERY      Hernia naval approx 25 years ago     BIOPSY  2016     CARDIAC SURGERY       COLONOSCOPY       CORONARY ANGIOGRAPHY ADULT ORDER  2000    75% distal LAD stenosis, 80% third diagonal stenosis, 75-80% mid ramus stenosis, ostial 60% stenosis in circumflex w/90% stenosis in distal circumflex     CORONARY ANGIOGRAPHY ADULT ORDER      4 stents placed     EP ABLATION / EP STUDIES  3/25/2014     HEART CATH, ANGIOPLASTY       HYDROCELECTOMY SCROTAL Right 10/6/2016    Procedure: HYDROCELECTOMY SCROTAL;  Surgeon: Jordan Chandra MD;  Location: [facility]     ORTHOPEDIC SURGERY      Meniscus Orthoscopic surgery left knee.     TESTICLE SURGERY       ZZC NONSPECIFIC PROCEDURE      colonoscopy approx  done elsewhere       Social History   I have reviewed this patient's social history and updated it with pertinent information if needed.  Social History     Tobacco Use     Smoking status: Former     Packs/day: 3.00     Years: 6.00     Pack years: 18.00     Types: Cigarettes, Cigars, Pipe     Start date: 1961     Quit date: 1967     Years since quittin.8     Smokeless tobacco: Never     Tobacco comments:     quit    Vaping Use     Vaping Use: Never used   Substance Use Topics     Alcohol use: Yes     Comment: 1 beer a week     Drug use: No       Family History   I have reviewed this patient's family history and  updated it with pertinent information if needed.  Family History   Problem Relation Age of Onset     Musculoskeletal Disorder Mother         spinal stenosis     Cancer Mother         cancer kidney age 85     Hypertension Mother         Dead     Diabetes Father         Dead     Diabetes Brother         Type 1     Heart Disease Brother        Prior to Admission Medications   Prior to Admission Medications   Prescriptions Last Dose Informant Patient Reported? Taking?   Ascorbic Acid (VITAMIN C PO)   Yes No   Sig: Take by mouth daily   Cyanocobalamin (B-12) 1000 MCG CAPS   Yes No   Sig: Take by mouth daily   Pyridoxine HCl (B-6) 100 MG TABS   Yes No   Sig: Take by mouth daily   VITAMIN E NATURAL PO   Yes No   Sig: Take by mouth daily   acetic acid-hydrocortisone (VOSOL-HC) 1-2 % otic solution   No No   Sig: INSTILL 1 DROP INTO BOTH   EARS TWO TIMES A DAY   amLODIPine (NORVASC) 5 MG tablet   No No   Sig: Take 1 tablet (5 mg) by mouth daily   aspirin (ASA) 81 MG tablet   Yes No   Sig: Take 81 mg by mouth 2 times daily   calcitRIOL (ROCALTROL) 0.25 MCG capsule   Yes No   Sig: Take 0.25 mcg by mouth daily    chlorhexidine (PERIDEX) 0.12 % solution   Yes No   Sig: USE 1/2 OZ TO SWISH FOR 30 SECONDS ONCE D   ciclopirox (LOPROX) 0.77 % cream   No No   Sig: Apply topically daily Apply to affected nail daily at night   clopidogrel (PLAVIX) 75 MG tablet   No No   Sig: TAKE 1 TABLET DAILY   famotidine (PEPCID) 40 MG tablet   No No   Sig: TAKE 1 TABLET DAILY   furosemide (LASIX) 40 MG tablet   No No   Sig: TAKE 1 TABLET DAILY   glimepiride (AMARYL) 1 MG tablet   No No   Sig: TAKE 2 TABLETS EVERY       MORNING BEFORE BREAKFAST   hydrocortisone 2.5 % cream   No No   Sig: APPLY TOPICALLY TWO TIMES ADAY   metFORMIN (GLUCOPHAGE) 1000 MG tablet   No No   Sig: TAKE 1 TABLET TWICE A DAY  WITH FOOD (MEALS)   metoprolol succinate ER (TOPROL-XL) 25 MG 24 hr tablet   No No   Sig: TAKE 1 TABLET DAILY   neomycin-polymyxin-hydrocortisone  (CORTISPORIN) 3.5-28244-4 otic solution   No No   Sig: Place 3 drops in ear(s) 4 times daily   nitroGLYcerin (NITROSTAT) 0.6 MG sublingual tablet   No No   Sig: DISSOLVE 1 TABLET UNDER THETONGUE AS NEEDED.   omega-3 acid ethyl esters (LOVAZA) 1 g capsule   No No   Sig: TAKE 1 CAPSULE TWICE DAILY   simvastatin (ZOCOR) 40 MG tablet   No No   Sig: TAKE 1 TABLET AT BEDTIME   sitagliptin (JANUVIA) 25 MG tablet   No No   Sig: Take 1 tablet (25 mg) by mouth daily      Facility-Administered Medications: None     Allergies   Allergies   Allergen Reactions     No Known Drug Allergies        Physical Exam   Vital Signs: Temp: 98.7  F (37.1  C) Temp src: Temporal BP: 117/56 Pulse: (!) 47   Resp: 14 SpO2: 99 % O2 Device: None (Room air)    Weight: 0 lbs 0 oz    General Appearance: Alert awake and oriented x3.  Eyes: No icterus  HEENT: Dry mucosa  Respiratory: Clear to auscultation  Cardiovascular: S1 and S2 is normal  GI: Soft and nontender  Lymph/Hematologic: Not examined  Genitourinary: Not examined  Skin: No rash  Musculoskeletal: No edema  Neurologic: Nonfocal  Psychiatric: Normal mood      Data   Data reviewed today: I reviewed all medications, new labs and imaging results over the last 24 hours.     Recent Labs   Lab 11/03/22  1518 11/03/22  1312 11/03/22  1101   WBC  --  7.3  --    HGB  --  11.3*  --    MCV  --  97  --    PLT  --  199  --     137  --    POTASSIUM 4.3 5.7*  --    CHLORIDE 104 102  --    CO2 23 24  --    BUN 35.9* 37.0*  --    CR 2.10* 2.10*  --    ANIONGAP 11 11  --    PETE 8.6* 9.2  --    GLC 93 140* 177*   ALBUMIN  --  3.8  --    PROTTOTAL  --  6.0*  --    BILITOTAL  --  0.4  --    ALKPHOS  --  55  --    ALT  --  24  --    AST  --  43  --      Recent Results (from the past 24 hour(s))   CT Head w/o Contrast    Narrative    CT SCAN OF THE HEAD WITHOUT CONTRAST   11/3/2022 2:23 PM     HISTORY: Dizziness/CVA evaluation.    TECHNIQUE:  Axial images of the head and coronal reformations without  IV  contrast material. Radiation dose for this scan was reduced using  automated exposure control, adjustment of the mA and/or kV according  to patient size, or iterative reconstruction technique.    COMPARISON: None.    FINDINGS:  No evidence of hemorrhage. Volume loss and white matter  hypoattenuation likely represent chronic small vessel ischemic change.  Scattered atherosclerotic plaquing including focal calcification  involving the superior basilar artery. No acute osseous abnormality.  Presumed cerumen within the left external auditory canal.      Impression    IMPRESSION:   1. No evidence of hemorrhage.  2. Scattered atherosclerotic plaquing including focal calcification  involving the basilar artery.    ADELSO HADLEY MD         SYSTEM ID:  [id number]   MR Brain w/o Contrast    Narrative    EXAM: MR BRAIN W/O CONTRAST, MRA BRAIN ([place]) W/O CONTRAST, MRA NECK (CAROTIDS) W/O CONTRAST  LOCATION: [facility]  DATE/TIME: 11/3/2022 7:29 PM    INDICATION: Dizziness posterior CVA evaluation.  COMPARISON: None.  TECHNIQUE:   1) Routine multiplanar multisequence head MRI without intravenous contrast.  2) 3D time-of-flight head MRA without intravenous contrast.  3) Neck MRA without IV contrast. Stenosis measurements made according to NASCET criteria unless otherwise specified.      FINDINGS:  HEAD MRI:  INTRACRANIAL CONTENTS: No acute or subacute infarct. No mass, acute hemorrhage, or extra-axial fluid collections. Scattered nonspecific T2/FLAIR hyperintensities within the cerebral white matter most consistent with mild chronic microvascular ischemic   change. Mild generalized cerebral atrophy. No hydrocephalus. Normal position of the cerebellar tonsils.     SELLA: No abnormality accounting for technique.    OSSEOUS STRUCTURES/SOFT TISSUES: Normal marrow signal. The major intracranial vascular flow voids are maintained.     ORBITS: No abnormality accounting for technique.      SINUSES/MASTOIDS: No paranasal sinus mucosal disease. No middle ear or mastoid effusion.     HEAD MRA:   ANTERIOR CIRCULATION: No stenosis/occlusion, aneurysm, or high flow vascular malformation. Standard [place] of [place] anatomy.    POSTERIOR CIRCULATION: No stenosis/occlusion, aneurysm, or high flow vascular malformation. Probable small fenestration within the mid to distal basilar artery, best seen on 3-D reconstruction. Balanced vertebral arteries supply a normal basilar artery.     NECK MRA:   RIGHT CAROTID: No measurable stenosis or dissection.    LEFT CAROTID: Mild to moderate plaque at the carotid bifurcation No measurable stenosis or dissection.    VERTEBRAL ARTERIES: No focal stenosis or dissection. Balanced vertebral arteries.    AORTIC ARCH: Classic aortic arch anatomy with no significant stenosis at the origin of the great vessels.      Impression    IMPRESSION:  HEAD MRI:   1.  No acute intracranial process.  2.  Mild generalized brain atrophy and presumed microvascular ischemic changes as detailed above.    HEAD MRA:   1.  No large artery stenosis or occlusion. No aneurysm.    NECK MRA:  1.  No measurable carotid or vertebral artery stenosis.   MRA Neck (Carotids) wo Contrast    Narrative    EXAM: MR BRAIN W/O CONTRAST, MRA BRAIN ([place]) W/O CONTRAST, MRA NECK (CAROTIDS) W/O CONTRAST  LOCATION: [facility]  DATE/TIME: 11/3/2022 7:29 PM    INDICATION: Dizziness posterior CVA evaluation.  COMPARISON: None.  TECHNIQUE:   1) Routine multiplanar multisequence head MRI without intravenous contrast.  2) 3D time-of-flight head MRA without intravenous contrast.  3) Neck MRA without IV contrast. Stenosis measurements made according to NASCET criteria unless otherwise specified.      FINDINGS:  HEAD MRI:  INTRACRANIAL CONTENTS: No acute or subacute infarct. No mass, acute hemorrhage, or extra-axial fluid collections. Scattered nonspecific T2/FLAIR hyperintensities within the  cerebral white matter most consistent with mild chronic microvascular ischemic   change. Mild generalized cerebral atrophy. No hydrocephalus. Normal position of the cerebellar tonsils.     SELLA: No abnormality accounting for technique.    OSSEOUS STRUCTURES/SOFT TISSUES: Normal marrow signal. The major intracranial vascular flow voids are maintained.     ORBITS: No abnormality accounting for technique.     SINUSES/MASTOIDS: No paranasal sinus mucosal disease. No middle ear or mastoid effusion.     HEAD MRA:   ANTERIOR CIRCULATION: No stenosis/occlusion, aneurysm, or high flow vascular malformation. Standard [place] of [place] anatomy.    POSTERIOR CIRCULATION: No stenosis/occlusion, aneurysm, or high flow vascular malformation. Probable small fenestration within the mid to distal basilar artery, best seen on 3-D reconstruction. Balanced vertebral arteries supply a normal basilar artery.     NECK MRA:   RIGHT CAROTID: No measurable stenosis or dissection.    LEFT CAROTID: Mild to moderate plaque at the carotid bifurcation No measurable stenosis or dissection.    VERTEBRAL ARTERIES: No focal stenosis or dissection. Balanced vertebral arteries.    AORTIC ARCH: Classic aortic arch anatomy with no significant stenosis at the origin of the great vessels.      Impression    IMPRESSION:  HEAD MRI:   1.  No acute intracranial process.  2.  Mild generalized brain atrophy and presumed microvascular ischemic changes as detailed above.    HEAD MRA:   1.  No large artery stenosis or occlusion. No aneurysm.    NECK MRA:  1.  No measurable carotid or vertebral artery stenosis.   MRA Brain ([place] of [place]) wo Contrast    Narrative    EXAM: MR BRAIN W/O CONTRAST, MRA BRAIN ([place]) W/O CONTRAST, MRA NECK (CAROTIDS) W/O CONTRAST  LOCATION: [facility]  DATE/TIME: 11/3/2022 7:29 PM    INDICATION: Dizziness posterior CVA evaluation.  COMPARISON: None.  TECHNIQUE:   1) Routine multiplanar multisequence  head MRI without intravenous contrast.  2) 3D time-of-flight head MRA without intravenous contrast.  3) Neck MRA without IV contrast. Stenosis measurements made according to NASCET criteria unless otherwise specified.      FINDINGS:  HEAD MRI:  INTRACRANIAL CONTENTS: No acute or subacute infarct. No mass, acute hemorrhage, or extra-axial fluid collections. Scattered nonspecific T2/FLAIR hyperintensities within the cerebral white matter most consistent with mild chronic microvascular ischemic   change. Mild generalized cerebral atrophy. No hydrocephalus. Normal position of the cerebellar tonsils.     SELLA: No abnormality accounting for technique.    OSSEOUS STRUCTURES/SOFT TISSUES: Normal marrow signal. The major intracranial vascular flow voids are maintained.     ORBITS: No abnormality accounting for technique.     SINUSES/MASTOIDS: No paranasal sinus mucosal disease. No middle ear or mastoid effusion.     HEAD MRA:   ANTERIOR CIRCULATION: No stenosis/occlusion, aneurysm, or high flow vascular malformation. Standard [place] of [place] anatomy.    POSTERIOR CIRCULATION: No stenosis/occlusion, aneurysm, or high flow vascular malformation. Probable small fenestration within the mid to distal basilar artery, best seen on 3-D reconstruction. Balanced vertebral arteries supply a normal basilar artery.     NECK MRA:   RIGHT CAROTID: No measurable stenosis or dissection.    LEFT CAROTID: Mild to moderate plaque at the carotid bifurcation No measurable stenosis or dissection.    VERTEBRAL ARTERIES: No focal stenosis or dissection. Balanced vertebral arteries.    AORTIC ARCH: Classic aortic arch anatomy with no significant stenosis at the origin of the great vessels.      Impression    IMPRESSION:  HEAD MRI:   1.  No acute intracranial process.  2.  Mild generalized brain atrophy and presumed microvascular ischemic changes as detailed above.    HEAD MRA:   1.  No large artery stenosis or occlusion. No aneurysm.    NECK  MRA:  1.  No measurable carotid or vertebral artery stenosis.

## 2022-11-03 NOTE — ED NOTES
"RECEIVING UNIT ED HANDOFF REVIEW    [facility]  ED Nurse Handoff Report    Jeff Ricks is a 77 year old male   ED Chief complaint: No chief complaint on file.  . ED Diagnosis:   Final diagnoses:   Hyperkalemia   Acute renal injury (H)   Dizziness     Allergies:   Allergies   Allergen Reactions     No Known Drug Allergies        Code Status: Prior  Activity level - Baseline/Home:  Independent. Activity Level - Current:   Stand by Assist. Lift room needed: No. Bariatric: No   Needed: No   Isolation: No. Infection: Not Applicable.     Vital Signs:   Vitals:    11/03/22 1806 11/03/22 1807 11/03/22 1808 11/03/22 1809   BP:       Pulse: (!) 48 (!) 47 (!) 48 (!) 47   Resp: 14 16 14 14   Temp:       TempSrc:       SpO2: 99% 98% 99% 99%       Cardiac Rhythm:  ,      Pain level:    Patient confused: No. Patient Falls Risk: Yes.   Elimination Status: Has voided   Patient Report - Initial Complaint:    Woke up at 0400 today to use the restroom and reports the room was spinning. Nausea. Diabetic, does not check sugars at home. Pt reports wife gave him a piece of candy at time of dizziness, symptoms \"somewhat resolved.\"  Pt reports symptoms are intermittent now. Denies blurred vision, weakness. Pt has eaten breakfast and candy PTA. Blood sugar 177 in triage. VSS. ABC's intact. A/Ox4.      . Focused Assessment: Dizziness at rest, gets better when he is up moving.    Tests Performed: MRI, CT, Labs. Abnormal Results:   Labs Ordered and Resulted from Time of ED Arrival to Time of ED Departure   GLUCOSE BY METER - Abnormal       Result Value    GLUCOSE BY METER POCT 177 (*)    COMPREHENSIVE METABOLIC PANEL - Abnormal    Sodium 137      Potassium 5.7 (*)     Chloride 102      Carbon Dioxide (CO2) 24      Anion Gap 11      Urea Nitrogen 37.0 (*)     Creatinine 2.10 (*)     Calcium 9.2      Glucose 140 (*)     Alkaline Phosphatase 55      AST 43      ALT 24      Protein Total 6.0 (*)     Albumin 3.8      " Bilirubin Total 0.4      GFR Estimate 31 (*)    TROPONIN T, HIGH SENSITIVITY - Abnormal    Troponin T, High Sensitivity 47 (*)    MAGNESIUM - Abnormal    Magnesium 1.5 (*)    CBC WITH PLATELETS AND DIFFERENTIAL - Abnormal    WBC Count 7.3      RBC Count 3.67 (*)     Hemoglobin 11.3 (*)     Hematocrit 35.4 (*)     MCV 97      MCH 30.8      MCHC 31.9      RDW 13.5      Platelet Count 199      % Neutrophils 75      % Lymphocytes 15      % Monocytes 8      % Eosinophils 1      % Basophils 0      % Immature Granulocytes 1      NRBCs per 100 WBC 0      Absolute Neutrophils 5.5      Absolute Lymphocytes 1.1      Absolute Monocytes 0.6      Absolute Eosinophils 0.1      Absolute Basophils 0.0      Absolute Immature Granulocytes 0.0      Absolute NRBCs 0.0     TROPONIN T, HIGH SENSITIVITY - Abnormal    Troponin T, High Sensitivity 47 (*)    BASIC METABOLIC PANEL - Abnormal    Sodium 138      Potassium 4.3      Chloride 104      Carbon Dioxide (CO2) 23      Anion Gap 11      Urea Nitrogen 35.9 (*)     Creatinine 2.10 (*)     Calcium 8.6 (*)     Glucose 93      GFR Estimate 31 (*)    MAGNESIUM - Abnormal    Magnesium 1.4 (*)    COVID-19 VIRUS (CORONAVIRUS) BY PCR     CT Head w/o Contrast   Final Result   IMPRESSION:    1. No evidence of hemorrhage.   2. Scattered atherosclerotic plaquing including focal calcification   involving the basilar artery.      ADELSO HADLEY MD            SYSTEM ID:  [id number]      MR Brain w/o Contrast    (Results Pending)   MRA Neck (Carotids) wo Contrast    (Results Pending)   MRA Brain ([place]) wo Contrast    (Results Pending)     .   Treatments provided: See MAR  Family Comments: Wife at bedside   OBS brochure/video discussed/provided to patient:  Yes  ED Medications:   Medications   0.9% sodium chloride BOLUS (0 mLs Intravenous Stopped 11/3/22 1524)   LORazepam (ATIVAN) injection 0.5 mg (0.5 mg Intravenous Given 11/3/22 1821)     Drips infusing:  No  For the majority of the shift,  the patient's behavior Green. Interventions performed were NA.    Sepsis treatment initiated: No     Patient tested for COVID 19 prior to admission: YES    ED Nurse Name/Phone Number: Sintia Mcguire RN,   6:58 PM    RECEIVING UNIT ED HANDOFF REVIEW    Above ED Nurse Handoff Report was reviewed: Yes  Reviewed by: Sylvia Evans RN on November 3, 2022 at 8:40 PM

## 2022-11-03 NOTE — TELEPHONE ENCOUNTER
S-(situation): vertigo    B-(background): diabetic, MI years ago, has 4 stents, hypertension     A-(assessment): Patient woke 0400 today to use bathroom and when he went to stand up he had room spins. Each time he stood up the room would spin and this continues.  He also had 1 emesis today.   Denies headache or vision changes, also denies numbness, tingling or weakness of any extremity or face.  He has not been ill recently except missed work due to nausea on Tues. This week which he attributes to being lactose intolerant and had ice cream the night before.  Neck feels stiff today when turning head to either side. He has ringing in his ears.  He is a diabetic controlled on oral agents and does not have equipment to check his blood sugar.  Last A1c 5.8 last month.  Wife gave him a piece of candy which he thinks made him feel a little better and he will be eating breakfast shortly.       R-(recommendations): Should be evaluated in ER per protocol.  Patient agrees and will have his wife take him now.  DIXON Cruz R.N.      Reason for Disposition    [1] Dizziness (vertigo) present now AND [2] one or more STROKE RISK FACTORS (i.e., hypertension, diabetes, prior stroke/TIA, heart attack)  (Exception: prior physician evaluation for this AND no different/worse than usual)    Additional Information    Negative: [1] Weakness (i.e., paralysis, loss of muscle strength) of the face, arm or leg on one side of the body AND [2] sudden onset AND [3] present now    Negative: [1] Numbness (i.e., loss of sensation) of the face, arm or leg on one side of the body AND [2] sudden onset AND [3] present now    Negative: [1] Loss of speech or garbled speech AND [2] sudden onset AND [3] present now    Negative: Difficult to awaken or acting confused (e.g., disoriented, slurred speech)    Negative: Sounds like a life-threatening emergency to the triager    Negative: Followed a head injury    Negative: Followed an ear injury    Negative:  "Localized weakness or numbness is main symptom    Negative: Dizziness relates to riding in a car, going to an amusement park, etc.    Negative: [1] Dizziness is main symptom AND [2] NO spinning sensation (i.e., vertigo)    Negative: SEVERE dizziness (vertigo) (e.g., unable to walk without assistance)    Negative: Severe headache (e.g., excruciating)  (Exception: similar to previous migraines)    Negative: [1] Dizziness (vertigo) present now AND [2] age > 59  (Exception: prior physician evaluation for this AND no different/worse than usual)    Negative: [1] Weakness (i.e., paralysis, loss of muscle strength) of the face, arm / hand, or leg / foot on one side of the body AND [2] sudden onset AND [3] brief (now gone)    Negative: [1] Numbness (i.e., loss of sensation) of the face, arm / hand, or leg / foot on one side of the body AND [2] sudden onset AND [3] brief (now gone)    Negative: [1] Loss of speech or garbled speech AND [2] sudden onset AND [3] brief (now gone)    Negative: Loss of vision or double vision (Exception: similar to previous migraines)    Negative: Patient sounds very sick or weak to the triager    Answer Assessment - Initial Assessment Questions  1. DESCRIPTION: \"Describe your dizziness.\"      Started when he tried to stand up 0400 today to go to the bathroom.    2. VERTIGO: \"Do you feel like either you or the room is spinning or tilting?\"       yes  3. LIGHTHEADED: \"Do you feel lightheaded?\" (e.g., somewhat faint, woozy, weak upon standing)      Yes  4. SEVERITY: \"How bad is it?\"  \"Can you walk?\"    - MILD: Feels slightly dizzy and unsteady, but is walking normally.    - MODERATE: Feels unsteady when walking, but not falling; interferes with normal activities (e.g., school, work).    - SEVERE: Unable to walk without falling, or requires assistance to walk without falling.      Can walk but needs to move carefully.  No falls, need no assistance   5. ONSET:  \"When did the dizziness begin?\"      0400 " "today  6. AGGRAVATING FACTORS: \"Does anything make it worse?\" (e.g., standing, change in head position)      Changing positions, getting up  7. CAUSE: \"What do you think is causing the dizziness?\"      Does not know  8. RECURRENT SYMPTOM: \"Have you had dizziness before?\" If Yes, ask: \"When was the last time?\" \"What happened that time?\"      no  9. OTHER SYMPTOMS: \"Do you have any other symptoms?\" (e.g., headache, weakness, numbness, vomiting, earache)      Denies headache, vision changes, numbness, tingling or weakness of limbs or face.   Did have emesis x1 this morning.  Also has ringing in ears.   Prone to wax build up in ears.    10. PREGNANCY: \"Is there any chance you are pregnant?\" \"When was your last menstrual period?\"        NA    Protocols used: DIZZINESS - VERTIGO-A-AH      "

## 2022-11-03 NOTE — ED NOTES
Rapid Assessment Note    History:   The patient reports he woke up around 0400 and the room was spinning. States that he felt dizzy, was diaphoretic, and had one episode of emesis. Notes that his dizziness is worse with ambulation but not with isolated head movements. Adds that he has a runny nose, nausea, and has had more muscle cramps than his baseline recently. Of note, the patient has history of heart attack and has had 4 stents placements. Denies history of stroke, smoking, excessive alcohol consumptoin, and drug use. Denies fever chills, congestion, cough, sore throat, chest pain, shortness of breath, abdominal pain, dysuria, hematuria, and blood in stool.    Exam:   Constitutional:       General: Not in acute distress.     Appearance: Normal appearance  HENT:      Head: Normocephalic and atraumatic.   Eyes:     Extraocular Movements: Extraocular movements intact.      Conjunctiva/sclera: Conjunctivae normal.      Pupils: Pupils are equal, round, and reactive to light.   Cardiovascular:     Rate and Rhythm: Normal rate and regular rhythm.   Pulmonary:      Effort: Pulmonary effort is normal.      Breath sounds: Normal breath sounds.   Abdominal:      General: Abdomen is flat. There is no distension.      Palpations: Abdomen is soft.      Tenderness: There is no abdominal tenderness.   Musculoskeletal:      Cervical back: Normal range of motion and neck supple. No rigidity.      General: No swelling or deformity.   Skin:     General: Skin is warm and dry.   Neurological:      General: No focal deficit present.     NIHSS: Patient alert and keenly responsive. Able to answer month and age. Able and blink eyes and squeeze hands. No gaze palsy. No visual field deficits. No facial palsy. No arm drift bilaterally. No leg drift bilaterally. No limb ataxia. Able to complete finger nose finger and heel to shin. No sensory deficits. No language deficits/aphasia. No dysarthria. No extinction/inattention.     NIHSS score of  0.       Mental Status: Alert and oriented to person, place, and time.   Psychiatric:         Mood and Affect: Mood normal.         Behavior: Behavior normal.     Plan of Care:   I evaluated the patient and developed an initial plan of care. I discussed this plan and explained that I, or one of my partners, would be returning to complete the evaluation.     77-year-old male as described above presents to the emergency department with episode of room spinning dizziness upon waking, nausea, vomiting, and associated diaphoresis.  Patient was amply stable to evaluation.  Afebrile.  Patient states that the dizziness does not appear to be associated with head movement or eye movement.  Patient states that he currently does not have dizziness, but it can come on unexpectedly.  NIHSS score of 0 at this time.  No truncal ataxia.  Patient has history of 4 prior cardiac stents.  Patient has been having some epigastric discomfort as well.  Differential diagnosis considered includes, but not limited to, ACS, posterior CVA, and BPPV versus vestibular neuritis/lumbar otitis.  Posterior CVA work-up ordered.  Cardiac work-up ordered.  Discussed care plan with patient and wife at bedside who voiced understanding and agreement with plan.  Answered all questions.  Additional work-up and orders as listed in chart.    Care transitioned to one of my partners upon completion of rapid assessment.    ED Course as of 11/03/22 1920   Thu Nov 03, 2022   1400 Potassium(!): 5.7   1400 Creatinine(!): 2.10   1400 Troponin T, High Sensitivity(!): 47   1440 Pulse(!): 47   1440 CT Head w/o Contrast  1. No evidence of hemorrhage.  2. Scattered atherosclerotic plaquing including focal calcification  involving the basilar artery.   1604 Troponin T, High Sensitivity(!): 47  Plateaued   1817 Updated patient on lab findings plan for admission.  Patient and wife voiced understanding agreement with plan.  MRI and MRA pending.   1827 Discussed patient with  hospitalist (Dr. Del Valle) who will admit patient to observation.  MRI imaging pending at time of admission.       I, Maryjo Chu, am serving as a scribe to document services personally performed by Bryant Baig DO, based on my observations and the provider's statements to me.    11/5/2018  [employer]    Portions of this medical record were completed by a scribe. UPON MY REVIEW AND AUTHENTICATION BY ELECTRONIC SIGNATURE, this confirms (a) I performed the applicable clinical services, and (b) the record is accurate.        Bryant Baig DO  11/03/22 1920

## 2022-11-04 LAB
ANION GAP SERPL CALCULATED.3IONS-SCNC: 7 MMOL/L (ref 7–15)
ATRIAL RATE - MUSE: 50 BPM
BUN SERPL-MCNC: 30.5 MG/DL (ref 8–23)
CALCIUM SERPL-MCNC: 8.5 MG/DL (ref 8.8–10.2)
CHLORIDE SERPL-SCNC: 105 MMOL/L (ref 98–107)
CREAT SERPL-MCNC: 1.8 MG/DL (ref 0.67–1.17)
DEPRECATED HCO3 PLAS-SCNC: 27 MMOL/L (ref 22–29)
DIASTOLIC BLOOD PRESSURE - MUSE: NORMAL MMHG
ERYTHROCYTE [DISTWIDTH] IN BLOOD BY AUTOMATED COUNT: 13.3 % (ref 10–15)
GFR SERPL CREATININE-BSD FRML MDRD: 38 ML/MIN/1.73M2
GLUCOSE BLDC GLUCOMTR-MCNC: 101 MG/DL (ref 70–99)
GLUCOSE BLDC GLUCOMTR-MCNC: 131 MG/DL (ref 70–99)
GLUCOSE BLDC GLUCOMTR-MCNC: 159 MG/DL (ref 70–99)
GLUCOSE BLDC GLUCOMTR-MCNC: 203 MG/DL (ref 70–99)
GLUCOSE BLDC GLUCOMTR-MCNC: 213 MG/DL (ref 70–99)
GLUCOSE BLDC GLUCOMTR-MCNC: 55 MG/DL (ref 70–99)
GLUCOSE BLDC GLUCOMTR-MCNC: 57 MG/DL (ref 70–99)
GLUCOSE BLDC GLUCOMTR-MCNC: 61 MG/DL (ref 70–99)
GLUCOSE SERPL-MCNC: 203 MG/DL (ref 70–99)
HCT VFR BLD AUTO: 31.2 % (ref 40–53)
HGB BLD-MCNC: 10.1 G/DL (ref 13.3–17.7)
INTERPRETATION ECG - MUSE: NORMAL
MAGNESIUM SERPL-MCNC: 2.6 MG/DL (ref 1.7–2.3)
MCH RBC QN AUTO: 31.4 PG (ref 26.5–33)
MCHC RBC AUTO-ENTMCNC: 32.4 G/DL (ref 31.5–36.5)
MCV RBC AUTO: 97 FL (ref 78–100)
P AXIS - MUSE: 54 DEGREES
PLATELET # BLD AUTO: 154 10E3/UL (ref 150–450)
POTASSIUM SERPL-SCNC: 4.9 MMOL/L (ref 3.4–5.3)
PR INTERVAL - MUSE: 172 MS
QRS DURATION - MUSE: 142 MS
QT - MUSE: 444 MS
QTC - MUSE: 404 MS
R AXIS - MUSE: -53 DEGREES
RBC # BLD AUTO: 3.22 10E6/UL (ref 4.4–5.9)
SODIUM SERPL-SCNC: 139 MMOL/L (ref 136–145)
SYSTOLIC BLOOD PRESSURE - MUSE: NORMAL MMHG
T AXIS - MUSE: -16 DEGREES
VENTRICULAR RATE- MUSE: 50 BPM
WBC # BLD AUTO: 4.5 10E3/UL (ref 4–11)

## 2022-11-04 PROCEDURE — 999N000127 HC STATISTIC PERIPHERAL IV START W US GUIDANCE

## 2022-11-04 PROCEDURE — 83735 ASSAY OF MAGNESIUM: CPT | Performed by: STUDENT IN AN ORGANIZED HEALTH CARE EDUCATION/TRAINING PROGRAM

## 2022-11-04 PROCEDURE — 258N000003 HC RX IP 258 OP 636: Performed by: STUDENT IN AN ORGANIZED HEALTH CARE EDUCATION/TRAINING PROGRAM

## 2022-11-04 PROCEDURE — 85018 HEMOGLOBIN: CPT | Performed by: STUDENT IN AN ORGANIZED HEALTH CARE EDUCATION/TRAINING PROGRAM

## 2022-11-04 PROCEDURE — 250N000013 HC RX MED GY IP 250 OP 250 PS 637: Performed by: STUDENT IN AN ORGANIZED HEALTH CARE EDUCATION/TRAINING PROGRAM

## 2022-11-04 PROCEDURE — 250N000012 HC RX MED GY IP 250 OP 636 PS 637: Performed by: STUDENT IN AN ORGANIZED HEALTH CARE EDUCATION/TRAINING PROGRAM

## 2022-11-04 PROCEDURE — 36415 COLL VENOUS BLD VENIPUNCTURE: CPT | Performed by: STUDENT IN AN ORGANIZED HEALTH CARE EDUCATION/TRAINING PROGRAM

## 2022-11-04 PROCEDURE — 80048 BASIC METABOLIC PNL TOTAL CA: CPT | Performed by: STUDENT IN AN ORGANIZED HEALTH CARE EDUCATION/TRAINING PROGRAM

## 2022-11-04 PROCEDURE — 120N000001 HC R&B MED SURG/OB

## 2022-11-04 PROCEDURE — 99233 SBSQ HOSP IP/OBS HIGH 50: CPT | Performed by: INTERNAL MEDICINE

## 2022-11-04 RX ADMIN — ASPIRIN 81 MG: 81 TABLET, COATED ORAL at 08:24

## 2022-11-04 RX ADMIN — SODIUM CHLORIDE: 9 INJECTION, SOLUTION INTRAVENOUS at 15:08

## 2022-11-04 RX ADMIN — GLIMEPIRIDE 2 MG: 1 TABLET ORAL at 08:24

## 2022-11-04 RX ADMIN — SODIUM ZIRCONIUM CYCLOSILICATE 5 G: 5 POWDER, FOR SUSPENSION ORAL at 10:57

## 2022-11-04 RX ADMIN — FAMOTIDINE 40 MG: 20 TABLET ORAL at 08:26

## 2022-11-04 RX ADMIN — ASPIRIN 81 MG: 81 TABLET, COATED ORAL at 21:15

## 2022-11-04 RX ADMIN — CLOPIDOGREL BISULFATE 75 MG: 75 TABLET ORAL at 08:24

## 2022-11-04 RX ADMIN — INSULIN ASPART 1 UNITS: 100 INJECTION, SOLUTION INTRAVENOUS; SUBCUTANEOUS at 13:22

## 2022-11-04 RX ADMIN — SIMVASTATIN 40 MG: 20 TABLET, FILM COATED ORAL at 21:15

## 2022-11-04 RX ADMIN — INSULIN ASPART 1 UNITS: 100 INJECTION, SOLUTION INTRAVENOUS; SUBCUTANEOUS at 09:00

## 2022-11-04 ASSESSMENT — ACTIVITIES OF DAILY LIVING (ADL)
ADLS_ACUITY_SCORE: 20

## 2022-11-04 NOTE — PLAN OF CARE
[id number]    VSS on RA x bradycardic in 40s-50s (baseline per patient). A/Ox4, pleasant. IV infiltrated w/ NS running, cold applied, extremity elevated, new PIV placed by VAT. NS running at 100mL/hr. LS clear, denies SOB, CP, N/V/dizziness this shift. Up steady SBA. Reg diet w/ carb count. Tele applied, SB w/ BBB & inverted Ts. Sugars -- 203, 213, 131. Discharge possible tomorrow. Continue monitoring, POC.

## 2022-11-04 NOTE — PROGRESS NOTES
[facility]  Hospitalist Progress Note  Nicola Bernstein M.D., M.B.A.   11/04/2022    Reason for Stay/active problem list      Acute kidney injury on chronic kidney disease    Hyperkalemia    Dizziness         Assessment and Plan:        Summary of Stay: 77-year-old male with history of diabetes mellitus type 2, CAD with 4 coronary stents following an MI in 2003, DVT in 2000 and CKD who woke up this morning with dizziness, worse when standing up and associated with feeling of things spinning.  He had 1 episode of emesis.  He continued to have the symptoms throughout the day and decided to come to the ER.  He denies any headache, chest pain or ongoing abdominal pain.     Patient had mild stomach upset for the last 2 to 3 days but no associated fever, diarrhea or emesis.     Vitals on presentation: Heart rate 47, blood pressure 129/60, respiratory rate 13 with oxygen saturation of 99% on room air.  EKG shows sinus bradycardia with heart rate of 47.  BMP showed BUN/creatinine of 37/2.1 (baseline from a year ago was 16/1.7) with potassium of 5.7.  Troponin was 47, flat on recheck.  Blood glucose 177.  CBC unremarkable.  CT head showed scattered atherosclerotic plaquing with focal calcification involving the basilar artery.  MRI brain did not show any acute stroke.    Problem List with Assessment and Plan:    1. Vertigo/dizziness.    Resolved.  Likely from volume depletion acute kidney injury     MRI brain did not show any acute stroke but showed only chronic microvascular changes.    Patient was treated with IV fluid   2. History of CAD    S/p 4 stents in 2003.    Continued with aspirin and Plavix.    Troponin mildly elevated at 47 but flat on recheck.  No indication of acute coronary syndrome     3. Diabetes mellitus..    Prior to admission on glimepiride 2 mg before breakfast, metformin 1000 mg twice daily and Januvia 25 mg daily.  Last HbA1c 5.8.    Metformin was on hold due to acute kidney injury  and he was continued with glimepiride and sliding scale insulin.  His Januvia also on hold until acute kidney injury improves      4. JOSE MIGUEL on CKD.    BMP showed BUN/creatinine of 37/2.1 (baseline from a year ago was 16/1.7)     Serum creatinine improving currently at 1.8.  Continue NS at 100 mL/h, recheck in the morning.    Continue to hold Lasix.     5. Hyperkalemia.    Secondary to JOSE MIGUEL.  Potassium was 5.7 on admission, improved back to normal with IV fluid hydration.  Patient was treated with Lokelma       6. Essential hypertension.    Prior to admission on metoprolol 25 mg daily, amlodipine 5 mg daily and Lasix 40 mg daily.    Continue amlodipine, hold Lasix till JOSE MIGUEL improves.  With we will also decrease metoprolol to 12.5 mg daily due to bradycardia.     7. Sinus bradycardia.    Patient has chronic bradycardia and 50s and occasionally down to 40s.  Previous history of coronary artery disease with multiple stents.  Patient was on metoprolol 25 mg daily which was decreased to 12.5 mg daily with hold parameters.      Currently his heart rate is in 50s and 60s.  We will monitor him on telemetry, monitor symptoms.       8. Abdominal upset.    Resolved.      Plan for today:    Continue IV fluid monitor intake and output and kidney function.  Avoid nephrotoxic medications    Monitor him on telemetry      VTE Prophylaxis: Pneumatic Compression Devices  Code Status: Full Code  Diet: Combination Diet Regular Diet Adult    Garza Catheter: Not present    Family updated today: No     Disposition: Likely home tomorrow if his kidney function remains stable and he remains asymptomatic          Interval History (Subjective):        Patient is seen and examined by me today and medical record reviewed.Overnight events noted and care discussed with nursing staff.  Patient care assumed today.  He denies any chest pain or shortness of breath.  No abdominal pain, fever or chills.  Patient has history of bradycardia and he is monitoring  "with his apple watch.          Physical Exam:        Last Vital Signs:  BP (!) 130/34 (BP Location: Right arm)   Pulse (!) 45   Temp 97.9  F (36.6  C) (Oral)   Resp 20   Ht 1.778 m (5' 10\")   Wt 89 kg (196 lb 1.6 oz)   SpO2 96%   BMI 28.14 kg/m      I/O last 3 completed shifts:  In: -   Out: 150 [Urine:150]    Wt Readings from Last 5 Encounters:   11/03/22 89 kg (196 lb 1.6 oz)   08/03/22 91.2 kg (201 lb)   07/26/22 91.2 kg (201 lb)   06/06/22 95.3 kg (210 lb)   12/20/21 92.1 kg (203 lb)        Constitutional: Awake, alert, cooperative, no apparent distress     Respiratory: Clear to auscultation bilaterally, no crackles or wheezing   Cardiovascular: Regular rate and rhythm, normal S1 and S2, and no murmur noted   Abdomen: Normal bowel sounds, soft, non-distended, non-tender   Skin: No new rashes, no cyanosis, dry to touch   Neuro: Alert with  no new focal weakness   Extremities: No edema   Other(s):        All other systems: Negative          Medications:        All current medications were reviewed with changes reflected in problem list.         Data:      All new lab and imaging data was reviewed.      Data reviewed today: I reviewed all new labs and imaging results over the last 24 hours. I personally reviewed no images or EKG's today      Recent Labs   Lab 11/04/22  0740 11/03/22  1312   WBC 4.5 7.3   HGB 10.1* 11.3*   HCT 31.2* 35.4*   MCV 97 97    199     No results for input(s): CULT in the last 168 hours.  Recent Labs   Lab 11/04/22  1157 11/04/22  0814 11/04/22  0740 11/04/22  0349 11/03/22  2144 11/03/22  2026 11/03/22  1518 11/03/22  1312   NA  --   --  139  --   --  141 138 137   POTASSIUM  --   --  4.9  --   --  4.7 4.3 5.7*   CHLORIDE  --   --  105  --   --  106 104 102   CO2  --   --  27  --   --  25 23 24   ANIONGAP  --   --  7  --   --  10 11 11   * 203* 203*  --    < > 72 93 140*   BUN  --   --  30.5*  --   --  34.5* 35.9* 37.0*   CR  --   --  1.80*  --   --  2.11* 2.10* 2.10* "   GFRESTIMATED  --   --  38*  --   --  31* 31* 31*   PETE  --   --  8.5*  --   --  8.6* 8.6* 9.2   MAG  --   --   --  2.6*  --  1.4* 1.4* 1.5*   PROTTOTAL  --   --   --   --   --   --   --  6.0*   ALBUMIN  --   --   --   --   --   --   --  3.8   BILITOTAL  --   --   --   --   --   --   --  0.4   ALKPHOS  --   --   --   --   --   --   --  55   AST  --   --   --   --   --   --   --  43   ALT  --   --   --   --   --   --   --  24    < > = values in this interval not displayed.       Recent Labs   Lab 11/04/22  1157 11/04/22  0814 11/04/22  0740 11/04/22  0301 11/04/22  0245   * 203* 203* 101* 61*       No results for input(s): INR in the last 168 hours.      No results for input(s): TROPONIN, TROPI, TROPR in the last 168 hours.    Invalid input(s): TROP, TROPONINIES    Recent Results (from the past 48 hour(s))   CT Head w/o Contrast    Narrative    CT SCAN OF THE HEAD WITHOUT CONTRAST   11/3/2022 2:23 PM     HISTORY: Dizziness/CVA evaluation.    TECHNIQUE:  Axial images of the head and coronal reformations without  IV contrast material. Radiation dose for this scan was reduced using  automated exposure control, adjustment of the mA and/or kV according  to patient size, or iterative reconstruction technique.    COMPARISON: None.    FINDINGS:  No evidence of hemorrhage. Volume loss and white matter  hypoattenuation likely represent chronic small vessel ischemic change.  Scattered atherosclerotic plaquing including focal calcification  involving the superior basilar artery. No acute osseous abnormality.  Presumed cerumen within the left external auditory canal.      Impression    IMPRESSION:   1. No evidence of hemorrhage.  2. Scattered atherosclerotic plaquing including focal calcification  involving the basilar artery.    ADELSO HADLEY MD         SYSTEM ID:  [id number]   MR Brain w/o Contrast    Narrative    EXAM: MR BRAIN W/O CONTRAST, MRA BRAIN ([place]) W/O CONTRAST, MRA NECK (CAROTIDS) W/O  CONTRAST  LOCATION: [facility]  DATE/TIME: 11/3/2022 7:29 PM    INDICATION: Dizziness posterior CVA evaluation.  COMPARISON: None.  TECHNIQUE:   1) Routine multiplanar multisequence head MRI without intravenous contrast.  2) 3D time-of-flight head MRA without intravenous contrast.  3) Neck MRA without IV contrast. Stenosis measurements made according to NASCET criteria unless otherwise specified.      FINDINGS:  HEAD MRI:  INTRACRANIAL CONTENTS: No acute or subacute infarct. No mass, acute hemorrhage, or extra-axial fluid collections. Scattered nonspecific T2/FLAIR hyperintensities within the cerebral white matter most consistent with mild chronic microvascular ischemic   change. Mild generalized cerebral atrophy. No hydrocephalus. Normal position of the cerebellar tonsils.     SELLA: No abnormality accounting for technique.    OSSEOUS STRUCTURES/SOFT TISSUES: Normal marrow signal. The major intracranial vascular flow voids are maintained.     ORBITS: No abnormality accounting for technique.     SINUSES/MASTOIDS: No paranasal sinus mucosal disease. No middle ear or mastoid effusion.     HEAD MRA:   ANTERIOR CIRCULATION: No stenosis/occlusion, aneurysm, or high flow vascular malformation. Standard [place] of [place] anatomy.    POSTERIOR CIRCULATION: No stenosis/occlusion, aneurysm, or high flow vascular malformation. Probable small fenestration within the mid to distal basilar artery, best seen on 3-D reconstruction. Balanced vertebral arteries supply a normal basilar artery.     NECK MRA:   RIGHT CAROTID: No measurable stenosis or dissection.    LEFT CAROTID: Mild to moderate plaque at the carotid bifurcation No measurable stenosis or dissection.    VERTEBRAL ARTERIES: No focal stenosis or dissection. Balanced vertebral arteries.    AORTIC ARCH: Classic aortic arch anatomy with no significant stenosis at the origin of the great vessels.      Impression    IMPRESSION:  HEAD MRI:   1.  No acute  intracranial process.  2.  Mild generalized brain atrophy and presumed microvascular ischemic changes as detailed above.    HEAD MRA:   1.  No large artery stenosis or occlusion. No aneurysm.    NECK MRA:  1.  No measurable carotid or vertebral artery stenosis.   MRA Neck (Carotids) wo Contrast    Narrative    EXAM: MR BRAIN W/O CONTRAST, MRA BRAIN ([place]) W/O CONTRAST, MRA NECK (CAROTIDS) W/O CONTRAST  LOCATION: [facility]  DATE/TIME: 11/3/2022 7:29 PM    INDICATION: Dizziness posterior CVA evaluation.  COMPARISON: None.  TECHNIQUE:   1) Routine multiplanar multisequence head MRI without intravenous contrast.  2) 3D time-of-flight head MRA without intravenous contrast.  3) Neck MRA without IV contrast. Stenosis measurements made according to NASCET criteria unless otherwise specified.      FINDINGS:  HEAD MRI:  INTRACRANIAL CONTENTS: No acute or subacute infarct. No mass, acute hemorrhage, or extra-axial fluid collections. Scattered nonspecific T2/FLAIR hyperintensities within the cerebral white matter most consistent with mild chronic microvascular ischemic   change. Mild generalized cerebral atrophy. No hydrocephalus. Normal position of the cerebellar tonsils.     SELLA: No abnormality accounting for technique.    OSSEOUS STRUCTURES/SOFT TISSUES: Normal marrow signal. The major intracranial vascular flow voids are maintained.     ORBITS: No abnormality accounting for technique.     SINUSES/MASTOIDS: No paranasal sinus mucosal disease. No middle ear or mastoid effusion.     HEAD MRA:   ANTERIOR CIRCULATION: No stenosis/occlusion, aneurysm, or high flow vascular malformation. Standard [place] of [place] anatomy.    POSTERIOR CIRCULATION: No stenosis/occlusion, aneurysm, or high flow vascular malformation. Probable small fenestration within the mid to distal basilar artery, best seen on 3-D reconstruction. Balanced vertebral arteries supply a normal basilar artery.     NECK MRA:    RIGHT CAROTID: No measurable stenosis or dissection.    LEFT CAROTID: Mild to moderate plaque at the carotid bifurcation No measurable stenosis or dissection.    VERTEBRAL ARTERIES: No focal stenosis or dissection. Balanced vertebral arteries.    AORTIC ARCH: Classic aortic arch anatomy with no significant stenosis at the origin of the great vessels.      Impression    IMPRESSION:  HEAD MRI:   1.  No acute intracranial process.  2.  Mild generalized brain atrophy and presumed microvascular ischemic changes as detailed above.    HEAD MRA:   1.  No large artery stenosis or occlusion. No aneurysm.    NECK MRA:  1.  No measurable carotid or vertebral artery stenosis.   MRA Brain ([place] of [place]) wo Contrast    Narrative    EXAM: MR BRAIN W/O CONTRAST, MRA BRAIN ([place]) W/O CONTRAST, MRA NECK (CAROTIDS) W/O CONTRAST  LOCATION: [facility]  DATE/TIME: 11/3/2022 7:29 PM    INDICATION: Dizziness posterior CVA evaluation.  COMPARISON: None.  TECHNIQUE:   1) Routine multiplanar multisequence head MRI without intravenous contrast.  2) 3D time-of-flight head MRA without intravenous contrast.  3) Neck MRA without IV contrast. Stenosis measurements made according to NASCET criteria unless otherwise specified.      FINDINGS:  HEAD MRI:  INTRACRANIAL CONTENTS: No acute or subacute infarct. No mass, acute hemorrhage, or extra-axial fluid collections. Scattered nonspecific T2/FLAIR hyperintensities within the cerebral white matter most consistent with mild chronic microvascular ischemic   change. Mild generalized cerebral atrophy. No hydrocephalus. Normal position of the cerebellar tonsils.     SELLA: No abnormality accounting for technique.    OSSEOUS STRUCTURES/SOFT TISSUES: Normal marrow signal. The major intracranial vascular flow voids are maintained.     ORBITS: No abnormality accounting for technique.     SINUSES/MASTOIDS: No paranasal sinus mucosal disease. No middle ear or mastoid  effusion.     HEAD MRA:   ANTERIOR CIRCULATION: No stenosis/occlusion, aneurysm, or high flow vascular malformation. Standard [place] of [place] anatomy.    POSTERIOR CIRCULATION: No stenosis/occlusion, aneurysm, or high flow vascular malformation. Probable small fenestration within the mid to distal basilar artery, best seen on 3-D reconstruction. Balanced vertebral arteries supply a normal basilar artery.     NECK MRA:   RIGHT CAROTID: No measurable stenosis or dissection.    LEFT CAROTID: Mild to moderate plaque at the carotid bifurcation No measurable stenosis or dissection.    VERTEBRAL ARTERIES: No focal stenosis or dissection. Balanced vertebral arteries.    AORTIC ARCH: Classic aortic arch anatomy with no significant stenosis at the origin of the great vessels.      Impression    IMPRESSION:  HEAD MRI:   1.  No acute intracranial process.  2.  Mild generalized brain atrophy and presumed microvascular ischemic changes as detailed above.    HEAD MRA:   1.  No large artery stenosis or occlusion. No aneurysm.    NECK MRA:  1.  No measurable carotid or vertebral artery stenosis.       COVID Status:  COVID-19 PCR Results    COVID-19 PCR Results 11/12/21 11/3/22   SARS CoV2 PCR Negative Negative      Comments are available for some flowsheets but are not being displayed.         COVID-19 Antibody Results, Testing for Immunity    COVID-19 Antibody Results, Testing for Immunity   No data to display.              Disclaimer: This note consists of symbols derived from keyboarding, dictation and/or voice recognition software. As a result, there may be errors in the script that have gone undetected. Please consider this when interpreting information found in this chart.

## 2022-11-04 NOTE — UTILIZATION REVIEW
Admission Status; Secondary Review Determination       Under the authority of the Utilization Management Committee, the utilization review process indicated a secondary review on the above patient. The review outcome is based on review of the medical records, discussions with staff, and applying clinical experience noted on the date of the review.     (x) Inpatient Status Appropriate - This patient's medical care is consistent with medical management for inpatient care and reasonable inpatient medical practice.     RATIONALE FOR DETERMINATION     Jeff Ricks is a 77-year-old male with history of diabetes mellitus type 2, CAD with 4 coronary stents following an MI in 2003, DVT in 2000, and CKD.  He presented to the emergency department on 11/3/2022 for evaluation of dizziness that was worse when standing up and associated with feeling of things spinning.  He had 1 episode of emesis.  He continued to have the symptoms throughout the day and decided to go to the ED for evaluation. ED evaluation showed heart rate 47, blood pressure 129/60, and respiratory rate 13 with oxygen saturation of 99% on room air.  EKG showed sinus bradycardia with heart rate of 47.  BMP showed BUN/creatinine of 37/2.1 (baseline from a year ago was 16/1.7) with potassium of 5.7.  Troponin was 47, flat on recheck.  Blood glucose was 177.  CBC was unremarkable.  CT head showed scattered atherosclerotic plaquing with focal calcification involving the basilar artery.  MRI brain did not show any acute stroke.  Jeff was admitted to the hospital for treatment of dizziness possibly due to vertigo, acute kidney injury, and hyperkalemia.  He was given 2 doses of Lokelma for hyperkalemia.  Potassium went down to 4.3 to 4.7 and then to 4.9.  He was hydrated with normal saline with improvement of renal function.  Hospital course was complicated by hypoglycemia after admission with blood sugar as low as 61, 55, and 57.  Inpatient admission is appropriate  for continued IV fluid hydration for acute kidney injury, continued monitoring of hyperkalemia for which he required Lokelma, and continued monitoring of hypoglycemia that was likely related to enhanced effects of anti-diabetic medications due to acute kidney injury.        At the time of admission with the information available to the attending physician more than 2 nights Hospital complex care was anticipated, based on patient risk of adverse outcome if treated as outpatient and complex care required. Inpatient admission is appropriate based on the Medicare guidelines.     This document was produced using voice recognition software       The information on this document is developed by the utilization review team in order for the business office to ensure compliance. This only denotes the appropriateness of proper admission status and does not reflect the quality of care rendered.   The definitions of Inpatient Status and Observation Status used in making the determination above are those provided in the CMS Coverage Manual, Chapter 1 and Chapter 6, section 70.4.   Sincerely,     Javier Goldberg MD    Utilization Review  Physician Advisor  [facility].

## 2022-11-04 NOTE — PHARMACY-ADMISSION MEDICATION HISTORY
Admission medication history interview status for this patient is complete. See [facility] admission navigator for allergy information, prior to admission medications and immunization status.     Medication history interview done, indicate source(s): Patient  Medication history resources (including written lists, pill bottles, clinic record):None      Changes made to PTA medication list:  Added: vitamin C, b12, b6, vitamin E  Changed: vosol-HC to prn  Reported as Not Taking: none  Removed: norvasc, cortisporin Otic    Actions taken by pharmacist (provider contacted, etc):None     Additional medication history information:None    Medication reconciliation/reorder completed by provider prior to medication history?  n   (Y/N)     For patients on insulin therapy:   Do you use sliding scale insulin based on blood sugars?   What is your pre-meal insulin coverage?    Do you typically eat three meals a day?   How many times do you check your blood glucose per day?   How many episodes of hypoglycemia do you typically have per month?   Do you have a Continuous Glucose Monitor (CGM)?      Prior to Admission medications    Medication Sig Last Dose Taking? Auth Provider Long Term End Date   acetic acid-hydrocortisone (VOSOL-HC) 1-2 % otic solution INSTILL 1 DROP INTO BOTH   EARS TWO TIMES A DAY  Patient taking differently: Place 1 drop into both ears 2 times daily as needed Past Week Yes Guillermo Deleon MD     Ascorbic Acid (VITAMIN C PO) Take 500 mg by mouth daily 11/3/2022 Yes Reported, Patient     aspirin (ASA) 81 MG tablet Take 81 mg by mouth 2 times daily 11/3/2022 at am Yes Reported, Patient No    calcitRIOL (ROCALTROL) 0.25 MCG capsule Take 0.25 mcg by mouth daily  11/2/2022 Yes Reported, Patient Yes    chlorhexidine (PERIDEX) 0.12 % solution USE 1/2 OZ TO SWISH FOR 30 SECONDS ONCE D 11/2/2022 Yes Reported, Patient     ciclopirox (LOPROX) 0.77 % cream Apply topically daily Apply to affected nail daily at night 11/2/2022 Yes  Aaliyah Jimenez DPM, Podiatry/Foot and Ankle Surgery     clopidogrel (PLAVIX) 75 MG tablet TAKE 1 TABLET DAILY 11/3/2022 at am Yes Guillermo Deleon MD Yes    Cyanocobalamin (B-12) 1000 MCG CAPS Take 1 mg by mouth daily 11/3/2022 Yes Reported, Patient     famotidine (PEPCID) 40 MG tablet TAKE 1 TABLET DAILY 11/3/2022 Yes Guillermo Deleon MD     furosemide (LASIX) 40 MG tablet TAKE 1 TABLET DAILY 11/3/2022 Yes Guillermo Deleno MD Yes    glimepiride (AMARYL) 1 MG tablet TAKE 2 TABLETS EVERY       MORNING BEFORE BREAKFAST 11/3/2022 Yes Guillermo Deleon MD Yes    hydrocortisone 2.5 % cream APPLY TOPICALLY TWO TIMES ADAY 11/2/2022 Yes Guillermo Deleon MD     metFORMIN (GLUCOPHAGE) 1000 MG tablet TAKE 1 TABLET TWICE A DAY  WITH FOOD (MEALS) 11/3/2022 at am Yes Guillermo Deleon MD Yes    metoprolol succinate ER (TOPROL-XL) 25 MG 24 hr tablet TAKE 1 TABLET DAILY 11/3/2022 Yes Guillermo Deleon MD Yes    nitroGLYcerin (NITROSTAT) 0.6 MG sublingual tablet DISSOLVE 1 TABLET UNDER THETONGUE AS NEEDED.  Yes Guillermo Deleon MD Yes    omega-3 acid ethyl esters (LOVAZA) 1 g capsule TAKE 1 CAPSULE TWICE DAILY 11/3/2022 at am Yes Guillermo Deleon MD     Pyridoxine HCl (B-6) 100 MG TABS Take 100 mg by mouth daily 11/3/2022 Yes Reported, Patient     simvastatin (ZOCOR) 40 MG tablet TAKE 1 TABLET AT BEDTIME 11/2/2022 Yes Guillermo Deleon MD Yes    sitagliptin (JANUVIA) 25 MG tablet Take 1 tablet (25 mg) by mouth daily 11/3/2022 Yes Guillermo Deleon MD Yes    VITAMIN E NATURAL PO Take 400 Units by mouth daily 11/3/2022 Yes Reported, Patient

## 2022-11-04 NOTE — PROGRESS NOTES
Pt arrived up to the floor around 2100. VSS on RA. AxOx4. Up SBA w/ GB. Denies n/v/SOB/chest pain. Pt denies dizziness when ambulating. PIV infusing NS @100/hr. Mag replaced this shift, recheck was 2.6. Lokelma given x1 for hyperK, recheck this AM. BG did dip to 55 overnight, carbs given w/ improvement up to 101. Plan is IVF, electrolyte monitoring, and reassess today. Will continue to proivde supportive cares.     Assumed Care: 2100-0730

## 2022-11-05 VITALS
DIASTOLIC BLOOD PRESSURE: 60 MMHG | TEMPERATURE: 97.9 F | HEIGHT: 70 IN | HEART RATE: 47 BPM | OXYGEN SATURATION: 97 % | WEIGHT: 202.1 LBS | BODY MASS INDEX: 28.93 KG/M2 | RESPIRATION RATE: 18 BRPM | SYSTOLIC BLOOD PRESSURE: 152 MMHG

## 2022-11-05 LAB
ANION GAP SERPL CALCULATED.3IONS-SCNC: 7 MMOL/L (ref 7–15)
BUN SERPL-MCNC: 21 MG/DL (ref 8–23)
CALCIUM SERPL-MCNC: 8.2 MG/DL (ref 8.8–10.2)
CHLORIDE SERPL-SCNC: 109 MMOL/L (ref 98–107)
CREAT SERPL-MCNC: 1.53 MG/DL (ref 0.67–1.17)
DEPRECATED HCO3 PLAS-SCNC: 25 MMOL/L (ref 22–29)
GFR SERPL CREATININE-BSD FRML MDRD: 46 ML/MIN/1.73M2
GLUCOSE BLDC GLUCOMTR-MCNC: 81 MG/DL (ref 70–99)
GLUCOSE BLDC GLUCOMTR-MCNC: 94 MG/DL (ref 70–99)
GLUCOSE SERPL-MCNC: 100 MG/DL (ref 70–99)
HOLD SPECIMEN: NORMAL
MAGNESIUM SERPL-MCNC: 1.8 MG/DL (ref 1.7–2.3)
POTASSIUM SERPL-SCNC: 4.6 MMOL/L (ref 3.4–5.3)
SODIUM SERPL-SCNC: 141 MMOL/L (ref 136–145)

## 2022-11-05 PROCEDURE — 82310 ASSAY OF CALCIUM: CPT | Performed by: INTERNAL MEDICINE

## 2022-11-05 PROCEDURE — 250N000013 HC RX MED GY IP 250 OP 250 PS 637: Performed by: STUDENT IN AN ORGANIZED HEALTH CARE EDUCATION/TRAINING PROGRAM

## 2022-11-05 PROCEDURE — 99239 HOSP IP/OBS DSCHRG MGMT >30: CPT | Performed by: INTERNAL MEDICINE

## 2022-11-05 PROCEDURE — 36415 COLL VENOUS BLD VENIPUNCTURE: CPT | Performed by: INTERNAL MEDICINE

## 2022-11-05 PROCEDURE — 83735 ASSAY OF MAGNESIUM: CPT | Performed by: INTERNAL MEDICINE

## 2022-11-05 PROCEDURE — 258N000003 HC RX IP 258 OP 636: Performed by: STUDENT IN AN ORGANIZED HEALTH CARE EDUCATION/TRAINING PROGRAM

## 2022-11-05 RX ORDER — METOPROLOL TARTRATE 25 MG/1
12.5 TABLET, FILM COATED ORAL 2 TIMES DAILY
Qty: 30 TABLET | Refills: 0 | Status: SHIPPED | OUTPATIENT
Start: 2022-11-05 | End: 2022-12-01

## 2022-11-05 RX ORDER — AMLODIPINE BESYLATE 2.5 MG/1
5 TABLET ORAL DAILY
Qty: 30 TABLET | Refills: 0 | Status: SHIPPED | OUTPATIENT
Start: 2022-11-05 | End: 2022-11-14

## 2022-11-05 RX ADMIN — CLOPIDOGREL BISULFATE 75 MG: 75 TABLET ORAL at 08:23

## 2022-11-05 RX ADMIN — ASPIRIN 81 MG: 81 TABLET, COATED ORAL at 08:23

## 2022-11-05 RX ADMIN — SODIUM CHLORIDE: 9 INJECTION, SOLUTION INTRAVENOUS at 00:49

## 2022-11-05 RX ADMIN — FAMOTIDINE 40 MG: 20 TABLET ORAL at 08:23

## 2022-11-05 RX ADMIN — GLIMEPIRIDE 2 MG: 1 TABLET ORAL at 08:23

## 2022-11-05 ASSESSMENT — ACTIVITIES OF DAILY LIVING (ADL)
ADLS_ACUITY_SCORE: 20
ADLS_ACUITY_SCORE: 22
ADLS_ACUITY_SCORE: 20
ADLS_ACUITY_SCORE: 20
ADLS_ACUITY_SCORE: 22
ADLS_ACUITY_SCORE: 22

## 2022-11-05 NOTE — PLAN OF CARE
VSS. A/Ox4. Orthostatic BP completed, BP increased when pt went from supine to standing by 41 points. HR increased by about 10 points. Tele SB with BBB. HR in 40s and 50s.  and 84. Gave pt apple juice for BG of 84. SBA, pt slightly unsteady. Pt calls appropriately. K and mg protocol. Possible discharge today or tomorrow.

## 2022-11-05 NOTE — PLAN OF CARE
"0700-discharge    VSS x mild HTN on RA, bradycardic in 40s (baseline per patient). Denies pain, CP, SOB, dizziness, N/V. All discharge instructions given and questions answered. Pt left to home via wife's car around 1100.       Problem: Plan of Care - These are the overarching goals to be used throughout the patient stay.    Goal: Plan of Care Review  Description: The Plan of Care Review/Shift note should be completed every shift.  The Outcome Evaluation is a brief statement about your assessment that the patient is improving, declining, or no change.  This information will be displayed automatically on your shift note.  Outcome: Met  Goal: Patient-Specific Goal (Individualized)  Description: You can add care plan individualizations to a care plan. Examples of Individualization might be:  \"Parent requests to be called daily at 9am for status\", \"I have a hard time hearing out of my right ear\", or \"Do not touch me to wake me up as it startles me\".  Outcome: Met  Goal: Absence of Hospital-Acquired Illness or Injury  Outcome: Met  Intervention: Identify and Manage Fall Risk  Recent Flowsheet Documentation  Taken 11/5/2022 0816 by Sun Suarez RN  Safety Promotion/Fall Prevention:    activity supervised    assistive device/personal items within reach    bed alarm on    clutter free environment maintained    fall prevention program maintained    nonskid shoes/slippers when out of bed    patient and family education    room organization consistent    safety round/check completed    supervised activity  Intervention: Prevent Skin Injury  Recent Flowsheet Documentation  Taken 11/5/2022 0816 by Sun Suarez RN  Body Position: position changed independently  Intervention: Prevent and Manage VTE (Venous Thromboembolism) Risk  Recent Flowsheet Documentation  Taken 11/5/2022 0816 by Sun Suarez RN  VTE Prevention/Management: SCDs (sequential compression devices) off  Goal: Optimal Comfort and " Wellbeing  Outcome: Met  Goal: Readiness for Transition of Care  Outcome: Met     Problem: Electrolyte Imbalance  Goal: Electrolyte Imbalance: Plan of Care  Outcome: Met     Problem: Chronic Kidney Disease  Goal: Optimal Coping with Chronic Illness  Outcome: Met  Goal: Electrolyte Balance  Outcome: Met  Goal: Fluid Balance  Outcome: Met  Goal: Optimal Functional Ability  Outcome: Met  Intervention: Optimize Functional Ability  Recent Flowsheet Documentation  Taken 11/5/2022 0816 by Sun Suarez RN  Activity Management: activity adjusted per tolerance  Goal: Absence of Anemia Signs and Symptoms  Outcome: Met  Goal: Optimal Oral Intake  Outcome: Met  Goal: Acceptable Pain Control  Outcome: Met  Goal: Minimize Renal Failure Effects  Outcome: Met  Intervention: Monitor and Support Renal Function  Recent Flowsheet Documentation  Taken 11/5/2022 0816 by Sun Suarez RN  Medication Review/Management: medications reviewed     Problem: Diabetes Comorbidity  Goal: Blood Glucose Level Within Targeted Range  Outcome: Met     Problem: Hypertension Comorbidity  Goal: Blood Pressure in Desired Range  Outcome: Met  Intervention: Maintain Blood Pressure Management  Recent Flowsheet Documentation  Taken 11/5/2022 0816 by Sun Suarez RN  Medication Review/Management: medications reviewed

## 2022-11-06 NOTE — DISCHARGE SUMMARY
Physician Discharge Summary           [facility]    Name: Jeff Ricks    MRN: [id number]     YOB: 1945    Age: 77 year old                                                     Primary care provider: Guillermo Deleon    Admit date:  11/3/2022    Discharge date and time: 11/5/2022 11:22 AM    Discharge Physician: Nicola Bernstein M.D., M.B.A.       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     Heart attack (H)      Hernia, abdominal      Hyperlipidaemia      Mumps      Obese      Other and unspecified hyperlipidemia      Other premature beats      Palpitations      Phlebitis and thrombophlebitis of other deep vessels of lower extremities 2000    has had 2-3 episodes     Stented coronary artery      4 stents     Syncope      Syncope      Thrombosis of leg      Type II or unspecified type diabetes mellitus without mention of complication, not stated as uncontrolled      Unspecified essential hypertension      Past Surgical History:  Past Surgical History:   Procedure Laterality Date     ABDOMEN SURGERY      Hernia naval approx 25 years ago     BIOPSY  8/2016     CARDIAC SURGERY       COLONOSCOPY       CORONARY ANGIOGRAPHY ADULT ORDER  8/28/2000    75% distal LAD stenosis, 80% third diagonal stenosis, 75-80% mid ramus stenosis, ostial 60% stenosis in circumflex w/90% stenosis in distal circumflex     CORONARY ANGIOGRAPHY ADULT ORDER  2003    4 stents placed     EP ABLATION / EP STUDIES  3/25/2014     HEART CATH,  ANGIOPLASTY       HYDROCELECTOMY SCROTAL Right 10/6/2016    Procedure: HYDROCELECTOMY SCROTAL;  Surgeon: Jordan Chandra MD;  Location: [facility]     ORTHOPEDIC SURGERY  1990    Meniscus Orthoscopic surgery left knee.     TESTICLE SURGERY       ZZC NONSPECIFIC PROCEDURE      colonoscopy approx 1999 done elsewhere           Brief Summary of Hospital stay :       Please refer to  Admission H&P note  and subsequent progress notes in EMR for full details of patient care.    Reason for Hospitalization(C/C,HPI and brief patient summary): dizziness       Significant findings(Primary diagnosis )Procedures and treatments provided(Hospital course ,consults, procedures):Please see below for details      77-year-old male with history of diabetes mellitus type 2, CAD with 4 coronary stents following an MI in 2003, DVT in 2000 and CKD who woke up this morning with dizziness, worse when standing up and associated with feeling of things spinning.  He had 1 episode of emesis.  He continued to have the symptoms throughout the day and decided to come to the ER.  He denies any headache, chest pain or ongoing abdominal pain.     Patient had mild stomach upset for the last 2 to 3 days but no associated fever, diarrhea or emesis.     Vitals on presentation: Heart rate 47, blood pressure 129/60, respiratory rate 13 with oxygen saturation of 99% on room air.  EKG shows sinus bradycardia with heart rate of 47.  BMP showed BUN/creatinine of 37/2.1 (baseline from a year ago was 16/1.7) with potassium of 5.7.  Troponin was 47, flat on recheck.  Blood glucose 177.  CBC unremarkable.  CT head showed scattered atherosclerotic plaquing with focal calcification involving the basilar artery.  MRI brain did not show any acute stroke.      Problem list (medical problems addressed during hospital stay):    1. Vertigo/dizziness.    Resolved.  Likely from volume depletion acute kidney injury     MRI brain did not show any acute stroke but showed only  chronic microvascular changes.    Patient was treated with IV fluid   2. History of CAD    S/p 4 stents in 2003.    Continued with aspirin and Plavix.    Troponin mildly elevated at 47 but flat on recheck.  No indication of acute coronary syndrome     3. Diabetes mellitus..    Prior to admission on glimepiride 2 mg before breakfast, metformin 1000 mg twice daily and Januvia 25 mg daily.  Last HbA1c 5.8.    Metformin was on hold due to acute kidney injury and he was continued with glimepiride and sliding scale insulin.  His Januvia also on hold until acute kidney injury improves     Metformin was stopped due to kidney disease and patient to follow with PCP for alternative treatment      4. JOSE MIGUEL on CKD.    BMP showed BUN/creatinine of 37/2.1 (baseline from a year ago was 16/1.7)     Serum creatinine improving and currently at 1.5 .    He was given IVF and closely monitored        5. Hyperkalemia.    Secondary to JOSE MIGUEL.  Potassium was 5.7 on admission, improved back to normal with IV fluid hydration.  Patient was treated with Lokelma        6. Essential hypertension.    Prior to admission on metoprolol 25 mg daily, amlodipine 5 mg daily and Lasix 40 mg daily.    Started on  amlodipine, hold Lasix till JOSE MIGUEL improves.  With we will also decrease metoprolol to 12.5 mg daily due to bradycardia.     7. Sinus bradycardia.    Patient has chronic bradycardia and 50s and occasionally down to 40s.  Previous history of coronary artery disease with multiple stents.  Patient was on metoprolol 25 mg daily which was decreased to 12.5 mg daily with hold parameters.      Currently his heart rate is in 50s and 60s. He was monitored on tele       8. Abdominal upset.    Resolved.                   Consultations during hospital stay:       None      Patient discharge Condition:     stable    BP (!) 152/60 (BP Location: Left arm, Patient Position: Semi-Rossi's, Cuff Size: Adult Regular)   Pulse (!) 47   Temp 97.9  F (36.6  C) (Oral)   Resp  "18   Ht 1.778 m (5' 10\")   Wt 91.7 kg (202 lb 1.6 oz)   SpO2 97%   BMI 29.00 kg/m           Discharge Instructions:       Patient/family instructions: Written discharge instruction given to patient/family    Discharge Medications:       Review of your medicines      START taking      Dose / Directions   amLODIPine 2.5 MG tablet  Commonly known as: NORVASC  Used for: Essential hypertension      Dose: 5 mg  Take 2 tablets (5 mg) by mouth daily for 30 days  Quantity: 30 tablet  Refills: 0     metoprolol tartrate 25 MG tablet  Commonly known as: LOPRESSOR  Used for: Essential hypertension      Dose: 12.5 mg  Take 0.5 tablets (12.5 mg) by mouth 2 times daily for 30 days  Quantity: 30 tablet  Refills: 0        CONTINUE these medicines which may have CHANGED, or have new prescriptions. If we are uncertain of the size of tablets/capsules you have at home, strength may be listed as something that might have changed.      Dose / Directions   acetic acid-hydrocortisone 1-2 % otic solution  Commonly known as: VOSOL-HC  This may have changed: See the new instructions.  Used for: Ear itching      INSTILL 1 DROP INTO BOTH   EARS TWO TIMES A DAY  Quantity: 10 mL  Refills: 0        CONTINUE these medicines which have NOT CHANGED      Dose / Directions   aspirin 81 MG tablet  Commonly known as: ASA      Dose: 81 mg  Take 81 mg by mouth 2 times daily  Refills: 0     B-12 1000 MCG Caps      Dose: 1 mg  Take 1 mg by mouth daily  Refills: 0     B-6 100 MG Tabs      Dose: 100 mg  Take 100 mg by mouth daily  Refills: 0     calcitRIOL 0.25 MCG capsule  Commonly known as: ROCALTROL      Dose: 0.25 mcg  Take 0.25 mcg by mouth daily  Refills: 0     chlorhexidine 0.12 % solution  Commonly known as: PERIDEX      USE 1/2 OZ TO SWISH FOR 30 SECONDS ONCE D  Refills: 3     ciclopirox 0.77 % cream  Commonly known as: LOPROX  Used for: Contusion of toe with damage to nail, unspecified laterality, unspecified toe, initial encounter, Onychomycosis " of toenail, Diabetic polyneuropathy associated with type 2 diabetes mellitus (H)      Apply topically daily Apply to affected nail daily at night  Quantity: 30 g  Refills: 6     clopidogrel 75 MG tablet  Commonly known as: PLAVIX  Used for: ASHD (arteriosclerotic heart disease)      TAKE 1 TABLET DAILY  Quantity: 90 tablet  Refills: 3     famotidine 40 MG tablet  Commonly known as: PEPCID  Used for: Oropharyngeal dysphagia      TAKE 1 TABLET DAILY  Quantity: 90 tablet  Refills: 3     furosemide 40 MG tablet  Commonly known as: LASIX  Used for: Bilateral leg edema      TAKE 1 TABLET DAILY  Quantity: 90 tablet  Refills: 0     glimepiride 1 MG tablet  Commonly known as: AMARYL  Used for: Type 2 diabetes mellitus with diabetic nephropathy, without long-term current use of insulin (H)      TAKE 2 TABLETS EVERY       MORNING BEFORE BREAKFAST  Quantity: 180 tablet  Refills: 1     hydrocortisone 2.5 % cream  Used for: Eczema, unspecified type      APPLY TOPICALLY TWO TIMES ADAY  Quantity: 28.35 g  Refills: 11     nitroGLYcerin 0.6 MG sublingual tablet  Commonly known as: NITROSTAT  Used for: ASHD (arteriosclerotic heart disease)      DISSOLVE 1 TABLET UNDER THETONGUE AS NEEDED.  Quantity: 100 tablet  Refills: 0     omega-3 acid ethyl esters 1 g capsule  Commonly known as: LOVAZA  Used for: Hyperlipidemia LDL goal <100      TAKE 1 CAPSULE TWICE DAILY  Quantity: 180 capsule  Refills: 3     simvastatin 40 MG tablet  Commonly known as: ZOCOR  Used for: Hyperlipidemia LDL goal <100      TAKE 1 TABLET AT BEDTIME  Quantity: 90 tablet  Refills: 1     sitagliptin 25 MG tablet  Commonly known as: JANUVIA  Used for: Type 2 diabetes mellitus with diabetic nephropathy, without long-term current use of insulin (H)      Dose: 25 mg  Take 1 tablet (25 mg) by mouth daily  Quantity: 90 tablet  Refills: 3     VITAMIN C PO      Dose: 500 mg  Take 500 mg by mouth daily  Refills: 0     VITAMIN E NATURAL PO      Dose: 400 Units  Take 400 Units by  mouth daily  Refills: 0        STOP taking    metFORMIN 1000 MG tablet  Commonly known as: GLUCOPHAGE        metoprolol succinate ER 25 MG 24 hr tablet  Commonly known as: TOPROL XL              Where to get your medicines      These medications were sent to [id number] - [place], [place] - [address] [facility] [address] [address] [facility], [facility] [address]    Phone: [phone]     amLODIPine 2.5 MG tablet    metoprolol tartrate 25 MG tablet          Discharge diet:Orders Placed This Encounter      Diet    cardiac diet and diabetic diet      Discharge activity:Activity as tolerated      Discharge follow-up:    Follow up with primary care provider in 7 days or earlier if symptoms return or gets worse.      Other instructions:    We discussed with patient/family about detail discharge instructions as well as discharge medications above including potential risks,side effects and benefits.Patient/family understood benefits and potential serious side effects of taking these medications and need to follow up with PCP if the patient develops complications.  Patient is also advised to see a doctor immediately for severe symptoms.        Major procedure performed/  Significant Diagnostic Studies:         Results for orders placed or performed during the hospital encounter of 11/03/22   CT Head w/o Contrast    Narrative    CT SCAN OF THE HEAD WITHOUT CONTRAST   11/3/2022 2:23 PM     HISTORY: Dizziness/CVA evaluation.    TECHNIQUE:  Axial images of the head and coronal reformations without  IV contrast material. Radiation dose for this scan was reduced using  automated exposure control, adjustment of the mA and/or kV according  to patient size, or iterative reconstruction technique.    COMPARISON: None.    FINDINGS:  No evidence of hemorrhage. Volume loss and white matter  hypoattenuation likely represent chronic small vessel ischemic change.  Scattered atherosclerotic plaquing including focal  calcification  involving the superior basilar artery. No acute osseous abnormality.  Presumed cerumen within the left external auditory canal.      Impression    IMPRESSION:   1. No evidence of hemorrhage.  2. Scattered atherosclerotic plaquing including focal calcification  involving the basilar artery.    ADELSO HADLEY MD         SYSTEM ID:  [id number]   MR Brain w/o Contrast    Narrative    EXAM: MR BRAIN W/O CONTRAST, MRA BRAIN ([place]) W/O CONTRAST, MRA NECK (CAROTIDS) W/O CONTRAST  LOCATION: [facility]  DATE/TIME: 11/3/2022 7:29 PM    INDICATION: Dizziness posterior CVA evaluation.  COMPARISON: None.  TECHNIQUE:   1) Routine multiplanar multisequence head MRI without intravenous contrast.  2) 3D time-of-flight head MRA without intravenous contrast.  3) Neck MRA without IV contrast. Stenosis measurements made according to NASCET criteria unless otherwise specified.      FINDINGS:  HEAD MRI:  INTRACRANIAL CONTENTS: No acute or subacute infarct. No mass, acute hemorrhage, or extra-axial fluid collections. Scattered nonspecific T2/FLAIR hyperintensities within the cerebral white matter most consistent with mild chronic microvascular ischemic   change. Mild generalized cerebral atrophy. No hydrocephalus. Normal position of the cerebellar tonsils.     SELLA: No abnormality accounting for technique.    OSSEOUS STRUCTURES/SOFT TISSUES: Normal marrow signal. The major intracranial vascular flow voids are maintained.     ORBITS: No abnormality accounting for technique.     SINUSES/MASTOIDS: No paranasal sinus mucosal disease. No middle ear or mastoid effusion.     HEAD MRA:   ANTERIOR CIRCULATION: No stenosis/occlusion, aneurysm, or high flow vascular malformation. Standard [place] of [place] anatomy.    POSTERIOR CIRCULATION: No stenosis/occlusion, aneurysm, or high flow vascular malformation. Probable small fenestration within the mid to distal basilar artery, best seen on 3-D  reconstruction. Balanced vertebral arteries supply a normal basilar artery.     NECK MRA:   RIGHT CAROTID: No measurable stenosis or dissection.    LEFT CAROTID: Mild to moderate plaque at the carotid bifurcation No measurable stenosis or dissection.    VERTEBRAL ARTERIES: No focal stenosis or dissection. Balanced vertebral arteries.    AORTIC ARCH: Classic aortic arch anatomy with no significant stenosis at the origin of the great vessels.      Impression    IMPRESSION:  HEAD MRI:   1.  No acute intracranial process.  2.  Mild generalized brain atrophy and presumed microvascular ischemic changes as detailed above.    HEAD MRA:   1.  No large artery stenosis or occlusion. No aneurysm.    NECK MRA:  1.  No measurable carotid or vertebral artery stenosis.   MRA Neck (Carotids) wo Contrast    Narrative    EXAM: MR BRAIN W/O CONTRAST, MRA BRAIN ([place]) W/O CONTRAST, MRA NECK (CAROTIDS) W/O CONTRAST  LOCATION: [facility]  DATE/TIME: 11/3/2022 7:29 PM    INDICATION: Dizziness posterior CVA evaluation.  COMPARISON: None.  TECHNIQUE:   1) Routine multiplanar multisequence head MRI without intravenous contrast.  2) 3D time-of-flight head MRA without intravenous contrast.  3) Neck MRA without IV contrast. Stenosis measurements made according to NASCET criteria unless otherwise specified.      FINDINGS:  HEAD MRI:  INTRACRANIAL CONTENTS: No acute or subacute infarct. No mass, acute hemorrhage, or extra-axial fluid collections. Scattered nonspecific T2/FLAIR hyperintensities within the cerebral white matter most consistent with mild chronic microvascular ischemic   change. Mild generalized cerebral atrophy. No hydrocephalus. Normal position of the cerebellar tonsils.     SELLA: No abnormality accounting for technique.    OSSEOUS STRUCTURES/SOFT TISSUES: Normal marrow signal. The major intracranial vascular flow voids are maintained.     ORBITS: No abnormality accounting for technique.      SINUSES/MASTOIDS: No paranasal sinus mucosal disease. No middle ear or mastoid effusion.     HEAD MRA:   ANTERIOR CIRCULATION: No stenosis/occlusion, aneurysm, or high flow vascular malformation. Standard [place] of [place] anatomy.    POSTERIOR CIRCULATION: No stenosis/occlusion, aneurysm, or high flow vascular malformation. Probable small fenestration within the mid to distal basilar artery, best seen on 3-D reconstruction. Balanced vertebral arteries supply a normal basilar artery.     NECK MRA:   RIGHT CAROTID: No measurable stenosis or dissection.    LEFT CAROTID: Mild to moderate plaque at the carotid bifurcation No measurable stenosis or dissection.    VERTEBRAL ARTERIES: No focal stenosis or dissection. Balanced vertebral arteries.    AORTIC ARCH: Classic aortic arch anatomy with no significant stenosis at the origin of the great vessels.      Impression    IMPRESSION:  HEAD MRI:   1.  No acute intracranial process.  2.  Mild generalized brain atrophy and presumed microvascular ischemic changes as detailed above.    HEAD MRA:   1.  No large artery stenosis or occlusion. No aneurysm.    NECK MRA:  1.  No measurable carotid or vertebral artery stenosis.   MRA Brain ([place] of [place]) wo Contrast    Narrative    EXAM: MR BRAIN W/O CONTRAST, MRA BRAIN ([place]) W/O CONTRAST, MRA NECK (CAROTIDS) W/O CONTRAST  LOCATION: [facility]  DATE/TIME: 11/3/2022 7:29 PM    INDICATION: Dizziness posterior CVA evaluation.  COMPARISON: None.  TECHNIQUE:   1) Routine multiplanar multisequence head MRI without intravenous contrast.  2) 3D time-of-flight head MRA without intravenous contrast.  3) Neck MRA without IV contrast. Stenosis measurements made according to NASCET criteria unless otherwise specified.      FINDINGS:  HEAD MRI:  INTRACRANIAL CONTENTS: No acute or subacute infarct. No mass, acute hemorrhage, or extra-axial fluid collections. Scattered nonspecific T2/FLAIR hyperintensities  within the cerebral white matter most consistent with mild chronic microvascular ischemic   change. Mild generalized cerebral atrophy. No hydrocephalus. Normal position of the cerebellar tonsils.     SELLA: No abnormality accounting for technique.    OSSEOUS STRUCTURES/SOFT TISSUES: Normal marrow signal. The major intracranial vascular flow voids are maintained.     ORBITS: No abnormality accounting for technique.     SINUSES/MASTOIDS: No paranasal sinus mucosal disease. No middle ear or mastoid effusion.     HEAD MRA:   ANTERIOR CIRCULATION: No stenosis/occlusion, aneurysm, or high flow vascular malformation. Standard [place] of [place] anatomy.    POSTERIOR CIRCULATION: No stenosis/occlusion, aneurysm, or high flow vascular malformation. Probable small fenestration within the mid to distal basilar artery, best seen on 3-D reconstruction. Balanced vertebral arteries supply a normal basilar artery.     NECK MRA:   RIGHT CAROTID: No measurable stenosis or dissection.    LEFT CAROTID: Mild to moderate plaque at the carotid bifurcation No measurable stenosis or dissection.    VERTEBRAL ARTERIES: No focal stenosis or dissection. Balanced vertebral arteries.    AORTIC ARCH: Classic aortic arch anatomy with no significant stenosis at the origin of the great vessels.      Impression    IMPRESSION:  HEAD MRI:   1.  No acute intracranial process.  2.  Mild generalized brain atrophy and presumed microvascular ischemic changes as detailed above.    HEAD MRA:   1.  No large artery stenosis or occlusion. No aneurysm.    NECK MRA:  1.  No measurable carotid or vertebral artery stenosis.       Recent Labs   Lab 11/04/22  0740 11/03/22  1312   WBC 4.5 7.3   HGB 10.1* 11.3*   HCT 31.2* 35.4*   MCV 97 97    199     No results for input(s): CULT in the last 168 hours.  Recent Labs   Lab 11/05/22  0914 11/05/22  0728 11/05/22  0211 11/04/22  0814 11/04/22  0740 11/04/22  0349 11/03/22  2144 11/03/22  2026 11/03/22  1518  11/03/22  1312   NA  --  141  --   --  139  --   --  141   < > 137   POTASSIUM  --  4.6  --   --  4.9  --   --  4.7   < > 5.7*   CHLORIDE  --  109*  --   --  105  --   --  106   < > 102   CO2  --  25  --   --  27  --   --  25   < > 24   ANIONGAP  --  7  --   --  7  --   --  10   < > 11   GLC 94 100* 81   < > 203*  --    < > 72   < > 140*   BUN  --  21.0  --   --  30.5*  --   --  34.5*   < > 37.0*   CR  --  1.53*  --   --  1.80*  --   --  2.11*   < > 2.10*   GFRESTIMATED  --  46*  --   --  38*  --   --  31*   < > 31*   PETE  --  8.2*  --   --  8.5*  --   --  8.6*   < > 9.2   MAG  --  1.8  --   --   --  2.6*  --  1.4*   < > 1.5*   PROTTOTAL  --   --   --   --   --   --   --   --   --  6.0*   ALBUMIN  --   --   --   --   --   --   --   --   --  3.8   BILITOTAL  --   --   --   --   --   --   --   --   --  0.4   ALKPHOS  --   --   --   --   --   --   --   --   --  55   AST  --   --   --   --   --   --   --   --   --  43   ALT  --   --   --   --   --   --   --   --   --  24    < > = values in this interval not displayed.       Recent Labs   Lab 11/05/22  0914 11/05/22  0728 11/05/22 0211 11/04/22 2114 11/04/22  1728   GLC 94 100* 81 159* 131*         Pending Results:       Unresulted Labs Ordered in the Past 30 Days of this Admission     No orders found from 10/4/2022 to 11/4/2022.             Patient Allergies:       Allergies   Allergen Reactions     No Known Drug Allergies          Disposition:     Disposition: home    I saw and evaluated the patient on day of discharge and  discharge instructions reviewed  and  all the patient's questions and concerns addressed. Over 30 minutes spent on discharge and coordination of discharge process for this patient.      Disclaimer: This note consists of symbols derived from keyboarding, dictation and/or voice recognition software. As a result, there may be errors in the script that have gone undetected. Please consider this when interpreting information found in this chart

## 2022-11-07 ENCOUNTER — MYC MEDICAL ADVICE (OUTPATIENT)
Dept: INTERNAL MEDICINE | Facility: CLINIC | Age: 77
End: 2022-11-07

## 2022-11-08 ENCOUNTER — IMMUNIZATION (OUTPATIENT)
Dept: FAMILY MEDICINE | Facility: CLINIC | Age: 77
End: 2022-11-08
Payer: MEDICARE

## 2022-11-08 PROCEDURE — 90662 IIV NO PRSV INCREASED AG IM: CPT

## 2022-11-08 PROCEDURE — G0008 ADMIN INFLUENZA VIRUS VAC: HCPCS

## 2022-11-08 NOTE — TELEPHONE ENCOUNTER
Patient needs a hospital follow up appointment, can we use a virtual spot as a in person?    Susanna Kelley CMA  [facility]  [phone]

## 2022-11-09 NOTE — TELEPHONE ENCOUNTER
"ED/Discharge Protocol    \"Hi, my name is Jen Ames RN, a registered nurse, and I am calling on behalf of Dr. Deleon's office at [place].  I am calling to follow up and see how things are going for you after your recent visit.\"    \"I see that you were in the IP on 11/03-11/05.    How are you doing now that you are home?\" Doing well    Is patient experiencing symptoms that may require a hospital visit?  No    Discharge Instructions    \"Let's review your discharge instructions.  What is/are the follow-up recommendations?  Pt. Response: Patient understands follow up recommendations    \"Were you instructed to make a follow-up appointment?\"  Pt. Response: Yes.  Has appointment been made?   No.  \"Can I help you schedule that appointment?\" Yes  Appointments in Next Year    Nov 10, 2022  2:10 PM  Extremity Treatment with Piyush Lindsey, PT  [facility] ([facility] ) [phone]   Nov 14, 2022  5:00 PM  (Arrive by 4:45 PM)  ED/Hospital Follow Up with Guillermo Deleon MD  [facility] ([facility] ) [phone]              \"When you see the provider, I would recommend that you bring your discharge instructions with you.    Medications    \"How many new medications are you on since your hospitalization/ED visit?\"    0-1  \"How many of your current medicines changed (dose, timing, name, etc.) while you were in the hospital/ED visit?\"   0-1  \"Do you have questions about your medications?\"   No  \"Were you newly diagnosed with heart failure, COPD, diabetes or did you have a heart attack?\"   No  For patients on insulin: \"Did you start on insulin in the hospital or did you have your insulin dose changed?\"   No  Post Discharge Medication Reconciliation Status: discharge medications reconciled, continue medications without change.    Was MTM referral placed (*Make sure to put transitions as " "reason for referral)?   No    Call Summary    \"Do you have any questions or concerns about your condition or care plan at the moment?\"    No  Triage nurse advice given: None needed    Patient was in ER 1 in the past year (assess appropriateness of ER visits.)      \"If you have questions or things don't continue to improve, we encourage you contact us through the main clinic number,  [phone].  Even if the clinic is not open, triage nurses are available 24/7 to help you.     We would like you to know that our clinic has extended hours (provide information).  We also have urgent care (provide details on closest location and hours/contact info)\"      \"Thank you for your time and take care!\"        "

## 2022-11-10 ENCOUNTER — THERAPY VISIT (OUTPATIENT)
Dept: PHYSICAL THERAPY | Facility: CLINIC | Age: 77
End: 2022-11-10
Payer: MEDICARE

## 2022-11-10 DIAGNOSIS — M76.892 HAMSTRING TENDINITIS OF LEFT THIGH: Primary | ICD-10-CM

## 2022-11-10 PROCEDURE — 97110 THERAPEUTIC EXERCISES: CPT | Mod: GP | Performed by: PHYSICAL THERAPIST

## 2022-11-10 ASSESSMENT — ACTIVITIES OF DAILY LIVING (ADL)
GOING_DOWN_1_FLIGHT_OF_STAIRS: NO DIFFICULTY AT ALL
GETTING_INTO_AND_OUT_OF_A_BATHTUB: NO DIFFICULTY AT ALL
WALKING_UP_STEEP_HILLS: MODERATE DIFFICULTY
ROLLING_OVER_IN_BED: NO DIFFICULTY AT ALL
WALKING_DOWN_STEEP_HILLS: NO DIFFICULTY AT ALL
PUTTING_ON_SOCKS_AND_SHOES: NO DIFFICULTY AT ALL
WALKING_APPROXIMATELY_10_MINUTES: SLIGHT DIFFICULTY
TWISTING/PIVOTING_ON_INVOLVED_LEG: NO DIFFICULTY AT ALL
STEPPING_UP_AND_DOWN_CURBS: NO DIFFICULTY AT ALL
HEAVY_WORK: MODERATE DIFFICULTY
SITTING_FOR_15_MINUTES: NO DIFFICULTY AT ALL
WALKING_15_MINUTES_OR_GREATER: SLIGHT DIFFICULTY
STANDING_FOR_15_MINUTES: SLIGHT DIFFICULTY
GOING_UP_1_FLIGHT_OF_STAIRS: MODERATE DIFFICULTY
WALKING_INITIALLY: SLIGHT DIFFICULTY
LIGHT_TO_MODERATE_WORK: NO DIFFICULTY AT ALL
HOW_WOULD_YOU_RATE_YOUR_CURRENT_LEVEL_OF_FUNCTION_DURING_YOUR_USUAL_ACTIVITIES_OF_DAILY_LIVING_FROM_0_TO_100_WITH_100_BEING_YOUR_LEVEL_OF_FUNCTION_PRIOR_TO_YOUR_HIP_PROBLEM_AND_0_BEING_THE_INABILITY_TO_PERFORM_ANY_OF_YOUR_USUAL_DAILY_ACTIVITIES?: 80
GETTING_INTO_AND_OUT_OF_AN_AVERAGE_CAR: SLIGHT DIFFICULTY

## 2022-11-10 NOTE — PROGRESS NOTES
Subjective:  HPI  Physical Exam                    Objective:  System    Physical Exam    General     ROS    Assessment/Plan:    DISCHARGE REPORT    Progress reporting period is from 8-17-22 to 11-10-22.       SUBJECTIVE  Subjective changes noted by patient:  Pt notes decreased pain in the left hamstring. Pt still notes intermittent pain in the right lateral hip but that is improving also. .       Current pain level is 2/10  .     Previous pain level was  5/10  .   Changes in function:  Pt notes increased with ambulation. Pt still notes temporary stiffness after rising from a chair. Pt still notes some difficulty with ascending stairs and walking up an incline  Adverse reaction to treatment or activity: None    OBJECTIVE  Changes noted in objective findings:  Pt demonstrates improved flexibility in the left hamstring. MMT left hamstring 4+/5 without pain. Pt notes decreased pain with palpation of the left hamstring.         ASSESSMENT/PLAN  Updated problem list and treatment plan: Diagnosis 1:  Left hamstring strain  Pain -  hot/cold therapy, self management, education and home program  Decreased ROM/flexibility - therapeutic exercise, therapeutic activity and home program  Decreased strength - therapeutic exercise, therapeutic activities and home program  STG/LTGs have been met or progress has been made towards goals:  Yes,   Assessment of Progress: The patient's condition is improving.  Self Management Plans:  Patient has been instructed in a home treatment program.  I have re-evaluated this patient and find that the nature, scope, duration and intensity of the therapy is appropriate for the medical condition of the patient.  Jeff continues to require the following intervention to meet STG and LTG's:  PT intervention is no longer required to meet STG/LTG.    Recommendations:  This patient is ready to be discharged from therapy and continue their home treatment program.    Please refer to the daily flowsheet for  treatment today, total treatment time and time spent performing 1:1 timed codes.

## 2022-11-14 ENCOUNTER — OFFICE VISIT (OUTPATIENT)
Dept: INTERNAL MEDICINE | Facility: CLINIC | Age: 77
End: 2022-11-14
Payer: MEDICARE

## 2022-11-14 VITALS
SYSTOLIC BLOOD PRESSURE: 126 MMHG | HEART RATE: 72 BPM | DIASTOLIC BLOOD PRESSURE: 62 MMHG | HEIGHT: 70 IN | BODY MASS INDEX: 29.11 KG/M2 | RESPIRATION RATE: 16 BRPM | OXYGEN SATURATION: 99 % | TEMPERATURE: 97.9 F | WEIGHT: 203.3 LBS

## 2022-11-14 DIAGNOSIS — N18.32 ANEMIA DUE TO STAGE 3B CHRONIC KIDNEY DISEASE (H): ICD-10-CM

## 2022-11-14 DIAGNOSIS — R42 VERTIGO: ICD-10-CM

## 2022-11-14 DIAGNOSIS — N18.32 STAGE 3B CHRONIC KIDNEY DISEASE (H): ICD-10-CM

## 2022-11-14 DIAGNOSIS — D63.1 ANEMIA DUE TO STAGE 3B CHRONIC KIDNEY DISEASE (H): ICD-10-CM

## 2022-11-14 DIAGNOSIS — I10 ESSENTIAL HYPERTENSION: ICD-10-CM

## 2022-11-14 DIAGNOSIS — Z09 HOSPITAL DISCHARGE FOLLOW-UP: Primary | ICD-10-CM

## 2022-11-14 DIAGNOSIS — E87.5 HYPERKALEMIA: ICD-10-CM

## 2022-11-14 DIAGNOSIS — E11.21 TYPE 2 DIABETES MELLITUS WITH DIABETIC NEPHROPATHY, WITHOUT LONG-TERM CURRENT USE OF INSULIN (H): ICD-10-CM

## 2022-11-14 LAB
ERYTHROCYTE [DISTWIDTH] IN BLOOD BY AUTOMATED COUNT: 13.3 % (ref 10–15)
HCT VFR BLD AUTO: 34.1 % (ref 40–53)
HGB BLD-MCNC: 11.8 G/DL (ref 13.3–17.7)
MCH RBC QN AUTO: 32.2 PG (ref 26.5–33)
MCHC RBC AUTO-ENTMCNC: 34.6 G/DL (ref 31.5–36.5)
MCV RBC AUTO: 93 FL (ref 78–100)
PLATELET # BLD AUTO: 187 10E3/UL (ref 150–450)
RBC # BLD AUTO: 3.66 10E6/UL (ref 4.4–5.9)
WBC # BLD AUTO: 6.3 10E3/UL (ref 4–11)

## 2022-11-14 PROCEDURE — 36415 COLL VENOUS BLD VENIPUNCTURE: CPT | Performed by: INTERNAL MEDICINE

## 2022-11-14 PROCEDURE — 82043 UR ALBUMIN QUANTITATIVE: CPT | Performed by: INTERNAL MEDICINE

## 2022-11-14 PROCEDURE — 99495 TRANSJ CARE MGMT MOD F2F 14D: CPT | Performed by: INTERNAL MEDICINE

## 2022-11-14 PROCEDURE — 80048 BASIC METABOLIC PNL TOTAL CA: CPT | Performed by: INTERNAL MEDICINE

## 2022-11-14 PROCEDURE — 85027 COMPLETE CBC AUTOMATED: CPT | Performed by: INTERNAL MEDICINE

## 2022-11-14 RX ORDER — AMLODIPINE BESYLATE 5 MG/1
5 TABLET ORAL DAILY
Qty: 90 TABLET | Refills: 3 | Status: SHIPPED | OUTPATIENT
Start: 2022-11-14 | End: 2022-11-14

## 2022-11-14 RX ORDER — LANCING DEVICE
EACH MISCELLANEOUS
Qty: 1 EACH | Refills: 0 | Status: SHIPPED | OUTPATIENT
Start: 2022-11-14

## 2022-11-14 RX ORDER — AMLODIPINE BESYLATE 5 MG/1
5 TABLET ORAL DAILY
Qty: 30 TABLET | Refills: 0 | Status: SHIPPED | OUTPATIENT
Start: 2022-11-14 | End: 2022-12-14

## 2022-11-14 RX ORDER — LISINOPRIL 20 MG/1
20 TABLET ORAL DAILY
COMMUNITY
Start: 2022-09-04

## 2022-11-14 RX ORDER — BLOOD-GLUCOSE METER
EACH MISCELLANEOUS
Qty: 1 KIT | Refills: 0 | Status: SHIPPED | OUTPATIENT
Start: 2022-11-14 | End: 2024-02-07

## 2022-11-14 ASSESSMENT — PAIN SCALES - GENERAL: PAINLEVEL: NO PAIN (0)

## 2022-11-14 NOTE — LETTER
November 15, 2022      Jeff Ricks  [address] [facility] [address]  [place] [address]        Dear ,    We are writing to inform you of your test results.    Mild anemia and decreased kidney function.   Normal potassium.   Keep good hydration.   Recheck BMP in 1 month.               Resulted Orders   Basic metabolic panel  (Ca, Cl, CO2, Creat, Gluc, K, Na, BUN)   Result Value Ref Range    Sodium 140 133 - 144 mmol/L    Potassium 3.7 3.4 - 5.3 mmol/L    Chloride 106 94 - 109 mmol/L    Carbon Dioxide (CO2) 29 20 - 32 mmol/L    Anion Gap 5 3 - 14 mmol/L    Urea Nitrogen 25 7 - 30 mg/dL    Creatinine 1.84 (H) 0.66 - 1.25 mg/dL    Calcium 8.7 8.5 - 10.1 mg/dL    Glucose 186 (H) 70 - 99 mg/dL    GFR Estimate 37 (L) >60 mL/min/1.73m2      Comment:      Effective December 21, 2021 eGFRcr in adults is calculated using the 2021 CKD-EPI creatinine equation which includes age and gender (Wilberto et al., NEJ, DOI: 10.1056/LSZYzj1567267)   CBC with platelets   Result Value Ref Range    WBC Count 6.3 4.0 - 11.0 10e3/uL    RBC Count 3.66 (L) 4.40 - 5.90 10e6/uL    Hemoglobin 11.8 (L) 13.3 - 17.7 g/dL    Hematocrit 34.1 (L) 40.0 - 53.0 %    MCV 93 78 - 100 fL    MCH 32.2 26.5 - 33.0 pg    MCHC 34.6 31.5 - 36.5 g/dL    RDW 13.3 10.0 - 15.0 %    Platelet Count 187 150 - 450 10e3/uL   Albumin Random Urine Quantitative with Creat Ratio   Result Value Ref Range    Creatinine Urine mg/dL 244 mg/dL    Albumin Urine mg/L 69 mg/L    Albumin Urine mg/g Cr 28.28 (H) 0.00 - 17.00 mg/g Cr       If you have any questions or concerns, please call the clinic at the number listed above.       Sincerely,      Guillermo Deleon MD      manav

## 2022-11-14 NOTE — PROGRESS NOTES
Assessment & Plan     Stage 3b chronic kidney disease (H)  Recheck lab work   Keep hydration , off Metformin   - Albumin Random Urine Quantitative with Creat Ratio; Future  - Basic metabolic panel  (Ca, Cl, CO2, Creat, Gluc, K, Na, BUN)  - CBC with platelets    Essential hypertension  Controlled on increased Amlodipine and reduced Metoprolol, because of bradycardia   - amLODIPine (NORVASC) 5 MG tablet; Take 1 tablet (5 mg) by mouth daily  - Basic metabolic panel  (Ca, Cl, CO2, Creat, Gluc, K, Na, BUN)  - CBC with platelets    Type 2 diabetes mellitus with diabetic nephropathy, without long-term current use of insulin (H)  Increase Januvia to 50 mg, continue Glimepiride, monitor BG  Off Metfromin   Recheck in 2 months HgbA1C   - sitagliptin (JANUVIA) 50 MG tablet; Take 1 tablet (50 mg) by mouth daily  - blood glucose monitoring (ACCU-CHEK SHELLEY PLUS) meter device kit; Use to test blood sugar 1 times daily or as directed.  - blood glucose (NO BRAND SPECIFIED) test strip; Use to test blood sugar 1 times daily or as directed.  - blood glucose (NO BRAND SPECIFIED) lancing device; Device to be used with lancets.    Hyperkalemia  Keep low K diet, recheck lab     Vertigo  Clinically BPPV, resolved     Anemia due to stage 3b chronic kidney disease (H)  Monitor CBC    Hospital discharge follow-up  Improved, keep hydration, no recurrent vertigo              MED REC REQUIRED  Post Medication Reconciliation Status:   See Patient Instructions    Return in about 2 months (around 1/14/2023) for Routine Visit.    Guillermo Deleon MD  [facility] [place] Aguilar is a 77 year old accompanied by his , presenting for the following health issues:  Hospital F/U      HPI       Hospital Follow-up Visit:    Hospital/Nursing Home/IP Rehab Facility: [facility]  Date of Admission: 11/3/2022  Date of Discharge: 11/05/2022  Reason(s) for Admission: dizziness    Pt states he is having a  "slight pain in the chest and a little SOB x1 week when walking longer distances.    Was your hospitalization related to COVID-19? No   Problems taking medications regularly:  None  Medication changes since discharge: None  Problems adhering to non-medication therapy:  None    Summary of hospitalization:  [facility] discharge summary reviewed  Diagnostic Tests/Treatments reviewed.  Follow up needed: clinic follow up , lab   Other Healthcare Providers Involved in Patient s Care:         None  Update since discharge: improved.   Plan of care communicated with patient     Patient is seen for a follow up visit.  Recently hospitalized for acute vertigo, nausea.   Patient has CKD, found to have elevated K level and worsened renal function.   Treated with IVF, renal function improved, K was corrected.   Stopped treatment with Metformin, because of acute on chronic renal failure.   Had bradycardia, and Metoprolol dose decreased to 12.5 mg bid.   Started on Amlodipine for HTN.   BP is controlled . No recurrent vertigo noted.   No symptoms of infection.         Review of Systems   Constitutional, HEENT, cardiovascular, pulmonary, gi and gu systems are negative, except as otherwise noted.      Objective    /62 (BP Location: Left arm, Cuff Size: Adult Regular)   Pulse 72   Temp 97.9  F (36.6  C) (Tympanic)   Resp 16   Ht 1.778 m (5' 10\")   Wt 92.2 kg (203 lb 4.8 oz)   SpO2 99%   BMI 29.17 kg/m    Body mass index is 29.17 kg/m .  Physical Exam   GENERAL: healthy, alert and no distress  EYES: Eyes grossly normal to inspection, PERRL and conjunctivae and sclerae normal  HENT: ear canals and TM's normal, nose and mouth without ulcers or lesions  NECK: no adenopathy, no asymmetry, masses, or scars and thyroid normal to palpation  RESP: lungs clear to auscultation - no rales, rhonchi or wheezes  CV: regular rate and rhythm, normal S1 S2, no S3 or S4, no murmur, click or rub, peripheral pulses " strong  ABDOMEN: soft, nontender, no hepatosplenomegaly, no masses and bowel sounds normal  MS: no gross musculoskeletal defects noted, trace LE edema  SKIN: no suspicious lesions or rashes  NEURO: Normal strength and tone, mentation intact and speech normal    Admission on 11/03/2022, Discharged on 11/05/2022   Component Date Value Ref Range Status     GLUCOSE BY METER POCT 11/03/2022 177 (H)  70 - 99 mg/dL Final     Sodium 11/03/2022 137  136 - 145 mmol/L Final     Potassium 11/03/2022 5.7 (H)  3.4 - 5.3 mmol/L Final    Specimen slightly hemolyzed, potassium may be falsely elevated.     Chloride 11/03/2022 102  98 - 107 mmol/L Final     Carbon Dioxide (CO2) 11/03/2022 24  22 - 29 mmol/L Final     Anion Gap 11/03/2022 11  7 - 15 mmol/L Final     Urea Nitrogen 11/03/2022 37.0 (H)  8.0 - 23.0 mg/dL Final     Creatinine 11/03/2022 2.10 (H)  0.67 - 1.17 mg/dL Final     Calcium 11/03/2022 9.2  8.8 - 10.2 mg/dL Final     Glucose 11/03/2022 140 (H)  70 - 99 mg/dL Final     Alkaline Phosphatase 11/03/2022 55  40 - 129 U/L Final     AST 11/03/2022 43  10 - 50 U/L Final    Specimen is hemolyzed which can falsely elevate AST. Analysis of a non-hemolyzed specimen may result in a lower value.     ALT 11/03/2022 24  10 - 50 U/L Final     Protein Total 11/03/2022 6.0 (L)  6.4 - 8.3 g/dL Final     Albumin 11/03/2022 3.8  3.5 - 5.2 g/dL Final     Bilirubin Total 11/03/2022 0.4  <=1.2 mg/dL Final     GFR Estimate 11/03/2022 31 (L)  >60 mL/min/1.73m2 Final    Effective December 21, 2021 eGFRcr in adults is calculated using the 2021 CKD-EPI creatinine equation which includes age and gender (Wilberto et al., NEJM, DOI: 10.1056/UFGDte2805276)     Troponin T, High Sensitivity 11/03/2022 47 (H)  <=22 ng/L Final    Either a High Sensitivity Troponin T baseline (0 hours) value = 100 ng/mL, or an increase in High Sensitivity Troponin T = 7 ng/mL at 2 hours compared to 0 hours (2-0 hours), suggests myocardial injury, and urgent clinical  attention is required.    If the 2-0 hours increase is<7 ng/mL, a High Sensitivity Troponin T result above gender-specific reference ranges warrants further evaluation.   Recommendations for further evaluation include correlation with clinical decision-making tool (e.g., HEART), a 3rd High Sensitivity Troponin T test 2 hours after the 2nd (a 20% change from baseline would represent concern), admission for observation, close PCC/cardiology follow-up, or urgent outpatient provocative testing.     Magnesium 11/03/2022 1.5 (L)  1.7 - 2.3 mg/dL Final     Ventricular Rate 11/03/2022 50  BPM Incomplete     Atrial Rate 11/03/2022 50  BPM Incomplete     MO Interval 11/03/2022 172  ms Incomplete     QRS Duration 11/03/2022 142  ms Incomplete     QT 11/03/2022 444  ms Incomplete     QTc 11/03/2022 404  ms Incomplete     P Axis 11/03/2022 54  degrees Incomplete     R AXIS 11/03/2022 -53  degrees Incomplete     T Axis 11/03/2022 -16  degrees Incomplete     Interpretation ECG 11/03/2022    Incomplete                    Value:Sinus bradycardia  Right bundle branch block  Left anterior fascicular block   Bifascicular block   Moderate voltage criteria for LVH, may be normal variant  Abnormal ECG  When compared with ECG of 21-MAR-2014 17:57,  T wave inversion more evident in Anterior leads       WBC Count 11/03/2022 7.3  4.0 - 11.0 10e3/uL Final     RBC Count 11/03/2022 3.67 (L)  4.40 - 5.90 10e6/uL Final     Hemoglobin 11/03/2022 11.3 (L)  13.3 - 17.7 g/dL Final     Hematocrit 11/03/2022 35.4 (L)  40.0 - 53.0 % Final     MCV 11/03/2022 97  78 - 100 fL Final     MCH 11/03/2022 30.8  26.5 - 33.0 pg Final     MCHC 11/03/2022 31.9  31.5 - 36.5 g/dL Final     RDW 11/03/2022 13.5  10.0 - 15.0 % Final     Platelet Count 11/03/2022 199  150 - 450 10e3/uL Final     % Neutrophils 11/03/2022 75  % Final     % Lymphocytes 11/03/2022 15  % Final     % Monocytes 11/03/2022 8  % Final     % Eosinophils 11/03/2022 1  % Final     % Basophils  11/03/2022 0  % Final     % Immature Granulocytes 11/03/2022 1  % Final     NRBCs per 100 WBC 11/03/2022 0  <1 /100 Final     Absolute Neutrophils 11/03/2022 5.5  1.6 - 8.3 10e3/uL Final     Absolute Lymphocytes 11/03/2022 1.1  0.8 - 5.3 10e3/uL Final     Absolute Monocytes 11/03/2022 0.6  0.0 - 1.3 10e3/uL Final     Absolute Eosinophils 11/03/2022 0.1  0.0 - 0.7 10e3/uL Final     Absolute Basophils 11/03/2022 0.0  0.0 - 0.2 10e3/uL Final     Absolute Immature Granulocytes 11/03/2022 0.0  <=0.4 10e3/uL Final     Absolute NRBCs 11/03/2022 0.0  10e3/uL Final     Troponin T, High Sensitivity 11/03/2022 47 (H)  <=22 ng/L Final    Either a High Sensitivity Troponin T baseline (0 hours) value = 100 ng/mL, or an increase in High Sensitivity Troponin T = 7 ng/mL at 2 hours compared to 0 hours (2-0 hours), suggests myocardial injury, and urgent clinical attention is required.    If the 2-0 hours increase is<7 ng/mL, a High Sensitivity Troponin T result above gender-specific reference ranges warrants further evaluation.   Recommendations for further evaluation include correlation with clinical decision-making tool (e.g., HEART), a 3rd High Sensitivity Troponin T test 2 hours after the 2nd (a 20% change from baseline would represent concern), admission for observation, close PCC/cardiology follow-up, or urgent outpatient provocative testing.     Sodium 11/03/2022 138  136 - 145 mmol/L Final     Potassium 11/03/2022 4.3  3.4 - 5.3 mmol/L Final     Chloride 11/03/2022 104  98 - 107 mmol/L Final     Carbon Dioxide (CO2) 11/03/2022 23  22 - 29 mmol/L Final     Anion Gap 11/03/2022 11  7 - 15 mmol/L Final     Urea Nitrogen 11/03/2022 35.9 (H)  8.0 - 23.0 mg/dL Final     Creatinine 11/03/2022 2.10 (H)  0.67 - 1.17 mg/dL Final     Calcium 11/03/2022 8.6 (L)  8.8 - 10.2 mg/dL Final     Glucose 11/03/2022 93  70 - 99 mg/dL Final     GFR Estimate 11/03/2022 31 (L)  >60 mL/min/1.73m2 Final    Effective December 21, 2021 eGFRcr in adults  is calculated using the 2021 CKD-EPI creatinine equation which includes age and gender (Wilberto et al., [facility], DOI: 10.1056/SWSUjo7570015)     Magnesium 11/03/2022 1.4 (L)  1.7 - 2.3 mg/dL Final     SARS CoV2 PCR 11/03/2022 Negative  Negative Final    NEGATIVE: SARS-CoV-2 (COVID-19) RNA not detected, presumed negative.     Sodium 11/03/2022 141  136 - 145 mmol/L Final     Potassium 11/03/2022 4.7  3.4 - 5.3 mmol/L Final     Chloride 11/03/2022 106  98 - 107 mmol/L Final     Carbon Dioxide (CO2) 11/03/2022 25  22 - 29 mmol/L Final     Anion Gap 11/03/2022 10  7 - 15 mmol/L Final     Urea Nitrogen 11/03/2022 34.5 (H)  8.0 - 23.0 mg/dL Final     Creatinine 11/03/2022 2.11 (H)  0.67 - 1.17 mg/dL Final     Calcium 11/03/2022 8.6 (L)  8.8 - 10.2 mg/dL Final     Glucose 11/03/2022 72  70 - 99 mg/dL Final     GFR Estimate 11/03/2022 31 (L)  >60 mL/min/1.73m2 Final    Effective December 21, 2021 eGFRcr in adults is calculated using the 2021 CKD-EPI creatinine equation which includes age and gender (Wilberto roy al., [facility], DOI: 10.1056/DNLWle5951441)     Magnesium 11/03/2022 1.4 (L)  1.7 - 2.3 mg/dL Final     GLUCOSE BY METER POCT 11/03/2022 98  70 - 99 mg/dL Final     Magnesium 11/04/2022 2.6 (H)  1.7 - 2.3 mg/dL Final     GLUCOSE BY METER POCT 11/04/2022 57 (L)  70 - 99 mg/dL Final     GLUCOSE BY METER POCT 11/04/2022 55 (L)  70 - 99 mg/dL Final     GLUCOSE BY METER POCT 11/04/2022 61 (L)  70 - 99 mg/dL Final     GLUCOSE BY METER POCT 11/04/2022 101 (H)  70 - 99 mg/dL Final     Sodium 11/04/2022 139  136 - 145 mmol/L Final     Potassium 11/04/2022 4.9  3.4 - 5.3 mmol/L Final     Chloride 11/04/2022 105  98 - 107 mmol/L Final     Carbon Dioxide (CO2) 11/04/2022 27  22 - 29 mmol/L Final     Anion Gap 11/04/2022 7  7 - 15 mmol/L Final     Urea Nitrogen 11/04/2022 30.5 (H)  8.0 - 23.0 mg/dL Final     Creatinine 11/04/2022 1.80 (H)  0.67 - 1.17 mg/dL Final     Calcium 11/04/2022 8.5 (L)  8.8 - 10.2 mg/dL Final     Glucose 11/04/2022  203 (H)  70 - 99 mg/dL Final     GFR Estimate 11/04/2022 38 (L)  >60 mL/min/1.73m2 Final    Effective December 21, 2021 eGFRcr in adults is calculated using the 2021 CKD-EPI creatinine equation which includes age and gender (Wilberto roy al., [facility], DOI: 10.1056/RASEys0225870)     WBC Count 11/04/2022 4.5  4.0 - 11.0 10e3/uL Final     RBC Count 11/04/2022 3.22 (L)  4.40 - 5.90 10e6/uL Final     Hemoglobin 11/04/2022 10.1 (L)  13.3 - 17.7 g/dL Final     Hematocrit 11/04/2022 31.2 (L)  40.0 - 53.0 % Final     MCV 11/04/2022 97  78 - 100 fL Final     MCH 11/04/2022 31.4  26.5 - 33.0 pg Final     MCHC 11/04/2022 32.4  31.5 - 36.5 g/dL Final     RDW 11/04/2022 13.3  10.0 - 15.0 % Final     Platelet Count 11/04/2022 154  150 - 450 10e3/uL Final     GLUCOSE BY METER POCT 11/04/2022 203 (H)  70 - 99 mg/dL Final     GLUCOSE BY METER POCT 11/04/2022 213 (H)  70 - 99 mg/dL Final     GLUCOSE BY METER POCT 11/04/2022 131 (H)  70 - 99 mg/dL Final     GLUCOSE BY METER POCT 11/04/2022 159 (H)  70 - 99 mg/dL Final     Magnesium 11/05/2022 1.8  1.7 - 2.3 mg/dL Final     Sodium 11/05/2022 141  136 - 145 mmol/L Final     Potassium 11/05/2022 4.6  3.4 - 5.3 mmol/L Final     Chloride 11/05/2022 109 (H)  98 - 107 mmol/L Final     Carbon Dioxide (CO2) 11/05/2022 25  22 - 29 mmol/L Final     Anion Gap 11/05/2022 7  7 - 15 mmol/L Final     Urea Nitrogen 11/05/2022 21.0  8.0 - 23.0 mg/dL Final     Creatinine 11/05/2022 1.53 (H)  0.67 - 1.17 mg/dL Final     Calcium 11/05/2022 8.2 (L)  8.8 - 10.2 mg/dL Final     Glucose 11/05/2022 100 (H)  70 - 99 mg/dL Final     GFR Estimate 11/05/2022 46 (L)  >60 mL/min/1.73m2 Final    Effective December 21, 2021 eGFRcr in adults is calculated using the 2021 CKD-EPI creatinine equation which includes age and gender (Wilberto et al., NEJM, DOI: 10.1056/BUOQxz4825839)     GLUCOSE BY METER POCT 11/05/2022 81  70 - 99 mg/dL Final     Hold Specimen 11/05/2022 JIC   Final     GLUCOSE BY METER POCT 11/05/2022 94  70 - 99  mg/dL Final

## 2022-11-15 DIAGNOSIS — E11.21 TYPE 2 DIABETES MELLITUS WITH DIABETIC NEPHROPATHY, WITHOUT LONG-TERM CURRENT USE OF INSULIN (H): ICD-10-CM

## 2022-11-15 LAB
ANION GAP SERPL CALCULATED.3IONS-SCNC: 5 MMOL/L (ref 3–14)
BUN SERPL-MCNC: 25 MG/DL (ref 7–30)
CALCIUM SERPL-MCNC: 8.7 MG/DL (ref 8.5–10.1)
CHLORIDE BLD-SCNC: 106 MMOL/L (ref 94–109)
CO2 SERPL-SCNC: 29 MMOL/L (ref 20–32)
CREAT SERPL-MCNC: 1.84 MG/DL (ref 0.66–1.25)
CREAT UR-MCNC: 244 MG/DL
GFR SERPL CREATININE-BSD FRML MDRD: 37 ML/MIN/1.73M2
GLUCOSE BLD-MCNC: 186 MG/DL (ref 70–99)
MICROALBUMIN UR-MCNC: 69 MG/L
MICROALBUMIN/CREAT UR: 28.28 MG/G CR (ref 0–17)
POTASSIUM BLD-SCNC: 3.7 MMOL/L (ref 3.4–5.3)
SODIUM SERPL-SCNC: 140 MMOL/L (ref 133–144)

## 2022-11-30 ENCOUNTER — MYC MEDICAL ADVICE (OUTPATIENT)
Dept: INTERNAL MEDICINE | Facility: CLINIC | Age: 77
End: 2022-11-30

## 2022-11-30 DIAGNOSIS — I10 ESSENTIAL HYPERTENSION: ICD-10-CM

## 2022-12-01 RX ORDER — METOPROLOL TARTRATE 25 MG/1
12.5 TABLET, FILM COATED ORAL 2 TIMES DAILY
Qty: 30 TABLET | Refills: 0 | Status: SHIPPED | OUTPATIENT
Start: 2022-12-01 | End: 2022-12-20

## 2022-12-01 NOTE — TELEPHONE ENCOUNTER
Call to patient. Discussed option of Medication Therapy Management referral. Patient is interested in speaking with Medication Therapy Management. Referral entered.    Patient was also changed from Metoprolol Succinate to Metoprolol Tartrate while in the hospital. Only 30 days was sent at hospital discharge and a new prescription was not sent at last office visit. Please advise if patient is to continue this medication and send new prescription if appropriate.

## 2022-12-13 ENCOUNTER — OFFICE VISIT (OUTPATIENT)
Dept: PHARMACY | Facility: CLINIC | Age: 77
End: 2022-12-13
Payer: COMMERCIAL

## 2022-12-13 VITALS — HEART RATE: 50 BPM | DIASTOLIC BLOOD PRESSURE: 70 MMHG | SYSTOLIC BLOOD PRESSURE: 137 MMHG | OXYGEN SATURATION: 98 %

## 2022-12-13 DIAGNOSIS — Z78.9 TAKES DIETARY SUPPLEMENTS: ICD-10-CM

## 2022-12-13 DIAGNOSIS — E78.5 HYPERLIPIDEMIA LDL GOAL <100: ICD-10-CM

## 2022-12-13 DIAGNOSIS — E11.21 TYPE 2 DIABETES MELLITUS WITH DIABETIC NEPHROPATHY, WITHOUT LONG-TERM CURRENT USE OF INSULIN (H): Primary | ICD-10-CM

## 2022-12-13 DIAGNOSIS — N18.32 STAGE 3B CHRONIC KIDNEY DISEASE (H): ICD-10-CM

## 2022-12-13 DIAGNOSIS — R10.13 DYSPEPSIA: ICD-10-CM

## 2022-12-13 DIAGNOSIS — I25.10 ATHEROSCLEROSIS OF CORONARY ARTERY OF NATIVE HEART WITHOUT ANGINA PECTORIS, UNSPECIFIED VESSEL OR LESION TYPE: ICD-10-CM

## 2022-12-13 DIAGNOSIS — I10 ESSENTIAL HYPERTENSION: ICD-10-CM

## 2022-12-13 DIAGNOSIS — Z71.85 VACCINE COUNSELING: ICD-10-CM

## 2022-12-13 DIAGNOSIS — R00.1 SINUS BRADYCARDIA: ICD-10-CM

## 2022-12-13 PROCEDURE — 99605 MTMS BY PHARM NP 15 MIN: CPT | Performed by: PHARMACIST

## 2022-12-13 PROCEDURE — 99607 MTMS BY PHARM ADDL 15 MIN: CPT | Performed by: PHARMACIST

## 2022-12-13 NOTE — PATIENT INSTRUCTIONS
"Recommendations from today's MTM visit:                                                    MTM (medication therapy management) is a service provided by a clinical pharmacist designed to help you get the most of out of your medicines.   Today we reviewed what your medicines are for, how to know if they are working, that your medicines are safe and how to make your medicine regimen as easy as possible.      Try to take your blood sugars a few times per week when you remember. Your goal morning blood sugar readings (before breakfast) is  mg/dL and if taken 2 hours after a meal your blood sugar goal is < 180 mmHg.   I will discuss with Dr. Deleon that you will not be starting Amlodipine at this time. Additionally, I will discuss with him whether he wants you to stay on Metoprolol tartrate vs the Metoprolol succinate you were previously taking (long-acting).     Follow-up: Return in 5 weeks (on 1/17/2023) for Follow up, with me, using a video visit at 2:00pm.    It was great speaking with you today.  I value your experience and would be very thankful for your time in providing feedback in our clinic survey. In the next few days, you may receive an email or text message from [employer] with a link to a survey related to your  clinical pharmacist.\"     To schedule another MTM appointment, please call the clinic directly or you may call the MTM scheduling line at [phone] or toll-free at [phone].     My Clinical Pharmacist's contact information:                                                      Please feel free to contact me with any questions or concerns you have.      Aparna Acosta, PharmD  Medication Therapy Management Resident  Pager: [phone]    "

## 2022-12-13 NOTE — PROGRESS NOTES
Medication Therapy Management (MTM) Encounter    ASSESSMENT:                            Medication Adherence/Access: Patient endorses some recent confusion with current medication regimen given recent changes/recommendations from hospitalist, Dr. Deleon, and [facility]. See below for considerations    Type 2 Diabetes: Patient is meeting A1c goal of < 7%. Patient is not currently self montioring blood glucose. Due to recent discontinuation of Metformin (11/4) & increased Januvia dose (11/14), would benefit from testing blood sugars a few times per week. Patient would also benefit from A1C recheck in 6-8 weeks to evaluate effectivenes of medication changes & overall blood sugar control.     Hypertension/Arteriosclerotic heart disease/Sinus bradycardia: Patient is meeting blood pressure goal of < 140/90mmHg. Notably, pulse has been dropping below 40 beats per minute more frequently during night over last 3 weeks. During hospital stay, prior to admission Metoprolol succinate was reduced in half. Per review of records, patient was tolerating Metoprolol succinate 12.5mg. Patients metoprolol was refilled for immediate release version at previous 25mg total daily dose. To reduce risk for bradycardia, may consider switching patient back to metoprolol succinate at half dose (12.5mg daily). Will contact Dr. Deleon to discuss option for change. Additionally, patient is not currently on amlodipine due to study requirements. Will notify Dr. Deleon that patient plans to hold off on starting Amlodipine per [facility] recommendations.     Hyperlipidemia: Stable.Patient is on high intensity statin which is indicated based on 2019 ACC/AHA guidelines for lipid management.      Dyspepsia: Stable.    CKD Stage 3:/Supplements: Estimated glomerular filtration rate improved over course of hospital stay (Nov 2021). Most recent serum creatinine (11/14/22) showed elevated serum creatinine. Will continue to closely follow  "with [facility]ist.    Vaccines: Up-to-date on all vaccines per ACIP/CDC Guidelines.     PLAN:                            1. Start taking home blood sugars a few times per week   2. MTM will discuss with Dr. Deleon that patient is not planning to start Amlodipine at this time due to study requirements  3. MTM will discuss with Dr. Deleon option to restart Metoprolol succinate at half dose (12.5 mg daily)     Follow-up: Return in 5 weeks (on 1/17/2023) for Follow up, with me, using a video visit at 2:00pm.    SUBJECTIVE/OBJECTIVE:                          Jeff Ricks is a 77 year old male coming in for an initial visit. He was referred to me from Guillermo Deleon for medication related questions.      Reason for visit: Patient reports he was recently hospitalized for hyperkalemia - the hospitalist changed some medications at this time, as well as Dr. Deleon at his last visit (11/14/22). Was switched from Metoprolol succinate to tartrate - unsure why this was. Only received a 30 day supply & will need refills by the end of the month. Was taken off Metformin. Looking for clarity on some of these changes to ensure his medication list is accurate.     Allergies/ADRs: Reviewed in chart  Past Medical History: Reviewed in chart  Tobacco: He reports that he quit smoking about 55 years ago. His smoking use included cigarettes, cigars, and pipe. He started smoking about 61 years ago. He has a 18.00 pack-year smoking history. He has never used smokeless tobacco.  Alcohol: 1 per week   Caffeine: coffee three times per week, tea all other days     Currently enrolled in a clinical trial though [facility] \"Phase 3 - assess efficacy & safety of [id number], a mineralocorticoid receptor antagonist (MRA), in subjects with uncontrolled HTN who have moderate or severe CKD\". Was told he should not make any changes to his BP medications at this time due to study. Reports he was officially started on the MRA medication " yesterday (previous months were blinded & unsure if he was on therapy/placebo).      Medication Adherence/Access:   Patient uses pill box(es), has an excel sheet printed with daily medications organized.   Patient takes medications 2 time(s) per day.   Per patient, rarely misses medications.     Type 2 Diabetes: Currently taking Glimepiride 2mg daily before breakfast and Sitagliptin (Januvia) 50mg daily. Patient is not experiencing side effects. Was stopped on metformin about 6 weeks ago (due to eGFR 37mL/min).     Blood sugar monitoring: Has not been checking with home glucose monitor. Reports he struggles to get blood with finger prick.   Symptoms of low blood sugar? none  Symptoms of high blood sugar? none  Eye exam: up to date  Foot exam: up to date - patient reports this was completed by Dr. Deleon at last visit (11/14/22)   Diet: tries to eat a well-balanced diet  Aspirin: Taking 81mg twice daily for secondary prevention   Statin: Yes: Simvastatin   ACEi/ARB: Yes: Lisinopril.   Urine Albumin:   Lab Results   Component Value Date    UMALCR 28.28 (H) 11/14/2022      Lab Results   Component Value Date    A1C 5.8 10/17/2022    A1C 7.5 11/12/2021    A1C 7.3 06/10/2021    A1C 5.7 01/20/2021    A1C 5.5 09/11/2020    A1C 5.7 07/01/2020    A1C 5.5 12/23/2019     Hypertension/Arteriosclerotic heart disease/Leg Edema: Current BP lowering medications include: Furosemide 40mg daily, Lisinopril 20mg daily, Metoprolol tartrate 12.5mg twice daily. Also takes Clopidogrel 75mg daily and Aspirin 81 mg twice daily. Has SL NTG 0.6mg tablets at home for acute chest pain, has never need to use (refills as recommended). Patient denies any signs/symptoms of bleeding.    Was prescribed Amolodipine 5mg daily by Dr. Deleon (filled 11/30/22). Has not taken any as he was told not to take it by nephrologist per study criteria.     Was switched from Metoprolol succinate, per review of records dose reduced to 12.5 twice daily due to  bradycardia. Patient reports he has always had a low pulse. Over last few weeks has had low pulse readings during the night of less than 40 bpm, had minimal episodes of this until Nov 26 (per Apple Watch report). Does not feel dizzy with low pulse, has some fatigue (mostly pulse in 40's)       BP Readings from Last 3 Encounters:   12/13/22 137/70   11/14/22 126/62   11/05/22 (!) 152/60     Pulse Readings from Last 3 Encounters:   12/13/22 50   11/14/22 72   11/05/22 (!) 47     Hyperlipidemia: Current therapy includes Simvastatin 40mg daily at bedtime and Lovaza (Omega-3 acid ethyl esters) 1g twice daily.  Patient reports no significant myalgias or other side effects.    Recent Labs   Lab Test 10/17/22  1402 06/10/21  0804 05/16/16  1133 05/13/15  1030   CHOL 84 100   < > 95   HDL 47 60   < > 65   LDL 26 30   < > 24   TRIG 56 52   < > 28   CHOLHDLRATIO  --   --   --  1.5    < > = values in this interval not displayed.     Dyspepsia: Current medications include: Pepcid (famotidine) 40mg once daily.   The patient does not have a history of GI bleed. Patient feels that current regimen is effective. Some infrequent heartburn (unknown triggers). Per patient, has history of hiatal hernia.     CKD Stage 3/Supplements: Takes Calcitriol 0.25 mcg daily, Vitamin B-12 1000mcg daily, Vitamin E 400 units daily, Pyridoxine/Vit B-6 100mg daily, Ascorbic acid 500mg daily. Feels that the frequency of common colds he gets has significantly reduced since starting Vit C 5 years ago. colds have significantly reduced since starting Vitamin C 5 years ago. Patient reports Podiatrist recommended these supplement for improved healing of foot/toe.   Creatinine   Date Value Ref Range Status   11/14/2022 1.84 (H) 0.66 - 1.25 mg/dL Final   06/10/2021 1.70 (H) 0.66 - 1.25 mg/dL Final     CrCl cannot be calculated (Patient's most recent lab result is older than the maximum 30 days allowed.).        Vitamin D Deficiency Screening Results:  Lab  Results   Component Value Date    VITDT 59 01/20/2021    VITDT (L) <13 04/30/2014     Date Vitamin B12   (200-1100 pg/mL)    06/22/2018 464     Lab Results   Component Value Date    VITDT 59 01/20/2021    VITDT (L) 04/30/2014     <13  Season, race, dietary intake, and treatment affect the concentration of   25-hydroxy-Vitamin D. Values may decrease during winter months and increase   during summer months. Values less than 30 ug/L may indicate Vitamin D   deficiency.   Vitamin D determiniation is routinely performed by an immunoassay specific for   25 hydroxyvitamin D3.  If an individual is on vitamin D2 (ergocalciferol)   supplementation, please specify 25 OH vitamin D2 and D3 level determination by   LCMSMS test VITD23.  For questions, please contact the laboratory at   [phone].       Vaccines:   Immunization History   Administered Date(s) Administered     COVID-19 Vaccine 12+ (Pfizer) 01/28/2021, 02/18/2021, 10/03/2021, 10/31/2021     COVID-19 Vaccine Bivalent Booster 12+ (Pfizer) 10/14/2022     Flu, Unspecified 11/04/2019     Influenza (H1N1) 12/21/2009     Influenza (High Dose) 3 valent vaccine 10/28/2010, 12/01/2014, 10/20/2015, 09/18/2017, 10/24/2018     Influenza (IIV3) PF 11/14/2002, 11/07/2003, 11/01/2004, 10/19/2005, 11/08/2006, 11/14/2007, 11/01/2008, 10/18/2012     Influenza Vaccine 65+ (Fluzone HD) 09/09/2020, 10/06/2021, 11/08/2022     Influenza Vaccine >6 months (Alfuria,Fluzone) 10/03/2013, 11/11/2016     Nasal Influenza Vaccine 2-49 (FluMist) 09/09/2020     Pneumo Conj 13-V (2010&after) 02/14/2017     Pneumococcal 23 valent 10/18/2012, 03/24/2014     TD (ADULT, 7+) 01/13/2009     TDAP Vaccine (Adacel) 08/26/2019     Td (Adult), Adsorbed 04/09/1996     Zoster vaccine recombinant adjuvanted (SHINGRIX) 08/26/2019, 12/06/2019     Today's Vitals: /70   Pulse 50   SpO2 98%   ----------------    I spent 60 minutes with this patient today. All changes were made via verbal approval with Guillermo  JUANJO Deleon MD. A copy of the visit note was provided to the patient's provider(s).    A summary of these recommendations was given to the patient.    Mr. Ricks was seen independently by Dr. Acosta. I have reviewed and agree with the resident note and plan of care.  Karo Lepe, PharmD      Aparna Acosta, PharmD  Medication Therapy Management Resident  Pager: [phone]     Medication Therapy Recommendations  Coronary atherosclerosis    Current Medication: metoprolol tartrate (LOPRESSOR) 25 MG tablet   Rationale: Dose too high - Dosage too high - Safety   Recommendation: Change Medication - metoprolol succinate ER 12.5 mg Tabs 24 hr half-tab - Take Metoprolol succinate 12.5 mg daily.   Status: Contact Provider - Awaiting Response         Type 2 diabetes mellitus with diabetic nephropathy (H)    Current Medication: glimepiride (AMARYL) 1 MG tablet   Rationale: Medication requires monitoring - Needs additional monitoring   Recommendation: Self-Monitoring - Take home blood sugar readings 4-5 times per week or daily.   Status: Patient Agreed - Adherence/Education

## 2022-12-17 DIAGNOSIS — R60.0 BILATERAL LEG EDEMA: ICD-10-CM

## 2022-12-17 DIAGNOSIS — I10 ESSENTIAL HYPERTENSION: ICD-10-CM

## 2022-12-20 RX ORDER — METOPROLOL TARTRATE 25 MG/1
TABLET, FILM COATED ORAL
Qty: 30 TABLET | Refills: 0 | Status: SHIPPED | OUTPATIENT
Start: 2022-12-20 | End: 2023-01-19

## 2022-12-20 RX ORDER — FUROSEMIDE 40 MG
TABLET ORAL
Qty: 90 TABLET | Refills: 0 | Status: SHIPPED | OUTPATIENT
Start: 2022-12-20 | End: 2022-12-27

## 2022-12-20 NOTE — TELEPHONE ENCOUNTER
Medication is being filled for 1 time refill only due to:  Patient needs to be seen because pt due for routine visit around 1/14/23 and no appt scheduled yet.  Please call him and get him scheduled.  Thanks!.    Next 5 appointments (look out 90 days)    Jan 17, 2023  2:00 PM  Pharmacist Visit with Karo Lepe RPH  [facility] ([facility] [place] ) [address]  [address]  [facility] [address] [phone]        Needs appointment with primary care provider too.
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
## 2022-12-24 DIAGNOSIS — R60.0 BILATERAL LEG EDEMA: ICD-10-CM

## 2022-12-27 RX ORDER — FUROSEMIDE 40 MG
TABLET ORAL
Qty: 90 TABLET | Refills: 0 | Status: SHIPPED | OUTPATIENT
Start: 2022-12-27 | End: 2023-04-27

## 2023-01-03 ENCOUNTER — ANCILLARY PROCEDURE (OUTPATIENT)
Dept: GENERAL RADIOLOGY | Facility: CLINIC | Age: 78
End: 2023-01-03
Attending: FAMILY MEDICINE
Payer: MEDICARE

## 2023-01-03 ENCOUNTER — OFFICE VISIT (OUTPATIENT)
Dept: ORTHOPEDICS | Facility: CLINIC | Age: 78
End: 2023-01-03
Payer: MEDICARE

## 2023-01-03 VITALS
BODY MASS INDEX: 29.35 KG/M2 | SYSTOLIC BLOOD PRESSURE: 112 MMHG | HEIGHT: 70 IN | DIASTOLIC BLOOD PRESSURE: 68 MMHG | WEIGHT: 205 LBS

## 2023-01-03 DIAGNOSIS — M51.379 DDD (DEGENERATIVE DISC DISEASE), LUMBOSACRAL: ICD-10-CM

## 2023-01-03 DIAGNOSIS — M54.41 CHRONIC BILATERAL LOW BACK PAIN WITH RIGHT-SIDED SCIATICA: Primary | ICD-10-CM

## 2023-01-03 DIAGNOSIS — M54.50 LOW BACK PAIN: ICD-10-CM

## 2023-01-03 DIAGNOSIS — M43.17 SPONDYLOLISTHESIS OF LUMBOSACRAL REGION: ICD-10-CM

## 2023-01-03 DIAGNOSIS — G89.29 CHRONIC BILATERAL LOW BACK PAIN WITH RIGHT-SIDED SCIATICA: Primary | ICD-10-CM

## 2023-01-03 PROCEDURE — 72100 X-RAY EXAM L-S SPINE 2/3 VWS: CPT | Mod: TC | Performed by: RADIOLOGY

## 2023-01-03 PROCEDURE — 99214 OFFICE O/P EST MOD 30 MIN: CPT | Performed by: FAMILY MEDICINE
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
## 2023-01-03 NOTE — PATIENT INSTRUCTIONS
1. Chronic bilateral low back pain with right-sided sciatica    2. DDD (degenerative disc disease), lumbosacral    3. Spondylolisthesis of lumbosacral region      -Patient is following up for acute on chronic exacerbation of multilevel lumbar herniated disks, arthritis and listhesis.  -Patient will continue with home exercises daily  -Patient may start riding under the desk stationary bike as tolerated  -Patient may continue with Tylenol as needed for pain.  Patient will avoid all oral anti-inflammatory medications due to poor kidney function  -Patient will get an updated MRI lumbar spine for further evaluation.  Patient is claustrophobic and so we will go to [facility] radiology in [place] which has an open upright MRI machine.  Patient will call [phone] to schedule the appointment.  -Patient will call us 2 to 3 days after his MRI is completed to discuss results and next of treatment options  -We discussed potentially trying gabapentin nerve pain medications with physical therapy versus referral to neurosurgery if indicated  -Call direct clinic number [[phone]] at any time with questions or concerns.    Albert Yeo MD CAQSM  [facility]  [facility]

## 2023-01-09 ENCOUNTER — TRANSFERRED RECORDS (OUTPATIENT)
Dept: HEALTH INFORMATION MANAGEMENT | Facility: CLINIC | Age: 78
End: 2023-01-09

## 2023-01-15 NOTE — PROGRESS NOTES
Medication Therapy Management (MTM) Encounter    ASSESSMENT:                            Medication Adherence/Access: No issues identified.     Hypertension/Arteriosclerotic heart disease/Sinus bradycardia: Patient is meeting blood pressure goal of < 140/90mmHg. Historically, has had low pulse readings of < 40 bpm during night. Per review of records, patient was tolerating Metoprolol succinate 12.5mg. Patients metoprolol was refilled for immediate release version at previous 25mg total daily dose. Patient endorsed at last visit he was previously tolerating metoprolol succinate. To reduce risk for bradycardia, recommended patient consider switching back to metoprolol succinate at half dose, 12.5mg daily (received verbal order authorization from Dr. Deleon). Patient deferred switching current medication regimen at this time. Endorses fatigue has improved and frequency of low pulse readings has reduced over last few weeks. Patient will continue to monitor pulse readings at home and contact provider if symptoms of bradycardia occur.     Type 2 Diabetes: Patient is meeting A1c goal of < 7%. Patient is not currently self montioring blood glucose. Due to recent discontinuation of Metformin (11/4) & increased Januvia dose (11/14), would benefit from A1C recheck. Will discuss option to obtain updated A1C at follow-up visit with Dr. Deleon (1/30/23). Due for diabetes foot exam, patient plans to receive at office visit with Dr. Deleon.     Hyperlipidemia: Stable.Patient is on high intensity statin which is indicated based on 2019 ACC/AHA guidelines for lipid management.      Dyspepsia: Stable.    Chronic low back pain: Stable.     CKD Stage 3/Supplements: Estimated glomerular filtration rate was 36 mL/min when last checked (1/5/23). Will continue to closely follow with [facility] Nephrologist.     Vaccines: Up-to-date on all vaccines per ACIP/CDC Guidelines.     PLAN:                            1. Receive a diabetes foot exam at  your upcoming office visit with Dr. Deleon   2. Please reach out if the frequency of low pulse readings increases and/or if you experience symptoms of dizziness or worsening fatigue    3. Recommend receiving an updated hemoglobin A1C lab to determine how well your blood sugars are controlled since stopping Metformin & increase Januvia dose in November 2022. I will mention this to Dr. Deleon.     Follow-up: Return in 5 months (on 6/19/2023) for Follow up, with me, Medication Therapy Management. MTM will each out to patient in June to see if medication review is needed at this time.     SUBJECTIVE/OBJECTIVE:                          Jeff Ricks is a 77 year old male contacted via secure video for a follow-up visit.  Today's visit is a follow-up MTM visit from 12/13/22.      Reason for visit: medication review. Patient reports he no longer thinks the frequency of his low pulse reading increased with switch from metoprolol succinate to tartrate. Notes he has always had low pulse readings overnight and these have been consistent.     Allergies/ADRs: Reviewed in chart  Past Medical History: Reviewed in chart  Tobacco: He reports that he quit smoking about 56 years ago. His smoking use included cigarettes, cigars, and pipe. He started smoking about 61 years ago. He has a 18.00 pack-year smoking history. He has never used smokeless tobacco.  Alcohol: not currently using    Patient notes that Jevon Richards from clinical trial may reach out about medications he is taking, and provided verbal consent to share information if requested.      PHQ-2 Score: Completed PHQ-2 questionnaire during today's visit (1/17/23). Patient reports his mood has been good.   PHQ-2 ( 1999 Pfizer) 1/17/2023 11/9/2022   Q1: Little interest or pleasure in doing things 0 0   Q2: Feeling down, depressed or hopeless 0 0   PHQ-2 Score 0 0   PHQ-2 Total Score (12-17 Years)- Positive if 3 or more points; Administer PHQ-A if positive - -   Q1: Little  "interest or pleasure in doing things - Not at all   Q2: Feeling down, depressed or hopeless - Not at all   PHQ-2 Score - 0     Medication Adherence/Access:   Patient uses pill box(es), has an excel sheet printed with daily medications organized.   Patient takes medications 2 time(s) per day.   Per patient, rarely misses medications.   Continues in clinical trial through [facility] Nephrologist \"Phase 3 - assess efficacy & safety of [id number], a mineralocorticoid receptor antagonist (MRA), in subjects with uncontrolled HTN who have moderate or severe CKD\". Has been told he should not make any changes to his BP medications at this time due to study requirements.    Hypertension/Arteriosclerotic heart disease/Sinus bradycardia: Current BP lowering medications include: Furosemide 40mg daily, Lisinopril 20mg daily, Metoprolol tartrate 12.5mg twice daily. Also taking Phase 3 mineralocorticoid receptor antagonist for blood pressure lowering with moderate to severe CKD.  Started on low dose of MRA medication on 12/12/22 (previous months were blinded & unsure if he was on therapy vs placebo). Dose was increased yesterday (1/16/23) following labs. Patient is not experiencing side effects at this time.     Also takes Clopidogrel 75mg daily and Aspirin 81 mg twice daily. Has SL NTG 0.6mg tablets at home for acute chest pain, has never need to use. Patient denies any signs/symptoms of bleeding.    At last visit (12/13/22) patient suspected frequency of low pulse reading during the night (< 40 bpm) were related to switch from Metoprolol succinate to Metoprolol tartrate following hospital discharge. Today reports that he no longer think this is related to metoprolol tartrate and would like to continue on current regimen. Endorses he has always had low pulse readings specifically throughout night. Last week pulse readings < 40 bpm: 3 during night & 1 during daytime. Has been doing better during the day over the last week, and has not " "been fatigued. Does not feel dizzy with low pulse.     Patient does self monitor home blood pressure. Reports ranges in home readings of 130's systolic and 60's diastolic. Notes his BP continues to be monitored by Nephrology for clinical study with similar readings.     Was prescribed Amolodipine 5mg daily by Dr. Deleon (11/30/22). Does not plan to take per study criteria and nephrologist recommendations.     Evaluated by Dr. Borrego, Cardiology (8/20/21): \"normal rate is usually in the 50s, and therefore, I think it is quite reasonable for a person to dip below 40s at night when sleeping due to heightened vagal tone. There is a small possibility that the patient may have untreated sleep apnea so I did suggest to Ferdinand he get a sleep study again as apnea can cause bradycardia. Ferdinand had no symptoms, only his relatively sinus bradycardia, and therefore, there is no indication for pacemaker.\"    BP Readings from Last 3 Encounters:   01/03/23 112/68   12/13/22 137/70   11/14/22 126/62     Pulse Readings from Last 3 Encounters:   12/13/22 50   11/14/22 72   11/05/22 (!) 47     Labs from [facility] (1/5/23):       Hyperlipidemia: Current therapy includes Simvastatin 40mg daily at bedtime and Lovaza (Omega-3 acid ethyl esters) 1g twice daily.  Patient reports no significant myalgias or other side effects.    Recent Labs   Lab Test 10/17/22  1402 06/10/21  0804 05/16/16  1133 05/13/15  1030   CHOL 84 100   < > 95   HDL 47 60   < > 65   LDL 26 30   < > 24   TRIG 56 52   < > 28   CHOLHDLRATIO  --   --   --  1.5    < > = values in this interval not displayed.     Type 2 Diabetes: Currently taking Glimepiride 2mg daily before breakfast and Sitagliptin (Januvia) 50mg daily. Patient is not experiencing side effects.     Previous Medications: Stopped on Metformin (Nov 2022) due to eGFR    Blood sugar monitoring: Has home glucose monitor, not checking at this time.   Symptoms of low blood sugar? none  Symptoms of high " blood sugar? none  Eye exam: up to date  Foot exam: due - will complete at visit with Dr. Deleon  Aspirin: Taking 81mg twice daily for secondary prevention   Statin: Yes: Simvastatin   ACEi/ARB: Yes: Lisinopril.   Urine Albumin:   Lab Results   Component Value Date    UMALCR 28.28 (H) 11/14/2022      Lab Results   Component Value Date    A1C 5.8 10/17/2022    A1C 7.5 11/12/2021    A1C 7.3 06/10/2021    A1C 5.7 01/20/2021    A1C 5.5 09/11/2020    A1C 5.7 07/01/2020    A1C 5.5 12/23/2019     Dyspepsia: Current medications include: Pepcid (famotidine) 40mg once daily. Patient feels that current regimen is effective. Some infrequent breakthrough heartburn. Per patient, has history of hiatal hernia.     Chronic low back pain: Currently either applies heat or CBD daily as needed for pain. Also has Acetaminophen 325mg tablets - 2 tablets as needed, rarely uses. Avoids NSAIDs. Reports pain has been well managed with current regimen. Patient is not experiencing side effects.    Specialist: Dr. Yeo, Sports Medicine.  Last visit on 1/3/23, the following was recommended: Patient may continue with Tylenol as needed for pain.  Patient will avoid all oral anti-inflammatory medications due to poor kidney function. Patient will get an updated MRI lumbar spine for further evaluation    CKD Stage 3/Supplements: Takes Calcitriol 0.25 mcg daily, Vitamin B-12 1000mcg daily, Vitamin E 400 units daily, Pyridoxine/Vit B-6 100mg daily, Ascorbic acid 500mg daily (to prevent colds). No reported issues at this time.     Creatinine   Date Value Ref Range Status   11/14/2022 1.84 (H) 0.66 - 1.25 mg/dL Final   06/10/2021 1.70 (H) 0.66 - 1.25 mg/dL Final     Vitamin D Deficiency Screening Results:  Lab Results   Component Value Date    VITDT 59 01/20/2021    VITDT (L) <13 04/30/2014     Per [facility] (1/5/23) Vit D labs: 43 ng/mL     Vaccines:   Immunization History   Administered Date(s) Administered     COVID-19 Vaccine 12+  (Pfizer) 01/28/2021, 02/18/2021, 10/03/2021, 10/31/2021     COVID-19 Vaccine Bivalent Booster 12+ (Pfizer) 10/14/2022     Flu, Unspecified 11/04/2019     Influenza (H1N1) 12/21/2009     Influenza (High Dose) 3 valent vaccine 10/28/2010, 12/01/2014, 10/20/2015, 09/18/2017, 10/24/2018     Influenza (IIV3) PF 11/14/2002, 11/07/2003, 11/01/2004, 10/19/2005, 11/08/2006, 11/14/2007, 11/01/2008, 10/18/2012     Influenza Vaccine 65+ (Fluzone HD) 09/09/2020, 10/06/2021, 11/08/2022     Influenza Vaccine >6 months (Alfuria,Fluzone) 10/03/2013, 11/11/2016     Nasal Influenza Vaccine 2-49 (FluMist) 09/09/2020     Pneumo Conj 13-V (2010&after) 02/14/2017     Pneumococcal 23 valent 10/18/2012, 03/24/2014     TD (ADULT, 7+) 01/13/2009     TDAP Vaccine (Adacel) 08/26/2019     Td (Adult), Adsorbed 04/09/1996     Zoster vaccine recombinant adjuvanted (SHINGRIX) 08/26/2019, 12/06/2019     Today's Vitals: There were no vitals taken for this visit.  ----------------    I spent 40 minutes with this patient today. All changes were made via verbal approval with Guillermo Deleon MD. A copy of the visit note was provided to the patient's provider(s).    A summary of these recommendations was sent via [employer].    Mr. Ricks was seen independently by Dr. Acosta. I have reviewed and agree with the resident note and plan of care.  Karo Lepe, PharmD      Aparna Acosta, PharmD  Medication Therapy Management Resident  Pager: [phone]    Telemedicine Visit Details  Type of service:  Video Conference via [employer]  Start Time: 2:20pm  End Time: 3:00pm     Medication Therapy Recommendations  No medication therapy recommendations to display

## 2023-01-16 ENCOUNTER — TRANSFERRED RECORDS (OUTPATIENT)
Dept: HEALTH INFORMATION MANAGEMENT | Facility: CLINIC | Age: 78
End: 2023-01-16
Payer: MEDICARE

## 2023-01-17 ENCOUNTER — VIRTUAL VISIT (OUTPATIENT)
Dept: PHARMACY | Facility: CLINIC | Age: 78
End: 2023-01-17
Payer: COMMERCIAL

## 2023-01-17 DIAGNOSIS — Z71.85 VACCINE COUNSELING: ICD-10-CM

## 2023-01-17 DIAGNOSIS — N18.32 STAGE 3B CHRONIC KIDNEY DISEASE (H): ICD-10-CM

## 2023-01-17 DIAGNOSIS — R00.1 SINUS BRADYCARDIA: ICD-10-CM

## 2023-01-17 DIAGNOSIS — R10.13 DYSPEPSIA: ICD-10-CM

## 2023-01-17 DIAGNOSIS — G89.29 CHRONIC LOW BACK PAIN, UNSPECIFIED BACK PAIN LATERALITY, UNSPECIFIED WHETHER SCIATICA PRESENT: ICD-10-CM

## 2023-01-17 DIAGNOSIS — I25.10 ATHEROSCLEROSIS OF CORONARY ARTERY OF NATIVE HEART WITHOUT ANGINA PECTORIS, UNSPECIFIED VESSEL OR LESION TYPE: ICD-10-CM

## 2023-01-17 DIAGNOSIS — Z78.9 TAKES DIETARY SUPPLEMENTS: ICD-10-CM

## 2023-01-17 DIAGNOSIS — E11.21 TYPE 2 DIABETES MELLITUS WITH DIABETIC NEPHROPATHY, WITHOUT LONG-TERM CURRENT USE OF INSULIN (H): Primary | ICD-10-CM

## 2023-01-17 DIAGNOSIS — M54.50 CHRONIC LOW BACK PAIN, UNSPECIFIED BACK PAIN LATERALITY, UNSPECIFIED WHETHER SCIATICA PRESENT: ICD-10-CM

## 2023-01-17 DIAGNOSIS — E78.5 HYPERLIPIDEMIA LDL GOAL <100: ICD-10-CM

## 2023-01-17 DIAGNOSIS — I10 ESSENTIAL HYPERTENSION: ICD-10-CM

## 2023-01-17 PROCEDURE — 99207 PR NO CHARGE LOS: CPT

## 2023-01-17 RX ORDER — METOPROLOL SUCCINATE 25 MG/1
12.5 TABLET, EXTENDED RELEASE ORAL DAILY
Qty: 90 TABLET | Status: CANCELLED | OUTPATIENT
Start: 2023-01-17

## 2023-01-17 NOTE — Clinical Note
Hilario Deleon - frequency of bradycardia & fatigue have improved. Plans to continue Metoprolol tart BID at this time. May benefit from A1C recheck at upcoming visit (off metformin, Januvia increased, no home BG monitoring).   Thanks,  Aparna Acosta, PharmD

## 2023-01-17 NOTE — PATIENT INSTRUCTIONS
"Recommendations from today's MTM visit:                                                         1. Receive a diabetes foot exam at your upcoming office visit with Dr. Deleon   2. Please reach out if the frequency of low pulse readings increases and/or if you experience symptoms of dizziness or worsening fatigue    3. Recommend receiving an updated hemoglobin A1C lab to determine how well your blood sugars are controlled since stopping Metformin & increase Januvia dose in November 2022. I will mention this to Dr. Deleon.     Follow-up: Return in 5 months (on 6/19/2023) for Follow up, with me, Medication Therapy Management. I will reach out to you in June to see if you have had any recent medication changes and/or would like to review your medications. Please reach out if questions or concerns related to your medications come up before then.     It was great speaking with you today.  I value your experience and would be very thankful for your time in providing feedback in our clinic survey. In the next few days, you may receive an email or text message from [employer] with a link to a survey related to your  clinical pharmacist.\"     To schedule another MTM appointment, please call the clinic directly or you may call the MTM scheduling line at [phone] or toll-free at [phone].     My Clinical Pharmacist's contact information:                                                      Please feel free to contact me with any questions or concerns you have.      Aparna Acosta, PharmD  Medication Therapy Management Resident  Pager: [phone]    "

## 2023-01-18 ENCOUNTER — TELEPHONE (OUTPATIENT)
Dept: ORTHOPEDICS | Facility: CLINIC | Age: 78
End: 2023-01-18
Payer: MEDICARE

## 2023-01-18 DIAGNOSIS — I10 ESSENTIAL HYPERTENSION: ICD-10-CM

## 2023-01-18 NOTE — TELEPHONE ENCOUNTER
Reason for Call:  Request for results:    Name of test or procedure: MRI Lumbar Spine    Date of test of procedure: 1/16/2023    Results:     EXAM: MR LUMBAR SPINE WITHOUT CONTRAST 0.6T OPEN UPRIGHT    CLINICAL INFORMATION: Bilateral low back pain with right greater than left sciatica and with spondylolisthesis.    TECHNICAL INFORMATION: T1, T2 and STIR sagittal with T1/T2 axial sections at selected levels with the patient in the sitting neutral position.    COMPARISON IMAGES: MRI dated 5/3/2019.    INTERPRETATION: Segmentation and Alignment: Normal segmentation with lordotic alignment of 5 lumbar-type vertebrae, a rudimentary nonmobile S1-2 disc and a mild lumbar curve to the right.    L5-S1: Moderate to advanced disc degeneration with advanced bilateral facet arthropathy, a 5-6 mm spondylolisthesis, moderate narrowing of the subarticular recesses and encroachment on the traversing S1 nerve roots. Moderate up-down foraminal stenosis on the right.    L4-5: Moderate disc degeneration with advanced bilateral facet arthropathy, a 7 mm spondylolisthesis and severe central/bilateral subarticular recess stenosis. A 5-6 mm central posterior disc protrusion is seen without significant narrowing of the central canal. Mild narrowing of the neural foramina.    L3-4: Moderate disc degeneration with moderate foraminal stenosis and moderate facet arthropathy bilaterally.    L2-3 and L1-2: Mild to moderate disc degeneration with left lateral bulging/osteophyte at each level, normal facet joints and no stenosis or impingement.    T12-L1, T11-12 and T10-11: Intervertebral discs unremarkable with normal facet joints and no stenosis or impingement.    Conus: Normal signal intensity within the conus medullaris. No intradural mass or arachnoidal adhesions.    Osseous structures: Signal intensity within the vertebral marrow spaces is unremarkable with mild discogenic signal changes at L5-S1 and L4-5. No fracture or avulsion. No  osteolytic or destructive bone lesion.    Paraspinous soft tissues: No paraspinous soft tissue mass or fluid collection.    CONCLUSION: Diffuse lumbar disc degeneration with significant findings as follows:    1. Severe central/bilateral subarticular recess stenosis at L4-5 with a 5-6 mm broad-based central posterior disc protrusion and a 7 mm degenerative spondylolisthesis.    2. Moderate bilateral subarticular recess stenosis at L5-S1 with a 5-6 mm degenerative spondylolisthesis.    3. Mild to moderate foraminal stenosis at L5-S1, L4-5 and L3-4.    4. Facet arthropathy, advanced bilaterally at L5-S1 and L4-5, and moderate at L3-4.    5. Comparison with 5/3/2019 shows that central/bilateral subarticular recess stenosis at L5-S1 has increased with spondylolisthesis also increasing at this level in the interval. Small to moderate-sized synovial cysts previously seen at L5 are not appreciated on the current examination and presumably have resolved in the interval.    Plan for results from last OV: Patient will call us 2 to 3 days after his MRI is completed to discuss results and next of treatment options    Lora Breen ATC

## 2023-01-19 RX ORDER — METOPROLOL TARTRATE 25 MG/1
TABLET, FILM COATED ORAL
Qty: 90 TABLET | Refills: 1 | Status: SHIPPED | OUTPATIENT
Start: 2023-01-19 | End: 2023-01-30

## 2023-01-19 NOTE — TELEPHONE ENCOUNTER
Pt has appt at the end of the month.   Prescription approved per [facility] Refill Protocol.

## 2023-01-23 NOTE — TELEPHONE ENCOUNTER
I called patient to discuss MRI results of the lumbar spine.  Patient has multilevels of arthritis, degenerative disease, facet arthritis with severe L4-L5 lumbar stenosis, moderate L5-S1 bilateral subarticular stenosis with 5 to 6 mm of listhesis.  Patient states that he has been feeling better since he was last seen in the office 2 weeks ago.  Patient has stiffness with intermittent episodes of pain that resolve with either medications or home exercises.  Patient denies weakness or debilitating pain that is preventing him from his day-to-day activities.  Patient is taking oral pain medications occasionally as needed.  Patient has not needed medications daily.  Patient states that currently his symptoms are tolerable with his home exercises and occasional pain medications.  Patient will call us if pain worsens for either referral to pain management or other medications as needed.  She is in agreement this plan.

## 2023-01-30 ENCOUNTER — OFFICE VISIT (OUTPATIENT)
Dept: INTERNAL MEDICINE | Facility: CLINIC | Age: 78
End: 2023-01-30
Payer: MEDICARE

## 2023-01-30 VITALS
HEART RATE: 61 BPM | HEIGHT: 70 IN | OXYGEN SATURATION: 99 % | TEMPERATURE: 97.7 F | WEIGHT: 201.7 LBS | RESPIRATION RATE: 20 BRPM | DIASTOLIC BLOOD PRESSURE: 58 MMHG | BODY MASS INDEX: 28.88 KG/M2 | SYSTOLIC BLOOD PRESSURE: 139 MMHG

## 2023-01-30 DIAGNOSIS — R07.9 CHEST PAIN, UNSPECIFIED TYPE: Primary | ICD-10-CM

## 2023-01-30 DIAGNOSIS — D63.1 ANEMIA DUE TO STAGE 3A CHRONIC KIDNEY DISEASE (H): ICD-10-CM

## 2023-01-30 DIAGNOSIS — N25.81 HYPERPARATHYROIDISM DUE TO RENAL INSUFFICIENCY (H): ICD-10-CM

## 2023-01-30 DIAGNOSIS — E11.21 TYPE 2 DIABETES MELLITUS WITH DIABETIC NEPHROPATHY, WITHOUT LONG-TERM CURRENT USE OF INSULIN (H): ICD-10-CM

## 2023-01-30 DIAGNOSIS — I25.10 ASHD (ARTERIOSCLEROTIC HEART DISEASE): ICD-10-CM

## 2023-01-30 DIAGNOSIS — I10 ESSENTIAL HYPERTENSION: ICD-10-CM

## 2023-01-30 DIAGNOSIS — N18.32 STAGE 3B CHRONIC KIDNEY DISEASE (H): ICD-10-CM

## 2023-01-30 DIAGNOSIS — N18.31 ANEMIA DUE TO STAGE 3A CHRONIC KIDNEY DISEASE (H): ICD-10-CM

## 2023-01-30 DIAGNOSIS — E78.5 HYPERLIPIDEMIA LDL GOAL <100: ICD-10-CM

## 2023-01-30 LAB
ERYTHROCYTE [DISTWIDTH] IN BLOOD BY AUTOMATED COUNT: 13 % (ref 10–15)
HBA1C MFR BLD: 7.9 % (ref 0–5.6)
HCT VFR BLD AUTO: 35.9 % (ref 40–53)
HGB BLD-MCNC: 12.2 G/DL (ref 13.3–17.7)
MCH RBC QN AUTO: 32 PG (ref 26.5–33)
MCHC RBC AUTO-ENTMCNC: 34 G/DL (ref 31.5–36.5)
MCV RBC AUTO: 94 FL (ref 78–100)
PLATELET # BLD AUTO: 204 10E3/UL (ref 150–450)
RBC # BLD AUTO: 3.81 10E6/UL (ref 4.4–5.9)
WBC # BLD AUTO: 6 10E3/UL (ref 4–11)

## 2023-01-30 PROCEDURE — 99214 OFFICE O/P EST MOD 30 MIN: CPT | Performed by: INTERNAL MEDICINE

## 2023-01-30 PROCEDURE — 84460 ALANINE AMINO (ALT) (SGPT): CPT | Performed by: INTERNAL MEDICINE

## 2023-01-30 PROCEDURE — 83970 ASSAY OF PARATHORMONE: CPT | Performed by: INTERNAL MEDICINE

## 2023-01-30 PROCEDURE — 84155 ASSAY OF PROTEIN SERUM: CPT | Performed by: INTERNAL MEDICINE

## 2023-01-30 PROCEDURE — 84075 ASSAY ALKALINE PHOSPHATASE: CPT | Performed by: INTERNAL MEDICINE

## 2023-01-30 PROCEDURE — 83036 HEMOGLOBIN GLYCOSYLATED A1C: CPT | Performed by: INTERNAL MEDICINE

## 2023-01-30 PROCEDURE — 82247 BILIRUBIN TOTAL: CPT | Performed by: INTERNAL MEDICINE

## 2023-01-30 PROCEDURE — 85027 COMPLETE CBC AUTOMATED: CPT | Performed by: INTERNAL MEDICINE

## 2023-01-30 PROCEDURE — 82306 VITAMIN D 25 HYDROXY: CPT | Performed by: INTERNAL MEDICINE

## 2023-01-30 PROCEDURE — 80069 RENAL FUNCTION PANEL: CPT | Performed by: INTERNAL MEDICINE

## 2023-01-30 PROCEDURE — 36415 COLL VENOUS BLD VENIPUNCTURE: CPT | Performed by: INTERNAL MEDICINE

## 2023-01-30 RX ORDER — SIMVASTATIN 40 MG
40 TABLET ORAL AT BEDTIME
Qty: 90 TABLET | Refills: 3 | Status: SHIPPED | OUTPATIENT
Start: 2023-01-30 | End: 2023-01-31

## 2023-01-30 RX ORDER — CLOPIDOGREL BISULFATE 75 MG/1
75 TABLET ORAL DAILY
Qty: 90 TABLET | Refills: 3 | Status: SHIPPED | OUTPATIENT
Start: 2023-01-30

## 2023-01-30 RX ORDER — OMEGA-3-ACID ETHYL ESTERS 1 G/1
1 CAPSULE, LIQUID FILLED ORAL 2 TIMES DAILY
Qty: 180 CAPSULE | Refills: 3 | Status: SHIPPED | OUTPATIENT
Start: 2023-01-30 | End: 2024-01-22

## 2023-01-30 ASSESSMENT — PAIN SCALES - GENERAL: PAINLEVEL: NO PAIN (0)

## 2023-01-30 NOTE — LETTER
February 1, 2023      Jeff Ricks  [address] [facility]   [place] [address]        Dear ,    We are writing to inform you of your test results. Your labs show decreased but stable kidney function and mild anemia.Your diabetic control is slightly worsened, please try to improve diet, and recheck labs in 3 months .       If you have any questions or concerns, please call the clinic at the number listed above.       Sincerely,      Guillermo Deleon MD         manav

## 2023-01-30 NOTE — PROGRESS NOTES
"  Assessment & Plan     ASHD (arteriosclerotic heart disease)  Continue treatment  Has had chest pain with walking, last stress test 2014, plan to repeat stress ECHO   - clopidogrel (PLAVIX) 75 MG tablet; Take 1 tablet (75 mg) by mouth daily  - Echocardiogram Exercise Stress; Future    Hyperlipidemia LDL goal <100  On treatment, controlled   - omega-3 acid ethyl esters (LOVAZA) 1 g capsule; Take 1 capsule by mouth 2 times daily  - simvastatin (ZOCOR) 40 MG tablet; Take 1 tablet (40 mg) by mouth At Bedtime    Hyperparathyroidism due to renal insufficiency (H)  Assess lab work   - Vitamin D Deficiency  - Parathyroid Hormone Intact  - Phosphorus    Chest pain, unspecified type  Assess stress test, if increased CP, not improved with NG to be seen urgently   - Echocardiogram Exercise Stress; Future    Type 2 diabetes mellitus with diabetic nephropathy, without long-term current use of insulin (H)  Assess DM control   - Hemoglobin A1c    Stage 3b chronic kidney disease (H)  Monitor renal function   - Comprehensive metabolic panel (BMP + Alb, Alk Phos, ALT, AST, Total. Bili, TP)  - Parathyroid Hormone Intact  - Phosphorus    Anemia due to stage 3a chronic kidney disease (H)  Monitor CBC  - CBC with platelets    Essential hypertension  Controlled, will stop Metoprolol , because of bradycardia, fatigue, dizziness                BMI:   Estimated body mass index is 29.36 kg/m  as calculated from the following:    Height as of this encounter: 1.765 m (5' 9.5\").    Weight as of this encounter: 91.5 kg (201 lb 11.2 oz).       See Patient Instructions    Return in about 3 months (around 4/30/2023) for Routine Visit.    Guillermo Deleon MD  [facility] is a 77 year old, presenting for the following health issues:  Diabetes      History of Present Illness       Reason for visit:  Made by the doctor    He eats 0-1 servings of fruits and vegetables daily.He consumes 1 sweetened " "beverage(s) daily.He exercises with enough effort to increase his heart rate 30 to 60 minutes per day.  He exercises with enough effort to increase his heart rate 7 days per week.   He is taking medications regularly.     Patient is seen for a follow up visit.  Has H/O DM. On diet , exercise and oral treatment. Blood sugars are controlled. No parestesias. No hypoglycemias.  Has h/o HTN. on medical treatment. BP has been controlled. No side effects from medications. No CP, HA, dizziness. good compliance with medications and low salt diet.  Has H/O hyperlipidemia. On medical treatment and diet. No side effects. No muscle weakness, myalgias or upset stomach.   Has h/o CRF. Symptoms include LE edema. Monitoring BP, BG, medications, avoiding OTC NSAIDs. Needs periodic recheck of kidney function.  Patient has anemia of renal disease.  Currently symptomatic with fatigue. No significant other symptoms of GI bleed.  Has h/o IHD, stents, MI, reports episodes of anterior chest pain, mild, with walking, resolves with rest. No associated palpitations, dizziness, feels SOB.       Review of Systems   Constitutional, HEENT, cardiovascular, pulmonary, GI, , musculoskeletal, neuro, skin, endocrine and psych systems are negative, except as otherwise noted.      Objective    /58 (BP Location: Left arm, Cuff Size: Adult Regular)   Pulse 61   Temp 97.7  F (36.5  C) (Tympanic)   Resp 20   Ht 1.765 m (5' 9.5\")   Wt 91.5 kg (201 lb 11.2 oz)   SpO2 99%   BMI 29.36 kg/m    Body mass index is 29.36 kg/m .  Physical Exam   GENERAL: healthy, alert and no distress  EYES: Eyes grossly normal to inspection, PERRL and conjunctivae and sclerae normal  HENT: ear canals and TM's normal, nose and mouth without ulcers or lesions  NECK: no adenopathy, no asymmetry, masses, or scars and thyroid normal to palpation  RESP: lungs clear to auscultation - no rales, rhonchi or wheezes  CV: regular rate and rhythm, normal S1 S2, no S3 or S4, no " murmur, click or rub, trace LE peripheral edema and peripheral pulses strong  ABDOMEN: soft, nontender, no hepatosplenomegaly, no masses and bowel sounds normal  MS: no gross musculoskeletal defects noted  SKIN: no suspicious lesions or rashes  NEURO: Normal strength and tone, mentation intact and speech normal    Office Visit on 11/14/2022   Component Date Value Ref Range Status     Sodium 11/14/2022 140  133 - 144 mmol/L Final     Potassium 11/14/2022 3.7  3.4 - 5.3 mmol/L Final     Chloride 11/14/2022 106  94 - 109 mmol/L Final     Carbon Dioxide (CO2) 11/14/2022 29  20 - 32 mmol/L Final     Anion Gap 11/14/2022 5  3 - 14 mmol/L Final     Urea Nitrogen 11/14/2022 25  7 - 30 mg/dL Final     Creatinine 11/14/2022 1.84 (H)  0.66 - 1.25 mg/dL Final     Calcium 11/14/2022 8.7  8.5 - 10.1 mg/dL Final     Glucose 11/14/2022 186 (H)  70 - 99 mg/dL Final     GFR Estimate 11/14/2022 37 (L)  >60 mL/min/1.73m2 Final    Effective December 21, 2021 eGFRcr in adults is calculated using the 2021 CKD-EPI creatinine equation which includes age and gender (Wilberto et al., [place], DOI: 10.1056/CPGNik2333170)     WBC Count 11/14/2022 6.3  4.0 - 11.0 10e3/uL Final     RBC Count 11/14/2022 3.66 (L)  4.40 - 5.90 10e6/uL Final     Hemoglobin 11/14/2022 11.8 (L)  13.3 - 17.7 g/dL Final     Hematocrit 11/14/2022 34.1 (L)  40.0 - 53.0 % Final     MCV 11/14/2022 93  78 - 100 fL Final     MCH 11/14/2022 32.2  26.5 - 33.0 pg Final     MCHC 11/14/2022 34.6  31.5 - 36.5 g/dL Final     RDW 11/14/2022 13.3  10.0 - 15.0 % Final     Platelet Count 11/14/2022 187  150 - 450 10e3/uL Final     Creatinine Urine mg/dL 11/14/2022 244  mg/dL Final     Albumin Urine mg/L 11/14/2022 69  mg/L Final     Albumin Urine mg/g Cr 11/14/2022 28.28 (H)  0.00 - 17.00 mg/g Cr Final

## 2023-01-31 DIAGNOSIS — E78.5 HYPERLIPIDEMIA LDL GOAL <100: ICD-10-CM

## 2023-01-31 LAB
ALBUMIN SERPL BCG-MCNC: 4.3 G/DL (ref 3.5–5.2)
ALP SERPL-CCNC: 70 U/L (ref 40–129)
ALT SERPL W P-5'-P-CCNC: 27 U/L (ref 10–50)
ANION GAP SERPL CALCULATED.3IONS-SCNC: 12 MMOL/L (ref 7–15)
AST SERPL W P-5'-P-CCNC: ABNORMAL U/L
BILIRUB SERPL-MCNC: 0.4 MG/DL
BUN SERPL-MCNC: 22.3 MG/DL (ref 8–23)
CALCIUM SERPL-MCNC: 9.7 MG/DL (ref 8.8–10.2)
CHLORIDE SERPL-SCNC: 106 MMOL/L (ref 98–107)
CREAT SERPL-MCNC: 1.96 MG/DL (ref 0.67–1.17)
DEPRECATED CALCIDIOL+CALCIFEROL SERPL-MC: 36 UG/L (ref 20–75)
DEPRECATED HCO3 PLAS-SCNC: 25 MMOL/L (ref 22–29)
GFR SERPL CREATININE-BSD FRML MDRD: 34 ML/MIN/1.73M2
GLUCOSE SERPL-MCNC: 138 MG/DL (ref 70–99)
PHOSPHATE SERPL-MCNC: 3 MG/DL (ref 2.5–4.5)
POTASSIUM SERPL-SCNC: 4.2 MMOL/L (ref 3.4–5.3)
PROT SERPL-MCNC: 6.6 G/DL (ref 6.4–8.3)
PTH-INTACT SERPL-MCNC: 55 PG/ML (ref 15–65)
SODIUM SERPL-SCNC: 143 MMOL/L (ref 136–145)

## 2023-01-31 RX ORDER — SIMVASTATIN 40 MG
TABLET ORAL
Qty: 90 TABLET | Refills: 3 | Status: SHIPPED | OUTPATIENT
Start: 2023-01-31 | End: 2024-09-17

## 2023-02-27 ENCOUNTER — MYC MEDICAL ADVICE (OUTPATIENT)
Dept: INTERNAL MEDICINE | Facility: CLINIC | Age: 78
End: 2023-02-27
Payer: MEDICARE

## 2023-02-28 DIAGNOSIS — E11.21 TYPE 2 DIABETES MELLITUS WITH DIABETIC NEPHROPATHY, WITHOUT LONG-TERM CURRENT USE OF INSULIN (H): ICD-10-CM

## 2023-03-01 RX ORDER — GLIMEPIRIDE 1 MG/1
TABLET ORAL
Qty: 180 TABLET | Refills: 1 | Status: SHIPPED | OUTPATIENT
Start: 2023-03-01 | End: 2024-07-08

## 2023-03-08 DIAGNOSIS — R13.12 OROPHARYNGEAL DYSPHAGIA: ICD-10-CM

## 2023-03-09 RX ORDER — FAMOTIDINE 40 MG/1
TABLET, FILM COATED ORAL
Qty: 90 TABLET | Refills: 3 | Status: SHIPPED | OUTPATIENT
Start: 2023-03-09 | End: 2024-01-15

## 2023-04-06 ENCOUNTER — HOSPITAL ENCOUNTER (OUTPATIENT)
Dept: CARDIOLOGY | Facility: CLINIC | Age: 78
Discharge: HOME | End: 2023-04-06
Attending: INTERNAL MEDICINE | Admitting: INTERNAL MEDICINE
Payer: MEDICARE

## 2023-04-06 DIAGNOSIS — I25.10 ASHD (ARTERIOSCLEROTIC HEART DISEASE): ICD-10-CM

## 2023-04-06 DIAGNOSIS — R07.9 CHEST PAIN, UNSPECIFIED TYPE: ICD-10-CM

## 2023-04-06 PROCEDURE — 93325 DOPPLER ECHO COLOR FLOW MAPG: CPT | Mod: 26 | Performed by: INTERNAL MEDICINE

## 2023-04-06 PROCEDURE — 999N000208 ECHO STRESS ECHOCARDIOGRAM

## 2023-04-06 PROCEDURE — 93018 CV STRESS TEST I&R ONLY: CPT | Performed by: INTERNAL MEDICINE

## 2023-04-06 PROCEDURE — 93350 STRESS TTE ONLY: CPT | Mod: 26 | Performed by: INTERNAL MEDICINE

## 2023-04-06 PROCEDURE — 999N000127 HC STATISTIC PERIPHERAL IV START W US GUIDANCE

## 2023-04-06 PROCEDURE — 255N000002 HC RX 255 OP 636: Performed by: INTERNAL MEDICINE

## 2023-04-06 PROCEDURE — 93017 CV STRESS TEST TRACING ONLY: CPT

## 2023-04-06 PROCEDURE — 93016 CV STRESS TEST SUPVJ ONLY: CPT | Performed by: INTERNAL MEDICINE

## 2023-04-06 PROCEDURE — 93321 DOPPLER ECHO F-UP/LMTD STD: CPT | Mod: 26 | Performed by: INTERNAL MEDICINE

## 2023-04-06 RX ADMIN — HUMAN ALBUMIN MICROSPHERES AND PERFLUTREN 3 ML: 10; .22 INJECTION, SOLUTION INTRAVENOUS at 14:21

## 2023-04-06 NOTE — LETTER
2023      Sondra Ricks  [address]   [facility] [address]        Dear ,    We are writing to inform you of your test results.     Abnormal stress test, showing areas of heart ischemia.   Recommend assessment with cardiology.    Resulted Orders   Echocardiogram Exercise Stress    Narrative    [id number]  [id number]  [id number]  [id number]                                                                       Version 2     [facility]  Echocardiography Laboratory  [address] [place] [address]     Name: SONDRA RICKS  MRN: [id number]  : 1945  Study Date: 2023 02:25 PM  Age: 77 yrs  Gender: Male  Patient Location: [facility]  Reason For Study: ASHD (arteriosclerotic heart disease), Chest pain  History: CAD, Diabetes, HTN, Hyperlipidemia  Ordering Physician: CHIKI CORNELIUS  Referring Physician: YONIS MORALES  Performed By: Maira Thompson     BSA: 2.1 m2  Height: 70 in  Weight: 198 lb  HR: 56  BP: 140/76 mmHg  ______________________________________________________________________________  Procedure  Stress Echo Complete. Contrast Optison.  ______________________________________________________________________________  Interpretation Summary     Abnormal exercise stress echocardiogram. Abnormal resting wall motion in the  apical lateral wall segment. New stress-induced hypokinesis in the apical  anterior wall segment.  Exercise was stopped due to chest pain. Chest pain resolved completely with  rest.  Rest ECG normal sinus rhythm with RBBB, LAFB, LVH.  Abnormal stress ECG. At peak stress there is 1 mm horizontal ST segment  depression in leads I, aVL, specificity reduced due to baseline ST changes. 1-  1.5 mm ST segment depressions in leads I, II, III, aVF, V4-6 in recovery.  There was a normal BP response to exercise.  ______________________________________________________________________________  Stress  The patient exercised  6:36.  Exercise was stopped due to chest pain.  There was a normal BP response to exercise.  RPP 20,960.  Target Heart Rate was achieved.  A low workload was achieved.  This was an abnormal stress EKG.  1 mm horizontal ST segment depression in leads I, aVL, specificity reduced due  to baseline ST changes. 1-1.5 mm ST segment depressions in leads I, II, III,  aVF, V4-6 in recovery.  No arrhythmia noted.  The visual ejection fraction is 55-60%.  Left ventricular cavity size does not change with exercise.  Global LV systolic function does not change with exercise.  Abnormal resting wall motion consistent with old infarction. New stress-  induced wall motion abnormalities noted during the study consistent with  ischemia.     Baseline  Rest ECG normal sinus rhythm with RBBB, LAFB, LVH.  The visual ejection fraction is estimated at 55-60%.  Apical lateral hypokinesis.     Stress Results      Protocol:  Modified Roger Manual        Maximum Predicted HR:   141 bpm      Target HR: 120 bpm                      % Maximum Predicted HR: 93 %                             Stage  DurationHeart Rate  BP                                  (mm:ss)   (bpm)                            0      3:00      86    144/76                            1      3:36      131   160/76                        RecoveryR  7:00      55    140/76            Stress Duration:   6:36 mm:ss *        Recovery Time: 7:00 mm:ss         Maximum Stress HR: 131 bpm *           METS:          5     ______________________________________________________________________________  Report approved by: Becky Kenyon 04/06/2023 05:06 PM     ______________________________________________________________________________          If you have any questions or concerns, please call the clinic at the number listed above.       Sincerely,      Guillermo Deleon MD

## 2023-04-07 ENCOUNTER — OFFICE VISIT (OUTPATIENT)
Dept: CARDIOLOGY | Facility: CLINIC | Age: 78
End: 2023-04-07
Payer: MEDICARE

## 2023-04-07 VITALS
HEART RATE: 52 BPM | SYSTOLIC BLOOD PRESSURE: 148 MMHG | DIASTOLIC BLOOD PRESSURE: 80 MMHG | HEIGHT: 70 IN | BODY MASS INDEX: 29.16 KG/M2 | OXYGEN SATURATION: 100 % | WEIGHT: 203.7 LBS

## 2023-04-07 DIAGNOSIS — E11.21 TYPE 2 DIABETES MELLITUS WITH DIABETIC NEPHROPATHY, WITHOUT LONG-TERM CURRENT USE OF INSULIN (H): ICD-10-CM

## 2023-04-07 DIAGNOSIS — E78.5 HYPERLIPIDEMIA LDL GOAL <100: ICD-10-CM

## 2023-04-07 DIAGNOSIS — R60.0 PERIPHERAL EDEMA: ICD-10-CM

## 2023-04-07 DIAGNOSIS — I10 ESSENTIAL HYPERTENSION: ICD-10-CM

## 2023-04-07 DIAGNOSIS — I25.118 ATHEROSCLEROSIS OF NATIVE CORONARY ARTERY OF NATIVE HEART WITH STABLE ANGINA PECTORIS (H): Primary | ICD-10-CM

## 2023-04-07 DIAGNOSIS — I45.2 RBBB WITH LEFT ANTERIOR FASCICULAR BLOCK: ICD-10-CM

## 2023-04-07 DIAGNOSIS — N18.32 STAGE 3B CHRONIC KIDNEY DISEASE (H): ICD-10-CM

## 2023-04-07 PROCEDURE — 99215 OFFICE O/P EST HI 40 MIN: CPT | Performed by: INTERNAL MEDICINE

## 2023-04-07 RX ORDER — EZETIMIBE AND SIMVASTATIN 10; 40 MG/1; MG/1
1 TABLET ORAL AT BEDTIME
COMMUNITY
End: 2023-04-07

## 2023-04-07 RX ORDER — METOPROLOL TARTRATE 25 MG/1
12.5 TABLET, FILM COATED ORAL DAILY
COMMUNITY
Start: 2022-11-05 | End: 2023-07-25

## 2023-04-07 RX ORDER — ISOSORBIDE MONONITRATE 30 MG/1
15 TABLET, EXTENDED RELEASE ORAL DAILY
Qty: 45 TABLET | Refills: 4 | Status: SHIPPED | OUTPATIENT
Start: 2023-04-07 | End: 2023-06-22

## 2023-04-07 NOTE — PATIENT INSTRUCTIONS
Your blood pressure was elevated at your appointment today.  Elevated blood pressure can increase your risk of a heart attack, stroke and heart failure.  For this reason, we feel it is important to monitor your blood pressure closely.  If you have access to a home blood pressure monitor or are able to check your blood pressure at a local pharmacy in the next week we would like you to do so and call our clinic with those readings. Please call [phone] ([place]) and leave a message with your name, date of birth, blood pressure reading, date and location it was completed. If your blood pressure remains elevated your care team will be notified.  We appreciate being a part of your healthcare team and look forward to hearing from you soon.     It was a pleasure seeing you today and thank you for allowing me to be a part of your health care team.  Should   you have any questions regarding your visit or future needs please feel free to reach out to my care team for assistance.      Thank you, Dr. Jordan Dixon        **Nursing: [phone]       **Scheduling: [phone]

## 2023-04-07 NOTE — PROGRESS NOTES
HPI and Plan:   Mr. Ricks is a very nice 77-year-old gentleman who actually performed a coronary angiogram on him in 2000.  His past medical history is significant for hypertension, bradycardia, diabetes mellitus, coronary artery disease, peripheral edema, stage IIIb renal failure, diabetes mellitus.    Coronary interventions dates back to 2003 when at [facility] for which she had 2 stents placed in his ramus intermedius branch and in a staged fashion came back for 2 stents in his left anterior descending artery.  Distal circumflex was described as subtotally occluded and his right coronary artery was described as diffusely diseased with a distal occlusion.    Over the years he is seen Dr. Rivas and Dr. Borrego.  Once for SVT for which he had a benign EP evaluation and secondly for nocturnal bradycardia which required no treatment.    Patient states he did well till this past December when he noted walking in the cold he would develop some shortness of breath and a mild substernal chest tightness.  This would quickly be alleviated by rest.  He has never had to take any nitroglycerin.  Since then it is now progressed to the point that he will have episodes when he walks inside.  He does walk on a daily basis.  He develops the discomfort occasionally and again is quickly relieved by rest.  He has had no spontaneous episodes, no prolonged episodes    This led to a stress echo yesterday for which she was able to exercise 6-1/2 minutes.  He did develop symptoms.  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  With exercise he developed worsening ST segment depression in 1 and aVL to 3 and F and V4 through V6.  Echo is described as a apical distal LAD wall motion abnormality at rest which with exercise worsens and includes the distal anterior wall.    Assessment and plan.  Jeff appears to have chronic stable exertional angina going back 3 to 4 months.  It has slowly progressed such  that is now occurring inside in addition to outside.  There does not appear to be a significant amount of ischemia on his stress test.  We discussed options including medical management and aggressive risk factor modification versus going to the Cath Lab to look.  I cautioned him that he does have a significant amount of renal dysfunction and that it is possible he might need to be done in a staged fashion.  At this time I am going to introduce Imdur 15 mg daily.  We will set up a coronary angiogram to see what he has.  I discussed risk, benefits and alternatives.  We will hold his diuretics when he comes in and he will need to come in early to get fluids for his renal dysfunction.    We did discuss the risk of bleeding, vascular trauma, CVA, myocardial infarction, renal damage and possible death.  He and wife appeared understand desire to proceed.    Blood pressure is high today.  But he states his blood pressure at home is typically in the 130s over 50s to 80.  All 4 of the blood pressures recorded are within normal limits.  I will not intensify his antihypertensive medicines at this time.  He is in a research trial for his hypertension and I will defer treatment to them.    Fasting lipid profile on 40 mg of simvastatin in the evening is excellent with total cholesterol of 84, HDL 47, LDL of 26 and triglycerides of 56.    Creatinine is 1.96 with a BUN of 22 giving him a GFR of 34.    Further evaluation treatment will depend upon the above results.    I have had him sign a release so we can look at all of his [facility] records.  I was limited and could not look at the angiogram report but only could see Dr. Cui's clinic note after the angiogram    Thank you for allow me to participate in this patient's care.  Sincerely,                               Jordan Dixon MD [facility]              Today's clinic visit entailed:  Review of external notes as documented elsewhere in note  Review of the result(s) of each  unique test - EKG, stress test, lab work  Ordering of each unique test  Prescription drug management  60 minutes spent by me on the date of the encounter doing chart review, history and exam, documentation and further activities per the note  Provider  Link to MDM Help Grid     The level of medical decision making during this visit was of high complexity.      Orders Placed This Encounter   Procedures     Basic metabolic panel     Follow-Up with Cardiology     Case Request Cath Lab: Left Heart Catheterization, Coronary Angiogram, Percutaneous Coronary Intervention       Orders Placed This Encounter   Medications     metoprolol tartrate (LOPRESSOR) 25 MG tablet     Sig: Take 25 mg by mouth daily     DISCONTD: ezetimibe-simvastatin (VYTORIN) 10-40 MG tablet     Sig: Take 1 tablet by mouth At Bedtime     isosorbide mononitrate (IMDUR) 30 MG 24 hr tablet     Sig: Take 0.5 tablets (15 mg) by mouth daily     Dispense:  45 tablet     Refill:  4       Medications Discontinued During This Encounter   Medication Reason     Acetaminophen 325 MG CAPS      ezetimibe-simvastatin (VYTORIN) 10-40 MG tablet          Encounter Diagnoses   Name Primary?     Atherosclerosis of native coronary artery of native heart with stable angina pectoris (H) Yes     Stage 3b chronic kidney disease (H)      Essential hypertension      Hyperlipidemia LDL goal <100      RBBB with left anterior fascicular block      Type 2 diabetes mellitus with diabetic nephropathy, without long-term current use of insulin (H)      Peripheral edema        CURRENT MEDICATIONS:  Current Outpatient Medications   Medication Sig Dispense Refill     acetic acid-hydrocortisone (VOSOL-HC) 1-2 % otic solution INSTILL 1 DROP INTO BOTH   EARS TWO TIMES A DAY 10 mL 0     Ascorbic Acid (VITAMIN C PO) Take 1,000 mg by mouth 2 times daily       aspirin (ASA) 81 MG tablet Take 81 mg by mouth daily       blood glucose (NO BRAND SPECIFIED) lancing device Device to be used with  lancets. 1 each 0     blood glucose (NO BRAND SPECIFIED) test strip Use to test blood sugar 1 times daily or as directed. 100 strip 1     blood glucose monitoring (ACCU-CHEK SHELLEY PLUS) meter device kit Use to test blood sugar 1 times daily or as directed. 1 kit 0     calcitRIOL (ROCALTROL) 0.25 MCG capsule Take 0.25 mcg by mouth daily        chlorhexidine (PERIDEX) 0.12 % solution USE 1/2 OZ TO SWISH FOR 30 SECONDS ONCE D  3     clopidogrel (PLAVIX) 75 MG tablet Take 1 tablet (75 mg) by mouth daily 90 tablet 3     Continuous Blood Gluc Sensor (FREESTYLE SHARRI 14 DAY SENSOR) MISC 1 each every 14 days Use 1 Sensor every 14 days. Use to read blood sugars per 's instructions. 2 each 5     Continuous Blood Gluc Sensor (FREESTYLE SHARRI 2 SENSOR) MISC 1 each every 14 days Use 1 sensor every 14 days. Use to read blood sugars per 's instructions. 2 each 5     Cyanocobalamin (B-12) 1000 MCG CAPS Take 1 mg by mouth daily       famotidine (PEPCID) 40 MG tablet TAKE 1 TABLET DAILY 90 tablet 3     furosemide (LASIX) 40 MG tablet TAKE 1 TABLET DAILY 90 tablet 0     glimepiride (AMARYL) 1 MG tablet TAKE 2 TABLETS EVERY       MORNING BEFORE BREAKFAST 180 tablet 1     hydrocortisone 2.5 % cream APPLY TOPICALLY TWO TIMES ADAY (Patient taking differently: 2 times daily as needed) 28.35 g 11     isosorbide mononitrate (IMDUR) 30 MG 24 hr tablet Take 0.5 tablets (15 mg) by mouth daily 45 tablet 4     lisinopril (ZESTRIL) 20 MG tablet Take 20 mg by mouth daily       metoprolol tartrate (LOPRESSOR) 25 MG tablet Take 25 mg by mouth daily       nitroGLYcerin (NITROSTAT) 0.6 MG sublingual tablet DISSOLVE 1 TABLET UNDER THETONGUE AS NEEDED. 100 tablet 0     omega-3 acid ethyl esters (LOVAZA) 1 g capsule Take 1 capsule by mouth 2 times daily 180 capsule 3     Pyridoxine HCl (B-6) 100 MG TABS Take 100 mg by mouth daily       sitagliptin (JANUVIA) 50 MG tablet Take 1 tablet (50 mg) by mouth daily 90 tablet 1     STUDY  DRUG FROM OUTSIDE FACILITY Takes Ocedurenone ([id number]), non-steroidal mineralocorticoid receptor antagonist, 1 tablet by mouth once daily (unknown strength). Phase 3 clinical trial       VITAMIN E NATURAL PO Take 400 Units by mouth daily       ZOCOR 40 MG tablet PLEASE UPDATE FOR ANY MEDICATION CHANGE (Patient taking differently: Take 40 mg by mouth) 90 tablet 3     ciclopirox (LOPROX) 0.77 % cream Apply topically daily Apply to affected nail daily at night (Patient not taking: Reported on 4/7/2023) 30 g 6       ALLERGIES     Allergies   Allergen Reactions     No Known Drug Allergies        PAST MEDICAL HISTORY:  Past Medical History:   Diagnosis Date     Chronic kidney disease      Coronary atherosclerosis of unspecified type of vessel, native or graft 10/03    at [facility]:  had 4 stents done following an MI     DDD (degenerative disc disease), lumbosacral 5/21/2021     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     Heart attack (H)      Hernia, abdominal      Hyperlipidaemia      Mumps      Obese      Other and unspecified hyperlipidemia      Other premature beats      Palpitations      Phlebitis and thrombophlebitis of other deep vessels of lower extremities 2000    has had 2-3 episodes     Stented coronary artery      4 stents     Syncope      Syncope      Thrombosis of leg      Type II or unspecified type diabetes mellitus without mention of complication, not stated as uncontrolled      Unspecified essential hypertension        PAST SURGICAL HISTORY:  Past Surgical History:   Procedure Laterality Date     ABDOMEN SURGERY      Hernia naval approx 25 years ago     BIOPSY  8/2016     CARDIAC SURGERY       COLONOSCOPY       CORONARY ANGIOGRAPHY ADULT ORDER  8/28/2000    75% distal LAD stenosis, 80% third diagonal stenosis, 75-80% mid ramus stenosis, ostial 60% stenosis in circumflex w/90% stenosis in distal circumflex     CORONARY ANGIOGRAPHY ADULT ORDER  2003    4 stents placed     EP ABLATION / EP STUDIES   3/25/2014     HEART CATH, ANGIOPLASTY       HYDROCELECTOMY SCROTAL Right 10/6/2016    Procedure: HYDROCELECTOMY SCROTAL;  Surgeon: Jordan Chandra MD;  Location: [facility]     ORTHOPEDIC SURGERY      Meniscus Orthoscopic surgery left knee.     TESTICLE SURGERY       ZZC NONSPECIFIC PROCEDURE      colonoscopy approx 1999 done elsewhere       FAMILY HISTORY:  Family History   Problem Relation Age of Onset     Musculoskeletal Disorder Mother         spinal stenosis     Cancer Mother         cancer kidney age 85     Hypertension Mother         Dead     Diabetes Father         Dead     Diabetes Brother         Type 1     Heart Disease Brother        SOCIAL HISTORY:  Social History     Socioeconomic History     Marital status:      Spouse name: Bethany     Number of children: 3     Years of education: None     Highest education level: None   Occupational History     Occupation: Computers/     Employer: [employer]      Comment: [employer]   Tobacco Use     Smoking status: Former     Packs/day: 3.00     Years: 6.00     Pack years: 18.00     Types: Cigarettes, Cigars, Pipe     Start date: 1961     Quit date: 1967     Years since quittin.3     Smokeless tobacco: Never     Tobacco comments:     quit    Vaping Use     Vaping status: Never Used     Passive vaping exposure: Yes   Substance and Sexual Activity     Alcohol use: Yes     Comment: 1 beer a week     Drug use: No     Sexual activity: Not Currently   Other Topics Concern     Parent/sibling w/ CABG, MI or angioplasty before 65F 55M? No       Review of Systems:  Skin:  Negative       Eyes:  Positive for glasses;cataracts    ENT:  Positive for   itching ears  Respiratory:  Positive for sleep apnea     Cardiovascular:    Positive for;edema compression socks  Gastroenterology: Positive for   Hiatal Hernia  Genitourinary:  Positive for urinary frequency From Lasix  Musculoskeletal:  Positive for back pain Occasional muscle  "cramps  Neurologic:  Negative      Psychiatric:  Negative      Heme/Lymph/Imm:  Positive for easy bruising    Endocrine:  Positive for diabetes      Physical Exam:  Vitals: BP (!) 148/80 (BP Location: Right arm, Patient Position: Sitting, Cuff Size: Adult Regular)   Pulse 52   Ht 1.765 m (5' 9.5\")   Wt 92.4 kg (203 lb 11.2 oz)   SpO2 100%   BMI 29.65 kg/m      Constitutional:  cooperative, alert and oriented, well developed, well nourished, in no acute distress overweight      Skin:  warm and dry to the touch, no apparent skin lesions or masses noted          Head:  normocephalic, no masses or lesions        Eyes:  pupils equal and round, conjunctivae and lids unremarkable, sclera white, no xanthalasma, EOMS intact, no nystagmus        Lymph:      ENT:  no pallor or cyanosis, dentition good        Neck:  no carotid bruit        Respiratory:  normal breath sounds, clear to auscultation, normal A-P diameter, normal symmetry, normal respiratory excursion, no use of accessory muscles         Cardiac: regular rhythm;no murmurs, gallops or rubs detected                pulses full and equal                                        GI:           Extremities and Muscular Skeletal:  no spinal abnormalities noted;normal muscle strength and tone   bilateral LE edema;L greater than R;trace;1+     Wears compression stockings    Neurological:  no gross motor deficits        Psych:  affect appropriate, oriented to time, person and place        CC  No referring provider defined for this encounter.              "

## 2023-04-07 NOTE — LETTER
4/7/2023    Guillermo Deleon MD  [address] [facility] [address]    RE: Jeff Ricks       Dear Colleague,     I had the pleasure of seeing Jeff Ricks in the [facility].  HPI and Plan:   Mr. Ricks is a very nice 77-year-old gentleman who actually performed a coronary angiogram on him in 2000.  His past medical history is significant for hypertension, bradycardia, diabetes mellitus, coronary artery disease, peripheral edema, stage IIIb renal failure, diabetes mellitus.    Coronary interventions dates back to 2003 when at [facility] for which she had 2 stents placed in his ramus intermedius branch and in a staged fashion came back for 2 stents in his left anterior descending artery.  Distal circumflex was described as subtotally occluded and his right coronary artery was described as diffusely diseased with a distal occlusion.    Over the years he is seen Dr. Rivas and Dr. Borrego.  Once for SVT for which he had a benign EP evaluation and secondly for nocturnal bradycardia which required no treatment.    Patient states he did well till this past December when he noted walking in the cold he would develop some shortness of breath and a mild substernal chest tightness.  This would quickly be alleviated by rest.  He has never had to take any nitroglycerin.  Since then it is now progressed to the point that he will have episodes when he walks inside.  He does walk on a daily basis.  He develops the discomfort occasionally and again is quickly relieved by rest.  He has had no spontaneous episodes, no prolonged episodes    This led to a stress echo yesterday for which she was able to exercise 6-1/2 minutes.  He did develop symptoms.  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  With exercise he developed worsening ST segment depression in 1 and aVL to 3 and F and V4 through V6.  Echo is described as a apical distal LAD wall motion abnormality at rest  which with exercise worsens and includes the distal anterior wall.    Assessment and plan.  Jeff appears to have chronic stable exertional angina going back 3 to 4 months.  It has slowly progressed such that is now occurring inside in addition to outside.  There does not appear to be a significant amount of ischemia on his stress test.  We discussed options including medical management and aggressive risk factor modification versus going to the Cath Lab to look.  I cautioned him that he does have a significant amount of renal dysfunction and that it is possible he might need to be done in a staged fashion.  At this time I am going to introduce Imdur 15 mg daily.  We will set up a coronary angiogram to see what he has.  I discussed risk, benefits and alternatives.  We will hold his diuretics when he comes in and he will need to come in early to get fluids for his renal dysfunction.    We did discuss the risk of bleeding, vascular trauma, CVA, myocardial infarction, renal damage and possible death.  He and wife appeared understand desire to proceed.    Blood pressure is high today.  But he states his blood pressure at home is typically in the 130s over 50s to 80.  All 4 of the blood pressures recorded are within normal limits.  I will not intensify his antihypertensive medicines at this time.  He is in a research trial for his hypertension and I will defer treatment to them.    Fasting lipid profile on 40 mg of simvastatin in the evening is excellent with total cholesterol of 84, HDL 47, LDL of 26 and triglycerides of 56.    Creatinine is 1.96 with a BUN of 22 giving him a GFR of 34.    Further evaluation treatment will depend upon the above results.    I have had him sign a release so we can look at all of his [facility] records.  I was limited and could not look at the angiogram report but only could see Dr. Cui's clinic note after the angiogram    Thank you for allow me to participate in this patient's  care.  Sincerely,                               Jordan Dixon MD [facility]              Today's clinic visit entailed:  Review of external notes as documented elsewhere in note  Review of the result(s) of each unique test - EKG, stress test, lab work  Ordering of each unique test  Prescription drug management  60 minutes spent by me on the date of the encounter doing chart review, history and exam, documentation and further activities per the note  Provider  Link to [facility] Help Grid     The level of medical decision making during this visit was of high complexity.      Orders Placed This Encounter   Procedures    Basic metabolic panel    Follow-Up with Cardiology    Case Request Cath Lab: Left Heart Catheterization, Coronary Angiogram, Percutaneous Coronary Intervention       Orders Placed This Encounter   Medications    metoprolol tartrate (LOPRESSOR) 25 MG tablet     Sig: Take 25 mg by mouth daily    DISCONTD: ezetimibe-simvastatin (VYTORIN) 10-40 MG tablet     Sig: Take 1 tablet by mouth At Bedtime    isosorbide mononitrate (IMDUR) 30 MG 24 hr tablet     Sig: Take 0.5 tablets (15 mg) by mouth daily     Dispense:  45 tablet     Refill:  4       Medications Discontinued During This Encounter   Medication Reason    Acetaminophen 325 MG CAPS     ezetimibe-simvastatin (VYTORIN) 10-40 MG tablet          Encounter Diagnoses   Name Primary?    Atherosclerosis of native coronary artery of native heart with stable angina pectoris (H) Yes    Stage 3b chronic kidney disease (H)     Essential hypertension     Hyperlipidemia LDL goal <100     RBBB with left anterior fascicular block     Type 2 diabetes mellitus with diabetic nephropathy, without long-term current use of insulin (H)     Peripheral edema        CURRENT MEDICATIONS:  Current Outpatient Medications   Medication Sig Dispense Refill    acetic acid-hydrocortisone (VOSOL-HC) 1-2 % otic solution INSTILL 1 DROP INTO BOTH   EARS TWO TIMES A DAY 10 mL 0    Ascorbic Acid  (VITAMIN C PO) Take 1,000 mg by mouth 2 times daily      aspirin (ASA) 81 MG tablet Take 81 mg by mouth daily      blood glucose (NO BRAND SPECIFIED) lancing device Device to be used with lancets. 1 each 0    blood glucose (NO BRAND SPECIFIED) test strip Use to test blood sugar 1 times daily or as directed. 100 strip 1    blood glucose monitoring (ACCU-CHEK SHELLEY PLUS) meter device kit Use to test blood sugar 1 times daily or as directed. 1 kit 0    calcitRIOL (ROCALTROL) 0.25 MCG capsule Take 0.25 mcg by mouth daily       chlorhexidine (PERIDEX) 0.12 % solution USE 1/2 OZ TO SWISH FOR 30 SECONDS ONCE D  3    clopidogrel (PLAVIX) 75 MG tablet Take 1 tablet (75 mg) by mouth daily 90 tablet 3    Continuous Blood Gluc Sensor (FREESTYLE SHARRI 14 DAY SENSOR) MISC 1 each every 14 days Use 1 Sensor every 14 days. Use to read blood sugars per 's instructions. 2 each 5    Continuous Blood Gluc Sensor (FREESTYLE SHARRI 2 SENSOR) MISC 1 each every 14 days Use 1 sensor every 14 days. Use to read blood sugars per 's instructions. 2 each 5    Cyanocobalamin (B-12) 1000 MCG CAPS Take 1 mg by mouth daily      famotidine (PEPCID) 40 MG tablet TAKE 1 TABLET DAILY 90 tablet 3    furosemide (LASIX) 40 MG tablet TAKE 1 TABLET DAILY 90 tablet 0    glimepiride (AMARYL) 1 MG tablet TAKE 2 TABLETS EVERY       MORNING BEFORE BREAKFAST 180 tablet 1    hydrocortisone 2.5 % cream APPLY TOPICALLY TWO TIMES ADAY (Patient taking differently: 2 times daily as needed) 28.35 g 11    isosorbide mononitrate (IMDUR) 30 MG 24 hr tablet Take 0.5 tablets (15 mg) by mouth daily 45 tablet 4    lisinopril (ZESTRIL) 20 MG tablet Take 20 mg by mouth daily      metoprolol tartrate (LOPRESSOR) 25 MG tablet Take 25 mg by mouth daily      nitroGLYcerin (NITROSTAT) 0.6 MG sublingual tablet DISSOLVE 1 TABLET UNDER THETONGUE AS NEEDED. 100 tablet 0    omega-3 acid ethyl esters (LOVAZA) 1 g capsule Take 1 capsule by mouth 2 times daily 180  capsule 3    Pyridoxine HCl (B-6) 100 MG TABS Take 100 mg by mouth daily      sitagliptin (JANUVIA) 50 MG tablet Take 1 tablet (50 mg) by mouth daily 90 tablet 1    STUDY DRUG FROM OUTSIDE FACILITY Takes Ocedurenone ([id number]), non-steroidal mineralocorticoid receptor antagonist, 1 tablet by mouth once daily (unknown strength). Phase 3 clinical trial      VITAMIN E NATURAL PO Take 400 Units by mouth daily      ZOCOR 40 MG tablet PLEASE UPDATE FOR ANY MEDICATION CHANGE (Patient taking differently: Take 40 mg by mouth) 90 tablet 3    ciclopirox (LOPROX) 0.77 % cream Apply topically daily Apply to affected nail daily at night (Patient not taking: Reported on 4/7/2023) 30 g 6       ALLERGIES     Allergies   Allergen Reactions    No Known Drug Allergies        PAST MEDICAL HISTORY:  Past Medical History:   Diagnosis Date    Chronic kidney disease     Coronary atherosclerosis of unspecified type of vessel, native or graft 10/03    at [facility][note 3]:  had 4 stents done following an MI    DDD (degenerative disc disease), lumbosacral 5/21/2021    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    Heart attack (H)     Hernia, abdominal     Hyperlipidaemia     Mumps     Obese     Other and unspecified hyperlipidemia     Other premature beats     Palpitations     Phlebitis and thrombophlebitis of other deep vessels of lower extremities 2000    has had 2-3 episodes    Stented coronary artery      4 stents    Syncope     Syncope     Thrombosis of leg     Type II or unspecified type diabetes mellitus without mention of complication, not stated as uncontrolled     Unspecified essential hypertension        PAST SURGICAL HISTORY:  Past Surgical History:   Procedure Laterality Date    ABDOMEN SURGERY      Hernia naval approx 25 years ago    BIOPSY  8/2016    CARDIAC SURGERY      COLONOSCOPY      CORONARY ANGIOGRAPHY ADULT ORDER  8/28/2000    75% distal LAD stenosis, 80% third diagonal stenosis, 75-80% mid ramus stenosis, ostial 60% stenosis  in circumflex w/90% stenosis in distal circumflex    CORONARY ANGIOGRAPHY ADULT ORDER      4 stents placed    EP ABLATION / EP STUDIES  3/25/2014    HEART CATH, ANGIOPLASTY      HYDROCELECTOMY SCROTAL Right 10/6/2016    Procedure: HYDROCELECTOMY SCROTAL;  Surgeon: Jordan Chandra MD;  [facility]: [facility]    ORTHOPEDIC SURGERY      Meniscus Orthoscopic surgery left knee.    TESTICLE SURGERY      ZZC NONSPECIFIC PROCEDURE      colonoscopy approx  done elsewhere       FAMILY HISTORY:  Family History   Problem Relation Age of Onset    Musculoskeletal Disorder Mother         spinal stenosis    Cancer Mother         cancer kidney age 85    Hypertension Mother         Dead    Diabetes Father         Dead    Diabetes Brother         Type 1    Heart Disease Brother        SOCIAL HISTORY:  Social History     Socioeconomic History    Marital status:      Spouse name: Bethany    Number of children: 3    Years of education: None    Highest education level: None   Occupational History    Occupation: [employer]     Employer: [employer]      Comment: [employer]   Tobacco Use    Smoking status: Former     Packs/day: 3.00     Years: 6.00     Pack years: 18.00     Types: Cigarettes, Cigars, Pipe     Start date: 1961     Quit date: 1967     Years since quittin.3    Smokeless tobacco: Never    Tobacco comments:     quit    Vaping Use    Vaping status: Never Used     Passive vaping exposure: Yes   Substance and Sexual Activity    Alcohol use: Yes     Comment: 1 beer a week    Drug use: No    Sexual activity: Not Currently   Other Topics Concern    Parent/sibling w/ CABG, MI or angioplasty before 65F 55M? No       Review of Systems:  Skin:  Negative       Eyes:  Positive for glasses;cataracts    ENT:  Positive for   itching ears  Respiratory:  Positive for sleep apnea     Cardiovascular:    Positive for;edema compression socks  Gastroenterology: Positive for   Hiatal Hernia  Genitourinary:   "Positive for urinary frequency From Lasix  Musculoskeletal:  Positive for back pain Occasional muscle cramps  Neurologic:  Negative      Psychiatric:  Negative      Heme/Lymph/Imm:  Positive for easy bruising    Endocrine:  Positive for diabetes      Physical Exam:  Vitals: BP (!) 148/80 (BP Location: Right arm, Patient Position: Sitting, Cuff Size: Adult Regular)   Pulse 52   Ht 1.765 m (5' 9.5\")   Wt 92.4 kg (203 lb 11.2 oz)   SpO2 100%   BMI 29.65 kg/m      Constitutional:  cooperative, alert and oriented, well developed, well nourished, in no acute distress overweight      Skin:  warm and dry to the touch, no apparent skin lesions or masses noted          Head:  normocephalic, no masses or lesions        Eyes:  pupils equal and round, conjunctivae and lids unremarkable, sclera white, no xanthalasma, EOMS intact, no nystagmus        Lymph:      ENT:  no pallor or cyanosis, dentition good        Neck:  no carotid bruit        Respiratory:  normal breath sounds, clear to auscultation, normal A-P diameter, normal symmetry, normal respiratory excursion, no use of accessory muscles         Cardiac: regular rhythm;no murmurs, gallops or rubs detected                pulses full and equal                                        GI:           Extremities and Muscular Skeletal:  no spinal abnormalities noted;normal muscle strength and tone   bilateral LE edema;L greater than R;trace;1+     Wears compression stockings    Neurological:  no gross motor deficits        Psych:  affect appropriate, oriented to time, person and place        CC  No referring provider defined for this encounter.      Thank you for allowing me to participate in the care of your patient.      Sincerely,     Jordan Dixon MD     [facility]  "

## 2023-04-08 DIAGNOSIS — E11.21 TYPE 2 DIABETES MELLITUS WITH DIABETIC NEPHROPATHY, WITHOUT LONG-TERM CURRENT USE OF INSULIN (H): ICD-10-CM

## 2023-04-10 RX ORDER — SITAGLIPTIN 50 MG/1
TABLET, FILM COATED ORAL
Qty: 90 TABLET | Refills: 1 | Status: SHIPPED | OUTPATIENT
Start: 2023-04-10 | End: 2023-09-18

## 2023-04-10 NOTE — TELEPHONE ENCOUNTER
Januvia 50 mg  Routing refill request to provider for review/approval because:  Labs out of range:    Creatinine   Date Value Ref Range Status   01/30/2023 1.96 (H) 0.67 - 1.17 mg/dL Final   06/10/2021 1.70 (H) 0.66 - 1.25 mg/dL Final     LOV 01/30/2023

## 2023-04-11 ENCOUNTER — TRANSFERRED RECORDS (OUTPATIENT)
Dept: HEALTH INFORMATION MANAGEMENT | Facility: CLINIC | Age: 78
End: 2023-04-11
Payer: MEDICARE

## 2023-04-11 ENCOUNTER — TELEPHONE (OUTPATIENT)
Dept: CARDIOLOGY | Facility: CLINIC | Age: 78
End: 2023-04-11
Payer: MEDICARE

## 2023-04-11 DIAGNOSIS — I25.118 ATHEROSCLEROSIS OF NATIVE CORONARY ARTERY OF NATIVE HEART WITH STABLE ANGINA PECTORIS (H): ICD-10-CM

## 2023-04-11 DIAGNOSIS — R07.9 CHEST PAIN, UNSPECIFIED TYPE: Primary | ICD-10-CM

## 2023-04-11 LAB — RETINOPATHY: POSITIVE

## 2023-04-11 RX ORDER — ASPIRIN 81 MG/1
243 TABLET, CHEWABLE ORAL ONCE
Status: CANCELLED | OUTPATIENT
Start: 2023-04-11

## 2023-04-11 RX ORDER — ASPIRIN 325 MG
325 TABLET ORAL ONCE
Status: CANCELLED | OUTPATIENT
Start: 2023-04-11 | End: 2023-04-11

## 2023-04-11 RX ORDER — POTASSIUM CHLORIDE 1500 MG/1
20 TABLET, EXTENDED RELEASE ORAL
Status: CANCELLED | OUTPATIENT
Start: 2023-04-11

## 2023-04-11 RX ORDER — LIDOCAINE 40 MG/G
CREAM TOPICAL
Status: CANCELLED | OUTPATIENT
Start: 2023-04-11

## 2023-04-11 RX ORDER — SODIUM CHLORIDE 9 MG/ML
INJECTION, SOLUTION INTRAVENOUS CONTINUOUS
Status: CANCELLED | OUTPATIENT
Start: 2023-04-11 | End: 2023-04-11

## 2023-04-11 NOTE — TELEPHONE ENCOUNTER
Coronary angiogram/PCI/Right Heart Cath prep instructions.     Patient is scheduled for a Coronary Angio w/possible PCI at [facility] - [address] [place] [address] - Main Entrance of the Hospital on 4/14/2023.  Check in time is at 0630 and procedure to follow.    Patient instructed to remain NPO for solid foods 8 hours prior to arrival and may have clear liquids up to 2 hours prior to arrival.    Patient Patient does require extra fluids prior to procedure and has been instructed to arrive at 0630    Patient is taking januvia and glimiperide and should contact PCP regarding hold instructions prior to this procedure.    Patient is not on anticoagulation.    Patient is taking furosemide and has been instructed to hold it the morning of procedure.     Patient is taking ASA 81mg daily and will take 4 tabs (324mg) the morning of the procedure.    Pt is not on a SGLT2 inhibitor.    Patient advised to take their other daily medications the morning of the procedure with small sips of water.     Verified patient does not have a contrast allergy.    Verified patient has someone available to drive them home from the hospital and can stay with them for 24 hours after the procedure.     Patient advised to notify care team with any new COVID like symptoms prior to procedure.    Patient will check their temperature the morning of procedure and call [facility] at [phone] if temp is >100.0.    Patient is aware of visitor policy.    Patient expresses understanding of above instructions and denies further questions at this time.

## 2023-04-14 ENCOUNTER — DOCUMENTATION ONLY (OUTPATIENT)
Dept: CARDIOLOGY | Facility: CLINIC | Age: 78
End: 2023-04-14

## 2023-04-14 ENCOUNTER — HOSPITAL ENCOUNTER (OUTPATIENT)
Facility: CLINIC | Age: 78
Discharge: HOME | End: 2023-04-14
Admitting: INTERNAL MEDICINE
Payer: MEDICARE

## 2023-04-14 ENCOUNTER — TELEPHONE (OUTPATIENT)
Dept: CARDIOLOGY | Facility: CLINIC | Age: 78
End: 2023-04-14

## 2023-04-14 VITALS
DIASTOLIC BLOOD PRESSURE: 62 MMHG | HEIGHT: 70 IN | WEIGHT: 204.4 LBS | RESPIRATION RATE: 16 BRPM | OXYGEN SATURATION: 97 % | SYSTOLIC BLOOD PRESSURE: 137 MMHG | HEART RATE: 50 BPM | TEMPERATURE: 98.5 F | BODY MASS INDEX: 29.26 KG/M2

## 2023-04-14 DIAGNOSIS — I25.118 ATHEROSCLEROSIS OF NATIVE CORONARY ARTERY OF NATIVE HEART WITH STABLE ANGINA PECTORIS (H): ICD-10-CM

## 2023-04-14 DIAGNOSIS — I25.10 ATHEROSCLEROSIS OF CORONARY ARTERY OF NATIVE HEART WITHOUT ANGINA PECTORIS, UNSPECIFIED VESSEL OR LESION TYPE: ICD-10-CM

## 2023-04-14 DIAGNOSIS — R07.9 CHEST PAIN, UNSPECIFIED TYPE: ICD-10-CM

## 2023-04-14 PROBLEM — Z98.890 STATUS POST CORONARY ANGIOGRAM: Status: ACTIVE | Noted: 2023-04-14

## 2023-04-14 LAB
ANION GAP SERPL CALCULATED.3IONS-SCNC: 6 MMOL/L (ref 7–15)
APTT PPP: 27 SECONDS (ref 22–38)
BUN SERPL-MCNC: 25.5 MG/DL (ref 8–23)
CALCIUM SERPL-MCNC: 8.8 MG/DL (ref 8.8–10.2)
CHLORIDE SERPL-SCNC: 106 MMOL/L (ref 98–107)
CREAT SERPL-MCNC: 2.28 MG/DL (ref 0.67–1.17)
DEPRECATED HCO3 PLAS-SCNC: 26 MMOL/L (ref 22–29)
ERYTHROCYTE [DISTWIDTH] IN BLOOD BY AUTOMATED COUNT: 12.9 % (ref 10–15)
GFR SERPL CREATININE-BSD FRML MDRD: 28 ML/MIN/1.73M2
GLUCOSE SERPL-MCNC: 97 MG/DL (ref 70–99)
HCT VFR BLD AUTO: 32.7 % (ref 40–53)
HGB BLD-MCNC: 11.1 G/DL (ref 13.3–17.7)
INR PPP: 1.06 (ref 0.85–1.15)
MCH RBC QN AUTO: 31.1 PG (ref 26.5–33)
MCHC RBC AUTO-ENTMCNC: 33.9 G/DL (ref 31.5–36.5)
MCV RBC AUTO: 92 FL (ref 78–100)
PLATELET # BLD AUTO: 150 10E3/UL (ref 150–450)
POTASSIUM SERPL-SCNC: 4 MMOL/L (ref 3.4–5.3)
RBC # BLD AUTO: 3.57 10E6/UL (ref 4.4–5.9)
SODIUM SERPL-SCNC: 138 MMOL/L (ref 136–145)
WBC # BLD AUTO: 5.5 10E3/UL (ref 4–11)

## 2023-04-14 PROCEDURE — 93454 CORONARY ARTERY ANGIO S&I: CPT | Mod: 26 | Performed by: INTERNAL MEDICINE

## 2023-04-14 PROCEDURE — 250N000011 HC RX IP 250 OP 636: Performed by: INTERNAL MEDICINE

## 2023-04-14 PROCEDURE — 36415 COLL VENOUS BLD VENIPUNCTURE: CPT | Performed by: INTERNAL MEDICINE

## 2023-04-14 PROCEDURE — 36591 DRAW BLOOD OFF VENOUS DEVICE: CPT

## 2023-04-14 PROCEDURE — 272N000001 HC OR GENERAL SUPPLY STERILE: Performed by: INTERNAL MEDICINE

## 2023-04-14 PROCEDURE — 85014 HEMATOCRIT: CPT | Performed by: INTERNAL MEDICINE

## 2023-04-14 PROCEDURE — 99152 MOD SED SAME PHYS/QHP 5/>YRS: CPT | Performed by: INTERNAL MEDICINE

## 2023-04-14 PROCEDURE — 250N000009 HC RX 250: Performed by: INTERNAL MEDICINE

## 2023-04-14 PROCEDURE — 93454 CORONARY ARTERY ANGIO S&I: CPT | Performed by: INTERNAL MEDICINE

## 2023-04-14 PROCEDURE — 258N000003 HC RX IP 258 OP 636: Performed by: INTERNAL MEDICINE

## 2023-04-14 PROCEDURE — 999N000054 HC STATISTIC EKG NON-CHARGEABLE

## 2023-04-14 PROCEDURE — 85610 PROTHROMBIN TIME: CPT | Performed by: INTERNAL MEDICINE

## 2023-04-14 PROCEDURE — 99153 MOD SED SAME PHYS/QHP EA: CPT

## 2023-04-14 PROCEDURE — 93010 ELECTROCARDIOGRAM REPORT: CPT | Performed by: INTERNAL MEDICINE

## 2023-04-14 PROCEDURE — 999N000184 HC STATISTIC TELEMETRY

## 2023-04-14 PROCEDURE — 85730 THROMBOPLASTIN TIME PARTIAL: CPT | Performed by: INTERNAL MEDICINE

## 2023-04-14 PROCEDURE — 93005 ELECTROCARDIOGRAM TRACING: CPT

## 2023-04-14 PROCEDURE — 999N000071 HC STATISTIC HEART CATH LAB OR EP LAB

## 2023-04-14 PROCEDURE — 80048 BASIC METABOLIC PNL TOTAL CA: CPT | Performed by: INTERNAL MEDICINE

## 2023-04-14 RX ORDER — LIDOCAINE 40 MG/G
CREAM TOPICAL
Status: DISCONTINUED | OUTPATIENT
Start: 2023-04-14 | End: 2023-04-14 | Stop reason: HOSPADM

## 2023-04-14 RX ORDER — POTASSIUM CHLORIDE 1500 MG/1
20 TABLET, EXTENDED RELEASE ORAL
Status: DISCONTINUED | OUTPATIENT
Start: 2023-04-14 | End: 2023-04-14 | Stop reason: HOSPADM

## 2023-04-14 RX ORDER — NALOXONE HYDROCHLORIDE 0.4 MG/ML
0.2 INJECTION, SOLUTION INTRAMUSCULAR; INTRAVENOUS; SUBCUTANEOUS
Status: DISCONTINUED | OUTPATIENT
Start: 2023-04-14 | End: 2023-04-14 | Stop reason: HOSPADM

## 2023-04-14 RX ORDER — OXYCODONE HYDROCHLORIDE 5 MG/1
5 TABLET ORAL EVERY 4 HOURS PRN
Status: DISCONTINUED | OUTPATIENT
Start: 2023-04-14 | End: 2023-04-14 | Stop reason: HOSPADM

## 2023-04-14 RX ORDER — OXYCODONE HYDROCHLORIDE 5 MG/1
10 TABLET ORAL EVERY 4 HOURS PRN
Status: DISCONTINUED | OUTPATIENT
Start: 2023-04-14 | End: 2023-04-14 | Stop reason: HOSPADM

## 2023-04-14 RX ORDER — ACETAMINOPHEN 325 MG/1
650 TABLET ORAL EVERY 4 HOURS PRN
Status: DISCONTINUED | OUTPATIENT
Start: 2023-04-14 | End: 2023-04-14 | Stop reason: HOSPADM

## 2023-04-14 RX ORDER — ATROPINE SULFATE 0.1 MG/ML
0.5 INJECTION INTRAVENOUS
Status: DISCONTINUED | OUTPATIENT
Start: 2023-04-14 | End: 2023-04-14 | Stop reason: HOSPADM

## 2023-04-14 RX ORDER — SODIUM CHLORIDE 9 MG/ML
INJECTION, SOLUTION INTRAVENOUS CONTINUOUS
Status: DISCONTINUED | OUTPATIENT
Start: 2023-04-14 | End: 2023-04-14 | Stop reason: HOSPADM

## 2023-04-14 RX ORDER — ASPIRIN 81 MG/1
243 TABLET, CHEWABLE ORAL ONCE
Status: COMPLETED | OUTPATIENT
Start: 2023-04-14 | End: 2023-04-14

## 2023-04-14 RX ORDER — SODIUM CHLORIDE 9 MG/ML
INJECTION, SOLUTION INTRAVENOUS CONTINUOUS
Status: ACTIVE | OUTPATIENT
Start: 2023-04-14 | End: 2023-04-14

## 2023-04-14 RX ORDER — NALOXONE HYDROCHLORIDE 0.4 MG/ML
0.4 INJECTION, SOLUTION INTRAMUSCULAR; INTRAVENOUS; SUBCUTANEOUS
Status: DISCONTINUED | OUTPATIENT
Start: 2023-04-14 | End: 2023-04-14 | Stop reason: HOSPADM

## 2023-04-14 RX ORDER — ASPIRIN 325 MG
325 TABLET ORAL ONCE
Status: COMPLETED | OUTPATIENT
Start: 2023-04-14 | End: 2023-04-14

## 2023-04-14 RX ORDER — FENTANYL CITRATE 50 UG/ML
INJECTION, SOLUTION INTRAMUSCULAR; INTRAVENOUS
Status: DISCONTINUED | OUTPATIENT
Start: 2023-04-14 | End: 2023-04-14 | Stop reason: HOSPADM

## 2023-04-14 RX ORDER — IOPAMIDOL 755 MG/ML
INJECTION, SOLUTION INTRAVASCULAR
Status: DISCONTINUED | OUTPATIENT
Start: 2023-04-14 | End: 2023-04-14 | Stop reason: HOSPADM

## 2023-04-14 RX ORDER — FENTANYL CITRATE 50 UG/ML
25 INJECTION, SOLUTION INTRAMUSCULAR; INTRAVENOUS
Status: DISCONTINUED | OUTPATIENT
Start: 2023-04-14 | End: 2023-04-14 | Stop reason: HOSPADM

## 2023-04-14 RX ORDER — FLUMAZENIL 0.1 MG/ML
0.2 INJECTION, SOLUTION INTRAVENOUS
Status: DISCONTINUED | OUTPATIENT
Start: 2023-04-14 | End: 2023-04-14 | Stop reason: HOSPADM

## 2023-04-14 RX ADMIN — SODIUM CHLORIDE: 9 INJECTION, SOLUTION INTRAVENOUS at 07:17

## 2023-04-14 ASSESSMENT — ACTIVITIES OF DAILY LIVING (ADL)
ADLS_ACUITY_SCORE: 35

## 2023-04-14 NOTE — PRE-PROCEDURE
GENERAL PRE-PROCEDURE:   Procedure:  Coronary angiogram, possible intervention  Date/Time:  4/14/2023 10:28 AM    Verbal consent obtained?: Yes    Risks and benefits: Risks, benefits and alternatives were discussed    Consent given by:  Patient  Patient states understanding of procedure being performed: Yes    Patient's understanding of procedure matches consent: Yes    Procedure consent matches procedure scheduled: Yes    Expected level of sedation:  Moderate  Appropriately NPO:  Yes  ASA Class:  1  Mallampati  :  Grade 1- soft palate, uvula, tonsillar pillars, and posterior pharyngeal wall visible  Lungs:  Lungs clear with good breath sounds bilaterally  Heart:  Normal heart sounds and rate  History & Physical reviewed:  History and physical reviewed and no updates needed  Statement of review:  I have reviewed the lab findings, diagnostic data, medications, and the plan for sedation

## 2023-04-14 NOTE — DISCHARGE INSTRUCTIONS

## 2023-04-14 NOTE — TELEPHONE ENCOUNTER
[facility] Call [facility]    Phone Message    May a detailed message be left on voicemail: yes     Reason for Call: Other: Patient called stating Dr. Dixon wanted to see him soon after angiogram was completed since there are medication that needs to be prescribed. Let patient know that there is an appt setup with Tete Mcguire on 05/16 and 07/12 josefina Dixon. Please give patient a call to see if an earlier appointment is needed or if what is scheduled is fine.      Action Taken: Other: Cardiology    Travel Screening: Not Applicable     Thank you!  [facility]

## 2023-04-14 NOTE — PROGRESS NOTES
Care Suites Post Procedure Note    Patient Information  Name: Jeff Ricks  Age: 77 year old    Post Procedure  Time patient returned to Care Suites: 1130  Concerns/abnormal assessment: NA  If abnormal assessment, provider notified: N/A  Plan/Other: Monitor post angiogram    Fatou Saavedra RN

## 2023-04-14 NOTE — PROGRESS NOTES
Message received from Dr Dixon as below. Imdur 15mg daily was started on 4/7 prior to angiogram. Follow up is scheduled for May.     Jordan Dixon MD  [facility] Team 2  I reviewed angiogram with Dr. Vela.  He has diffuse mostly distal and branch vessel disease.  Nothing to easily fixed by angioplasty or stenting and does not warrant bypass surgery.  We will treat medically.  I started Imdur 15 mg daily.  We will see how he is doing on this and can bump it up to 30 if necessary and if tolerating it.

## 2023-04-14 NOTE — Clinical Note
ORTHOPAEDIC CONSULT     Orthopedic Attending: Marvin TELLO  Left hip and left knee pain after fall    HPI  Valentina Redman is a 87 year old female who presents with left hip and left knee pain after fall. Patient has dementia so exact events are unclear. But apparently she fell out of her chair on to her left side. She was unable to ambulate after fall. Normally ambulate, unclear regarding device use. In ED XR of left hip and left knee revealed left IT fracture. Patient denies numbness or tingling in LLE. Nothing else hurts.    PMHx: Dementia, DM, HTN, HLD    PAST MEDICAL HISTORY:  Past Medical History:   Diagnosis Date   • Diabetes mellitus (CMS/HCC)    • Essential (primary) hypertension    • GERD (gastroesophageal reflux disease)    • Hyperlipidemia        PAST SURGICAL HISTORY:  Past Surgical History:   Procedure Laterality Date   • Abdominal aortic aneurysm repair, endovascular     • Hysterectomy         SOCIAL HISTORY:  Social History     Tobacco Use   • Smoking status: Never Smoker   • Smokeless tobacco: Never Used   Vaping Use   • Vaping Use: never used   Substance Use Topics   • Alcohol use: No   • Drug use: No       Family history noncontributory    MEDICATIONS  See med rec    ALLERGIES  ALLERGIES:  No Known Allergies    REVIEW OF SYSTEMS  A 12 point review of systems was performed and is negative other than those mentioned in HPI.    PHYSICAL EXAM  Visit Vitals  BP (!) 163/92   Pulse 95   Temp 98.4 °F (36.9 °C) (Oral)   Resp 16   SpO2 99%     General:   Alert, cooperative, no distress, appears stated age  HEENT:   NC/AT, PERRL, EOMFI, MMM  Neck:   symmetrical, supple, trachea midline    RUE:  Skin intact. No open wounds or abrasions  No pain with palpation in shoulder, arm, elbow, forearm, wrist, or hand   ROM at baseline in shoulder, elbow, and wrist   +AIN/PIN/M/R/U/A intact   Passive ROM at shoulder, elbow, wrist, hand at baseline  SILT median/ulnar/radial nerves  +2/4 radial pulse with capillary refill  Potential access sites were evaluated for patency using ultrasound.   The right femoral artery was selected. Access was obtained under with Sonosite guidance using a standard 18 guage needle with direct visualization of needle entry.    <2 sec in all fingers   Hand WWP   Compartments soft    LUE:  Skin intact. No open wounds or abrasions  No pain with palpation in shoulder, arm, elbow, forearm, wrist, or hand   ROM at baseline in shoulder, elbow, and wrist   +AIN/PIN/M/R/U/A intact   Passive ROM at shoulder, elbow, wrist, hand at baseline  SILT median/ulnar/radial nerves  +2/4 radial pulse with capillary refill <2 sec in all fingers   Hand WWP   Compartments soft    RLE:  Skin intact. No open wounds or abrasions  ROM at baseline in hip, knee, and ankle   Negative log roll bilaterally  No pain with palpation in thigh, knee, leg, or foot   +EHL/FHL/GS/TA intact  Passive ROM at hip, knee, and ankle at baseline  SILT superficial/deep peroneal, saphenous, sural, tibial nerve distributions  +2/4 DP/PT pulse with capillary refill <2 sec in all toes  Foot WWP  Compartments Soft; no calf TTP    LLE:  Skin intact. No open wounds or abrasions  Pain in hip and knee with palpation   Positive log roll   No pain in foot or ankle   +EHL/FHL/GS/TA intact  SILT superficial/deep peroneal, saphenous, sural, tibial nerve distributions  +2/4 DP/PT pulse with capillary refill <2 sec in all toes  Foot WWP  Compartments Soft; no calf TTP        RADIOLOGY REVIEW  XR of left hip shows IT fracture with subtroch extension  XR of left knee negative for fracture  She has extensive osteopenia     LABS  WBC (K/mcL)   Date Value   04/21/2022 16.8 (H)     RBC (mil/mcL)   Date Value   04/21/2022 4.32     HCT (%)   Date Value   04/21/2022 35.0 (L)     HGB (g/dL)   Date Value   04/21/2022 11.9 (L)     PLT (K/mcL)   Date Value   04/21/2022 239     Sodium (mmol/L)   Date Value   04/21/2022 120 (L)     Potassium (mmol/L)   Date Value   04/21/2022 4.0     Chloride (mmol/L)   Date Value   04/21/2022 89 (L)     Glucose (mg/dL)   Date Value   04/21/2022 294 (H)     Calcium (mg/dL)   Date Value   04/21/2022 9.0     Carbon Dioxide (mmol/L)   Date Value   04/21/2022 21     BUN (mg/dL)   Date  Value   04/21/2022 17     Creatinine (mg/dL)   Date Value   04/21/2022 1.01 (H)         ASSESSMENT & PLAN  Valentina Redman is a 87 year old female with left hip IT fracture with subtroch extension     · Plan for OR 4/22/22  · She will need medical and likely cardiac clearance. Please obtain these as soon as possible as not to delay surgery  · Will obtain consent- risks, benefits, alternatives discussed with patient  · Bedrest  · NWB LLE  · Pain control per primary  · NPO at midnight  · Preoperative labs and testing ordered   · Will discuss with Dr. Marvin Grove, DO   Orthopedic Surgery PGY-5  4/21/2022  11:23 PM  Contact through [employer]

## 2023-04-14 NOTE — PROGRESS NOTES
Care Suites Admission Nursing Note    Patient Information  Name: Jeff Ricks  Age: 77 year old  Reason for admission: Coronary angiogram  Care Suites arrival time: 0645    Visitor Information  Name: Bethany  Informed of visitor restrictions: Yes  2 visitor allowed per patient   Visitor must wear a mask    Patient Admission/Assessment   Pre-procedure assessment complete: Yes  If abnormal assessment/labs, provider notified: N/A  NPO: Yes  Medications held per instructions/orders: Yes  Consent: obtained  Patient oriented to room: Yes  Education/questions answered: Yes  Plan/other: To cath lab as planned    Discharge Planning  Discharge name/phone number: Bethany [phone]  Overnight post sedation caregiver: same  Discharge location: home    Kari Torres RN

## 2023-04-14 NOTE — PROGRESS NOTES
Care Suites Discharge Nursing Note    Patient Information  Name: Jeff Ricks  Age: 77 year old    Discharge Education:  Discharge instructions reviewed: Yes  Additional education/resources provided: all questions answered  Patient/patient representative verbalizes understanding: Yes  Patient discharging on new medications: No  Medication education completed: N/A    Discharge Plans:   Discharge location: home  Discharge ride contacted: Yes  Approximate discharge time: 1430    Discharge Criteria:  Discharge criteria met and vital signs stable: Yes    Patient Belongs:  Patient belongings returned to patient: Yes    Kari Torres RN

## 2023-04-17 NOTE — TELEPHONE ENCOUNTER
"Jordan Dixon MD  You 3 days ago     We can call him next week and see how his angina is doing.  I suspect it is better because he was not having that much anyway.  If he still having a significant amount but tolerating the Imdur we can bump it up over the phone.  Follow-up with JUANA on the 16th is fine.  Have him follow-up with me in 2 to 3 months.        Spoke to patient and reviewed message from Dr Dixon. He was agreeable to plan. He has no symptoms at this time but admits he hasn't \"done much to bring it on.\" Team 2 phone number provided to call for any recurrence of chest pain or other questions.   "

## 2023-04-18 LAB
ATRIAL RATE - MUSE: 44 BPM
DIASTOLIC BLOOD PRESSURE - MUSE: NORMAL MMHG
INTERPRETATION ECG - MUSE: NORMAL
P AXIS - MUSE: 40 DEGREES
PR INTERVAL - MUSE: 172 MS
QRS DURATION - MUSE: 146 MS
QT - MUSE: 462 MS
QTC - MUSE: 395 MS
R AXIS - MUSE: -49 DEGREES
SYSTOLIC BLOOD PRESSURE - MUSE: NORMAL MMHG
T AXIS - MUSE: -33 DEGREES
VENTRICULAR RATE- MUSE: 44 BPM

## 2023-04-25 DIAGNOSIS — R60.0 BILATERAL LEG EDEMA: ICD-10-CM

## 2023-04-27 RX ORDER — FUROSEMIDE 40 MG
TABLET ORAL
Qty: 90 TABLET | Refills: 0 | Status: SHIPPED | OUTPATIENT
Start: 2023-04-27 | End: 2023-11-14

## 2023-04-27 NOTE — TELEPHONE ENCOUNTER
Routing refill request to provider for review/approval because:  Labs out of range:    Creatinine   Date Value Ref Range Status   04/14/2023 2.28 (H) 0.67 - 1.17 mg/dL Final   06/10/2021 1.70 (H) 0.66 - 1.25 mg/dL Final     A break in medication  Nahid LAZAR RN, BSN

## 2023-05-16 ENCOUNTER — OFFICE VISIT (OUTPATIENT)
Dept: CARDIOLOGY | Facility: CLINIC | Age: 78
End: 2023-05-16
Payer: MEDICARE

## 2023-05-16 VITALS
BODY MASS INDEX: 29.96 KG/M2 | HEART RATE: 49 BPM | OXYGEN SATURATION: 98 % | SYSTOLIC BLOOD PRESSURE: 120 MMHG | HEIGHT: 70 IN | DIASTOLIC BLOOD PRESSURE: 55 MMHG | WEIGHT: 209.3 LBS

## 2023-05-16 DIAGNOSIS — I25.118 ATHEROSCLEROSIS OF NATIVE CORONARY ARTERY OF NATIVE HEART WITH STABLE ANGINA PECTORIS (H): ICD-10-CM

## 2023-05-16 DIAGNOSIS — R00.1 BRADYCARDIA: ICD-10-CM

## 2023-05-16 DIAGNOSIS — I10 ESSENTIAL HYPERTENSION: Primary | ICD-10-CM

## 2023-05-16 PROCEDURE — 99214 OFFICE O/P EST MOD 30 MIN: CPT | Performed by: NURSE PRACTITIONER

## 2023-05-16 NOTE — LETTER
5/16/2023    Guillermo Deleon MD  [address] [facility] [address]    RE: Jeff HERNANDEZ Rambo       Dear Colleague,     I had the pleasure of seeing Jeff Ricks in the [facility].  HISTORY OF PRESENT ILLNESS:    This is a 77 year old male who follows with Dr Dixon at [facility]  His past medical history includes:  Coronary artery disease, bradycardia, hypertension, hyperlipidemia, stage III CKD, type II diabetes, and peripheral edema    Mr Ricks underwent ramus intermedius branch stenting (2003) and then staged stenting x2 to his LAD.  At that time, he was noted to have an occluded distal CFX.    He has seen our EP physicians some over the years for SVT and nocturnal bradycardia which did not require any treatment for.     Recently, he complained of some exertional shortness of breath and chest tightness which has progressively worsened over the past 4 months.  A stress ECHO (4/2023) showed an apical distal LAD wall motion abnormality at rest which worsened with exercise.  EKG showed underlying rBBB and left anterior fascicular block.  He was started on Imdur and coronary angiography was arranged.    His cath (4/14/23) showed and occluded ramus branch, 70% ostial CFX disease, 85% disease in distal CFX, 100% occlusion of his mid-LAD (at previous stented site) with 60% ostial-prox LAD in-stent stenosis.  He was noted to have poor targets and medical therapy was recommended    Our visit today is for further review.    Mr Ricks comes in with his wife today  Overall, he has not had any further exertional chest pain or shortness of breath  He is now walking more frequently and further without significant limitations  He denies any lightheadedness  He states that his heart rate is usually around 50 bpm and climbs to  bpm with his walk  He has noted some low heart rates on his iWatch during the nighttime  He denies palpitations, orthopnea, or peripheral edema  He drinks > 32 oz of  water day.  He has not been checking his blood sugars at home  His wife thinks that they could improve their diet to help with his blood sugars         VITAL SIGNS:  BP: 120/55  Pulse:  49  Weight:  209 lbs  (stable)  BMI: 30    IMPRESSION AND PLAN:    Coronary Artery Disease:   -s/p stenting x2 to Ramus and x2 to LAD (2003)  -cath (4/7/23) showed mostly diffuse distal and branch vessel disease with in-stent stenosis not easily fixed by stenting.  Medical management advised  -now on Imdur 15 mg which has resolved his symptoms.  -he will call with recurrence of chest pain at which point we would increase his Imdur  Follow up as planned in a couple of months with Dr Dixon  -on Plavix + ASA    Hypertension:  -on Lasix 40 mg, Imdur 15 mg, Lisinopril 20 mg, Metoprolol 25 mg/day  -BP controlled    Bradycardia:  -asymptomatic   -some longstanding low heart rates during sleep, not new    Hyperlipidemia:  -on Simvastatin 40 mg  -LDL  26    Stage III-IV CKD:  -follows with Dr Alvarez  -creatinine 2.28 post cath, reviewed by Dr Alvarez who did not recommend any changes    Type II Diabetes:  -Hgb A1C  7.9    Untreated Sleep Apnea  -did not like CPAP when he tried it 20 yrs ago    The total time for the visit today was 30 minutes which includes patient visit, reviewing of records, discussion, and placing of orders of the outpatient coordination of cardiovascular care as described.  The level of medical decision making during this visit was of moderate complexity.  Thank you for allowing me to participate in their care.    No orders of the defined types were placed in this encounter.      No orders of the defined types were placed in this encounter.      There are no discontinued medications.      Encounter Diagnoses   Name Primary?    Essential hypertension Yes    Atherosclerosis of native coronary artery of native heart with stable angina pectoris (H)     Bradycardia        CURRENT MEDICATIONS:  Current Outpatient Medications    Medication Sig Dispense Refill    acetic acid-hydrocortisone (VOSOL-HC) 1-2 % otic solution INSTILL 1 DROP INTO BOTH   EARS TWO TIMES A DAY 10 mL 0    Ascorbic Acid (VITAMIN C PO) Take 1,000 mg by mouth 2 times daily      aspirin (ASA) 81 MG tablet Take 81 mg by mouth daily      blood glucose (NO BRAND SPECIFIED) lancing device Device to be used with lancets. 1 each 0    blood glucose (NO BRAND SPECIFIED) test strip Use to test blood sugar 1 times daily or as directed. 100 strip 1    blood glucose monitoring (ACCU-CHEK SHELLEY PLUS) meter device kit Use to test blood sugar 1 times daily or as directed. 1 kit 0    calcitRIOL (ROCALTROL) 0.25 MCG capsule Take 0.25 mcg by mouth daily       chlorhexidine (PERIDEX) 0.12 % solution USE 1/2 OZ TO SWISH FOR 30 SECONDS ONCE D  3    ciclopirox (LOPROX) 0.77 % cream Apply topically daily Apply to affected nail daily at night 30 g 6    clopidogrel (PLAVIX) 75 MG tablet Take 1 tablet (75 mg) by mouth daily 90 tablet 3    Continuous Blood Gluc Sensor (FREESTYLE SHARRI 14 DAY SENSOR) [facility] 1 each every 14 days Use 1 Sensor every 14 days. Use to read blood sugars per 's instructions. 2 each 5    Continuous Blood Gluc Sensor (FREESTYLE SHARRI 2 SENSOR) [facility] 1 each every 14 days Use 1 sensor every 14 days. Use to read blood sugars per 's instructions. 2 each 5    Cyanocobalamin (B-12) 1000 MCG CAPS Take 1 mg by mouth daily      famotidine (PEPCID) 40 MG tablet TAKE 1 TABLET DAILY 90 tablet 3    furosemide (LASIX) 40 MG tablet TAKE 1 TABLET DAILY 90 tablet 0    glimepiride (AMARYL) 1 MG tablet TAKE 2 TABLETS EVERY       MORNING BEFORE BREAKFAST 180 tablet 1    hydrocortisone 2.5 % cream APPLY TOPICALLY TWO TIMES ADAY (Patient taking differently: 2 times daily as needed) 28.35 g 11    isosorbide mononitrate (IMDUR) 30 MG 24 hr tablet Take 0.5 tablets (15 mg) by mouth daily 45 tablet 4    JANUVIA 50 MG tablet TAKE 1 TABLET DAILY 90 tablet 1    lisinopril (ZESTRIL)  20 MG tablet Take 20 mg by mouth daily      metoprolol tartrate (LOPRESSOR) 25 MG tablet Take 25 mg by mouth daily      nitroGLYcerin (NITROSTAT) 0.6 MG sublingual tablet DISSOLVE 1 TABLET UNDER THETONGUE AS NEEDED. 100 tablet 0    omega-3 acid ethyl esters (LOVAZA) 1 g capsule Take 1 capsule by mouth 2 times daily 180 capsule 3    Pyridoxine HCl (B-6) 100 MG TABS Take 100 mg by mouth daily      STUDY DRUG FROM OUTSIDE FACILITY Takes Ocedurenone ([id number]), non-steroidal mineralocorticoid receptor antagonist, 1 tablet by mouth once daily (unknown strength). Phase 3 clinical trial      VITAMIN E NATURAL PO Take 400 Units by mouth daily      ZOCOR 40 MG tablet PLEASE UPDATE FOR ANY MEDICATION CHANGE (Patient taking differently: Take 40 mg by mouth) 90 tablet 3       ALLERGIES     Allergies   Allergen Reactions    No Known Drug Allergy        PAST MEDICAL HISTORY:  Past Medical History:   Diagnosis Date    Chronic kidney disease     Coronary atherosclerosis of unspecified type of vessel, native or graft 10/03    at [facility]:  had 4 stents done following an MI    DDD (degenerative disc disease), lumbosacral 5/21/2021    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    Heart attack (H)     Hernia, abdominal     Hyperlipidaemia     Mumps     Obese     Other and unspecified hyperlipidemia     Other premature beats     Palpitations     Phlebitis and thrombophlebitis of other deep vessels of lower extremities 2000    has had 2-3 episodes    Stented coronary artery      4 stents    Syncope     Syncope     Thrombosis of leg     Type II or unspecified type diabetes mellitus without mention of complication, not stated as uncontrolled     Unspecified essential hypertension        PAST SURGICAL HISTORY:  Past Surgical History:   Procedure Laterality Date    ABDOMEN SURGERY      Hernia naval approx 25 years ago    BIOPSY  8/2016    CARDIAC SURGERY      COLONOSCOPY      CORONARY ANGIOGRAPHY ADULT ORDER  8/28/2000    75% distal LAD  stenosis, 80% third diagonal stenosis, 75-80% mid ramus stenosis, ostial 60% stenosis in circumflex w/90% stenosis in distal circumflex    CORONARY ANGIOGRAPHY ADULT ORDER      4 stents placed    CV CORONARY ANGIOGRAM N/A 2023    Procedure: Coronary Angiogram;  Surgeon: Rafita Vela MD;  Location:  HEART CARDIAC CATH LAB    CV LEFT HEART CATH N/A 2023    Procedure: Left Heart Catheterization;  Surgeon: Rafita Vela MD;  Location:  HEART CARDIAC CATH LAB    CV PCI N/A 2023    Procedure: Percutaneous Coronary Intervention;  Surgeon: Rafita Vela MD;  Location:  HEART CARDIAC CATH LAB    EP ABLATION / EP STUDIES  3/25/2014    HEART CATH, ANGIOPLASTY      HYDROCELECTOMY SCROTAL Right 10/6/2016    Procedure: HYDROCELECTOMY SCROTAL;  Surgeon: Jordan Chandra MD;  Location: [facility]    ORTHOPEDIC SURGERY      Meniscus Orthoscopic surgery left knee.    TESTICLE SURGERY      ZZC NONSPECIFIC PROCEDURE      colonoscopy approx  done elsewhere       FAMILY HISTORY:  Family History   Problem Relation Age of Onset    Musculoskeletal Disorder Mother         spinal stenosis    Cancer Mother         cancer kidney age 85    Hypertension Mother         Dead    Diabetes Father         Dead    Diabetes Brother         Type 1    Heart Disease Brother        SOCIAL HISTORY:  Social History     Socioeconomic History    Marital status:      Spouse name: Bethany    Number of children: 3    Years of education: None    Highest education level: None   Occupational History    Occupation: Computers/     Employer: [employer]      Comment: [employer]   Tobacco Use    Smoking status: Former     Packs/day: 3.00     Years: 6.00     Pack years: 18.00     Types: Cigarettes, Cigars, Pipe     Start date: 1961     Quit date: 1967     Years since quittin.4    Smokeless tobacco: Never    Tobacco comments:     quit    Vaping Use    Vaping status: Never Used      "Passive vaping exposure: Yes   Substance and Sexual Activity    Alcohol use: Yes     Comment: 1 beer a week    Drug use: No    Sexual activity: Not Currently   Other Topics Concern    Parent/sibling w/ CABG, MI or angioplasty before 65F 55M? No       Review of Systems:  Skin:  not assessed       Eyes:  not assessed      ENT:  not assessed      Respiratory:  Positive for sleep apnea     Cardiovascular:  Negative      Gastroenterology: not assessed      Genitourinary:  not assessed      Musculoskeletal:  not assessed      Neurologic:  not assessed      Psychiatric:  not assessed      Heme/Lymph/Imm:  not assessed      Endocrine:  not assessed        Physical Exam:  Vitals: /55 (BP Location: Right arm, Patient Position: Sitting, Cuff Size: Adult Regular)   Pulse (!) 49   Ht 1.765 m (5' 9.5\")   Wt 94.9 kg (209 lb 4.8 oz)   SpO2 98%   BMI 30.47 kg/m      Constitutional:  cooperative overweight      Skin:  warm and dry to the touch          Head:  normocephalic        Eyes:  pupils equal and round        Lymph:      ENT:  no pallor or cyanosis        Neck:  no carotid bruit;JVP normal        Respiratory:  normal respiratory excursion;clear to auscultation         Cardiac: regular rhythm;no murmurs, gallops or rubs detected                pulses full and equal                                        GI:           Extremities and Muscular Skeletal:      bilateral LE edema;trace;L greater than R     Wears compression stockings    Neurological:  no gross motor deficits        Psych:  affect appropriate, oriented to time, person and place          CC  No referring provider defined for this encounter.      Thank you for allowing me to participate in the care of your patient.      Sincerely,     HÉCTOR Carrillo [facility]  "

## 2023-05-16 NOTE — PROGRESS NOTES
HISTORY OF PRESENT ILLNESS:    This is a 77 year old male who follows with Dr Dixon at [facility]  His past medical history includes:  Coronary artery disease, bradycardia, hypertension, hyperlipidemia, stage III CKD, type II diabetes, and peripheral edema    Mr Ricks underwent ramus intermedius branch stenting (2003) and then staged stenting x2 to his LAD.  At that time, he was noted to have an occluded distal CFX.    He has seen our EP physicians some over the years for SVT and nocturnal bradycardia which did not require any treatment for.     Recently, he complained of some exertional shortness of breath and chest tightness which has progressively worsened over the past 4 months.  A stress ECHO (4/2023) showed an apical distal LAD wall motion abnormality at rest which worsened with exercise.  EKG showed underlying rBBB and left anterior fascicular block.  He was started on Imdur and coronary angiography was arranged.    His cath (4/14/23) showed and occluded ramus branch, 70% ostial CFX disease, 85% disease in distal CFX, 100% occlusion of his mid-LAD (at previous stented site) with 60% ostial-prox LAD in-stent stenosis.  He was noted to have poor targets and medical therapy was recommended    Our visit today is for further review.    Mr Ricks comes in with his wife today  Overall, he has not had any further exertional chest pain or shortness of breath  He is now walking more frequently and further without significant limitations  He denies any lightheadedness  He states that his heart rate is usually around 50 bpm and climbs to  bpm with his walk  He has noted some low heart rates on his iWatch during the nighttime  He denies palpitations, orthopnea, or peripheral edema  He drinks > 32 oz of water day.  He has not been checking his blood sugars at home  His wife thinks that they could improve their diet to help with his blood sugars         VITAL SIGNS:  BP: 120/55  Pulse:  49  Weight:  209  lbs  (stable)  BMI: 30    IMPRESSION AND PLAN:    Coronary Artery Disease:   -s/p stenting x2 to Ramus and x2 to LAD (2003)  -cath (4/7/23) showed mostly diffuse distal and branch vessel disease with in-stent stenosis not easily fixed by stenting.  Medical management advised  -now on Imdur 15 mg which has resolved his symptoms.  -he will call with recurrence of chest pain at which point we would increase his Imdur  Follow up as planned in a couple of months with Dr Dixon  -on Plavix + ASA    Hypertension:  -on Lasix 40 mg, Imdur 15 mg, Lisinopril 20 mg, Metoprolol 25 mg/day  -BP controlled    Bradycardia:  -asymptomatic   -some longstanding low heart rates during sleep, not new    Hyperlipidemia:  -on Simvastatin 40 mg  -LDL  26    Stage III-IV CKD:  -follows with Dr Alvarez  -creatinine 2.28 post cath, reviewed by Dr Alvarez who did not recommend any changes    Type II Diabetes:  -Hgb A1C  7.9    Untreated Sleep Apnea  -did not like CPAP when he tried it 20 yrs ago    The total time for the visit today was 30 minutes which includes patient visit, reviewing of records, discussion, and placing of orders of the outpatient coordination of cardiovascular care as described.  The level of medical decision making during this visit was of moderate complexity.  Thank you for allowing me to participate in their care.    No orders of the defined types were placed in this encounter.      No orders of the defined types were placed in this encounter.      There are no discontinued medications.      Encounter Diagnoses   Name Primary?     Essential hypertension Yes     Atherosclerosis of native coronary artery of native heart with stable angina pectoris (H)      Bradycardia        CURRENT MEDICATIONS:  Current Outpatient Medications   Medication Sig Dispense Refill     acetic acid-hydrocortisone (VOSOL-HC) 1-2 % otic solution INSTILL 1 DROP INTO BOTH   EARS TWO TIMES A DAY 10 mL 0     Ascorbic Acid (VITAMIN C PO) Take 1,000 mg by  mouth 2 times daily       aspirin (ASA) 81 MG tablet Take 81 mg by mouth daily       blood glucose (NO BRAND SPECIFIED) lancing device Device to be used with lancets. 1 each 0     blood glucose (NO BRAND SPECIFIED) test strip Use to test blood sugar 1 times daily or as directed. 100 strip 1     blood glucose monitoring (ACCU-CHEK SHELLEY PLUS) meter device kit Use to test blood sugar 1 times daily or as directed. 1 kit 0     calcitRIOL (ROCALTROL) 0.25 MCG capsule Take 0.25 mcg by mouth daily        chlorhexidine (PERIDEX) 0.12 % solution USE 1/2 OZ TO SWISH FOR 30 SECONDS ONCE D  3     ciclopirox (LOPROX) 0.77 % cream Apply topically daily Apply to affected nail daily at night 30 g 6     clopidogrel (PLAVIX) 75 MG tablet Take 1 tablet (75 mg) by mouth daily 90 tablet 3     Continuous Blood Gluc Sensor (FREESTYLE SHARRI 14 DAY SENSOR) MISC 1 each every 14 days Use 1 Sensor every 14 days. Use to read blood sugars per 's instructions. 2 each 5     Continuous Blood Gluc Sensor (FREESTYLE SHARRI 2 SENSOR) MISC 1 each every 14 days Use 1 sensor every 14 days. Use to read blood sugars per 's instructions. 2 each 5     Cyanocobalamin (B-12) 1000 MCG CAPS Take 1 mg by mouth daily       famotidine (PEPCID) 40 MG tablet TAKE 1 TABLET DAILY 90 tablet 3     furosemide (LASIX) 40 MG tablet TAKE 1 TABLET DAILY 90 tablet 0     glimepiride (AMARYL) 1 MG tablet TAKE 2 TABLETS EVERY       MORNING BEFORE BREAKFAST 180 tablet 1     hydrocortisone 2.5 % cream APPLY TOPICALLY TWO TIMES ADAY (Patient taking differently: 2 times daily as needed) 28.35 g 11     isosorbide mononitrate (IMDUR) 30 MG 24 hr tablet Take 0.5 tablets (15 mg) by mouth daily 45 tablet 4     JANUVIA 50 MG tablet TAKE 1 TABLET DAILY 90 tablet 1     lisinopril (ZESTRIL) 20 MG tablet Take 20 mg by mouth daily       metoprolol tartrate (LOPRESSOR) 25 MG tablet Take 25 mg by mouth daily       nitroGLYcerin (NITROSTAT) 0.6 MG sublingual tablet DISSOLVE  1 TABLET UNDER THETONGUE AS NEEDED. 100 tablet 0     omega-3 acid ethyl esters (LOVAZA) 1 g capsule Take 1 capsule by mouth 2 times daily 180 capsule 3     Pyridoxine HCl (B-6) 100 MG TABS Take 100 mg by mouth daily       STUDY DRUG FROM OUTSIDE FACILITY Takes Ocedurenone (KBP-5074), non-steroidal mineralocorticoid receptor antagonist, 1 tablet by mouth once daily (unknown strength). Phase 3 clinical trial       VITAMIN E NATURAL PO Take 400 Units by mouth daily       ZOCOR 40 MG tablet PLEASE UPDATE FOR ANY MEDICATION CHANGE (Patient taking differently: Take 40 mg by mouth) 90 tablet 3       ALLERGIES     Allergies   Allergen Reactions     No Known Drug Allergy        PAST MEDICAL HISTORY:  Past Medical History:   Diagnosis Date     Chronic kidney disease      Coronary atherosclerosis of unspecified type of vessel, native or graft 10/03    at 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     Heart attack (H)      Hernia, abdominal      Hyperlipidaemia      Mumps      Obese      Other and unspecified hyperlipidemia      Other premature beats      Palpitations      Phlebitis and thrombophlebitis of other deep vessels of lower extremities 2000    has had 2-3 episodes     Stented coronary artery      4 stents     Syncope      Syncope      Thrombosis of leg      Type II or unspecified type diabetes mellitus without mention of complication, not stated as uncontrolled      Unspecified essential hypertension        PAST SURGICAL HISTORY:  Past Surgical History:   Procedure Laterality Date     ABDOMEN SURGERY      Hernia naval approx 25 years ago     BIOPSY  8/2016     CARDIAC SURGERY       COLONOSCOPY       CORONARY ANGIOGRAPHY ADULT ORDER  8/28/2000    75% distal LAD stenosis, 80% third diagonal stenosis, 75-80% mid ramus stenosis, ostial 60% stenosis in circumflex w/90% stenosis in distal circumflex     CORONARY  ANGIOGRAPHY ADULT ORDER      4 stents placed     CV CORONARY ANGIOGRAM N/A 2023    Procedure: Coronary Angiogram;  Surgeon: Rafita Vela MD;  Location:  HEART CARDIAC CATH LAB     CV LEFT HEART CATH N/A 2023    Procedure: Left Heart Catheterization;  Surgeon: Rafita Vela MD;  Location:  HEART CARDIAC CATH LAB     CV PCI N/A 2023    Procedure: Percutaneous Coronary Intervention;  Surgeon: Rafita Vela MD;  Location:  HEART CARDIAC CATH LAB     EP ABLATION / EP STUDIES  3/25/2014     HEART CATH, ANGIOPLASTY       HYDROCELECTOMY SCROTAL Right 10/6/2016    Procedure: HYDROCELECTOMY SCROTAL;  Surgeon: Jordan Chandra MD;  Location: [facility]     ORTHOPEDIC SURGERY      Meniscus Orthoscopic surgery left knee.     TESTICLE SURGERY       ZZC NONSPECIFIC PROCEDURE      colonoscopy approx  done elsewhere       FAMILY HISTORY:  Family History   Problem Relation Age of Onset     Musculoskeletal Disorder Mother         spinal stenosis     Cancer Mother         cancer kidney age 85     Hypertension Mother         Dead     Diabetes Father         Dead     Diabetes Brother         Type 1     Heart Disease Brother        SOCIAL HISTORY:  Social History     Socioeconomic History     Marital status:      Spouse name: Bethany     Number of children: 3     Years of education: None     Highest education level: None   Occupational History     Occupation: Computers/     Employer: [employer]      Comment: [employer]   Tobacco Use     Smoking status: Former     Packs/day: 3.00     Years: 6.00     Pack years: 18.00     Types: Cigarettes, Cigars, Pipe     Start date: 1961     Quit date: 1967     Years since quittin.4     Smokeless tobacco: Never     Tobacco comments:     quit    Vaping Use     Vaping status: Never Used     Passive vaping exposure: Yes   Substance and Sexual Activity     Alcohol use: Yes     Comment: 1 beer a week     Drug use: No  "    Sexual activity: Not Currently   Other Topics Concern     Parent/sibling w/ CABG, MI or angioplasty before 65F 55M? No       Review of Systems:  Skin:  not assessed       Eyes:  not assessed      ENT:  not assessed      Respiratory:  Positive for sleep apnea     Cardiovascular:  Negative      Gastroenterology: not assessed      Genitourinary:  not assessed      Musculoskeletal:  not assessed      Neurologic:  not assessed      Psychiatric:  not assessed      Heme/Lymph/Imm:  not assessed      Endocrine:  not assessed        Physical Exam:  Vitals: /55 (BP Location: Right arm, Patient Position: Sitting, Cuff Size: Adult Regular)   Pulse (!) 49   Ht 1.765 m (5' 9.5\")   Wt 94.9 kg (209 lb 4.8 oz)   SpO2 98%   BMI 30.47 kg/m      Constitutional:  cooperative overweight      Skin:  warm and dry to the touch          Head:  normocephalic        Eyes:  pupils equal and round        Lymph:      ENT:  no pallor or cyanosis        Neck:  no carotid bruit;JVP normal        Respiratory:  normal respiratory excursion;clear to auscultation         Cardiac: regular rhythm;no murmurs, gallops or rubs detected                pulses full and equal                                        GI:           Extremities and Muscular Skeletal:      bilateral LE edema;trace;L greater than R     Wears compression stockings    Neurological:  no gross motor deficits        Psych:  affect appropriate, oriented to time, person and place          CC  No referring provider defined for this encounter.                  "

## 2023-06-10 ENCOUNTER — HEALTH MAINTENANCE LETTER (OUTPATIENT)
Age: 78
End: 2023-06-10

## 2023-06-22 ENCOUNTER — TELEPHONE (OUTPATIENT)
Dept: CARDIOLOGY | Facility: CLINIC | Age: 78
End: 2023-06-22
Payer: MEDICARE

## 2023-06-22 DIAGNOSIS — I45.2 RBBB WITH LEFT ANTERIOR FASCICULAR BLOCK: Primary | ICD-10-CM

## 2023-06-22 DIAGNOSIS — I25.118 ATHEROSCLEROSIS OF NATIVE CORONARY ARTERY OF NATIVE HEART WITH STABLE ANGINA PECTORIS (H): ICD-10-CM

## 2023-06-22 DIAGNOSIS — R07.9 CHEST PAIN: ICD-10-CM

## 2023-06-22 RX ORDER — ISOSORBIDE MONONITRATE 30 MG/1
30 TABLET, EXTENDED RELEASE ORAL DAILY
Qty: 90 TABLET | Refills: 4 | Status: SHIPPED | OUTPATIENT
Start: 2023-06-22 | End: 2023-06-22

## 2023-06-22 RX ORDER — ISOSORBIDE MONONITRATE 30 MG/1
30 TABLET, EXTENDED RELEASE ORAL DAILY
Qty: 30 TABLET | Refills: 0 | Status: SHIPPED | OUTPATIENT
Start: 2023-06-22 | End: 2023-08-01

## 2023-06-22 NOTE — TELEPHONE ENCOUNTER
Spoke to patient, reviewed recommendations to increase imdur to 30mg daily and get a 3-day monitor. He was agreeable to plan. New Rx sent to pharmacy and scheduling messaged to coordinate monitor. Will keep labs/visit with Dr Dixon for 7/12 as scheduled.     He wanted to note that his chest discomfort is not consistent, can occur walking short distances and then not occur when walking long distances. He notes it is not severe/debilitating, he will just stop what he's doing when it happens and then let it pass before resuming activity. He also wanted to note he is part of a research study for BP, took a medication for a month (does not know the name) and then now is in the experimental portion where he doesn't know if he has the medication still or is getting the placebo.

## 2023-06-22 NOTE — TELEPHONE ENCOUNTER
Message left to return call regarding increasing Imdur. And 3 day ZIO Monitor - orders need to be placed.     Dr. Dixon reviewed  Message  from From: Jason Alvarez MD  Sent: 6/20/2023   8:08 PM CDT  To: HÉCTOR Canchola CNP; Jordan Dixon MD    Reply -   Subject: Worsenign bradycardia      Dr. Dixon's reply 6-21-23 - Jordan Dixon MD Phan, Oliver Tan, MD; Tete Mcgiure APRN CNP; SCOTTY Kruse [facility] Heart Team 2  I will increase his Imdur to a whole pill a day and I will place a 3-day Zio Patch.  Thanks     Last June S. JUANA OV 5-16-23 -   His cath (4/14/23) showed and occluded ramus branch, 70% ostial CFX disease, 85% disease in distal CFX, 100% occlusion of his mid-LAD (at previous stented site) with 60% ostial-prox LAD in-stent stenosis.  He was noted to have poor targets and medical therapy was recommended  IMPRESSION AND PLAN:  Coronary Artery Disease:   -s/p stenting x2 to Ramus and x2 to LAD (2003)  -cath (4/7/23) showed mostly diffuse distal and branch vessel disease with in-stent stenosis not easily fixed by stenting.  Medical management advised  -now on Imdur 15 mg which has resolved his symptoms.  -he will call with recurrence of chest pain at which point we would increase his Imdur  Follow up as planned in a couple of months with Dr Dixon  -on Plavix + ASA    Last Dr. Dixon OV 4-7-23 -   Over the years he is seen Dr. Rivas and Dr. Borrego.  Once for SVT for which he had a benign EP evaluation and secondly for nocturnal bradycardia which required no treatment.  - Assessment and plan.  Jeff appears to have chronic stable exertional angina going back 3 to 4 months.  It has slowly progressed such that is now occurring inside in addition to outside.  There does not appear to be a significant amount of ischemia on his stress test.  We discussed options including medical management and aggressive risk factor modification versus going to the Cath Lab to look.  I cautioned him that he does have  a significant amount of renal dysfunction and that it is possible he might need to be done in a staged fashion.  At this time I am going to introduce Imdur 15 mg daily.  We will set up a coronary angiogram to see what he has.  I discussed risk, benefits and alternatives

## 2023-06-22 NOTE — TELEPHONE ENCOUNTER
"----- Message from Jordan Dixon MD sent at 6/21/2023  6:21 PM CDT -----  Regarding: RE: Worsenign bradycardia  I will increase his Imdur to a whole pill a day and I will place a 3-day Zio Patch.  Thanks  ----- Message -----  From: Jason Alvarez MD  Sent: 6/20/2023   8:08 PM CDT  To: HÉCTOR Canchola CNP; Jordan Dixon MD  Subject: Worsenign bradycardia                              Dr. Dixon and Ms Maynor,    I saw Jeff today for CKD IIIB.   He continues to have chest discomfort with walking.He says  it is similar to when he came in and had the [facility] in  April.   He has been feeling \"off\" and a bit dizzy last couple days.  BP in clinic is high with SBP ~ 170 and recheck ~ 150.  His has chronic bradycardia, but in clinic and at home, his heart has been lower ~ high 30s while awake lately.    In clinic  today, his heart rare was 37, then it went back up to 70s, then down again to the high 30s and regular.   He was mostly asymptomatic when his heart rate was in the 30s    I told him I will send a note to you to review.     Thanks    Jason          "

## 2023-06-22 NOTE — TELEPHONE ENCOUNTER
Patient called back, he is not sure how soon the mail order pharmacy will fill his script and asks for a 30 day bridging script to be sent to [facility] for his imdur.   Rx escripted.

## 2023-06-26 ENCOUNTER — PATIENT OUTREACH (OUTPATIENT)
Dept: CARE COORDINATION | Facility: CLINIC | Age: 78
End: 2023-06-26
Payer: MEDICARE

## 2023-06-30 ENCOUNTER — HOSPITAL ENCOUNTER (OUTPATIENT)
Dept: CARDIOLOGY | Facility: CLINIC | Age: 78
Discharge: HOME | End: 2023-06-30
Attending: INTERNAL MEDICINE | Admitting: INTERNAL MEDICINE
Payer: MEDICARE

## 2023-06-30 DIAGNOSIS — I45.2 RBBB WITH LEFT ANTERIOR FASCICULAR BLOCK: ICD-10-CM

## 2023-06-30 PROCEDURE — 93244 EXT ECG>48HR<7D REV&INTERPJ: CPT | Performed by: INTERNAL MEDICINE

## 2023-06-30 PROCEDURE — 93242 EXT ECG>48HR<7D RECORDING: CPT

## 2023-07-10 ENCOUNTER — PATIENT OUTREACH (OUTPATIENT)
Dept: CARE COORDINATION | Facility: CLINIC | Age: 78
End: 2023-07-10
Payer: MEDICARE

## 2023-07-12 ENCOUNTER — OFFICE VISIT (OUTPATIENT)
Dept: CARDIOLOGY | Facility: CLINIC | Age: 78
End: 2023-07-12
Attending: INTERNAL MEDICINE
Payer: MEDICARE

## 2023-07-12 VITALS
HEIGHT: 70 IN | BODY MASS INDEX: 29.68 KG/M2 | OXYGEN SATURATION: 99 % | SYSTOLIC BLOOD PRESSURE: 131 MMHG | HEART RATE: 44 BPM | DIASTOLIC BLOOD PRESSURE: 59 MMHG | WEIGHT: 207.3 LBS

## 2023-07-12 DIAGNOSIS — E11.21 TYPE 2 DIABETES MELLITUS WITH DIABETIC NEPHROPATHY, WITHOUT LONG-TERM CURRENT USE OF INSULIN (H): ICD-10-CM

## 2023-07-12 DIAGNOSIS — I25.118 ATHEROSCLEROSIS OF NATIVE CORONARY ARTERY OF NATIVE HEART WITH STABLE ANGINA PECTORIS (H): Primary | ICD-10-CM

## 2023-07-12 DIAGNOSIS — N18.32 STAGE 3B CHRONIC KIDNEY DISEASE (H): ICD-10-CM

## 2023-07-12 DIAGNOSIS — I45.2 RBBB WITH LEFT ANTERIOR FASCICULAR BLOCK: ICD-10-CM

## 2023-07-12 DIAGNOSIS — I10 ESSENTIAL HYPERTENSION: ICD-10-CM

## 2023-07-12 PROCEDURE — 99215 OFFICE O/P EST HI 40 MIN: CPT | Performed by: INTERNAL MEDICINE

## 2023-07-12 RX ORDER — AMLODIPINE BESYLATE 2.5 MG/1
2.5 TABLET ORAL DAILY
Qty: 90 TABLET | Refills: 4 | Status: SHIPPED | OUTPATIENT
Start: 2023-07-12 | End: 2024-02-07

## 2023-07-12 NOTE — LETTER
7/12/2023    Guillermo Deleon MD  [address] [facility] [address]    RE: Jeff Ricks       Dear Colleague,     I had the pleasure of seeing Jeff Ricks in the [facility].  HPI and Plan:     Mr. Ricks is a very nice 77-year-old gentleman who I performed a coronary angiogram on him in 2000.  His past medical history is significant for hypertension, bradycardia, diabetes mellitus, coronary artery disease, peripheral edema, stage IIIb renal failure, diabetes mellitus.     Coronary interventions dates back to 2003 when at [facility] for which he had 2 stents placed in his ramus intermedius branch and in a staged fashion came back for 2 stents in his left anterior descending artery.  Distal circumflex was described as subtotally occluded and his right coronary artery was described as diffusely diseased with a distal occlusion.     Over the years he was seen by Dr. Rivas and Dr. Borrego.  Once for SVT for which he had a benign EP evaluation and secondly for nocturnal bradycardia which required no treatment.     Recurrent torsional angina and abnormal stress test resulted in repeat coronary angiography in April demonstrating a distal occlusion of his ramus intermedius, 70% ostial circumflex stenosis 85% stenosis in his distal circumflex 100% occlusion of his mid LAD and a 60% stenosis in his proximal LAD.  He had poor distal targets and recommendation was to treat him medically.  We initiated Imdur 15 mg daily.  I did review his angiogram again today.    I received a call from nephrologist who reported worsening dyspnea on exertion and slow heart rates.  I increased his Imdur to 30 mg daily and did a 3-day Zio patch.  He now returns for follow-up.    He states he tries to walk regularly and intermittently but more often than not will have some shortness of breath and fatigue.  He is not having jailene chest pain.  He has no lightheadedness and dizziness.  No palpitations.  He states it waxes  and wanes..  He does not think the increased Imdur made much difference.     Assessment and plan.    I think Jeff is having anginal symptoms.  He also notes his blood pressure can be significantly high at times and I wonder whether this may be a contributor.  To this end I will add amlodipine 2.5 mg daily to his regiment.  I will see him back in a month and we will see how this helps.     Blood pressure is high today.    As stated I am going to increase his amlodipine.    His 3 day Zio patch showed an average heart rate of 50 ranging from .  We have decreased his metoprolol tart Woodward to 12.5 mg twice daily.  Sometimes the amlodipine may cause a reflex tachycardia and this may help with that.  If when he is returns he is still having exercise intolerance it is possible it may be chronotropic incompetence and not exertional angina and I will consider stopping metoprolol altogether.    Fasting lipid profile on 40 mg of simvastatin in the evening is excellent with total cholesterol of 84, HDL 47, LDL of 26 and triglycerides of 56.  We will have to watch for side effects as there can be an amlodipine simvastatin interaction.  We may be best to change his simvastatin to a different statin in the future.     Creatinine is 2.28 with a GFR of 28     Further evaluation treatment will depend upon the above results.     Thank you for allow me to participate in this patient's care.  Sincerely,                                Jordan Dixon MD [facility]        Today's clinic visit entailed:  Review of the result(s) of each unique test - Zio Patch, lab work  Ordering of each unique test  Prescription drug management  42 minutes spent by me on the date of the encounter doing chart review, history and exam, documentation and further activities per the note  Provider  Link to [facility] Help Grid     The level of medical decision making during this visit was of high complexity.      No orders of the defined types were placed in this  encounter.      No orders of the defined types were placed in this encounter.      There are no discontinued medications.      Encounter Diagnosis   Name Primary?    Atherosclerosis of native coronary artery of native heart with stable angina pectoris (H)        CURRENT MEDICATIONS:  Current Outpatient Medications   Medication Sig Dispense Refill    acetic acid-hydrocortisone (VOSOL-HC) 1-2 % otic solution INSTILL 1 DROP INTO BOTH   EARS TWO TIMES A DAY 10 mL 0    Ascorbic Acid (VITAMIN C PO) Take 1,000 mg by mouth 2 times daily      aspirin (ASA) 81 MG tablet Take 81 mg by mouth daily      blood glucose (NO BRAND SPECIFIED) lancing device Device to be used with lancets. 1 each 0    blood glucose (NO BRAND SPECIFIED) test strip Use to test blood sugar 1 times daily or as directed. 100 strip 1    blood glucose monitoring (ACCU-CHEK SHELLEY PLUS) meter device kit Use to test blood sugar 1 times daily or as directed. 1 kit 0    calcitRIOL (ROCALTROL) 0.25 MCG capsule Take 0.25 mcg by mouth daily       chlorhexidine (PERIDEX) 0.12 % solution USE 1/2 OZ TO SWISH FOR 30 SECONDS ONCE D  3    ciclopirox (LOPROX) 0.77 % cream Apply topically daily Apply to affected nail daily at night 30 g 6    clopidogrel (PLAVIX) 75 MG tablet Take 1 tablet (75 mg) by mouth daily 90 tablet 3    Continuous Blood Gluc Sensor (FREESTYLE SHARRI 14 DAY SENSOR) [facility] 1 each every 14 days Use 1 Sensor every 14 days. Use to read blood sugars per 's instructions. 2 each 5    Continuous Blood Gluc Sensor (FREESTYLE SHARRI 2 SENSOR) [facility] 1 each every 14 days Use 1 sensor every 14 days. Use to read blood sugars per 's instructions. 2 each 5    Cyanocobalamin (B-12) 1000 MCG CAPS Take 1 mg by mouth daily      famotidine (PEPCID) 40 MG tablet TAKE 1 TABLET DAILY 90 tablet 3    furosemide (LASIX) 40 MG tablet TAKE 1 TABLET DAILY 90 tablet 0    glimepiride (AMARYL) 1 MG tablet TAKE 2 TABLETS EVERY       MORNING BEFORE BREAKFAST 180  tablet 1    hydrocortisone 2.5 % cream APPLY TOPICALLY TWO TIMES ADAY (Patient taking differently: 2 times daily as needed) 28.35 g 11    isosorbide mononitrate (IMDUR) 30 MG 24 hr tablet Take 1 tablet (30 mg) by mouth daily 30 tablet 0    JANUVIA 50 MG tablet TAKE 1 TABLET DAILY 90 tablet 1    lisinopril (ZESTRIL) 20 MG tablet Take 20 mg by mouth daily      metoprolol tartrate (LOPRESSOR) 25 MG tablet Take 25 mg by mouth daily      nitroGLYcerin (NITROSTAT) 0.6 MG sublingual tablet DISSOLVE 1 TABLET UNDER THETONGUE AS NEEDED. 100 tablet 0    omega-3 acid ethyl esters (LOVAZA) 1 g capsule Take 1 capsule by mouth 2 times daily 180 capsule 3    Pyridoxine HCl (B-6) 100 MG TABS Take 100 mg by mouth daily      STUDY DRUG FROM OUTSIDE FACILITY Takes Ocedurenone ([id number]), non-steroidal mineralocorticoid receptor antagonist, 1 tablet by mouth once daily (unknown strength). 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    Heart attack (H)     Hernia, abdominal     Hyperlipidaemia     Mumps     Obese     Other and unspecified hyperlipidemia     Other premature beats     Palpitations     Phlebitis and thrombophlebitis of other deep vessels of lower extremities 2000    has had 2-3 episodes    Stented coronary artery      4 stents    Syncope     Syncope     Thrombosis of leg     Type II or unspecified type diabetes mellitus without mention of complication, not stated  as uncontrolled     Unspecified essential hypertension        PAST SURGICAL HISTORY:  Past Surgical History:   Procedure Laterality Date    ABDOMEN SURGERY      Hernia naval approx 25 years ago    BIOPSY  8/2016    CARDIAC SURGERY      COLONOSCOPY      CORONARY ANGIOGRAPHY ADULT ORDER  8/28/2000    75% distal LAD stenosis, 80% third diagonal stenosis, 75-80% mid ramus stenosis, ostial 60% stenosis in circumflex w/90% stenosis in distal circumflex    CORONARY ANGIOGRAPHY ADULT ORDER  2003    4 stents placed    CV CORONARY ANGIOGRAM N/A 4/14/2023    Procedure: Coronary Angiogram;  Surgeon: Rafita Vela MD;  Location:  HEART CARDIAC CATH LAB    CV LEFT HEART CATH N/A 4/14/2023    Procedure: Left Heart Catheterization;  Surgeon: Rafita Vela MD;  Location:  HEART CARDIAC CATH LAB    CV PCI N/A 4/14/2023    Procedure: Percutaneous Coronary Intervention;  Surgeon: Rafita Vela MD;  Location:  HEART CARDIAC CATH LAB    EP ABLATION / EP STUDIES  3/25/2014    HEART CATH, ANGIOPLASTY      HYDROCELECTOMY SCROTAL Right 10/6/2016    Procedure: HYDROCELECTOMY SCROTAL;  Surgeon: Jordan Chandra MD;  Location: [facility]    ORTHOPEDIC SURGERY  1990    Meniscus Orthoscopic surgery left knee.    TESTICLE SURGERY      ZZC NONSPECIFIC PROCEDURE      colonoscopy approx 1999 done elsewhere       FAMILY HISTORY:  Family History   Problem Relation Age of Onset    Musculoskeletal Disorder Mother         spinal stenosis    Cancer Mother         cancer kidney age 85    Hypertension Mother         Dead    Diabetes Father         Dead    Diabetes Brother         Type 1    Heart Disease Brother        SOCIAL HISTORY:  Social History     Socioeconomic History    Marital status:      Spouse name: Bethany    Number of children: 3    Years of education: None    Highest education level: None   Occupational History    Occupation: Computers/     Employer: [employer]      Comment: [employer]  "Use    Smoking status: Former     Packs/day: 3.00     Years: 6.00     Pack years: 18.00     Types: Cigarettes, Cigars, Pipe     Start date: 1961     Quit date: 1967     Years since quittin.5    Smokeless tobacco: Never    Tobacco comments:     quit    Vaping Use    Vaping Use: Never used   Substance and Sexual Activity    Alcohol use: Yes     Comment: 1 beer a week    Drug use: No    Sexual activity: Not Currently   Other Topics Concern    Parent/sibling w/ CABG, MI or angioplasty before 65F 55M? No       Review of Systems:  Skin:  not assessed       Eyes:  Positive for glasses;contacts    ENT:  not assessed      Respiratory:  Positive for sleep apnea     Cardiovascular:    chest pain;Positive for chest \"tightness\"  Gastroenterology: not assessed      Genitourinary:  not assessed      Musculoskeletal:  not assessed      Neurologic:  not assessed      Psychiatric:  not assessed      Heme/Lymph/Imm:  not assessed      Endocrine:  not assessed        Physical Exam:  Vitals: /59 (BP Location: Right arm, Patient Position: Sitting, Cuff Size: Adult Large)   Pulse (!) 44   Ht 1.765 m (5' 9.5\")   Wt 94 kg (207 lb 4.8 oz)   SpO2 99%   BMI 30.17 kg/m      Constitutional:           Skin:             Head:           Eyes:           Lymph:      ENT:           Neck:           Respiratory:            Cardiac:                                                           GI:           Extremities and Muscular Skeletal:                 Neurological:           Psych:           CC  Jordan Dixon MD  [id number]  [place] [address]    Thank you for allowing me to participate in the care of your patient.      Sincerely,   Jordan Dixon MD   [facility]  "

## 2023-07-12 NOTE — PROGRESS NOTES
HPI and Plan:     Mr. Ricks is a very nice 77-year-old gentleman who I performed a coronary angiogram on him in 2000.  His past medical history is significant for hypertension, bradycardia, diabetes mellitus, coronary artery disease, peripheral edema, stage IIIb renal failure, diabetes mellitus.     Coronary interventions dates back to 2003 when at [facility] for which he had 2 stents placed in his ramus intermedius branch and in a staged fashion came back for 2 stents in his left anterior descending artery.  Distal circumflex was described as subtotally occluded and his right coronary artery was described as diffusely diseased with a distal occlusion.     Over the years he was seen by Dr. Rivas and Dr. Borrego.  Once for SVT for which he had a benign EP evaluation and secondly for nocturnal bradycardia which required no treatment.     Recurrent torsional angina and abnormal stress test resulted in repeat coronary angiography in April demonstrating a distal occlusion of his ramus intermedius, 70% ostial circumflex stenosis 85% stenosis in his distal circumflex 100% occlusion of his mid LAD and a 60% stenosis in his proximal LAD.  He had poor distal targets and recommendation was to treat him medically.  We initiated Imdur 15 mg daily.  I did review his angiogram again today.    I received a call from nephrologist who reported worsening dyspnea on exertion and slow heart rates.  I increased his Imdur to 30 mg daily and did a 3-day Zio patch.  He now returns for follow-up.    He states he tries to walk regularly and intermittently but more often than not will have some shortness of breath and fatigue.  He is not having jailene chest pain.  He has no lightheadedness and dizziness.  No palpitations.  He states it waxes and wanes..  He does not think the increased Imdur made much difference.     Assessment and plan.    I think Jeff is having anginal symptoms.  He also notes his blood pressure can be significantly high at  times and I wonder whether this may be a contributor.  To this end I will add amlodipine 2.5 mg daily to his regiment.  I will see him back in a month and we will see how this helps.     Blood pressure is high today.    As stated I am going to increase his amlodipine.    His 3 day Zio patch showed an average heart rate of 50 ranging from .  We have decreased his metoprolol tart Woodward to 12.5 mg twice daily.  Sometimes the amlodipine may cause a reflex tachycardia and this may help with that.  If when he is returns he is still having exercise intolerance it is possible it may be chronotropic incompetence and not exertional angina and I will consider stopping metoprolol altogether.    Fasting lipid profile on 40 mg of simvastatin in the evening is excellent with total cholesterol of 84, HDL 47, LDL of 26 and triglycerides of 56.  We will have to watch for side effects as there can be an amlodipine simvastatin interaction.  We may be best to change his simvastatin to a different statin in the future.     Creatinine is 2.28 with a GFR of 28     Further evaluation treatment will depend upon the above results.     Thank you for allow me to participate in this patient's care.  Sincerely,                                Jordan Dixon MD [facility]        Today's clinic visit entailed:  Review of the result(s) of each unique test - Zio Patch, lab work  Ordering of each unique test  Prescription drug management  42 minutes spent by me on the date of the encounter doing chart review, history and exam, documentation and further activities per the note  Provider  Link to [facility] Help Grid     The level of medical decision making during this visit was of high complexity.      No orders of the defined types were placed in this encounter.      No orders of the defined types were placed in this encounter.      There are no discontinued medications.      Encounter Diagnosis   Name Primary?     Atherosclerosis of native coronary artery  of native heart with stable angina pectoris (H)        CURRENT MEDICATIONS:  Current Outpatient Medications   Medication Sig Dispense Refill     acetic acid-hydrocortisone (VOSOL-HC) 1-2 % otic solution INSTILL 1 DROP INTO BOTH   EARS TWO TIMES A DAY 10 mL 0     Ascorbic Acid (VITAMIN C PO) Take 1,000 mg by mouth 2 times daily       aspirin (ASA) 81 MG tablet Take 81 mg by mouth daily       blood glucose (NO BRAND SPECIFIED) lancing device Device to be used with lancets. 1 each 0     blood glucose (NO BRAND SPECIFIED) test strip Use to test blood sugar 1 times daily or as directed. 100 strip 1     blood glucose monitoring (ACCU-CHEK SHELLEY PLUS) meter device kit Use to test blood sugar 1 times daily or as directed. 1 kit 0     calcitRIOL (ROCALTROL) 0.25 MCG capsule Take 0.25 mcg by mouth daily        chlorhexidine (PERIDEX) 0.12 % solution USE 1/2 OZ TO SWISH FOR 30 SECONDS ONCE D  3     ciclopirox (LOPROX) 0.77 % cream Apply topically daily Apply to affected nail daily at night 30 g 6     clopidogrel (PLAVIX) 75 MG tablet Take 1 tablet (75 mg) by mouth daily 90 tablet 3     Continuous Blood Gluc Sensor (FREESTYLE SHARRI 14 DAY SENSOR) MISC 1 each every 14 days Use 1 Sensor every 14 days. Use to read blood sugars per 's instructions. 2 each 5     Continuous Blood Gluc Sensor (FREESTYLE SHARRI 2 SENSOR) MISC 1 each every 14 days Use 1 sensor every 14 days. Use to read blood sugars per 's instructions. 2 each 5     Cyanocobalamin (B-12) 1000 MCG CAPS Take 1 mg by mouth daily       famotidine (PEPCID) 40 MG tablet TAKE 1 TABLET DAILY 90 tablet 3     furosemide (LASIX) 40 MG tablet TAKE 1 TABLET DAILY 90 tablet 0     glimepiride (AMARYL) 1 MG tablet TAKE 2 TABLETS EVERY       MORNING BEFORE BREAKFAST 180 tablet 1     hydrocortisone 2.5 % cream APPLY TOPICALLY TWO TIMES ADAY (Patient taking differently: 2 times daily as needed) 28.35 g 11     isosorbide mononitrate (IMDUR) 30 MG 24 hr tablet Take 1  tablet (30 mg) by mouth daily 30 tablet 0     JANUVIA 50 MG tablet TAKE 1 TABLET DAILY 90 tablet 1     lisinopril (ZESTRIL) 20 MG tablet Take 20 mg by mouth daily       metoprolol tartrate (LOPRESSOR) 25 MG tablet Take 25 mg by mouth daily       nitroGLYcerin (NITROSTAT) 0.6 MG sublingual tablet DISSOLVE 1 TABLET UNDER THETONGUE AS NEEDED. 100 tablet 0     omega-3 acid ethyl esters (LOVAZA) 1 g capsule Take 1 capsule by mouth 2 times daily 180 capsule 3     Pyridoxine HCl (B-6) 100 MG TABS Take 100 mg by mouth daily       STUDY DRUG FROM OUTSIDE FACILITY Takes Ocedurenone ([id number]), non-steroidal mineralocorticoid receptor antagonist, 1 tablet by mouth once daily (unknown strength). 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     Heart attack (H)      Hernia, abdominal      Hyperlipidaemia      Mumps      Obese      Other and unspecified hyperlipidemia      Other premature beats      Palpitations      Phlebitis and thrombophlebitis of other deep vessels of lower extremities 2000    has had 2-3 episodes     Stented coronary artery      4 stents     Syncope      Syncope      Thrombosis of leg      Type II or unspecified type diabetes mellitus without mention of complication, not stated as uncontrolled      Unspecified essential hypertension        PAST SURGICAL HISTORY:  Past Surgical History:   Procedure Laterality Date     ABDOMEN SURGERY       Hernia naval approx 25 years ago     BIOPSY  8/2016     CARDIAC SURGERY       COLONOSCOPY       CORONARY ANGIOGRAPHY ADULT ORDER  8/28/2000    75% distal LAD stenosis, 80% third diagonal stenosis, 75-80% mid ramus stenosis, ostial 60% stenosis in circumflex w/90% stenosis in distal circumflex     CORONARY ANGIOGRAPHY ADULT ORDER  2003    4 stents placed     CV CORONARY ANGIOGRAM N/A 4/14/2023    Procedure: Coronary Angiogram;  Surgeon: Rafita Vela MD;  Location:  HEART CARDIAC CATH LAB     CV LEFT HEART CATH N/A 4/14/2023    Procedure: Left Heart Catheterization;  Surgeon: Rafita Vela MD;  Location:  HEART CARDIAC CATH LAB     CV PCI N/A 4/14/2023    Procedure: Percutaneous Coronary Intervention;  Surgeon: Rafita Vela MD;  Location:  HEART CARDIAC CATH LAB     EP ABLATION / EP STUDIES  3/25/2014     HEART CATH, ANGIOPLASTY       HYDROCELECTOMY SCROTAL Right 10/6/2016    Procedure: HYDROCELECTOMY SCROTAL;  Surgeon: Jordan Chandra MD;  Location: [facility]     ORTHOPEDIC SURGERY  1990    Meniscus Orthoscopic surgery left knee.     TESTICLE SURGERY       ZZC NONSPECIFIC PROCEDURE      colonoscopy approx 1999 done elsewhere       FAMILY HISTORY:  Family History   Problem Relation Age of Onset     Musculoskeletal Disorder Mother         spinal stenosis     Cancer Mother         cancer kidney age 85     Hypertension Mother         Dead     Diabetes Father         Dead     Diabetes Brother         Type 1     Heart Disease Brother        SOCIAL HISTORY:  Social History     Socioeconomic History     Marital status:      Spouse name: Bethany     Number of children: 3     Years of education: None     Highest education level: None   Occupational History     Occupation: Computers/     Employer: [employer]      Comment: [employer]   Tobacco Use     Smoking status: Former     Packs/day: 3.00     Years: 6.00     Pack years: 18.00     Types: Cigarettes, Cigars, Pipe     Start  "date: 1961     Quit date: 1967     Years since quittin.5     Smokeless tobacco: Never     Tobacco comments:     quit 1960s   Vaping Use     Vaping Use: Never used   Substance and Sexual Activity     Alcohol use: Yes     Comment: 1 beer a week     Drug use: No     Sexual activity: Not Currently   Other Topics Concern     Parent/sibling w/ CABG, MI or angioplasty before 65F 55M? No       Review of Systems:  Skin:  not assessed       Eyes:  Positive for glasses;contacts    ENT:  not assessed      Respiratory:  Positive for sleep apnea     Cardiovascular:    chest pain;Positive for chest \"tightness\"  Gastroenterology: not assessed      Genitourinary:  not assessed      Musculoskeletal:  not assessed      Neurologic:  not assessed      Psychiatric:  not assessed      Heme/Lymph/Imm:  not assessed      Endocrine:  not assessed        Physical Exam:  Vitals: /59 (BP Location: Right arm, Patient Position: Sitting, Cuff Size: Adult Large)   Pulse (!) 44   Ht 1.765 m (5' 9.5\")   Wt 94 kg (207 lb 4.8 oz)   SpO2 99%   BMI 30.17 kg/m      Constitutional:           Skin:             Head:           Eyes:           Lymph:      ENT:           Neck:           Respiratory:            Cardiac:                                                           GI:           Extremities and Muscular Skeletal:                 Neurological:           Psych:           CC  Jordan Dixon MD  [address]  [place] [id number]              "

## 2023-07-18 ENCOUNTER — TELEPHONE (OUTPATIENT)
Dept: CARDIOLOGY | Facility: CLINIC | Age: 78
End: 2023-07-18
Payer: MEDICARE

## 2023-07-25 ENCOUNTER — DOCUMENTATION ONLY (OUTPATIENT)
Dept: INTERNAL MEDICINE | Facility: CLINIC | Age: 78
End: 2023-07-25

## 2023-07-25 ENCOUNTER — MYC REFILL (OUTPATIENT)
Dept: INTERNAL MEDICINE | Facility: CLINIC | Age: 78
End: 2023-07-25
Payer: MEDICARE

## 2023-07-25 DIAGNOSIS — I10 ESSENTIAL HYPERTENSION: Primary | ICD-10-CM

## 2023-07-25 DIAGNOSIS — E11.21 TYPE 2 DIABETES MELLITUS WITH DIABETIC NEPHROPATHY, WITHOUT LONG-TERM CURRENT USE OF INSULIN (H): Primary | ICD-10-CM

## 2023-07-25 NOTE — PROGRESS NOTES
Medicare requires that the MD/DO treating the patient for diabetes answers all of the questions and signs the pended order for the patient to qualify for diabetic shoes. Once the order is completed, please route the encounter back to sender.    Thank You,  Britney Jones

## 2023-07-25 NOTE — TELEPHONE ENCOUNTER
Routing refill request to provider for review/approval because:  Drug not active on patient's medication list  This Order Has Been Discontinued     Order Status Reason By On   Discontinued None Guillermo Deleon MD 8/26/19 4606            [FreeTextEntry1] : labs\par derm--given resources\par consider [facility]--pt to research and advise

## 2023-07-26 RX ORDER — METOPROLOL TARTRATE 25 MG/1
12.5 TABLET, FILM COATED ORAL DAILY
Qty: 45 TABLET | Refills: 0 | Status: SHIPPED | OUTPATIENT
Start: 2023-07-26 | End: 2023-09-28

## 2023-07-26 NOTE — TELEPHONE ENCOUNTER
Medication is historical.     BP Readings from Last 3 Encounters:   07/12/23 131/59   05/16/23 120/55   04/14/23 137/62

## 2023-08-01 DIAGNOSIS — I25.118 ATHEROSCLEROSIS OF NATIVE CORONARY ARTERY OF NATIVE HEART WITH STABLE ANGINA PECTORIS (H): ICD-10-CM

## 2023-08-01 DIAGNOSIS — R07.9 CHEST PAIN: ICD-10-CM

## 2023-08-01 RX ORDER — ISOSORBIDE MONONITRATE 30 MG/1
30 TABLET, EXTENDED RELEASE ORAL DAILY
Qty: 90 TABLET | Refills: 0 | Status: SHIPPED | OUTPATIENT
Start: 2023-08-01

## 2023-08-01 RX ORDER — ISOSORBIDE MONONITRATE 30 MG/1
30 TABLET, EXTENDED RELEASE ORAL DAILY
Qty: 30 TABLET | Refills: 0 | Status: SHIPPED | OUTPATIENT
Start: 2023-08-01 | End: 2023-08-01

## 2023-08-25 ENCOUNTER — LAB (OUTPATIENT)
Dept: LAB | Facility: CLINIC | Age: 78
End: 2023-08-25
Payer: MEDICARE

## 2023-08-25 ENCOUNTER — OFFICE VISIT (OUTPATIENT)
Dept: CARDIOLOGY | Facility: CLINIC | Age: 78
End: 2023-08-25
Payer: MEDICARE

## 2023-08-25 VITALS
SYSTOLIC BLOOD PRESSURE: 163 MMHG | HEART RATE: 44 BPM | OXYGEN SATURATION: 100 % | BODY MASS INDEX: 30.08 KG/M2 | HEIGHT: 70 IN | WEIGHT: 210.1 LBS | DIASTOLIC BLOOD PRESSURE: 62 MMHG

## 2023-08-25 DIAGNOSIS — R07.9 CHEST PAIN, UNSPECIFIED TYPE: ICD-10-CM

## 2023-08-25 DIAGNOSIS — E11.21 TYPE 2 DIABETES MELLITUS WITH DIABETIC NEPHROPATHY, WITHOUT LONG-TERM CURRENT USE OF INSULIN (H): ICD-10-CM

## 2023-08-25 DIAGNOSIS — I10 ESSENTIAL HYPERTENSION: ICD-10-CM

## 2023-08-25 DIAGNOSIS — N18.32 STAGE 3B CHRONIC KIDNEY DISEASE (H): ICD-10-CM

## 2023-08-25 DIAGNOSIS — I25.118 ATHEROSCLEROSIS OF NATIVE CORONARY ARTERY OF NATIVE HEART WITH STABLE ANGINA PECTORIS (H): Primary | ICD-10-CM

## 2023-08-25 DIAGNOSIS — E78.00 HYPERCHOLESTEROLEMIA: ICD-10-CM

## 2023-08-25 DIAGNOSIS — I25.118 ATHEROSCLEROSIS OF NATIVE CORONARY ARTERY OF NATIVE HEART WITH STABLE ANGINA PECTORIS (H): ICD-10-CM

## 2023-08-25 DIAGNOSIS — R60.0 PERIPHERAL EDEMA: ICD-10-CM

## 2023-08-25 DIAGNOSIS — I45.2 RBBB WITH LEFT ANTERIOR FASCICULAR BLOCK: ICD-10-CM

## 2023-08-25 LAB
ANION GAP SERPL CALCULATED.3IONS-SCNC: 9 MMOL/L (ref 7–15)
BUN SERPL-MCNC: 15 MG/DL (ref 8–23)
CALCIUM SERPL-MCNC: 9.2 MG/DL (ref 8.8–10.2)
CHLORIDE SERPL-SCNC: 108 MMOL/L (ref 98–107)
CREAT SERPL-MCNC: 1.94 MG/DL (ref 0.67–1.17)
DEPRECATED HCO3 PLAS-SCNC: 24 MMOL/L (ref 22–29)
GFR SERPL CREATININE-BSD FRML MDRD: 35 ML/MIN/1.73M2
GLUCOSE SERPL-MCNC: 68 MG/DL (ref 70–99)
POTASSIUM SERPL-SCNC: 4.1 MMOL/L (ref 3.4–5.3)
SODIUM SERPL-SCNC: 141 MMOL/L (ref 136–145)

## 2023-08-25 PROCEDURE — 36415 COLL VENOUS BLD VENIPUNCTURE: CPT | Performed by: INTERNAL MEDICINE

## 2023-08-25 PROCEDURE — 80048 BASIC METABOLIC PNL TOTAL CA: CPT | Performed by: INTERNAL MEDICINE

## 2023-08-25 PROCEDURE — 99214 OFFICE O/P EST MOD 30 MIN: CPT | Performed by: INTERNAL MEDICINE

## 2023-08-25 RX ORDER — FERROUS SULFATE 325(65) MG
325 TABLET ORAL DAILY
COMMUNITY

## 2023-08-25 RX ORDER — EZETIMIBE AND SIMVASTATIN 10; 40 MG/1; MG/1
1 TABLET ORAL AT BEDTIME
COMMUNITY
End: 2023-11-20

## 2023-08-25 RX ORDER — DAPAGLIFLOZIN 5 MG/1
5 TABLET, FILM COATED ORAL DAILY
COMMUNITY

## 2023-08-25 NOTE — LETTER
8/25/2023    Guillermo Deleon MD  [address] [facility] [address]    RE: Jeff Ricks       Dear Colleague,     I had the pleasure of seeing Jeff Ricks in the [facility].  HPI and Plan:     Mr. Ricks is a very nice 77-year-old gentleman who I performed a coronary angiogram on in 2000.  His past medical history is significant for hypertension, bradycardia, diabetes mellitus, coronary artery disease, peripheral edema, stage IIIb renal failure, diabetes mellitus.     Coronary interventions dates back to 2003 when at [facility] for which he had 2 stents placed in his ramus intermedius branch and in a staged fashion came back for 2 stents in his left anterior descending artery.  Distal circumflex was described as subtotally occluded and his right coronary artery was described as diffusely diseased with a distal occlusion.     Over the years he was seen by Dr. Rivas and Dr. Borrego.  Once for SVT for which he had a benign EP evaluation and secondly for nocturnal bradycardia which required no treatment.     Recurrent exertional angina and abnormal stress test resulted in repeat coronary angiography in April 2023 demonstrating a distal occlusion of his ramus intermedius, 70% ostial circumflex stenosis 85% stenosis in his distal circumflex 100% occlusion of his mid LAD and a 60% stenosis in his proximal LAD.  He had poor distal targets and recommendation was to treat him medically.  We initiated Imdur 15 mg daily.  I did review his angiogram again today.     I received a call from nephrologist, Dr. Jason Alvarez, who reported worsening dyspnea on exertion and slow heart rates.  I increased his Imdur to 30 mg daily and did a 3-day Zio patch.  And saw him back last month     I was concerned about hypertension and added amlodipine 2.5 mg to his regiment.  I was also worried about chronotropic incompetence and decreased his metoprolol tart [place] to 12.5 mg twice daily.    He returns to clinic  stating he is doing much better.  He states he is out doing his walk without any limitations or problems.  He has no lightheadedness dizziness syncope near syncope.  He describes an atypical chest pain associated with movements that occurs periodically but states his exertional discomfort is now resolved.    I also note that Dr. Alvarez has stopped his Lasix.     Assessment and plan.   As stated I again looked at Jeff's angiogram.  I am concerned about the 60% ostial LAD that it may be significant.  However his symptoms have resolved, he is walking regularly without problems and he does have significant renal dysfunction.  We will continue to treat him medically at this time.     Blood pressure is high again today.    He does have significant peripheral edema.  I am hesitant to increase his amlodipine.  I did send a message to Dr. Alvarez regarding potentially restarting a diuretic.  I defer blood pressure management to Dr. Alvarez given his renal dysfunction     His 3 day Zio patch showed an average heart rate of 50 ranging from .  We have decreased his metoprolol tartrate to 12.5 mg twice daily.  Sometimes the amlodipine may cause a reflex tachycardia and this may help with that.  As he appears to be asymptomatic I will continue this dose.  If he were to develop symptoms I would have no problem stopping his metoprolol.     Fasting lipid profile on 40 mg of simvastatin in the evening is excellent with total cholesterol of 84, HDL 47, LDL of 26 and triglycerides of 56.  We will continue his current medical regimen as is.  He does not appear to develop any side effects with the addition of amlodipine.    Creatinine is 1.94 with a GFR of 35.  I suspect this improvement is due to discontinuing his diuretic.     I will plan on seeing him back in 6 months.  I would be glad to see him sooner if necessary     Thank you for allow me to participate in this patient's care.  Sincerely,                                Jordan  Zack FELIX [facility]  Today's clinic visit entailed:  Review of the result(s) of each unique test - lab work, coronary angiogram, event monitor  Ordering of each unique test  Prescription drug management  2 minutes spent by me on the date of the encounter doing chart review, history and exam, documentation and further activities per the note  Provider  Link to [facility] Help Grid     The level of medical decision making during this visit was of high complexity.      No orders of the defined types were placed in this encounter.      Orders Placed This Encounter   Medications    dapagliflozin (FARXIGA) 5 MG TABS tablet     Sig: Take 5 mg by mouth daily    ferrous sulfate (FEROSUL) 325 (65 Fe) MG tablet     Sig: Take 325 mg by mouth daily    ezetimibe-simvastatin (VYTORIN) 10-40 MG tablet     Sig: Take 1 tablet by mouth At Bedtime       There are no discontinued medications.      Encounter Diagnoses   Name Primary?    Atherosclerosis of native coronary artery of native heart with stable angina pectoris (H) Yes    Essential hypertension     RBBB with left anterior fascicular block     Type 2 diabetes mellitus with diabetic nephropathy, without long-term current use of insulin (H)     Stage 3b chronic kidney disease (H)     Peripheral edema     Chest pain, unspecified type     Hypercholesterolemia        CURRENT MEDICATIONS:  Current Outpatient Medications   Medication Sig Dispense Refill    acetic acid-hydrocortisone (VOSOL-HC) 1-2 % otic solution INSTILL 1 DROP INTO BOTH   EARS TWO TIMES A DAY 10 mL 0    amLODIPine (NORVASC) 2.5 MG tablet Take 1 tablet (2.5 mg) by mouth daily 90 tablet 4    Ascorbic Acid (VITAMIN C PO) Take 1,000 mg by mouth 2 times daily      calcitRIOL (ROCALTROL) 0.25 MCG capsule Take 0.25 mcg by mouth daily       chlorhexidine (PERIDEX) 0.12 % solution USE 1/2 OZ TO SWISH FOR 30 SECONDS ONCE D  3    clopidogrel (PLAVIX) 75 MG tablet Take 1 tablet (75 mg) by mouth daily 90 tablet 3    Cyanocobalamin (B-12)  1000 MCG CAPS Take 1 mg by mouth daily      dapagliflozin (FARXIGA) 5 MG TABS tablet Take 5 mg by mouth daily      ezetimibe-simvastatin (VYTORIN) 10-40 MG tablet Take 1 tablet by mouth At Bedtime      famotidine (PEPCID) 40 MG tablet TAKE 1 TABLET DAILY 90 tablet 3    ferrous sulfate (FEROSUL) 325 (65 Fe) MG tablet Take 325 mg by mouth daily      glimepiride (AMARYL) 1 MG tablet TAKE 2 TABLETS EVERY       MORNING BEFORE BREAKFAST 180 tablet 1    hydrocortisone 2.5 % cream APPLY TOPICALLY TWO TIMES ADAY (Patient taking differently: 2 times daily as needed) 28.35 g 11    isosorbide mononitrate (IMDUR) 30 MG 24 hr tablet Take 1 tablet (30 mg) by mouth daily (Patient taking differently: Take 15 mg by mouth daily) 90 tablet 0    JANUVIA 50 MG tablet TAKE 1 TABLET DAILY 90 tablet 1    lisinopril (ZESTRIL) 20 MG tablet Take 20 mg by mouth daily      metoprolol tartrate (LOPRESSOR) 25 MG tablet Take 0.5 tablets (12.5 mg) by mouth daily (Patient taking differently: Take 25 mg by mouth daily) 45 tablet 0    nitroGLYcerin (NITROSTAT) 0.6 MG sublingual tablet DISSOLVE 1 TABLET UNDER THETONGUE AS NEEDED. 100 tablet 0    omega-3 acid ethyl esters (LOVAZA) 1 g capsule Take 1 capsule by mouth 2 times daily 180 capsule 3    Pyridoxine HCl (B-6) 100 MG TABS Take 100 mg by mouth daily      STUDY DRUG FROM OUTSIDE FACILITY Takes Ocedurenone ([id number]), non-steroidal mineralocorticoid receptor antagonist, 1 tablet by mouth once daily (unknown strength). Phase 3 clinical trial      VITAMIN E NATURAL PO Take 400 Units by mouth daily      blood glucose (NO BRAND SPECIFIED) lancing device Device to be used with lancets. (Patient not taking: Reported on 8/25/2023) 1 each 0    blood glucose (NO BRAND SPECIFIED) test strip Use to test blood sugar 1 times daily or as directed. (Patient not taking: Reported on 8/25/2023) 100 strip 1    blood glucose monitoring (ACCU-CHEK SHELLEY PLUS) meter device kit Use to test blood sugar 1 times daily or as  directed. (Patient not taking: Reported on 8/25/2023) 1 kit 0    ciclopirox (LOPROX) 0.77 % cream Apply topically daily Apply to affected nail daily at night (Patient not taking: Reported on 8/25/2023) 30 g 6    Continuous Blood Gluc Sensor (FREESTYLE SHARRI 14 DAY SENSOR) MISC 1 each every 14 days Use 1 Sensor every 14 days. Use to read blood sugars per 's instructions. (Patient not taking: Reported on 8/25/2023) 2 each 5    Continuous Blood Gluc Sensor (FREESTYLE SHARRI 2 SENSOR) MISC 1 each every 14 days Use 1 sensor every 14 days. Use to read blood sugars per 's instructions. (Patient not taking: Reported on 8/25/2023) 2 each 5    furosemide (LASIX) 40 MG tablet TAKE 1 TABLET DAILY (Patient not taking: Reported on 8/25/2023) 90 tablet 0    ZOCOR 40 MG tablet PLEASE UPDATE FOR ANY MEDICATION CHANGE (Patient not taking: Reported on 8/25/2023) 90 tablet 3       ALLERGIES     Allergies   Allergen Reactions    No Known Drug Allergy        PAST MEDICAL HISTORY:  Past Medical History:   Diagnosis Date    Chronic kidney disease     Coronary atherosclerosis of unspecified type of vessel, native or graft 10/03    at [facility]:  had 4 stents done following an MI    DDD (degenerative disc disease), lumbosacral 5/21/2021    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    Heart attack (H)     Hernia, abdominal     Hyperlipidaemia     Mumps     Obese     Other and unspecified hyperlipidemia     Other premature beats     Palpitations     Phlebitis and thrombophlebitis of other deep vessels of lower extremities 2000    has had 2-3 episodes    Stented coronary artery      4 stents    Syncope     Syncope     Thrombosis of leg     Type II or unspecified type diabetes mellitus without mention of complication, not stated as uncontrolled     Unspecified essential hypertension        PAST SURGICAL HISTORY:  Past Surgical History:   Procedure Laterality Date    ABDOMEN SURGERY      Hernia naval approx 25 years ago     BIOPSY  8/2016    CARDIAC SURGERY      COLONOSCOPY      CORONARY ANGIOGRAPHY ADULT ORDER  8/28/2000    75% distal LAD stenosis, 80% third diagonal stenosis, 75-80% mid ramus stenosis, ostial 60% stenosis in circumflex w/90% stenosis in distal circumflex    CORONARY ANGIOGRAPHY ADULT ORDER  2003    4 stents placed    CV CORONARY ANGIOGRAM N/A 4/14/2023    Procedure: Coronary Angiogram;  Surgeon: Rafita Vela MD;  Location:  HEART CARDIAC CATH LAB    CV LEFT HEART CATH N/A 4/14/2023    Procedure: Left Heart Catheterization;  Surgeon: Rafita Vela MD;  Location:  HEART CARDIAC CATH LAB    CV PCI N/A 4/14/2023    Procedure: Percutaneous Coronary Intervention;  Surgeon: Rafita Vela MD;  Location:  HEART CARDIAC CATH LAB    EP ABLATION / EP STUDIES  3/25/2014    HEART CATH, ANGIOPLASTY      HYDROCELECTOMY SCROTAL Right 10/6/2016    Procedure: HYDROCELECTOMY SCROTAL;  Surgeon: Jordan Chandra MD;  Location: [facility]    ORTHOPEDIC SURGERY  1990    Meniscus Orthoscopic surgery left knee.    TESTICLE SURGERY      ZZC NONSPECIFIC PROCEDURE      colonoscopy approx 1999 done elsewhere       FAMILY HISTORY:  Family History   Problem Relation Age of Onset    Musculoskeletal Disorder Mother         spinal stenosis    Cancer Mother         cancer kidney age 85    Hypertension Mother         Dead    Diabetes Father         Dead    Diabetes Brother         Type 1    Heart Disease Brother        SOCIAL HISTORY:  Social History     Socioeconomic History    Marital status:      Spouse name: Bethany    Number of children: 3    Years of education: None    Highest education level: None   Occupational History    Occupation: Computers/     Employer: [employer]      Comment: [employer]   Tobacco Use    Smoking status: Former     Packs/day: 3.00     Years: 6.00     Pack years: 18.00     Types: Cigarettes, Cigars, Pipe     Start date: 7/1/1961     Quit date: 1/1/1967     Years since  "quittin.6    Smokeless tobacco: Never    Tobacco comments:     quit 1960s   Vaping Use    Vaping Use: Never used   Substance and Sexual Activity    Alcohol use: Yes     Comment: 1 beer a week    Drug use: No    Sexual activity: Not Currently   Other Topics Concern    Parent/sibling w/ CABG, MI or angioplasty before 65F 55M? No       Review of Systems:  Skin:  Negative       Eyes:  Positive for glasses;contacts    ENT:  not assessed   itching ears  Respiratory:  Positive for sleep apnea     Cardiovascular:  Negative;dizziness;lightheadedness;syncope or near-syncope;palpitations;chest pain;fatigue chest pain;Positive for turns to the left side and feels chest \"tightness\" occasionally  Gastroenterology: not assessed   Hiatal Hernia  Genitourinary:  Positive for urinary frequency From Lasix  Musculoskeletal:  Positive for back pain Occasional muscle cramps - recently in the hands but has dealt with this his entire life. feels some sharp pains in his feet - does not linger (within the last months)  Neurologic:  Negative      Psychiatric:  not assessed      Heme/Lymph/Imm:  Positive for easy bruising    Endocrine:  Positive for diabetes      Physical Exam:  Vitals: BP (!) 163/62 (BP Location: Left arm, Patient Position: Sitting)   Pulse (!) 44   Ht 1.765 m (5' 9.5\")   Wt 95.3 kg (210 lb 1.6 oz)   SpO2 100%   BMI 30.58 kg/m      Constitutional:  cooperative, alert and oriented, well developed, well nourished, in no acute distress overweight      Skin:  warm and dry to the touch, no apparent skin lesions or masses noted          Head:  normocephalic, no masses or lesions        Eyes:  pupils equal and round, conjunctivae and lids unremarkable, sclera white, no xanthalasma, EOMS intact, no nystagmus        Lymph:      ENT:  no pallor or cyanosis, dentition good        Neck:  no carotid bruit        Respiratory:  normal breath sounds, clear to auscultation, normal A-P diameter, normal symmetry, normal respiratory " excursion, no use of accessory muscles         Cardiac: regular rhythm;no murmurs, gallops or rubs detected                pulses full and equal                                        GI:           Extremities and Muscular Skeletal:  no spinal abnormalities noted;normal muscle strength and tone   bilateral LE edema;L greater than R;1+     Wears compression stockings    Neurological:  no gross motor deficits        Psych:  affect appropriate, oriented to time, person and place        CC  Jordan Dixon MD  [address]  [place] [address]    Thank you for allowing me to participate in the care of your patient.      Sincerely,   Jordan Dixon MD     [facility]

## 2023-08-25 NOTE — PROGRESS NOTES
HPI and Plan:     Mr. Ricks is a very nice 77-year-old gentleman who I performed a coronary angiogram on in 2000.  His past medical history is significant for hypertension, bradycardia, diabetes mellitus, coronary artery disease, peripheral edema, stage IIIb renal failure, diabetes mellitus.     Coronary interventions dates back to 2003 when at [facility] for which he had 2 stents placed in his ramus intermedius branch and in a staged fashion came back for 2 stents in his left anterior descending artery.  Distal circumflex was described as subtotally occluded and his right coronary artery was described as diffusely diseased with a distal occlusion.     Over the years he was seen by Dr. Rivas and Dr. Borrego.  Once for SVT for which he had a benign EP evaluation and secondly for nocturnal bradycardia which required no treatment.     Recurrent exertional angina and abnormal stress test resulted in repeat coronary angiography in April 2023 demonstrating a distal occlusion of his ramus intermedius, 70% ostial circumflex stenosis 85% stenosis in his distal circumflex 100% occlusion of his mid LAD and a 60% stenosis in his proximal LAD.  He had poor distal targets and recommendation was to treat him medically.  We initiated Imdur 15 mg daily.  I did review his angiogram again today.     I received a call from nephrologist, Dr. Jason Alvarez, who reported worsening dyspnea on exertion and slow heart rates.  I increased his Imdur to 30 mg daily and did a 3-day Zio patch.  And saw him back last month     I was concerned about hypertension and added amlodipine 2.5 mg to his regiment.  I was also worried about chronotropic incompetence and decreased his metoprolol tart [place] to 12.5 mg twice daily.    He returns to clinic stating he is doing much better.  He states he is out doing his walk without any limitations or problems.  He has no lightheadedness dizziness syncope near syncope.  He describes an atypical chest pain  associated with movements that occurs periodically but states his exertional discomfort is now resolved.    I also note that Dr. Alvarez has stopped his Lasix.     Assessment and plan.   As stated I again looked at Jeff's angiogram.  I am concerned about the 60% ostial LAD that it may be significant.  However his symptoms have resolved, he is walking regularly without problems and he does have significant renal dysfunction.  We will continue to treat him medically at this time.     Blood pressure is high again today.    He does have significant peripheral edema.  I am hesitant to increase his amlodipine.  I did send a message to Dr. Alvarez regarding potentially restarting a diuretic.  I defer blood pressure management to Dr. Alvarez given his renal dysfunction     His 3 day Zio patch showed an average heart rate of 50 ranging from .  We have decreased his metoprolol tartrate to 12.5 mg twice daily.  Sometimes the amlodipine may cause a reflex tachycardia and this may help with that.  As he appears to be asymptomatic I will continue this dose.  If he were to develop symptoms I would have no problem stopping his metoprolol.     Fasting lipid profile on 40 mg of simvastatin in the evening is excellent with total cholesterol of 84, HDL 47, LDL of 26 and triglycerides of 56.  We will continue his current medical regimen as is.  He does not appear to develop any side effects with the addition of amlodipine.    Creatinine is 1.94 with a GFR of 35.  I suspect this improvement is due to discontinuing his diuretic.     I will plan on seeing him back in 6 months.  I would be glad to see him sooner if necessary     Thank you for allow me to participate in this patient's care.  Sincerely,                                Jordan Dixon MD [facility]  Today's clinic visit entailed:  Review of the result(s) of each unique test - lab work, coronary angiogram, event monitor  Ordering of each unique test  Prescription drug management  2  minutes spent by me on the date of the encounter doing chart review, history and exam, documentation and further activities per the note  Provider  Link to MDM Help Grid     The level of medical decision making during this visit was of high complexity.      No orders of the defined types were placed in this encounter.      Orders Placed This Encounter   Medications    dapagliflozin (FARXIGA) 5 MG TABS tablet     Sig: Take 5 mg by mouth daily    ferrous sulfate (FEROSUL) 325 (65 Fe) MG tablet     Sig: Take 325 mg by mouth daily    ezetimibe-simvastatin (VYTORIN) 10-40 MG tablet     Sig: Take 1 tablet by mouth At Bedtime       There are no discontinued medications.      Encounter Diagnoses   Name Primary?    Atherosclerosis of native coronary artery of native heart with stable angina pectoris (H) Yes    Essential hypertension     RBBB with left anterior fascicular block     Type 2 diabetes mellitus with diabetic nephropathy, without long-term current use of insulin (H)     Stage 3b chronic kidney disease (H)     Peripheral edema     Chest pain, unspecified type     Hypercholesterolemia        CURRENT MEDICATIONS:  Current Outpatient Medications   Medication Sig Dispense Refill    acetic acid-hydrocortisone (VOSOL-HC) 1-2 % otic solution INSTILL 1 DROP INTO BOTH   EARS TWO TIMES A DAY 10 mL 0    amLODIPine (NORVASC) 2.5 MG tablet Take 1 tablet (2.5 mg) by mouth daily 90 tablet 4    Ascorbic Acid (VITAMIN C PO) Take 1,000 mg by mouth 2 times daily      calcitRIOL (ROCALTROL) 0.25 MCG capsule Take 0.25 mcg by mouth daily       chlorhexidine (PERIDEX) 0.12 % solution USE 1/2 OZ TO SWISH FOR 30 SECONDS ONCE D  3    clopidogrel (PLAVIX) 75 MG tablet Take 1 tablet (75 mg) by mouth daily 90 tablet 3    Cyanocobalamin (B-12) 1000 MCG CAPS Take 1 mg by mouth daily      dapagliflozin (FARXIGA) 5 MG TABS tablet Take 5 mg by mouth daily      ezetimibe-simvastatin (VYTORIN) 10-40 MG tablet Take 1 tablet by mouth At Bedtime       famotidine (PEPCID) 40 MG tablet TAKE 1 TABLET DAILY 90 tablet 3    ferrous sulfate (FEROSUL) 325 (65 Fe) MG tablet Take 325 mg by mouth daily      glimepiride (AMARYL) 1 MG tablet TAKE 2 TABLETS EVERY       MORNING BEFORE BREAKFAST 180 tablet 1    hydrocortisone 2.5 % cream APPLY TOPICALLY TWO TIMES ADAY (Patient taking differently: 2 times daily as needed) 28.35 g 11    isosorbide mononitrate (IMDUR) 30 MG 24 hr tablet Take 1 tablet (30 mg) by mouth daily (Patient taking differently: Take 15 mg by mouth daily) 90 tablet 0    JANUVIA 50 MG tablet TAKE 1 TABLET DAILY 90 tablet 1    lisinopril (ZESTRIL) 20 MG tablet Take 20 mg by mouth daily      metoprolol tartrate (LOPRESSOR) 25 MG tablet Take 0.5 tablets (12.5 mg) by mouth daily (Patient taking differently: Take 25 mg by mouth daily) 45 tablet 0    nitroGLYcerin (NITROSTAT) 0.6 MG sublingual tablet DISSOLVE 1 TABLET UNDER THETONGUE AS NEEDED. 100 tablet 0    omega-3 acid ethyl esters (LOVAZA) 1 g capsule Take 1 capsule by mouth 2 times daily 180 capsule 3    Pyridoxine HCl (B-6) 100 MG TABS Take 100 mg by mouth daily      STUDY DRUG FROM OUTSIDE FACILITY Takes Ocedurenone ([id number]), non-steroidal mineralocorticoid receptor antagonist, 1 tablet by mouth once daily (unknown strength). Phase 3 clinical trial      VITAMIN E NATURAL PO Take 400 Units by mouth daily      blood glucose (NO BRAND SPECIFIED) lancing device Device to be used with lancets. (Patient not taking: Reported on 8/25/2023) 1 each 0    blood glucose (NO BRAND SPECIFIED) test strip Use to test blood sugar 1 times daily or as directed. (Patient not taking: Reported on 8/25/2023) 100 strip 1    blood glucose monitoring (ACCU-CHEK SHELLEY PLUS) meter device kit Use to test blood sugar 1 times daily or as directed. (Patient not taking: Reported on 8/25/2023) 1 kit 0    ciclopirox (LOPROX) 0.77 % cream Apply topically daily Apply to affected nail daily at night (Patient not taking: Reported on 8/25/2023)  30 g 6    Continuous Blood Gluc Sensor (FREESTYLE SHARRI 14 DAY SENSOR) MISC 1 each every 14 days Use 1 Sensor every 14 days. Use to read blood sugars per 's instructions. (Patient not taking: Reported on 8/25/2023) 2 each 5    Continuous Blood Gluc Sensor (FREESTYLE SHARRI 2 SENSOR) MISC 1 each every 14 days Use 1 sensor every 14 days. Use to read blood sugars per 's instructions. (Patient not taking: Reported on 8/25/2023) 2 each 5    furosemide (LASIX) 40 MG tablet TAKE 1 TABLET DAILY (Patient not taking: Reported on 8/25/2023) 90 tablet 0    ZOCOR 40 MG tablet PLEASE UPDATE FOR ANY MEDICATION CHANGE (Patient not taking: Reported on 8/25/2023) 90 tablet 3       ALLERGIES     Allergies   Allergen Reactions    No Known Drug Allergy        PAST MEDICAL HISTORY:  Past Medical History:   Diagnosis Date    Chronic kidney disease     Coronary atherosclerosis of unspecified type of vessel, native or graft 10/03    at [facility]:  had 4 stents done following an MI    DDD (degenerative disc disease), lumbosacral 5/21/2021    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    Heart attack (H)     Hernia, abdominal     Hyperlipidaemia     Mumps     Obese     Other and unspecified hyperlipidemia     Other premature beats     Palpitations     Phlebitis and thrombophlebitis of other deep vessels of lower extremities 2000    has had 2-3 episodes    Stented coronary artery      4 stents    Syncope     Syncope     Thrombosis of leg     Type II or unspecified type diabetes mellitus without mention of complication, not stated as uncontrolled     Unspecified essential hypertension        PAST SURGICAL HISTORY:  Past Surgical History:   Procedure Laterality Date    ABDOMEN SURGERY      Hernia naval approx 25 years ago    BIOPSY  8/2016    CARDIAC SURGERY      COLONOSCOPY      CORONARY ANGIOGRAPHY ADULT ORDER  8/28/2000    75% distal LAD stenosis, 80% third diagonal stenosis, 75-80% mid ramus stenosis, ostial 60%  stenosis in circumflex w/90% stenosis in distal circumflex    CORONARY ANGIOGRAPHY ADULT ORDER      4 stents placed    CV CORONARY ANGIOGRAM N/A 2023    Procedure: Coronary Angiogram;  Surgeon: Rafita Vela MD;  Location:  HEART CARDIAC CATH LAB    CV LEFT HEART CATH N/A 2023    Procedure: Left Heart Catheterization;  Surgeon: Rafita Vela MD;  Location:  HEART CARDIAC CATH LAB    CV PCI N/A 2023    Procedure: Percutaneous Coronary Intervention;  Surgeon: Rafita Vela MD;  Location:  HEART CARDIAC CATH LAB    EP ABLATION / EP STUDIES  3/25/2014    HEART CATH, ANGIOPLASTY      HYDROCELECTOMY SCROTAL Right 10/6/2016    Procedure: HYDROCELECTOMY SCROTAL;  Surgeon: Jordan Chandra MD;  Location: [facility]    ORTHOPEDIC SURGERY      Meniscus Orthoscopic surgery left knee.    TESTICLE SURGERY      ZZC NONSPECIFIC PROCEDURE      colonoscopy approx  done elsewhere       FAMILY HISTORY:  Family History   Problem Relation Age of Onset    Musculoskeletal Disorder Mother         spinal stenosis    Cancer Mother         cancer kidney age 85    Hypertension Mother         Dead    Diabetes Father         Dead    Diabetes Brother         Type 1    Heart Disease Brother        SOCIAL HISTORY:  Social History     Socioeconomic History    Marital status:      Spouse name: Bethany    Number of children: 3    Years of education: None    Highest education level: None   Occupational History    Occupation: Computers/     Employer: [employer]      Comment: [employer]   Tobacco Use    Smoking status: Former     Packs/day: 3.00     Years: 6.00     Pack years: 18.00     Types: Cigarettes, Cigars, Pipe     Start date: 1961     Quit date: 1967     Years since quittin.6    Smokeless tobacco: Never    Tobacco comments:     quit    Vaping Use    Vaping Use: Never used   Substance and Sexual Activity    Alcohol use: Yes     Comment: 1 beer a week     "Drug use: No    Sexual activity: Not Currently   Other Topics Concern    Parent/sibling w/ CABG, MI or angioplasty before 65F 55M? No       Review of Systems:  Skin:  Negative       Eyes:  Positive for glasses;contacts    ENT:  not assessed   itching ears  Respiratory:  Positive for sleep apnea     Cardiovascular:  Negative;dizziness;lightheadedness;syncope or near-syncope;palpitations;chest pain;fatigue chest pain;Positive for turns to the left side and feels chest \"tightness\" occasionally  Gastroenterology: not assessed   Hiatal Hernia  Genitourinary:  Positive for urinary frequency From Lasix  Musculoskeletal:  Positive for back pain Occasional muscle cramps - recently in the hands but has dealt with this his entire life. feels some sharp pains in his feet - does not linger (within the last months)  Neurologic:  Negative      Psychiatric:  not assessed      Heme/Lymph/Imm:  Positive for easy bruising    Endocrine:  Positive for diabetes      Physical Exam:  Vitals: BP (!) 163/62 (BP Location: Left arm, Patient Position: Sitting)   Pulse (!) 44   Ht 1.765 m (5' 9.5\")   Wt 95.3 kg (210 lb 1.6 oz)   SpO2 100%   BMI 30.58 kg/m      Constitutional:  cooperative, alert and oriented, well developed, well nourished, in no acute distress overweight      Skin:  warm and dry to the touch, no apparent skin lesions or masses noted          Head:  normocephalic, no masses or lesions        Eyes:  pupils equal and round, conjunctivae and lids unremarkable, sclera white, no xanthalasma, EOMS intact, no nystagmus        Lymph:      ENT:  no pallor or cyanosis, dentition good        Neck:  no carotid bruit        Respiratory:  normal breath sounds, clear to auscultation, normal A-P diameter, normal symmetry, normal respiratory excursion, no use of accessory muscles         Cardiac: regular rhythm;no murmurs, gallops or rubs detected                pulses full and equal                                        GI:       "     Extremities and Muscular Skeletal:  no spinal abnormalities noted;normal muscle strength and tone   bilateral LE edema;L greater than R;1+     Wears compression stockings    Neurological:  no gross motor deficits        Psych:  affect appropriate, oriented to time, person and place        CC  Jordan Dixon MD  [id number]  [place] [id number]

## 2023-09-17 DIAGNOSIS — E11.21 TYPE 2 DIABETES MELLITUS WITH DIABETIC NEPHROPATHY, WITHOUT LONG-TERM CURRENT USE OF INSULIN (H): ICD-10-CM

## 2023-09-18 RX ORDER — SITAGLIPTIN 50 MG/1
TABLET, FILM COATED ORAL
Qty: 90 TABLET | Refills: 1 | Status: SHIPPED | OUTPATIENT
Start: 2023-09-18 | End: 2024-01-22

## 2023-09-27 DIAGNOSIS — I10 ESSENTIAL HYPERTENSION: ICD-10-CM

## 2023-09-28 RX ORDER — METOPROLOL TARTRATE 25 MG/1
25 TABLET, FILM COATED ORAL DAILY
Qty: 90 TABLET | Refills: 1 | Status: SHIPPED | OUTPATIENT
Start: 2023-09-28 | End: 2023-11-09

## 2023-09-29 NOTE — TELEPHONE ENCOUNTER
Pt is due for OV. Please call to schedule, he already read the Saranas message.   (Dr Deleon is booking into end of November now.)

## 2023-10-28 ENCOUNTER — HEALTH MAINTENANCE LETTER (OUTPATIENT)
Age: 78
End: 2023-10-28

## 2023-11-08 ENCOUNTER — IMMUNIZATION (OUTPATIENT)
Dept: FAMILY MEDICINE | Facility: CLINIC | Age: 78
End: 2023-11-08
Payer: MEDICARE

## 2023-11-08 ENCOUNTER — TELEPHONE (OUTPATIENT)
Dept: CARDIOLOGY | Facility: CLINIC | Age: 78
End: 2023-11-08

## 2023-11-08 DIAGNOSIS — I10 ESSENTIAL HYPERTENSION: ICD-10-CM

## 2023-11-08 DIAGNOSIS — Z23 HIGH PRIORITY FOR 2019-NCOV VACCINE: ICD-10-CM

## 2023-11-08 DIAGNOSIS — Z23 NEED FOR PROPHYLACTIC VACCINATION AND INOCULATION AGAINST INFLUENZA: Primary | ICD-10-CM

## 2023-11-08 PROCEDURE — 91320 SARSCV2 VAC 30MCG TRS-SUC IM: CPT

## 2023-11-08 PROCEDURE — 90480 ADMN SARSCOV2 VAC 1/ONLY CMP: CPT

## 2023-11-08 PROCEDURE — 99207 PR NO CHARGE NURSE ONLY: CPT

## 2023-11-08 PROCEDURE — 90662 IIV NO PRSV INCREASED AG IM: CPT

## 2023-11-08 PROCEDURE — G0008 ADMIN INFLUENZA VIRUS VAC: HCPCS

## 2023-11-08 NOTE — TELEPHONE ENCOUNTER
Patient wonders if his medication dose needs to be changed?     Currently on Metoprolol tartrate 25 mg daily .     Spoke with Patient regarding low HR's . Patient states he is noticing from is Apple Watch low HR alerts more frequently in the day when they use to happen in the night time.   HR is below 40. Bpm.    Patient denies shortness of breath, light headedness or dizziness.    Last Dr. Dixon OV 8-25-23  His 3 day Zio patch showed an average heart rate of 50 ranging from .  We have decreased his metoprolol tartrate to 12.5 mg twice daily.  Sometimes the amlodipine may cause a reflex tachycardia and this may help with that.  As he appears to be asymptomatic I will continue this dose.  If he were to develop symptoms I would have no problem stopping his metoprolol.

## 2023-11-09 RX ORDER — METOPROLOL TARTRATE 25 MG/1
12.5 TABLET, FILM COATED ORAL 2 TIMES DAILY
Qty: 90 TABLET | Refills: 3 | COMMUNITY
Start: 2023-11-09 | End: 2024-02-07

## 2023-11-09 NOTE — TELEPHONE ENCOUNTER
Jordan Dixon MD Theis, Marcie J, RN15 hours ago (5:50 PM)     Metoprolol tart Trace is a twice a day medicine.  According to my note he supposed to be taking 12.5 twice a day.  If he wants to take it once a day then it should be metoprolol succinate 25 mg once a day.  If he is asymptomatic it does not matter what his watch is telling him.  If he is symptomatic then it is an issue.       Spoke to patient, he said he has only been taking metoprolol tartrate 25mg daily as that is how it is written on the bottle, but he is willing to do 12.5mg BID if that is what Dr Dixon recommends. He does not think he is symptomatic from the low HR's, just gets alerts on his watch that his HR is low. However, he comments that since he was a child he has been able to fall asleep easily, even during the daytime. This is not new for him though he thinks perhaps it is increasing in frequency ie when watching TV.     Patient states he would like to make an appointment, notes for the past month or so he has been having intermittent left sided chest discomfort. He describes it as feeling like a pulled muscle, rates it at 1-2/10. He said it is more of a nuisance. He says the pain is not triggered by formal exercise, but sometimes he will feel it when he's walking. No real pattern. No increased GALINDO, weight gain, edema.     He wonders about ozempic and if this would be a good medication for him, has an MTM visit later this month and plans to discuss it then.     Dr Dixon messaged.

## 2023-11-09 NOTE — TELEPHONE ENCOUNTER
Reply from Dr. Dixon:  He was cathed this spring. We know he has multivessel CAD. If he is having angina he probably needs to go back to the lab. He should see me if able or an JUANA       Called patient to offer OV with JUANA Meredith Edwards at [facility] on 11/14/2023 at 2PM. Message sent to scheduling to confirm the hold spot for the patient. Reviewed that Dr. Dixon is considering an angiogram again.

## 2023-11-14 ENCOUNTER — OFFICE VISIT (OUTPATIENT)
Dept: CARDIOLOGY | Facility: CLINIC | Age: 78
End: 2023-11-14
Payer: MEDICARE

## 2023-11-14 VITALS
SYSTOLIC BLOOD PRESSURE: 144 MMHG | BODY MASS INDEX: 29.59 KG/M2 | OXYGEN SATURATION: 97 % | HEART RATE: 45 BPM | DIASTOLIC BLOOD PRESSURE: 68 MMHG | WEIGHT: 206.7 LBS | HEIGHT: 70 IN

## 2023-11-14 DIAGNOSIS — I25.118 ATHEROSCLEROSIS OF NATIVE CORONARY ARTERY OF NATIVE HEART WITH STABLE ANGINA PECTORIS (H): Primary | ICD-10-CM

## 2023-11-14 PROCEDURE — 99214 OFFICE O/P EST MOD 30 MIN: CPT | Performed by: PHYSICIAN ASSISTANT

## 2023-11-14 NOTE — LETTER
2023    Guillermo Deleon MD  [address] [facility] [address]    RE: Jeff Ricks       Dear Colleague,     I had the pleasure of seeing Jeff Ricks in the [facility].      CARDIOLOGY CLINIC PROGRESS NOTE    DOS: 2023      Jeff Ricks  : 1945, 78 year old  MRN: [id number]      Cardiologist: Dr. Dixon    History: Mr. Ricks is a very nice 78-year-old gentleman with history of CAD, hypertension, bradycardia, diabetes mellitus, peripheral edema, stage IIIb renal failure, diabetes mellitus, SVT.    Coronary interventions dates back to  when at [facility] for which he had 2 stents placed in his ramus intermedius branch and in a staged fashion came back for 2 stents in his left anterior descending artery.  Distal circumflex was described as subtotally occluded and his right coronary artery was described as diffusely diseased with a distal occlusion.     Over the years he was seen by Dr. Rivas and Dr. Borrego.  Once for SVT for which he had a benign EP evaluation and secondly for nocturnal bradycardia which required no treatment.     Recurrent exertional angina and abnormal stress test resulted in repeat coronary angiography in 2023 demonstrating a distal occlusion of his ramus intermedius, 70% ostial circumflex stenosis 85% stenosis in his distal circumflex 100% occlusion of his mid LAD and a 60% stenosis in his proximal LAD.  He had poor distal targets and recommendation was to treat him medically.  We initiated Imdur 15 mg daily.      Summer 2023, Dr. Dixon received a call from nephrologist, Dr. Jason Alvarez, who reported worsening dyspnea on exertion and slow heart rates. Dr. Dixon increased his Imdur to 30 mg daily and did a 3-day Zio patch.     His 3 day Zio patch showed an average heart rate of 50 ranging from .   Dr. Dixon lowered metoprolol to 12.5 mg BID for possible chronotropic incompetence, and added amlodipine for more BP control.      He saw Dr. Dioxn back in August and was doing much better.     I also note that nephrology stopped his Lasix due to worsening renal function on both Lasix and Farxiga.       Interval History, reviewed:   11/8/23 telephone encounter reviewed. He was having low HRs on his Apple watch. He was talking metoprolol 25 mg daily.   He was instructed to take 12.5 mg BID.   He was also having some chest discomfort.     So an appt was made today.   He tells me he can have some chest tightness, but this is only when he goes to reach a certain way across his chest.  It is not exertional.   Low HRs on Apple watch intermittently.   He has no near syncope or syncope.  BP runs a little high still, today 144/68.   He is taking Imdur 15 mg. He is not sure when he lowered back to 15 mg. Though his list still says 30 mg daily.           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    Heart attack (H)     Hernia, abdominal     Hyperlipidaemia     Mumps     Obese     Other and unspecified hyperlipidemia     Other premature beats     Palpitations     Phlebitis and thrombophlebitis of other deep vessels of lower extremities 2000    has had 2-3 episodes    Stented coronary artery      4 stents    Syncope     Syncope     Thrombosis of leg     Type II or unspecified type diabetes mellitus without mention of complication, not stated as uncontrolled     Unspecified essential  hypertension        PAST SURGICAL HISTORY:  Past Surgical History:   Procedure Laterality Date    ABDOMEN SURGERY      Hernia naval approx 25 years ago    BIOPSY  2016    CARDIAC SURGERY      COLONOSCOPY      CORONARY ANGIOGRAPHY ADULT ORDER  2000    75% distal LAD stenosis, 80% third diagonal stenosis, 75-80% mid ramus stenosis, ostial 60% stenosis in circumflex w/90% stenosis in distal circumflex    CORONARY ANGIOGRAPHY ADULT ORDER      4 stents placed    CV CORONARY ANGIOGRAM N/A 2023    Procedure: Coronary Angiogram;  Surgeon: Rafita Vela MD;  Location:  HEART CARDIAC CATH LAB    CV LEFT HEART CATH N/A 2023    Procedure: Left Heart Catheterization;  Surgeon: Rafita Vela MD;  Location:  HEART CARDIAC CATH LAB    CV PCI N/A 2023    Procedure: Percutaneous Coronary Intervention;  Surgeon: Rafita Vela MD;  Location:  HEART CARDIAC CATH LAB    EP ABLATION / EP STUDIES  3/25/2014    HEART CATH, ANGIOPLASTY      HYDROCELECTOMY SCROTAL Right 10/6/2016    Procedure: HYDROCELECTOMY SCROTAL;  Surgeon: Jordan Chandra MD;  Location: [facility]    ORTHOPEDIC SURGERY      Meniscus Orthoscopic surgery left knee.    TESTICLE SURGERY      ZZC NONSPECIFIC PROCEDURE      colonoscopy approx  done elsewhere       SOCIAL HISTORY:  Social History     Socioeconomic History    Marital status:      Spouse name: Bethany    Number of children: 3    Years of education: None    Highest education level: None   Occupational History    Occupation: Computers/     Employer: [employer]      Comment: [employer]   Tobacco Use    Smoking status: Former     Packs/day: 3.00     Years: 6.00     Additional pack years: 0.00     Total pack years: 18.00     Types: Cigarettes, Cigars, Pipe     Start date: 1961     Quit date: 1967     Years since quittin.9    Smokeless tobacco: Never    Tobacco comments:     quit    Vaping Use    Vaping Use: Never used    Substance and Sexual Activity    Alcohol use: Yes     Comment: 1 beer a week    Drug use: No    Sexual activity: Not Currently   Other Topics Concern    Parent/sibling w/ CABG, MI or angioplasty before 65F 55M? No       FAMILY HISTORY:  Family History   Problem Relation Age of Onset    Musculoskeletal Disorder Mother         spinal stenosis    Cancer Mother         cancer kidney age 85    Hypertension Mother         Dead    Diabetes Father         Dead    Diabetes Brother         Type 1    Heart Disease Brother        MEDS: acetic acid-hydrocortisone (VOSOL-HC) 1-2 % otic solution, INSTILL 1 DROP INTO BOTH   EARS TWO TIMES A DAY  amLODIPine (NORVASC) 2.5 MG tablet, Take 1 tablet (2.5 mg) by mouth daily  Ascorbic Acid (VITAMIN C PO), Take 1,000 mg by mouth 2 times daily  blood glucose (NO BRAND SPECIFIED) lancing device, Device to be used with lancets.  blood glucose (NO BRAND SPECIFIED) test strip, Use to test blood sugar 1 times daily or as directed.  blood glucose monitoring (ACCU-CHEK SHELLEY PLUS) meter device kit, Use to test blood sugar 1 times daily or as directed.  calcitRIOL (ROCALTROL) 0.25 MCG capsule, Take 0.25 mcg by mouth daily   chlorhexidine (PERIDEX) 0.12 % solution, USE 1/2 OZ TO SWISH FOR 30 SECONDS ONCE D  clopidogrel (PLAVIX) 75 MG tablet, Take 1 tablet (75 mg) by mouth daily  Continuous Blood Gluc Sensor (FREESTYLE SHARRI 14 DAY SENSOR) MIS, 1 each every 14 days Use 1 Sensor every 14 days. Use to read blood sugars per 's instructions.  Continuous Blood Gluc Sensor (FREESTYLE SHARRI 2 SENSOR) MISC, 1 each every 14 days Use 1 sensor every 14 days. Use to read blood sugars per 's instructions.  Cyanocobalamin (B-12) 1000 MCG CAPS, Take 1 mg by mouth daily  dapagliflozin (FARXIGA) 5 MG TABS tablet, Take 5 mg by mouth daily  famotidine (PEPCID) 40 MG tablet, TAKE 1 TABLET DAILY  ferrous sulfate (FEROSUL) 325 (65 Fe) MG tablet, Take 325 mg by mouth daily  glimepiride (AMARYL) 1  "MG tablet, TAKE 2 TABLETS EVERY       MORNING BEFORE BREAKFAST  hydrocortisone 2.5 % cream, APPLY TOPICALLY TWO TIMES ADAY (Patient taking differently: 2 times daily as needed)  isosorbide mononitrate (IMDUR) 30 MG 24 hr tablet, Take 1 tablet (30 mg) by mouth daily  JANUVIA 50 MG tablet, TAKE 1 TABLET DAILY  lisinopril (ZESTRIL) 20 MG tablet, Take 20 mg by mouth daily  metoprolol tartrate (LOPRESSOR) 25 MG tablet, Take 0.5 tablets (12.5 mg) by mouth 2 times daily  nitroGLYcerin (NITROSTAT) 0.6 MG sublingual tablet, DISSOLVE 1 TABLET UNDER THETONGUE AS NEEDED.  omega-3 acid ethyl esters (LOVAZA) 1 g capsule, Take 1 capsule by mouth 2 times daily  Pyridoxine HCl (B-6) 100 MG TABS, Take 100 mg by mouth daily  VITAMIN E NATURAL PO, Take 400 Units by mouth daily  ZOCOR 40 MG tablet, PLEASE UPDATE FOR ANY MEDICATION CHANGE  ezetimibe-simvastatin (VYTORIN) 10-40 MG tablet, Take 1 tablet by mouth At Bedtime (Patient not taking: Reported on 11/14/2023)  STUDY DRUG FROM OUTSIDE FACILITY, Takes Ocedurenone (KBP-5074), non-steroidal mineralocorticoid receptor antagonist, 1 tablet by mouth once daily (unknown strength). Phase 3 clinical trial (Patient not taking: Reported on 11/14/2023)    gadobutrol (GADAVIST) injection 10 mL        ALLERGIES:   Allergies   Allergen Reactions    No Known Drug Allergy        PHYSICAL EXAM:  Vitals: BP (!) 144/68 (BP Location: Right arm, Patient Position: Sitting, Cuff Size: Adult Regular)   Pulse (!) 45   Ht 1.765 m (5' 9.5\")   Wt 93.8 kg (206 lb 11.2 oz)   SpO2 97%   BMI 30.09 kg/m    Constitutional:  cooperative, alert and oriented, well developed, well nourished, in no acute distress overweight      Skin:  warm and dry to the touch, no apparent skin lesions or masses noted        Head:  normocephalic, no masses or lesions        Eyes:  pupils equal and round, conjunctivae and lids unremarkable, sclera white, no xanthalasma, EOMS intact, no nystagmus        ENT:  no pallor or cyanosis, " dentition good        Neck:  JVP normal        Respiratory:  normal breath sounds, clear to auscultation, normal A-P diameter, normal symmetry, normal respiratory excursion, no use of accessory muscles        Cardiac: regular rhythm;no murmurs, gallops or rubs detected                  GI:  abdomen soft;BS normoactive        Vascular: pulses full and equal                                      Extremities and Musculoskeletal:  no spinal abnormalities noted;normal muscle strength and tone   Wears compression stockings    Neurological:  no gross motor deficits              LABS/DATA:  I reviewed the following:  No new labs or data today      ASSESSMENT/PLAN:  CAD  HTN  CKD, stage 3B  Bradycardia, asx  Diabetes mellitus  Peripheral edema  SVT      He presents today for some chest discomfort.   His sxs are positional with movement/reaching across his chest.  He does not have exertional chest discomfort.   This being said, he states he is taking Imdur 15 mg, and he is to be taking 30 mg. So I did have him increase back to 30 mg.   He will continue on metoprolol 12.5 mg BID.   Should he have recurrent exertional chest discomfort, he will notify us.  If that happens, he would likely need repeat cardiac cath. He has a 60% ostial LAD.      Blood pressure is high again today 144/68.  He will increase his Imdur back to 30 mg. Stay on metoprolol 12.5 mg BID, amlodipine 2.5 mg.   He is off diuretics since he had some worsening renal function with Lasix + Farxiga.       He is bradycardic, but completely asx.   Continue metoprolol 12.5 mg BID for now.   If he were to develop symptoms I would have no problem stopping his metoprolol.       Fasting lipid profile on 40 mg of simvastatin in the evening is excellent with total cholesterol of 84, HDL 47, LDL of 26 and triglycerides of 56.  We will continue his current medical regimen as is.            Follow up:  Dr. Dixon in Feb 2024, sooner if concerns or questions prior      Thank  you for allowing me to participate in the care of your patient.      Sincerely,     YAHAIRA Herron [facility]  cc:   No referring provider defined for this encounter.

## 2023-11-14 NOTE — PATIENT INSTRUCTIONS
Take metoprolol 12.5 mg (1/2 tab) twice daily.     Take isosorbide 30 mg (1 tab) daily.     See Dr. Dixon back in Feb 2024.

## 2023-11-14 NOTE — PROGRESS NOTES
CARDIOLOGY CLINIC PROGRESS NOTE    DOS: 2023      Jeff Ricks  : 1945, 78 year old  MRN: [id number]      Cardiologist: Dr. Dixon    History: Mr. Ricks is a very nice 78-year-old gentleman with history of CAD, hypertension, bradycardia, diabetes mellitus, peripheral edema, stage IIIb renal failure, diabetes mellitus, SVT.    Coronary interventions dates back to  when at [facility] for which he had 2 stents placed in his ramus intermedius branch and in a staged fashion came back for 2 stents in his left anterior descending artery.  Distal circumflex was described as subtotally occluded and his right coronary artery was described as diffusely diseased with a distal occlusion.     Over the years he was seen by Dr. Rivas and Dr. Borrego.  Once for SVT for which he had a benign EP evaluation and secondly for nocturnal bradycardia which required no treatment.     Recurrent exertional angina and abnormal stress test resulted in repeat coronary angiography in 2023 demonstrating a distal occlusion of his ramus intermedius, 70% ostial circumflex stenosis 85% stenosis in his distal circumflex 100% occlusion of his mid LAD and a 60% stenosis in his proximal LAD.  He had poor distal targets and recommendation was to treat him medically.  We initiated Imdur 15 mg daily.      Summer 2023, Dr. Dixon received a call from nephrologist, Dr. Jason Alvarez, who reported worsening dyspnea on exertion and slow heart rates. Dr. Dixon increased his Imdur to 30 mg daily and did a 3-day Zio patch.     His 3 day Zio patch showed an average heart rate of 50 ranging from .   Dr. Dixon lowered metoprolol to 12.5 mg BID for possible chronotropic incompetence, and added amlodipine for more BP control.     He saw Dr. Dixon back in August and was doing much better.     I also note that nephrology stopped his Lasix due to worsening renal function on both Lasix and Farxiga.       Interval History,  reviewed:   11/8/23 telephone encounter reviewed. He was having low HRs on his Apple watch. He was talking metoprolol 25 mg daily.   He was instructed to take 12.5 mg BID.   He was also having some chest discomfort.     So an appt was made today.   He tells me he can have some chest tightness, but this is only when he goes to reach a certain way across his chest.  It is not exertional.   Low HRs on Apple watch intermittently.   He has no near syncope or syncope.  BP runs a little high still, today 144/68.   He is taking Imdur 15 mg. He is not sure when he lowered back to 15 mg. Though his list still says 30 mg daily.           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    Heart attack (H)     Hernia, abdominal     Hyperlipidaemia     Mumps     Obese     Other and unspecified hyperlipidemia     Other premature beats     Palpitations     Phlebitis and thrombophlebitis of other deep vessels of lower extremities 2000    has had 2-3 episodes    Stented coronary artery      4 stents    Syncope     Syncope     Thrombosis of leg     Type II or unspecified type diabetes mellitus without mention of complication, not stated as uncontrolled     Unspecified essential hypertension        PAST SURGICAL HISTORY:  Past Surgical History:   Procedure Laterality Date    ABDOMEN SURGERY      Hernia naval approx 25 years ago    BIOPSY  8/2016    CARDIAC SURGERY       COLONOSCOPY      CORONARY ANGIOGRAPHY ADULT ORDER  2000    75% distal LAD stenosis, 80% third diagonal stenosis, 75-80% mid ramus stenosis, ostial 60% stenosis in circumflex w/90% stenosis in distal circumflex    CORONARY ANGIOGRAPHY ADULT ORDER      4 stents placed    CV CORONARY ANGIOGRAM N/A 2023    Procedure: Coronary Angiogram;  Surgeon: Rafita Vela MD;  Location:  HEART CARDIAC CATH LAB    CV LEFT HEART CATH N/A 2023    Procedure: Left Heart Catheterization;  Surgeon: Rafita Vela MD;  Location:  HEART CARDIAC CATH LAB    CV PCI N/A 2023    Procedure: Percutaneous Coronary Intervention;  Surgeon: Rafita Vela MD;  Location:  HEART CARDIAC CATH LAB    EP ABLATION / EP STUDIES  3/25/2014    HEART CATH, ANGIOPLASTY      HYDROCELECTOMY SCROTAL Right 10/6/2016    Procedure: HYDROCELECTOMY SCROTAL;  Surgeon: Jordan Chandra MD;  Location: [facility]    ORTHOPEDIC SURGERY      Meniscus Orthoscopic surgery left knee.    TESTICLE SURGERY      ZZC NONSPECIFIC PROCEDURE      colonoscopy approx  done elsewhere       SOCIAL HISTORY:  Social History     Socioeconomic History    Marital status:      Spouse name: Bethany    Number of children: 3    Years of education: None    Highest education level: None   Occupational History    Occupation: Computers/     Employer: [employer]      Comment: [employer]   Tobacco Use    Smoking status: Former     Packs/day: 3.00     Years: 6.00     Additional pack years: 0.00     Total pack years: 18.00     Types: Cigarettes, Cigars, Pipe     Start date: 1961     Quit date: 1967     Years since quittin.9    Smokeless tobacco: Never    Tobacco comments:     quit    Vaping Use    Vaping Use: Never used   Substance and Sexual Activity    Alcohol use: Yes     Comment: 1 beer a week    Drug use: No    Sexual activity: Not Currently   Other Topics Concern    Parent/sibling w/ CABG, MI or  angioplasty before 65F 55M? No       FAMILY HISTORY:  Family History   Problem Relation Age of Onset    Musculoskeletal Disorder Mother         spinal stenosis    Cancer Mother         cancer kidney age 85    Hypertension Mother         Dead    Diabetes Father         Dead    Diabetes Brother         Type 1    Heart Disease Brother        MEDS: acetic acid-hydrocortisone (VOSOL-HC) 1-2 % otic solution, INSTILL 1 DROP INTO BOTH   EARS TWO TIMES A DAY  amLODIPine (NORVASC) 2.5 MG tablet, Take 1 tablet (2.5 mg) by mouth daily  Ascorbic Acid (VITAMIN C PO), Take 1,000 mg by mouth 2 times daily  blood glucose (NO BRAND SPECIFIED) lancing device, Device to be used with lancets.  blood glucose (NO BRAND SPECIFIED) test strip, Use to test blood sugar 1 times daily or as directed.  blood glucose monitoring (ACCU-CHEK SHELLEY PLUS) meter device kit, Use to test blood sugar 1 times daily or as directed.  calcitRIOL (ROCALTROL) 0.25 MCG capsule, Take 0.25 mcg by mouth daily   chlorhexidine (PERIDEX) 0.12 % solution, USE 1/2 OZ TO SWISH FOR 30 SECONDS ONCE D  clopidogrel (PLAVIX) 75 MG tablet, Take 1 tablet (75 mg) by mouth daily  Continuous Blood Gluc Sensor (FREESTYLE SHARRI 14 DAY SENSOR) MISC, 1 each every 14 days Use 1 Sensor every 14 days. Use to read blood sugars per 's instructions.  Continuous Blood Gluc Sensor (FREESTYLE SHARRI 2 SENSOR) MISC, 1 each every 14 days Use 1 sensor every 14 days. Use to read blood sugars per 's instructions.  Cyanocobalamin (B-12) 1000 MCG CAPS, Take 1 mg by mouth daily  dapagliflozin (FARXIGA) 5 MG TABS tablet, Take 5 mg by mouth daily  famotidine (PEPCID) 40 MG tablet, TAKE 1 TABLET DAILY  ferrous sulfate (FEROSUL) 325 (65 Fe) MG tablet, Take 325 mg by mouth daily  glimepiride (AMARYL) 1 MG tablet, TAKE 2 TABLETS EVERY       MORNING BEFORE BREAKFAST  hydrocortisone 2.5 % cream, APPLY TOPICALLY TWO TIMES ADAY (Patient taking differently: 2 times daily as  "needed)  isosorbide mononitrate (IMDUR) 30 MG 24 hr tablet, Take 1 tablet (30 mg) by mouth daily  JANUVIA 50 MG tablet, TAKE 1 TABLET DAILY  lisinopril (ZESTRIL) 20 MG tablet, Take 20 mg by mouth daily  metoprolol tartrate (LOPRESSOR) 25 MG tablet, Take 0.5 tablets (12.5 mg) by mouth 2 times daily  nitroGLYcerin (NITROSTAT) 0.6 MG sublingual tablet, DISSOLVE 1 TABLET UNDER THETONGUE AS NEEDED.  omega-3 acid ethyl esters (LOVAZA) 1 g capsule, Take 1 capsule by mouth 2 times daily  Pyridoxine HCl (B-6) 100 MG TABS, Take 100 mg by mouth daily  VITAMIN E NATURAL PO, Take 400 Units by mouth daily  ZOCOR 40 MG tablet, PLEASE UPDATE FOR ANY MEDICATION CHANGE  ezetimibe-simvastatin (VYTORIN) 10-40 MG tablet, Take 1 tablet by mouth At Bedtime (Patient not taking: Reported on 11/14/2023)  STUDY DRUG FROM OUTSIDE FACILITY, Takes Ocedurenone (KBP-5074), non-steroidal mineralocorticoid receptor antagonist, 1 tablet by mouth once daily (unknown strength). Phase 3 clinical trial (Patient not taking: Reported on 11/14/2023)    gadobutrol (GADAVIST) injection 10 mL        ALLERGIES:   Allergies   Allergen Reactions    No Known Drug Allergy        PHYSICAL EXAM:  Vitals: BP (!) 144/68 (BP Location: Right arm, Patient Position: Sitting, Cuff Size: Adult Regular)   Pulse (!) 45   Ht 1.765 m (5' 9.5\")   Wt 93.8 kg (206 lb 11.2 oz)   SpO2 97%   BMI 30.09 kg/m    Constitutional:  cooperative, alert and oriented, well developed, well nourished, in no acute distress overweight      Skin:  warm and dry to the touch, no apparent skin lesions or masses noted        Head:  normocephalic, no masses or lesions        Eyes:  pupils equal and round, conjunctivae and lids unremarkable, sclera white, no xanthalasma, EOMS intact, no nystagmus        ENT:  no pallor or cyanosis, dentition good        Neck:  JVP normal        Respiratory:  normal breath sounds, clear to auscultation, normal A-P diameter, normal symmetry, normal respiratory " excursion, no use of accessory muscles        Cardiac: regular rhythm;no murmurs, gallops or rubs detected                  GI:  abdomen soft;BS normoactive        Vascular: pulses full and equal                                      Extremities and Musculoskeletal:  no spinal abnormalities noted;normal muscle strength and tone   Wears compression stockings    Neurological:  no gross motor deficits              LABS/DATA:  I reviewed the following:  No new labs or data today      ASSESSMENT/PLAN:  CAD  HTN  CKD, stage 3B  Bradycardia, asx  Diabetes mellitus  Peripheral edema  SVT      He presents today for some chest discomfort.   His sxs are positional with movement/reaching across his chest.  He does not have exertional chest discomfort.   This being said, he states he is taking Imdur 15 mg, and he is to be taking 30 mg. So I did have him increase back to 30 mg.   He will continue on metoprolol 12.5 mg BID.   Should he have recurrent exertional chest discomfort, he will notify us.  If that happens, he would likely need repeat cardiac cath. He has a 60% ostial LAD.      Blood pressure is high again today 144/68.  He will increase his Imdur back to 30 mg. Stay on metoprolol 12.5 mg BID, amlodipine 2.5 mg.   He is off diuretics since he had some worsening renal function with Lasix + Farxiga.       He is bradycardic, but completely asx.   Continue metoprolol 12.5 mg BID for now.   If he were to develop symptoms I would have no problem stopping his metoprolol.       Fasting lipid profile on 40 mg of simvastatin in the evening is excellent with total cholesterol of 84, HDL 47, LDL of 26 and triglycerides of 56.  We will continue his current medical regimen as is.            Follow up:  Dr. Dixon in Feb 2024, sooner if concerns or questions prior          Meredith Edwards PA-C

## 2023-11-17 NOTE — PROGRESS NOTES
Medication Therapy Management (MTM) Encounter    ASSESSMENT:                            Medication Adherence/Access: No issues identified    Type 2 Diabetes: Patient is meeting A1c goal of < 7%. Patient is not currently self montioring blood glucose. Patient would benefit from A1C recheck since this has not been checked since starting Farxiga.   Patient also asked about GLP-1's, but after hearing they were injectables and discussing potential side effects, patient was not interested at this time, unless A1c comes back elevated. Then would maybe reconsider depending on cost. Did discuss we would have to discontinue Januvia to start one of these.    Hypertension/Arteriosclerotic heart disease/Sinus bradycardia: Patient is meeting blood pressure goal of < 140/90mmHg. Historically, has had low pulse readings of < 40 bpm during night. Patient will continue to monitor pulse readings at home and contact provider if symptoms of bradycardia occur. Continue following with cardiology     Hyperlipidemia: Patient is on moderate intensity statin which is indicated based on 2019 ACC/AHA guidelines for lipid management.  Due for repeat cholesterol check.     Dyspepsia: Stable.     CKD Stage 3/Supplements: Continue to closely follow with [facility] Nephrologist.     PLAN:                            Ordered repeat A1c and cholesterol      Follow-up: Return in about 6 months (around 5/20/2024) for Medication Therapy Management - possibly sooner depending on labs.    SUBJECTIVE/OBJECTIVE:                          Jeff Ricks is a 78 year old male contacted via secure video for a follow-up visit from 1/17/23 with Aparna Acosta, PharmD.       Reason for visit: med review.    Allergies/ADRs: Reviewed in chart  Past Medical History: Reviewed in chart  Tobacco: He reports that he quit smoking about 56 years ago. His smoking use included cigarettes, cigars, and pipe. He started smoking about 62 years ago. He has a 18.00 pack-year smoking  history. He has never used smokeless tobacco.  Alcohol: 1-3 beverages / week - usually 1 beer a week    Medication Adherence/Access: Patient uses pill box(es).  Per patient, misses medication 0 times per week.   Medication barriers: none.     Diabetes   Glimepiride 2 mg daily before breakfast  Farxiga 5 mg daily  Sitagliptin (Januvia) 50mg daily.   Patient is not experiencing side effects.   Reports that he was very surprised at last A1c because his sugars have always been good, then last A1c was quite high.  Reports he does exercise, not as strenuous as he did before covid (stopped going during covid when no one was wearing a mask).  Previous Medications: Stopped on Metformin (Nov 2022) due to eGFR     Blood sugar monitoring: Has home glucose monitor, not checking at this time.   Symptoms of low blood sugar? none  Symptoms of high blood sugar? none  Aspirin: Taking 81mg twice daily for secondary prevention   Statin: Yes: Simvastatin   ACEi/ARB: Yes: Lisinopril.      Eye exam is up to date  Foot exam: due  Urine Albumin:   Lab Results   Component Value Date    UMALCR 28.28 (H) 11/14/2022      Lab Results   Component Value Date    A1C 7.9 (H) 01/30/2023    A1C 5.8 (H) 10/17/2022    A1C 7.5 (H) 11/12/2021       Hypertension   Hypertension/Arteriosclerotic heart disease/Sinus bradycardia:   Furosemide 40mg daily  Lisinopril 20mg daily  Metoprolol tartrate 12.5 mg twice daily - dose reduced by cardiology a week ago  Amlodipine 2.5 mg daily  Clopidogrel 75 mg daily  Aspirin 81 mg twice daily   Isosorbide mononitrate 30 mg daily  SL NTG 0.6 mg as needed (never needed)  Patient denies any signs/symptoms of bleeding.  History of bradycardia, appears to most often occur overnight, but has been having them more frequently during the day than before. Reports he hasn't noticed much of a change in bradycardia since the dose was reduced, but the dose was only reduced 6 days ago.     Patient does self monitor home blood pressure -  "keeps forgetting to check lately, so no recent readings.      Was prescribed Amolodipine 5mg daily by Dr. Deleon (11/30/22). Does not plan to take per study criteria and nephrologist recommendations.      Evaluated by Dr. Borrego, Cardiology (8/20/21): \"normal rate is usually in the 50s, and therefore, I think it is quite reasonable for a person to dip below 40s at night when sleeping due to heightened vagal tone. There is a small possibility that the patient may have untreated sleep apnea so I did suggest to Ferdinand he get a sleep study again as apnea can cause bradycardia. Ferdinand had no symptoms, only his relatively sinus bradycardia, and therefore, there is no indication for pacemaker.\"     BP Readings from Last 3 Encounters:   11/14/23 (!) 144/68   08/25/23 (!) 163/62   07/12/23 131/59     Pulse Readings from Last 3 Encounters:   11/14/23 (!) 45   08/25/23 (!) 44   07/12/23 (!) 44     Hyperlipidemia   Simvastatin 40 mg daily  Lovaza (Omega-3 acid ethyl esters) 1g twice daily.    Patient reports no significant myalgias or other side effects.   The ASCVD Risk score ([place] DK, et al., 2019) failed to calculate for the following reasons:    The 2019 ASCVD risk score is only valid for ages 40 to 79       Recent Labs   Lab Test 10/17/22  1402 06/10/21  0804   CHOL 84 100   HDL 47 60   LDL 26 30   TRIG 56 52       Dyspepsia:   Pepcid (famotidine) 40mg once daily.   Patient feels that current regimen is effective. Some infrequent breakthrough heartburn. Per patient, has history of hiatal hernia, feels like things things get stuck in his throat rarely.        CKD Stage 3/Supplements:   Calcitriol 0.25 mcg daily  Vitamin B-12 1000mcg daily  Vitamin E 400 units daily  Pyridoxine/Vit B-6 100mg daily  Ascorbic acid 500mg daily (to prevent colds).   Ferrous sulfate 325 mg daily  No reported issues at this time.   Followed at [facility].    GFR Estimate   Date Value Ref Range Status   08/25/2023 35 (L) >60 mL/min/1.73m2 Final   06/10/2021 " 38 (L) >60 mL/min/[1.73_m2] Final     Comment:     Non  GFR Calc  Starting 12/18/2018, serum creatinine based estimated GFR (eGFR) will be   calculated using the Chronic Kidney Disease Epidemiology Collaboration   (CKD-EPI) equation.       Estimated Creatinine Clearance: 34.7 mL/min (A) (based on SCr of 1.94 mg/dL (H)).          Today's Vitals: There were no vitals taken for this visit.  ----------------      I spent 35 minutes with this patient today. All changes were made via collaborative practice agreement with Guillermo Deleon MD. A copy of the visit note was provided to the patient's provider(s).    A summary of these recommendations was sent via [employer].    Padmini Linda, PharmD  Medication Therapy Management Pharmacist  Voicemail: [phone]      Telemedicine Visit Details  Type of service:  Video Conference via [employer]  Joined the call at 11/20/2023, 3:01:12 pm.  Left the call at 11/20/2023, 3:36:16 pm.  You were on the call for 35 minutes 3 seconds     Medication Therapy Recommendations  No medication therapy recommendations to display

## 2023-11-20 ENCOUNTER — VIRTUAL VISIT (OUTPATIENT)
Dept: PHARMACY | Facility: CLINIC | Age: 78
End: 2023-11-20
Payer: MEDICARE

## 2023-11-20 DIAGNOSIS — E78.5 HYPERLIPIDEMIA LDL GOAL <100: ICD-10-CM

## 2023-11-20 DIAGNOSIS — E11.21 TYPE 2 DIABETES MELLITUS WITH DIABETIC NEPHROPATHY, WITHOUT LONG-TERM CURRENT USE OF INSULIN (H): Primary | ICD-10-CM

## 2023-11-20 DIAGNOSIS — I10 ESSENTIAL HYPERTENSION: ICD-10-CM

## 2023-11-20 DIAGNOSIS — I25.10 ATHEROSCLEROSIS OF CORONARY ARTERY OF NATIVE HEART WITHOUT ANGINA PECTORIS, UNSPECIFIED VESSEL OR LESION TYPE: ICD-10-CM

## 2023-11-20 DIAGNOSIS — Z78.9 TAKES DIETARY SUPPLEMENTS: ICD-10-CM

## 2023-11-20 DIAGNOSIS — R00.1 SINUS BRADYCARDIA: ICD-10-CM

## 2023-11-20 DIAGNOSIS — R10.13 DYSPEPSIA: ICD-10-CM

## 2023-11-20 DIAGNOSIS — N18.32 STAGE 3B CHRONIC KIDNEY DISEASE (H): ICD-10-CM

## 2023-11-20 PROCEDURE — 99207 PR NO CHARGE LOS: CPT | Mod: VID | Performed by: PHARMACIST

## 2023-11-20 NOTE — PATIENT INSTRUCTIONS
"Recommendations from today's MTM visit:                                                       Schedule a repeat A1c and cholesterol labs. These will need to be fasting.    Follow-up: Return in about 6 months (around 5/20/2024) for Medication Therapy Management - possibly sooner depending on labs.    It was great speaking with you today.  I value your experience and would be very thankful for your time in providing feedback in our clinic survey. In the next few days, you may receive an email or text message from [employer] with a link to a survey related to your  clinical pharmacist.\"     To schedule another MTM appointment, please call the clinic directly or you may call the MTM scheduling line at [phone] or toll-free at [phone].     My Clinical Pharmacist's contact information:                                                      Please feel free to contact me with any questions or concerns you have.      Padmini Linda, PharmD  Medication Therapy Management Pharmacist  Voicemail: [phone]    "

## 2024-01-12 DIAGNOSIS — R13.12 OROPHARYNGEAL DYSPHAGIA: ICD-10-CM

## 2024-01-15 RX ORDER — FAMOTIDINE 40 MG/1
TABLET, FILM COATED ORAL
Qty: 90 TABLET | Refills: 3 | Status: SHIPPED | OUTPATIENT
Start: 2024-01-15

## 2024-01-20 DIAGNOSIS — E11.21 TYPE 2 DIABETES MELLITUS WITH DIABETIC NEPHROPATHY, WITHOUT LONG-TERM CURRENT USE OF INSULIN (H): ICD-10-CM

## 2024-01-20 DIAGNOSIS — E78.5 HYPERLIPIDEMIA LDL GOAL <100: ICD-10-CM

## 2024-01-22 RX ORDER — SITAGLIPTIN 50 MG/1
TABLET, FILM COATED ORAL
Qty: 90 TABLET | Refills: 1 | Status: SHIPPED | OUTPATIENT
Start: 2024-01-22 | End: 2024-06-28

## 2024-01-22 RX ORDER — OMEGA-3-ACID ETHYL ESTERS 1 G/1
1 CAPSULE, LIQUID FILLED ORAL 2 TIMES DAILY
Qty: 180 CAPSULE | Refills: 3 | Status: SHIPPED | OUTPATIENT
Start: 2024-01-22

## 2024-02-07 ENCOUNTER — OFFICE VISIT (OUTPATIENT)
Dept: CARDIOLOGY | Facility: CLINIC | Age: 79
End: 2024-02-07
Attending: INTERNAL MEDICINE
Payer: MEDICARE

## 2024-02-07 VITALS
HEART RATE: 52 BPM | WEIGHT: 205 LBS | SYSTOLIC BLOOD PRESSURE: 176 MMHG | BODY MASS INDEX: 29.35 KG/M2 | HEIGHT: 70 IN | DIASTOLIC BLOOD PRESSURE: 73 MMHG

## 2024-02-07 DIAGNOSIS — E11.21 TYPE 2 DIABETES MELLITUS WITH DIABETIC NEPHROPATHY, WITHOUT LONG-TERM CURRENT USE OF INSULIN (H): ICD-10-CM

## 2024-02-07 DIAGNOSIS — I45.89 CHRONOTROPIC INCOMPETENCE: ICD-10-CM

## 2024-02-07 DIAGNOSIS — I25.118 ATHEROSCLEROSIS OF NATIVE CORONARY ARTERY OF NATIVE HEART WITH STABLE ANGINA PECTORIS (H): ICD-10-CM

## 2024-02-07 DIAGNOSIS — R06.09 DOE (DYSPNEA ON EXERTION): Primary | ICD-10-CM

## 2024-02-07 DIAGNOSIS — R07.9 CHEST PAIN, UNSPECIFIED TYPE: ICD-10-CM

## 2024-02-07 DIAGNOSIS — R60.0 PERIPHERAL EDEMA: ICD-10-CM

## 2024-02-07 DIAGNOSIS — N18.32 STAGE 3B CHRONIC KIDNEY DISEASE (H): ICD-10-CM

## 2024-02-07 DIAGNOSIS — E87.5 HYPERKALEMIA: ICD-10-CM

## 2024-02-07 DIAGNOSIS — I10 ESSENTIAL HYPERTENSION: ICD-10-CM

## 2024-02-07 DIAGNOSIS — I45.2 RBBB WITH LEFT ANTERIOR FASCICULAR BLOCK: ICD-10-CM

## 2024-02-07 DIAGNOSIS — E78.00 HYPERCHOLESTEROLEMIA: ICD-10-CM

## 2024-02-07 PROCEDURE — 99214 OFFICE O/P EST MOD 30 MIN: CPT | Performed by: INTERNAL MEDICINE

## 2024-02-07 RX ORDER — AMLODIPINE BESYLATE 5 MG/1
5 TABLET ORAL DAILY
Qty: 90 TABLET | Refills: 3 | Status: SHIPPED | OUTPATIENT
Start: 2024-02-07

## 2024-02-07 RX ORDER — HYDROCHLOROTHIAZIDE 50 MG/1
50 TABLET ORAL DAILY
Qty: 90 TABLET | Refills: 3 | Status: SHIPPED | OUTPATIENT
Start: 2024-02-07 | End: 2024-03-08

## 2024-02-07 NOTE — LETTER
2/7/2024    Guillermo Deleon MD  [address] [facility] [address]    RE: Jeff Ricks       Dear Colleague,     I had the pleasure of seeing Jeff Ricks in the [facility].  HPI and Plan:   Mr. Ricks is a very nice 78-year-old gentleman who I performed a coronary angiogram on in 2000.  His past medical history is significant for hypertension, bradycardia, chronotropic incompetence, diabetes mellitus, coronary artery disease, peripheral edema, stage IIIb renal failure and diabetes mellitus.     Coronary interventions dates back to 2003 when at [facility] for which he had 2 stents placed in his ramus intermedius branch and in a staged fashion came back for 2 stents in his left anterior descending artery.  Distal circumflex was described as subtotally occluded and his right coronary artery was described as diffusely diseased with a distal occlusion.     Over the years he was seen by Dr. Rivas and Dr. Borrego.  Once for SVT for which he had a benign EP evaluation and secondly for nocturnal bradycardia which required no treatment.     Recurrent exertional angina and abnormal stress test resulted in repeat coronary angiography in April 2023 demonstrating a distal occlusion of his ramus intermedius, 70% ostial circumflex stenosis 85% stenosis in his distal circumflex 100% occlusion of his mid LAD and a 60% stenosis in his proximal LAD.  He had poor distal targets and recommendation was to treat him medically.  We initiated Imdur 15 mg daily.  I did review his angiogram again today.     I saw Jeff this past summer because nephrology question is bradycardia.  They stopped his diuretic as well.  When I saw him I was concerned about hypertension and added amlodipine 2.5 mg to his regiment.  I was also worried about chronotropic incompetence and decreased his metoprolol tartrate to 12.5 mg twice daily.     When I saw him back in clinic a month later he was doing much better.  He now returns to  clinic stating he thinks he is having decreased exercise tolerance and gets short of breath with exercise.    He also complains of a back problem for which she follows with a chiropractor.  He states he had a particular bad day on Sunday but got a chiropractic treatment on Monday and now is feeling much better.        Assessment and plan.   Last visit I  looked at Jeff's angiogram.  I am concerned about the 60% ostial LAD that it may be significant.  However his symptoms have resolved, he is walking regularly without problems and he does have significant renal dysfunction.  We will continue to treat him medically at this time.    At this time I am more suspicious that his exercise intolerance is not ischemic in nature but is chronotropic incompetence.  To this end I will stop his metoprolol altogether and then check another Zio Patch in 1 week.     Blood pressure is high again today.    I am going to increase his amlodipine to 5 mg daily.  And I will start hydrochlorothiazide 50 mg daily.  He has peripheral edema as well.  I will see him back in 1 month at which time I will check a BMP and follow-up on his Zio Patch        Fasting lipid profile on 40 mg of simvastatin in the evening is excellent with total cholesterol of 84, HDL 47, LDL of 26 and triglycerides of 56.       Creatinine is 1.94 with a GFR of 35.  I suspect this improvement is due to discontinuing his Lasix.  I will repeat his BMP when I see him back in 1 month     Thank you for allow me to participate in this patient's care.  Sincerely,                                Jordna Dixon MD [facility]    Today's clinic visit entailed:  Review of the result(s) of each unique test - lab work  Ordering of each unique test  Prescription drug management  33 minutes spent by me on the date of the encounter doing chart review, history and exam, documentation and further activities per the note  Provider  Link to [facility] Help Grid     The level of medical decision making  during this visit was of moderate complexity.      Orders Placed This Encounter   Procedures    Basic metabolic panel    Follow-Up with Cardiology       Orders Placed This Encounter   Medications    amLODIPine (NORVASC) 5 MG tablet     Sig: Take 1 tablet (5 mg) by mouth daily     Dispense:  90 tablet     Refill:  3    hydrochlorothiazide (HYDRODIURIL) 50 MG tablet     Sig: Take 1 tablet (50 mg) by mouth daily     Dispense:  90 tablet     Refill:  3       Medications Discontinued During This Encounter   Medication Reason    blood glucose monitoring (ACCU-CHEK SHELLEY PLUS) meter device kit     metoprolol tartrate (LOPRESSOR) 25 MG tablet     amLODIPine (NORVASC) 2.5 MG tablet          Encounter Diagnoses   Name Primary?    Atherosclerosis of native coronary artery of native heart with stable angina pectoris (H24)     Essential hypertension     RBBB with left anterior fascicular block     Type 2 diabetes mellitus with diabetic nephropathy, without long-term current use of insulin (H)     Stage 3b chronic kidney disease (H)     Peripheral edema     Chest pain, unspecified type     Hypercholesterolemia     GALINDO (dyspnea on exertion) Yes    Hyperkalemia     Chronotropic incompetence        CURRENT MEDICATIONS:  Current Outpatient Medications   Medication Sig Dispense Refill    acetic acid-hydrocortisone (VOSOL-HC) 1-2 % otic solution INSTILL 1 DROP INTO BOTH   EARS TWO TIMES A DAY (Patient taking differently: prn) 10 mL 0    amLODIPine (NORVASC) 5 MG tablet Take 1 tablet (5 mg) by mouth daily 90 tablet 3    Ascorbic Acid (VITAMIN C PO) Take 1,000 mg by mouth 2 times daily      blood glucose (NO BRAND SPECIFIED) lancing device Device to be used with lancets. 1 each 0    blood glucose (NO BRAND SPECIFIED) test strip Use to test blood sugar 1 times daily or as directed. 100 strip 1    calcitRIOL (ROCALTROL) 0.25 MCG capsule Take 0.25 mcg by mouth daily       chlorhexidine (PERIDEX) 0.12 % solution USE 1/2 OZ TO SWISH FOR 30  SECONDS ONCE D  3    clopidogrel (PLAVIX) 75 MG tablet Take 1 tablet (75 mg) by mouth daily 90 tablet 3    Continuous Blood Gluc Sensor (FREESTYLE SHARRI 14 DAY SENSOR) MISC 1 each every 14 days Use 1 Sensor every 14 days. Use to read blood sugars per 's instructions. 2 each 5    Continuous Blood Gluc Sensor (FREESTYLE SHARRI 2 SENSOR) MISC 1 each every 14 days Use 1 sensor every 14 days. Use to read blood sugars per 's instructions. 2 each 5    Cyanocobalamin (B-12) 1000 MCG CAPS Take 1 mg by mouth daily      dapagliflozin (FARXIGA) 5 MG TABS tablet Take 5 mg by mouth daily      famotidine (PEPCID) 40 MG tablet TAKE 1 TABLET DAILY 90 tablet 3    ferrous sulfate (FEROSUL) 325 (65 Fe) MG tablet Take 325 mg by mouth daily      glimepiride (AMARYL) 1 MG tablet TAKE 2 TABLETS EVERY       MORNING BEFORE BREAKFAST 180 tablet 1    hydrochlorothiazide (HYDRODIURIL) 50 MG tablet Take 1 tablet (50 mg) by mouth daily 90 tablet 3    hydrocortisone 2.5 % cream APPLY TOPICALLY TWO TIMES ADAY (Patient taking differently: 2 times daily as needed) 28.35 g 11    isosorbide mononitrate (IMDUR) 30 MG 24 hr tablet Take 1 tablet (30 mg) by mouth daily (Patient taking differently: Take 15 mg by mouth daily) 90 tablet 0    JANUVIA 50 MG tablet TAKE 1 TABLET DAILY 90 tablet 1    lisinopril (ZESTRIL) 20 MG tablet Take 20 mg by mouth daily      nitroGLYcerin (NITROSTAT) 0.6 MG sublingual tablet DISSOLVE 1 TABLET UNDER THETONGUE AS NEEDED. 100 tablet 0    omega-3 acid ethyl esters (LOVAZA) 1 g capsule TAKE 1 CAPSULE TWICE DAILY 180 capsule 3    Pyridoxine HCl (B-6) 100 MG TABS Take 100 mg by mouth daily      VITAMIN E NATURAL PO Take 400 Units by mouth daily      ZOCOR 40 MG tablet PLEASE UPDATE FOR ANY MEDICATION CHANGE 90 tablet 3       ALLERGIES     Allergies   Allergen Reactions    No Known Drug Allergy        PAST MEDICAL HISTORY:  Past Medical History:   Diagnosis Date    Chronic kidney disease     Coronary  atherosclerosis of unspecified type of vessel, native or graft 10/03    at [facility]:  had 4 stents done following an MI    DDD (degenerative disc disease), lumbosacral 5/21/2021    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    Heart attack (H)     Hernia, abdominal     Hyperlipidaemia     Mumps     Obese     Other and unspecified hyperlipidemia     Other premature beats     Palpitations     Phlebitis and thrombophlebitis of other deep vessels of lower extremities 2000    has had 2-3 episodes    Stented coronary artery      4 stents    Syncope     Syncope     Thrombosis of leg     Type II or unspecified type diabetes mellitus without mention of complication, not stated as uncontrolled     Unspecified essential hypertension        PAST SURGICAL HISTORY:  Past Surgical History:   Procedure Laterality Date    ABDOMEN SURGERY      Hernia naval approx 25 years ago    BIOPSY  8/2016    CARDIAC SURGERY      COLONOSCOPY      CORONARY ANGIOGRAPHY ADULT ORDER  8/28/2000    75% distal LAD stenosis, 80% third diagonal stenosis, 75-80% mid ramus stenosis, ostial 60% stenosis in circumflex w/90% stenosis in distal circumflex    CORONARY ANGIOGRAPHY ADULT ORDER  2003    4 stents placed    CV CORONARY ANGIOGRAM N/A 4/14/2023    Procedure: Coronary Angiogram;  Surgeon: Rafita Vela MD;  Location:  HEART CARDIAC CATH LAB    CV LEFT HEART CATH N/A 4/14/2023    Procedure: Left Heart Catheterization;  Surgeon: Rafita Vela MD;  Location: [facility] CARDIAC CATH LAB    CV PCI N/A 4/14/2023    Procedure: Percutaneous Coronary Intervention;  Surgeon: Rafita Vela MD;  Location: [facility] CARDIAC CATH LAB    EP ABLATION / EP STUDIES  3/25/2014    HEART CATH, ANGIOPLASTY      HYDROCELECTOMY SCROTAL Right 10/6/2016    Procedure: HYDROCELECTOMY SCROTAL;  Surgeon: Jordan Chandra MD;  Location: [facility]    ORTHOPEDIC SURGERY  1990    Meniscus Orthoscopic surgery left knee.    TESTICLE SURGERY      [facility]  "NONSPECIFIC PROCEDURE      colonoscopy approx  done elsewhere       FAMILY HISTORY:  Family History   Problem Relation Age of Onset    Musculoskeletal Disorder Mother         spinal stenosis    Cancer Mother         cancer kidney age 85    Hypertension Mother         Dead    Diabetes Father         Dead    Diabetes Brother         Type 1    Heart Disease Brother        SOCIAL HISTORY:  Social History     Socioeconomic History    Marital status:      Spouse name: Bethany    Number of children: 3    Years of education: None    Highest education level: None   Occupational History    Occupation: Computers/     Employer: [employer]      Comment: [employer]   Tobacco Use    Smoking status: Former     Packs/day: 3.00     Years: 6.00     Additional pack years: 0.00     Total pack years: 18.00     Types: Cigarettes, Cigars, Pipe     Start date: 1961     Quit date: 1967     Years since quittin.1    Smokeless tobacco: Never    Tobacco comments:     quit    Vaping Use    Vaping Use: Never used   Substance and Sexual Activity    Alcohol use: Yes     Comment: 1 beer a week    Drug use: No    Sexual activity: Not Currently   Other Topics Concern    Parent/sibling w/ CABG, MI or angioplasty before 65F 55M? No       Review of Systems:  Skin:          Eyes:         ENT:         Respiratory:  Negative       Cardiovascular:  Negative Positive for;chest pain;edema;fatigue    Gastroenterology:        Genitourinary:         Musculoskeletal:         Neurologic:         Psychiatric:         Heme/Lymph/Imm:         Endocrine:  Positive for diabetes      Physical Exam:  Vitals: BP (!) 176/73   Pulse 52   Ht 1.778 m (5' 10\")   Wt 93 kg (205 lb)   BMI 29.41 kg/m      Constitutional:  cooperative, alert and oriented, well developed, well nourished, in no acute distress overweight      Skin:  warm and dry to the touch, no apparent skin lesions or masses noted          Head:  normocephalic, no masses or " lesions        Eyes:  pupils equal and round, conjunctivae and lids unremarkable, sclera white, no xanthalasma, EOMS intact, no nystagmus        Lymph:      ENT:  no pallor or cyanosis, dentition good        Neck:  no carotid bruit        Respiratory:  normal breath sounds, clear to auscultation, normal A-P diameter, normal symmetry, normal respiratory excursion, no use of accessory muscles         Cardiac: regular rhythm;no murmurs, gallops or rubs detected                pulses full and equal                                        GI:           Extremities and Muscular Skeletal:  no spinal abnormalities noted;normal muscle strength and tone   bilateral LE edema;L greater than R;1+;trace     Wears compression stockings    Neurological:  no gross motor deficits        Psych:  affect appropriate, oriented to time, person and place        CC  Jordan Dixon MD  [address] [facility] [address]  [place] [address]      Thank you for allowing me to participate in the care of your patient.      Sincerely,     Jordan Dixon MD     [facility]

## 2024-02-07 NOTE — PROGRESS NOTES
HPI and Plan:   Mr. Ricks is a very nice 78-year-old gentleman who I performed a coronary angiogram on in 2000.  His past medical history is significant for hypertension, bradycardia, chronotropic incompetence, diabetes mellitus, coronary artery disease, peripheral edema, stage IIIb renal failure and diabetes mellitus.     Coronary interventions dates back to 2003 when at [facility] for which he had 2 stents placed in his ramus intermedius branch and in a staged fashion came back for 2 stents in his left anterior descending artery.  Distal circumflex was described as subtotally occluded and his right coronary artery was described as diffusely diseased with a distal occlusion.     Over the years he was seen by Dr. Rivas and Dr. Borrego.  Once for SVT for which he had a benign EP evaluation and secondly for nocturnal bradycardia which required no treatment.     Recurrent exertional angina and abnormal stress test resulted in repeat coronary angiography in April 2023 demonstrating a distal occlusion of his ramus intermedius, 70% ostial circumflex stenosis 85% stenosis in his distal circumflex 100% occlusion of his mid LAD and a 60% stenosis in his proximal LAD.  He had poor distal targets and recommendation was to treat him medically.  We initiated Imdur 15 mg daily.  I did review his angiogram again today.     I saw Jeff this past summer because nephrology question is bradycardia.  They stopped his diuretic as well.  When I saw him I was concerned about hypertension and added amlodipine 2.5 mg to his regiment.  I was also worried about chronotropic incompetence and decreased his metoprolol tartrate to 12.5 mg twice daily.     When I saw him back in clinic a month later he was doing much better.  He now returns to clinic stating he thinks he is having decreased exercise tolerance and gets short of breath with exercise.    He also complains of a back problem for which she follows with a chiropractor.  He states he had  a particular bad day on Sunday but got a chiropractic treatment on Monday and now is feeling much better.        Assessment and plan.   Last visit I  looked at Jeff's angiogram.  I am concerned about the 60% ostial LAD that it may be significant.  However his symptoms have resolved, he is walking regularly without problems and he does have significant renal dysfunction.  We will continue to treat him medically at this time.    At this time I am more suspicious that his exercise intolerance is not ischemic in nature but is chronotropic incompetence.  To this end I will stop his metoprolol altogether and then check another Zio Patch in 1 week.     Blood pressure is high again today.    I am going to increase his amlodipine to 5 mg daily.  And I will start hydrochlorothiazide 50 mg daily.  He has peripheral edema as well.  I will see him back in 1 month at which time I will check a BMP and follow-up on his Zio Patch        Fasting lipid profile on 40 mg of simvastatin in the evening is excellent with total cholesterol of 84, HDL 47, LDL of 26 and triglycerides of 56.       Creatinine is 1.94 with a GFR of 35.  I suspect this improvement is due to discontinuing his Lasix.  I will repeat his BMP when I see him back in 1 month     Thank you for allow me to participate in this patient's care.  Sincerely,                                Jordan Dixon MD [facility]    Today's clinic visit entailed:  Review of the result(s) of each unique test - lab work  Ordering of each unique test  Prescription drug management  33 minutes spent by me on the date of the encounter doing chart review, history and exam, documentation and further activities per the note  Provider  Link to [facility] Help Grid     The level of medical decision making during this visit was of moderate complexity.      Orders Placed This Encounter   Procedures    Basic metabolic panel    Follow-Up with Cardiology       Orders Placed This Encounter   Medications     amLODIPine (NORVASC) 5 MG tablet     Sig: Take 1 tablet (5 mg) by mouth daily     Dispense:  90 tablet     Refill:  3    hydrochlorothiazide (HYDRODIURIL) 50 MG tablet     Sig: Take 1 tablet (50 mg) by mouth daily     Dispense:  90 tablet     Refill:  3       Medications Discontinued During This Encounter   Medication Reason    blood glucose monitoring (ACCU-CHEK SHELLEY PLUS) meter device kit     metoprolol tartrate (LOPRESSOR) 25 MG tablet     amLODIPine (NORVASC) 2.5 MG tablet          Encounter Diagnoses   Name Primary?    Atherosclerosis of native coronary artery of native heart with stable angina pectoris (H24)     Essential hypertension     RBBB with left anterior fascicular block     Type 2 diabetes mellitus with diabetic nephropathy, without long-term current use of insulin (H)     Stage 3b chronic kidney disease (H)     Peripheral edema     Chest pain, unspecified type     Hypercholesterolemia     GALINDO (dyspnea on exertion) Yes    Hyperkalemia     Chronotropic incompetence        CURRENT MEDICATIONS:  Current Outpatient Medications   Medication Sig Dispense Refill    acetic acid-hydrocortisone (VOSOL-HC) 1-2 % otic solution INSTILL 1 DROP INTO BOTH   EARS TWO TIMES A DAY (Patient taking differently: prn) 10 mL 0    amLODIPine (NORVASC) 5 MG tablet Take 1 tablet (5 mg) by mouth daily 90 tablet 3    Ascorbic Acid (VITAMIN C PO) Take 1,000 mg by mouth 2 times daily      blood glucose (NO BRAND SPECIFIED) lancing device Device to be used with lancets. 1 each 0    blood glucose (NO BRAND SPECIFIED) test strip Use to test blood sugar 1 times daily or as directed. 100 strip 1    calcitRIOL (ROCALTROL) 0.25 MCG capsule Take 0.25 mcg by mouth daily       chlorhexidine (PERIDEX) 0.12 % solution USE 1/2 OZ TO SWISH FOR 30 SECONDS ONCE D  3    clopidogrel (PLAVIX) 75 MG tablet Take 1 tablet (75 mg) by mouth daily 90 tablet 3    Continuous Blood Gluc Sensor (FREESTYLE SHARRI 14 DAY SENSOR) MISC 1 each every 14 days Use 1  Sensor every 14 days. Use to read blood sugars per 's instructions. 2 each 5    Continuous Blood Gluc Sensor (FREESTYLE SHARRI 2 SENSOR) [facility] 1 each every 14 days Use 1 sensor every 14 days. Use to read blood sugars per 's instructions. 2 each 5    Cyanocobalamin (B-12) 1000 MCG CAPS Take 1 mg by mouth daily      dapagliflozin (FARXIGA) 5 MG TABS tablet Take 5 mg by mouth daily      famotidine (PEPCID) 40 MG tablet TAKE 1 TABLET DAILY 90 tablet 3    ferrous sulfate (FEROSUL) 325 (65 Fe) MG tablet Take 325 mg by mouth daily      glimepiride (AMARYL) 1 MG tablet TAKE 2 TABLETS EVERY       MORNING BEFORE BREAKFAST 180 tablet 1    hydrochlorothiazide (HYDRODIURIL) 50 MG tablet Take 1 tablet (50 mg) by mouth daily 90 tablet 3    hydrocortisone 2.5 % cream APPLY TOPICALLY TWO TIMES ADAY (Patient taking differently: 2 times daily as needed) 28.35 g 11    isosorbide mononitrate (IMDUR) 30 MG 24 hr tablet Take 1 tablet (30 mg) by mouth daily (Patient taking differently: Take 15 mg by mouth daily) 90 tablet 0    JANUVIA 50 MG tablet TAKE 1 TABLET DAILY 90 tablet 1    lisinopril (ZESTRIL) 20 MG tablet Take 20 mg by mouth daily      nitroGLYcerin (NITROSTAT) 0.6 MG sublingual tablet DISSOLVE 1 TABLET UNDER THETONGUE AS NEEDED. 100 tablet 0    omega-3 acid ethyl esters (LOVAZA) 1 g capsule TAKE 1 CAPSULE TWICE DAILY 180 capsule 3    Pyridoxine HCl (B-6) 100 MG TABS Take 100 mg by mouth daily      VITAMIN E NATURAL PO Take 400 Units by mouth daily      ZOCOR 40 MG tablet PLEASE UPDATE FOR ANY MEDICATION CHANGE 90 tablet 3       ALLERGIES     Allergies   Allergen Reactions    No Known Drug Allergy        PAST MEDICAL HISTORY:  Past Medical History:   Diagnosis Date    Chronic kidney disease     Coronary atherosclerosis of unspecified type of vessel, native or graft 10/03    [facility]  [facility] following an MI    DDD (degenerative disc disease), lumbosacral 5/21/2021    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    Heart attack (H)     Hernia, abdominal     Hyperlipidaemia     Mumps     Obese     Other and unspecified hyperlipidemia     Other premature beats     Palpitations     Phlebitis and thrombophlebitis of other deep vessels of lower extremities 2000    has had 2-3 episodes    Stented coronary artery      4 stents    Syncope     Syncope     Thrombosis of leg     Type II or unspecified type diabetes mellitus without mention of complication, not stated as uncontrolled     Unspecified essential hypertension        PAST SURGICAL HISTORY:  Past Surgical History:   Procedure Laterality Date    ABDOMEN SURGERY      Hernia naval approx 25 years ago    BIOPSY  8/2016    CARDIAC SURGERY      COLONOSCOPY      CORONARY ANGIOGRAPHY ADULT ORDER  8/28/2000    75% distal LAD stenosis, 80% third diagonal stenosis, 75-80% mid ramus stenosis, ostial 60% stenosis in circumflex w/90% stenosis in distal circumflex    CORONARY ANGIOGRAPHY ADULT ORDER  2003    4 stents placed    CV CORONARY ANGIOGRAM N/A 4/14/2023    Procedure: Coronary Angiogram;  Surgeon: Rafita Vela MD;  Location:  HEART CARDIAC CATH LAB    CV LEFT HEART CATH N/A 4/14/2023    Procedure: Left Heart Catheterization;  Surgeon: Rafita Vela MD;  Location:  HEART CARDIAC CATH LAB    CV PCI N/A 4/14/2023    Procedure: Percutaneous Coronary Intervention;  Surgeon: Rafita Vela MD;  Location:  HEART CARDIAC CATH LAB    EP ABLATION / EP STUDIES  3/25/2014    HEART CATH, ANGIOPLASTY      HYDROCELECTOMY SCROTAL Right 10/6/2016    Procedure: HYDROCELECTOMY SCROTAL;  Surgeon: Jordan Chandra MD;  Location: [facility]    ORTHOPEDIC SURGERY  1990    Meniscus Orthoscopic surgery left knee.    TESTICLE SURGERY      ZZC NONSPECIFIC PROCEDURE      colonoscopy approx 1999 done elsewhere       FAMILY HISTORY:  Family History   Problem Relation Age of Onset    Musculoskeletal Disorder Mother         spinal stenosis    Cancer Mother       "   cancer kidney age 85    Hypertension Mother         Dead    Diabetes Father         Dead    Diabetes Brother         Type 1    Heart Disease Brother        SOCIAL HISTORY:  Social History     Socioeconomic History    Marital status:      Spouse name: Bethany    Number of children: 3    Years of education: None    Highest education level: None   Occupational History    Occupation: Computers/     Employer: [employer]      Comment: [employer]   Tobacco Use    Smoking status: Former     Packs/day: 3.00     Years: 6.00     Additional pack years: 0.00     Total pack years: 18.00     Types: Cigarettes, Cigars, Pipe     Start date: 1961     Quit date: 1967     Years since quittin.1    Smokeless tobacco: Never    Tobacco comments:     quit    Vaping Use    Vaping Use: Never used   Substance and Sexual Activity    Alcohol use: Yes     Comment: 1 beer a week    Drug use: No    Sexual activity: Not Currently   Other Topics Concern    Parent/sibling w/ CABG, MI or angioplasty before 65F 55M? No       Review of Systems:  Skin:          Eyes:         ENT:         Respiratory:  Negative       Cardiovascular:  Negative Positive for;chest pain;edema;fatigue    Gastroenterology:        Genitourinary:         Musculoskeletal:         Neurologic:         Psychiatric:         Heme/Lymph/Imm:         Endocrine:  Positive for diabetes      Physical Exam:  Vitals: BP (!) 176/73   Pulse 52   Ht 1.778 m (5' 10\")   Wt 93 kg (205 lb)   BMI 29.41 kg/m      Constitutional:  cooperative, alert and oriented, well developed, well nourished, in no acute distress overweight      Skin:  warm and dry to the touch, no apparent skin lesions or masses noted          Head:  normocephalic, no masses or lesions        Eyes:  pupils equal and round, conjunctivae and lids unremarkable, sclera white, no xanthalasma, EOMS intact, no nystagmus        Lymph:      ENT:  no pallor or cyanosis, dentition good        Neck:  no " carotid bruit        Respiratory:  normal breath sounds, clear to auscultation, normal A-P diameter, normal symmetry, normal respiratory excursion, no use of accessory muscles         Cardiac: regular rhythm;no murmurs, gallops or rubs detected                pulses full and equal                                        GI:           Extremities and Muscular Skeletal:  no spinal abnormalities noted;normal muscle strength and tone   bilateral LE edema;L greater than R;1+;trace     Wears compression stockings    Neurological:  no gross motor deficits        Psych:  affect appropriate, oriented to time, person and place        CC  Jordan Dixon MD  [id number]  [place] [id number]

## 2024-02-14 ENCOUNTER — ORDERS ONLY (AUTO-RELEASED) (OUTPATIENT)
Dept: CARDIOLOGY | Facility: CLINIC | Age: 79
End: 2024-02-14
Payer: MEDICARE

## 2024-02-14 DIAGNOSIS — I45.89 CHRONOTROPIC INCOMPETENCE: ICD-10-CM

## 2024-03-07 ENCOUNTER — LAB (OUTPATIENT)
Dept: LAB | Facility: CLINIC | Age: 79
End: 2024-03-07
Payer: MEDICARE

## 2024-03-07 ENCOUNTER — TELEPHONE (OUTPATIENT)
Dept: CARDIOLOGY | Facility: CLINIC | Age: 79
End: 2024-03-07

## 2024-03-07 DIAGNOSIS — I10 ESSENTIAL HYPERTENSION: ICD-10-CM

## 2024-03-07 DIAGNOSIS — N18.32 STAGE 3B CHRONIC KIDNEY DISEASE (H): Primary | ICD-10-CM

## 2024-03-07 LAB
ANION GAP SERPL CALCULATED.3IONS-SCNC: 9 MMOL/L (ref 7–15)
BUN SERPL-MCNC: 27.6 MG/DL (ref 8–23)
CALCIUM SERPL-MCNC: 9.5 MG/DL (ref 8.8–10.2)
CHLORIDE SERPL-SCNC: 101 MMOL/L (ref 98–107)
CREAT SERPL-MCNC: 2.54 MG/DL (ref 0.67–1.17)
DEPRECATED HCO3 PLAS-SCNC: 28 MMOL/L (ref 22–29)
EGFRCR SERPLBLD CKD-EPI 2021: 25 ML/MIN/1.73M2
GLUCOSE SERPL-MCNC: 144 MG/DL (ref 70–99)
POTASSIUM SERPL-SCNC: 3.8 MMOL/L (ref 3.4–5.3)
SODIUM SERPL-SCNC: 138 MMOL/L (ref 135–145)

## 2024-03-07 PROCEDURE — 36415 COLL VENOUS BLD VENIPUNCTURE: CPT | Performed by: INTERNAL MEDICINE

## 2024-03-07 PROCEDURE — 80048 BASIC METABOLIC PNL TOTAL CA: CPT | Performed by: INTERNAL MEDICINE

## 2024-03-07 NOTE — TELEPHONE ENCOUNTER
BMP done 3-7-24 - in body of note.           Next Dr. Dixon OV 3-12-24     Last Dr. Dixon OV 2-7-24   Blood pressure is high again today.    I am going to increase his amlodipine to 5 mg daily.  And I will start hydrochlorothiazide 50 mg daily.  He has peripheral edema as well.  I will see him back in 1 month at which time I will check a BMP and follow-up on his Zio Patch  - Creatinine is 1.94 with a GFR of 35.  I suspect this improvement is due to discontinuing his Lasix.  I will repeat his BMP when I see him back in 1 month

## 2024-03-08 RX ORDER — HYDROCHLOROTHIAZIDE 50 MG/1
25 TABLET ORAL 2 TIMES DAILY
COMMUNITY
Start: 2024-03-08 | End: 2024-05-01

## 2024-03-08 NOTE — TELEPHONE ENCOUNTER
Jordan Dixon MD Theis, Marcie J, RN17 minutes ago (10:05 AM)     Try cutting his hydrochlorothiazide back to 25 mg daily.  Recheck a BMP in 1 week       Spoke to patient, reviewed labs and recommendations from Dr Dixon. He verbalized understanding. Patient asks that a [employer] message be sent to him with the scheduling number as he is not able to write it down at this time. Mychart sent. Order placed for lab. Med list updated.

## 2024-03-12 ENCOUNTER — OFFICE VISIT (OUTPATIENT)
Dept: CARDIOLOGY | Facility: CLINIC | Age: 79
End: 2024-03-12
Attending: INTERNAL MEDICINE
Payer: MEDICARE

## 2024-03-12 VITALS
HEIGHT: 70 IN | SYSTOLIC BLOOD PRESSURE: 155 MMHG | BODY MASS INDEX: 28.62 KG/M2 | HEART RATE: 59 BPM | OXYGEN SATURATION: 100 % | WEIGHT: 199.9 LBS | DIASTOLIC BLOOD PRESSURE: 96 MMHG

## 2024-03-12 DIAGNOSIS — I25.118 ATHEROSCLEROSIS OF NATIVE CORONARY ARTERY OF NATIVE HEART WITH STABLE ANGINA PECTORIS (H): Primary | ICD-10-CM

## 2024-03-12 DIAGNOSIS — E78.00 HYPERCHOLESTEROLEMIA: ICD-10-CM

## 2024-03-12 DIAGNOSIS — N18.32 STAGE 3B CHRONIC KIDNEY DISEASE (H): ICD-10-CM

## 2024-03-12 DIAGNOSIS — I45.89 CHRONOTROPIC INCOMPETENCE: ICD-10-CM

## 2024-03-12 DIAGNOSIS — R06.09 DOE (DYSPNEA ON EXERTION): ICD-10-CM

## 2024-03-12 DIAGNOSIS — I10 ESSENTIAL HYPERTENSION: ICD-10-CM

## 2024-03-12 DIAGNOSIS — R60.0 PERIPHERAL EDEMA: ICD-10-CM

## 2024-03-12 PROCEDURE — 93248 EXT ECG>7D<15D REV&INTERPJ: CPT | Performed by: INTERNAL MEDICINE

## 2024-03-12 PROCEDURE — 99215 OFFICE O/P EST HI 40 MIN: CPT | Performed by: INTERNAL MEDICINE

## 2024-03-12 RX ORDER — CARVEDILOL 6.25 MG/1
6.25 TABLET ORAL 2 TIMES DAILY WITH MEALS
Qty: 180 TABLET | Refills: 4 | Status: SHIPPED | OUTPATIENT
Start: 2024-03-12 | End: 2024-05-01

## 2024-03-12 NOTE — PROGRESS NOTES
HPI and Plan:   Mr. Ricks is a very nice 78-year-old gentleman who I performed a coronary angiogram on in 2000.  His past medical history is significant for hypertension, bradycardia, chronotropic incompetence, diabetes mellitus, coronary artery disease, peripheral edema, stage IIIb renal failure and diabetes mellitus.     Coronary interventions dates back to 2003 when at [facility] for which he had 2 stents placed in his ramus intermedius branch and in a staged fashion came back for 2 stents in his left anterior descending artery.  Distal circumflex was described as subtotally occluded and his right coronary artery was described as diffusely diseased with a distal occlusion.     Over the years he was seen by Dr. Rivas and Dr. Borrego.  Once for SVT for which he had a benign EP evaluation and secondly for nocturnal bradycardia which required no treatment.     Recurrent exertional angina and abnormal stress test resulted in repeat coronary angiography in April 2023 demonstrating a distal occlusion of his ramus intermedius, 70% ostial circumflex stenosis 85% stenosis in his distal circumflex 100% occlusion of his mid LAD and a 60% stenosis in his proximal LAD.  He had poor distal targets and recommendation was to treat him medically.  We initiated Imdur.      I saw Jeff this past summer because nephrology questioned his bradycardia.  They stopped his diuretic as well.  When I saw him I was concerned about hypertension and added amlodipine 2.5 mg to his regiment.  I was also worried about chronotropic incompetence and decreased his metoprolol tartrate to 12.5 mg twice daily.     He thought he was improved with the decreased dose of metoprolol but states he also had been seeing the chiropractor and does not know how much was due to that.  When I saw him last month I tried stopping his metoprolol altogether.     He states he thinks he has been doing fairly well.  Although notes his back is also doing better.  Today he  parked on the B level climb the stairs to the sea level which is something he rarely does came all the way across the [place] and his watch told him his heart rate was 114.  He was having a little angina.  But he states just by waiting in the line and letting his heart rate comes down this resolved.  He states he exercises 30 minutes every day sometimes will exercise more than once a day and normally this causes him no symptoms.  He has not had any more warnings from his watch that his heart rates in the 40s.  Although this was always a nocturnal phenomenon.    I did repeat his 7-day Zio patch which showed average heart rate of 63 ranging from .  He had 2 runs of SVT fastest 5 beats at 152 bpm.    When I saw him last I also started hydrochlorothiazide 50 mg daily.  BMP from last week shows creatinine up to 2.54 with a BUN of 27.6.  I asked him to cut down on his hydrochlorothiazide to 25 mg daily.  Will plan on rechecking his BMP next week.  Blood pressure is still quite high at 155/96.        Assessment and plan.  Angina has been very well-controlled.  Chronotropic incompetence is much improved.  At this time I plan on trying to reintroduce Coreg 6.25 mg twice daily.  As I told him we are trying to get the [place] dose here such that were controlling his upper heart rate to avoid angina but avoiding chronotropic incompetence and exercise intolerance.    Will follow-up on his blood work next week with a lower dose of hydrochlorothiazide to see how this affects his electrolytes and creatinine.    Congratulated him on his exercise regiment encouraged him continue to do so.     I will have him follow-up with my JUANA in 1 month.  I have asked that he check his blood pressure a couple times in the interim to see what his blood pressure is running at home.  He still has well-controlled peripheral edema left greater than right obviously contributed to by his amlodipine.     I would like to add spironolactone both  for his peripheral edema and his blood pressure but I am not sure his kidney function or electrolytes will tolerate it.      Fasting lipid profile on 40 mg of simvastatin in the evening is excellent with total cholesterol of 84, HDL 47, LDL of 26 and triglycerides of 56.       I will plan on seeing her back in 4 months.  If you have any problems I be glad to see him sooner.     Thank you for allow me to participate in this patient's care.  Sincerely,                                Jordan Dixon MD [facility]       Today's clinic visit entailed:  Review of the result(s) of each unique test - lab work, event monitor  Ordering of each unique test  Prescription drug management  43 minutes spent by me on the date of the encounter doing chart review, history and exam, documentation and further activities per the note  Provider  Link to [facility] Help Grid     The level of medical decision making during this visit was of moderate complexity.      Orders Placed This Encounter   Procedures    Basic metabolic panel    Basic metabolic panel    Follow-Up with Cardiology JUANA    Follow-Up with Cardiology       Orders Placed This Encounter   Medications    carvedilol (COREG) 6.25 MG tablet     Sig: Take 1 tablet (6.25 mg) by mouth 2 times daily (with meals)     Dispense:  180 tablet     Refill:  4       There are no discontinued medications.      Encounter Diagnoses   Name Primary?    Essential hypertension     Atherosclerosis of native coronary artery of native heart with stable angina pectoris (H24) Yes    Stage 3b chronic kidney disease (H)     Chronotropic incompetence     Hypercholesterolemia     GALINDO (dyspnea on exertion)     Peripheral edema        CURRENT MEDICATIONS:  Current Outpatient Medications   Medication Sig Dispense Refill    acetic acid-hydrocortisone (VOSOL-HC) 1-2 % otic solution INSTILL 1 DROP INTO BOTH   EARS TWO TIMES A DAY (Patient taking differently: prn) 10 mL 0    amLODIPine (NORVASC) 5 MG tablet Take 1 tablet (5  mg) by mouth daily 90 tablet 3    Ascorbic Acid (VITAMIN C PO) Take 1,000 mg by mouth 2 times daily      calcitRIOL (ROCALTROL) 0.25 MCG capsule Take 0.25 mcg by mouth daily       carvedilol (COREG) 6.25 MG tablet Take 1 tablet (6.25 mg) by mouth 2 times daily (with meals) 180 tablet 4    chlorhexidine (PERIDEX) 0.12 % solution USE 1/2 OZ TO SWISH FOR 30 SECONDS ONCE D  3    clopidogrel (PLAVIX) 75 MG tablet Take 1 tablet (75 mg) by mouth daily 90 tablet 3    Cyanocobalamin (B-12) 1000 MCG CAPS Take 1 mg by mouth daily      dapagliflozin (FARXIGA) 5 MG TABS tablet Take 5 mg by mouth daily      famotidine (PEPCID) 40 MG tablet TAKE 1 TABLET DAILY 90 tablet 3    ferrous sulfate (FEROSUL) 325 (65 Fe) MG tablet Take 325 mg by mouth daily      glimepiride (AMARYL) 1 MG tablet TAKE 2 TABLETS EVERY       MORNING BEFORE BREAKFAST 180 tablet 1    hydrochlorothiazide (HYDRODIURIL) 50 MG tablet Take 0.5 tablets (25 mg) by mouth daily      hydrocortisone 2.5 % cream APPLY TOPICALLY TWO TIMES ADAY (Patient taking differently: 2 times daily as needed) 28.35 g 11    isosorbide mononitrate (IMDUR) 30 MG 24 hr tablet Take 1 tablet (30 mg) by mouth daily (Patient taking differently: Take 30 mg by mouth daily Patient is taking a full tablet (30mg). 3-12-24) 90 tablet 0    JANUVIA 50 MG tablet TAKE 1 TABLET DAILY 90 tablet 1    lisinopril (ZESTRIL) 20 MG tablet Take 20 mg by mouth daily      nitroGLYcerin (NITROSTAT) 0.6 MG sublingual tablet DISSOLVE 1 TABLET UNDER THETONGUE AS NEEDED. 100 tablet 0    omega-3 acid ethyl esters (LOVAZA) 1 g capsule TAKE 1 CAPSULE TWICE DAILY 180 capsule 3    Pyridoxine HCl (B-6) 100 MG TABS Take 100 mg by mouth daily      VITAMIN E NATURAL PO Take 400 Units by mouth daily      ZOCOR 40 MG tablet PLEASE UPDATE FOR ANY MEDICATION CHANGE 90 tablet 3    blood glucose (NO BRAND SPECIFIED) lancing device Device to be used with lancets. (Patient not taking: Reported on 3/12/2024) 1 each 0    blood glucose (NO  BRAND SPECIFIED) test strip Use to test blood sugar 1 times daily or as directed. (Patient not taking: Reported on 3/12/2024) 100 strip 1    Continuous Blood Gluc Sensor (FREESTYLE SHARRI 14 DAY SENSOR) MISC 1 each every 14 days Use 1 Sensor every 14 days. Use to read blood sugars per 's instructions. (Patient not taking: Reported on 3/12/2024) 2 each 5    Continuous Blood Gluc Sensor (FREESTYLE SHARRI 2 SENSOR) MISC 1 each every 14 days Use 1 sensor every 14 days. Use to read blood sugars per 's instructions. 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    Heart attack (H)     Hernia, abdominal     Hyperlipidaemia     Mumps     Obese     Other and unspecified hyperlipidemia     Other premature beats     Palpitations     Phlebitis and thrombophlebitis of other deep vessels of lower extremities 2000    has had 2-3 episodes    Stented coronary artery      4 stents    Syncope     Syncope     Thrombosis of leg     Type II or unspecified type diabetes mellitus without mention of complication, not stated as uncontrolled     Unspecified essential hypertension        PAST SURGICAL HISTORY:  Past Surgical History:   Procedure Laterality Date    ABDOMEN SURGERY      Hernia naval approx 25 years ago    BIOPSY  8/2016    CARDIAC SURGERY      COLONOSCOPY      CORONARY ANGIOGRAPHY ADULT ORDER  8/28/2000    75% distal LAD stenosis, 80% third diagonal stenosis, 75-80% mid ramus stenosis, ostial 60% stenosis in circumflex w/90% stenosis in distal circumflex    CORONARY ANGIOGRAPHY ADULT ORDER  2003     4 stents placed    CV CORONARY ANGIOGRAM N/A 2023    Procedure: Coronary Angiogram;  Surgeon: Rafita Vela MD;  Location:  HEART CARDIAC CATH LAB    CV LEFT HEART CATH N/A 2023    Procedure: Left Heart Catheterization;  Surgeon: Rafita Vela MD;  Location:  HEART CARDIAC CATH LAB    CV PCI N/A 2023    Procedure: Percutaneous Coronary Intervention;  Surgeon: Rafita Vela MD;  Location:  HEART CARDIAC CATH LAB    EP ABLATION / EP STUDIES  3/25/2014    HEART CATH, ANGIOPLASTY      HYDROCELECTOMY SCROTAL Right 10/6/2016    Procedure: HYDROCELECTOMY SCROTAL;  Surgeon: Jordan Chandra MD;  Location: [facility]    ORTHOPEDIC SURGERY      Meniscus Orthoscopic surgery left knee.    TESTICLE SURGERY      ZZC NONSPECIFIC PROCEDURE      colonoscopy approx  done elsewhere       FAMILY HISTORY:  Family History   Problem Relation Age of Onset    Musculoskeletal Disorder Mother         spinal stenosis    Cancer Mother         cancer kidney age 85    Hypertension Mother         Dead    Diabetes Father         Dead    Diabetes Brother         Type 1    Heart Disease Brother        SOCIAL HISTORY:  Social History     Socioeconomic History    Marital status:      Spouse name: Bethany    Number of children: 3    Years of education: None    Highest education level: None   Occupational History    Occupation: [employer]/     Employer: [employer]      Comment: [employer]   Tobacco Use    Smoking status: Former     Packs/day: 3.00     Years: 6.00     Additional pack years: 0.00     Total pack years: 18.00     Types: Cigarettes, Cigars, Pipe     Start date: 1961     Quit date: 1967     Years since quittin.2    Smokeless tobacco: Never    Tobacco comments:     quit    Vaping Use    Vaping Use: Never used   Substance and Sexual Activity    Alcohol use: Yes     Comment: 1 beer a week    Drug use: No    Sexual activity: Not Currently   Other Topics  "Concern    Parent/sibling w/ CABG, MI or angioplasty before 65F 55M? No       Review of Systems:  Skin:  Negative       Eyes:  Positive for glasses;contacts    ENT:  not assessed   itching ears  Respiratory:  Positive for dyspnea on exertion sob with stairs   Cardiovascular:  Negative Positive for;chest pain;edema chest tightness and sob with walking, but symptoms have improved; leg edema has improved.  Gastroenterology: not assessed   Hiatal Hernia  Genitourinary:  Positive for urinary frequency From Lasix  Musculoskeletal:  Negative back pain Occasional muscle cramps - recently in the hands but has dealt with this his entire life. feels some sharp pains in his feet - does not linger (within the last months)  Neurologic:  Negative      Psychiatric:  not assessed      Heme/Lymph/Imm:  Positive for easy bruising    Endocrine:  Positive for diabetes      Physical Exam:  Vitals: BP (!) 155/96   Pulse 59   Ht 1.778 m (5' 10\")   Wt 90.7 kg (199 lb 14.4 oz)   SpO2 100%   BMI 28.68 kg/m      Constitutional:  cooperative, alert and oriented, well developed, well nourished, in no acute distress overweight      Skin:  warm and dry to the touch, no apparent skin lesions or masses noted          Head:  normocephalic, no masses or lesions        Eyes:  pupils equal and round, conjunctivae and lids unremarkable, sclera white, no xanthalasma, EOMS intact, no nystagmus        Lymph:      ENT:  no pallor or cyanosis, dentition good        Neck:  no carotid bruit        Respiratory:  normal breath sounds, clear to auscultation, normal A-P diameter, normal symmetry, normal respiratory excursion, no use of accessory muscles         Cardiac: regular rhythm;no murmurs, gallops or rubs detected                pulses full and equal                                        GI:           Extremities and Muscular Skeletal:  no spinal abnormalities noted;normal muscle strength and tone   bilateral LE edema;L greater than R;1+;trace     " Wears compression stockings    Neurological:  no gross motor deficits        Psych:  affect appropriate, oriented to time, person and place        CC  Jordan Dixon MD  [id number]  [place] [id number]

## 2024-03-12 NOTE — LETTER
3/12/2024    Guillermo Deleon MD  [address] [facility] [address]    RE: Jeff Ricks       Dear Colleague,     I had the pleasure of seeing Jeff Ricks in the [facility].  HPI and Plan:   Mr. Ricks is a very nice 78-year-old gentleman who I performed a coronary angiogram on in 2000.  His past medical history is significant for hypertension, bradycardia, chronotropic incompetence, diabetes mellitus, coronary artery disease, peripheral edema, stage IIIb renal failure and diabetes mellitus.     Coronary interventions dates back to 2003 when at [facility] for which he had 2 stents placed in his ramus intermedius branch and in a staged fashion came back for 2 stents in his left anterior descending artery.  Distal circumflex was described as subtotally occluded and his right coronary artery was described as diffusely diseased with a distal occlusion.     Over the years he was seen by Dr. Rivas and Dr. Borrego.  Once for SVT for which he had a benign EP evaluation and secondly for nocturnal bradycardia which required no treatment.     Recurrent exertional angina and abnormal stress test resulted in repeat coronary angiography in April 2023 demonstrating a distal occlusion of his ramus intermedius, 70% ostial circumflex stenosis 85% stenosis in his distal circumflex 100% occlusion of his mid LAD and a 60% stenosis in his proximal LAD.  He had poor distal targets and recommendation was to treat him medically.  We initiated Imdur.      I saw Jeff this past summer because nephrology questioned his bradycardia.  They stopped his diuretic as well.  When I saw him I was concerned about hypertension and added amlodipine 2.5 mg to his regiment.  I was also worried about chronotropic incompetence and decreased his metoprolol tartrate to 12.5 mg twice daily.     He thought he was improved with the decreased dose of metoprolol but states he also had been seeing the chiropractor and does not know how  much was due to that.  When I saw him last month I tried stopping his metoprolol altogether.     He states he thinks he has been doing fairly well.  Although notes his back is also doing better.  Today he parked on the B level climb the stairs to the sea level which is something he rarely does came all the way across the [place] and his watch told him his heart rate was 114.  He was having a little angina.  But he states just by waiting in the line and letting his heart rate comes down this resolved.  He states he exercises 30 minutes every day sometimes will exercise more than once a day and normally this causes him no symptoms.  He has not had any more warnings from his watch that his heart rates in the 40s.  Although this was always a nocturnal phenomenon.    I did repeat his 7-day Zio patch which showed average heart rate of 63 ranging from .  He had 2 runs of SVT fastest 5 beats at 152 bpm.    When I saw him last I also started hydrochlorothiazide 50 mg daily.  BMP from last week shows creatinine up to 2.54 with a BUN of 27.6.  I asked him to cut down on his hydrochlorothiazide to 25 mg daily.  Will plan on rechecking his BMP next week.  Blood pressure is still quite high at 155/96.        Assessment and plan.  Angina has been very well-controlled.  Chronotropic incompetence is much improved.  At this time I plan on trying to reintroduce Coreg 6.25 mg twice daily.  As I told him we are trying to get the [place] dose here such that were controlling his upper heart rate to avoid angina but avoiding chronotropic incompetence and exercise intolerance.    Will follow-up on his blood work next week with a lower dose of hydrochlorothiazide to see how this affects his electrolytes and creatinine.    Congratulated him on his exercise regiment encouraged him continue to do so.     I will have him follow-up with my JUANA in 1 month.  I have asked that he check his blood pressure a couple times in the interim to see  what his blood pressure is running at home.  He still has well-controlled peripheral edema left greater than right obviously contributed to by his amlodipine.     I would like to add spironolactone both for his peripheral edema and his blood pressure but I am not sure his kidney function or electrolytes will tolerate it.      Fasting lipid profile on 40 mg of simvastatin in the evening is excellent with total cholesterol of 84, HDL 47, LDL of 26 and triglycerides of 56.       I will plan on seeing her back in 4 months.  If you have any problems I be glad to see him sooner.     Thank you for allow me to participate in this patient's care.  Sincerely,                                Jordan Dixon MD [facility]       Today's clinic visit entailed:  Review of the result(s) of each unique test - lab work, event monitor  Ordering of each unique test  Prescription drug management  43 minutes spent by me on the date of the encounter doing chart review, history and exam, documentation and further activities per the note  Provider  Link to [employer] Help Grid     The level of medical decision making during this visit was of moderate complexity.      Orders Placed This Encounter   Procedures    Basic metabolic panel    Basic metabolic panel    Follow-Up with Cardiology JUANA    Follow-Up with Cardiology       Orders Placed This Encounter   Medications    carvedilol (COREG) 6.25 MG tablet     Sig: Take 1 tablet (6.25 mg) by mouth 2 times daily (with meals)     Dispense:  180 tablet     Refill:  4       There are no discontinued medications.      Encounter Diagnoses   Name Primary?    Essential hypertension     Atherosclerosis of native coronary artery of native heart with stable angina pectoris (H24) Yes    Stage 3b chronic kidney disease (H)     Chronotropic incompetence     Hypercholesterolemia     GALIDNO (dyspnea on exertion)     Peripheral edema        CURRENT MEDICATIONS:  Current Outpatient Medications   Medication Sig Dispense Refill     acetic acid-hydrocortisone (VOSOL-HC) 1-2 % otic solution INSTILL 1 DROP INTO BOTH   EARS TWO TIMES A DAY (Patient taking differently: prn) 10 mL 0    amLODIPine (NORVASC) 5 MG tablet Take 1 tablet (5 mg) by mouth daily 90 tablet 3    Ascorbic Acid (VITAMIN C PO) Take 1,000 mg by mouth 2 times daily      calcitRIOL (ROCALTROL) 0.25 MCG capsule Take 0.25 mcg by mouth daily       carvedilol (COREG) 6.25 MG tablet Take 1 tablet (6.25 mg) by mouth 2 times daily (with meals) 180 tablet 4    chlorhexidine (PERIDEX) 0.12 % solution USE 1/2 OZ TO SWISH FOR 30 SECONDS ONCE D  3    clopidogrel (PLAVIX) 75 MG tablet Take 1 tablet (75 mg) by mouth daily 90 tablet 3    Cyanocobalamin (B-12) 1000 MCG CAPS Take 1 mg by mouth daily      dapagliflozin (FARXIGA) 5 MG TABS tablet Take 5 mg by mouth daily      famotidine (PEPCID) 40 MG tablet TAKE 1 TABLET DAILY 90 tablet 3    ferrous sulfate (FEROSUL) 325 (65 Fe) MG tablet Take 325 mg by mouth daily      glimepiride (AMARYL) 1 MG tablet TAKE 2 TABLETS EVERY       MORNING BEFORE BREAKFAST 180 tablet 1    hydrochlorothiazide (HYDRODIURIL) 50 MG tablet Take 0.5 tablets (25 mg) by mouth daily      hydrocortisone 2.5 % cream APPLY TOPICALLY TWO TIMES ADAY (Patient taking differently: 2 times daily as needed) 28.35 g 11    isosorbide mononitrate (IMDUR) 30 MG 24 hr tablet Take 1 tablet (30 mg) by mouth daily (Patient taking differently: Take 30 mg by mouth daily Patient is taking a full tablet (30mg). 3-12-24) 90 tablet 0    JANUVIA 50 MG tablet TAKE 1 TABLET DAILY 90 tablet 1    lisinopril (ZESTRIL) 20 MG tablet Take 20 mg by mouth daily      nitroGLYcerin (NITROSTAT) 0.6 MG sublingual tablet DISSOLVE 1 TABLET UNDER THETONGUE AS NEEDED. 100 tablet 0    omega-3 acid ethyl esters (LOVAZA) 1 g capsule TAKE 1 CAPSULE TWICE DAILY 180 capsule 3    Pyridoxine HCl (B-6) 100 MG TABS Take 100 mg by mouth daily      VITAMIN E NATURAL PO Take 400 Units by mouth daily      ZOCOR 40 MG tablet PLEASE  UPDATE FOR ANY MEDICATION CHANGE 90 tablet 3    blood glucose (NO BRAND SPECIFIED) lancing device Device to be used with lancets. (Patient not taking: Reported on 3/12/2024) 1 each 0    blood glucose (NO BRAND SPECIFIED) test strip Use to test blood sugar 1 times daily or as directed. (Patient not taking: Reported on 3/12/2024) 100 strip 1    Continuous Blood Gluc Sensor (FREESTYLE SHARRI 14 DAY SENSOR) MISC 1 each every 14 days Use 1 Sensor every 14 days. Use to read blood sugars per 's instructions. (Patient not taking: Reported on 3/12/2024) 2 each 5    Continuous Blood Gluc Sensor (FREESTYLE SHARRI 2 SENSOR) MISC 1 each every 14 days Use 1 sensor every 14 days. Use to read blood sugars per 's instructions. 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    Heart attack (H)     Hernia, abdominal     Hyperlipidaemia     Mumps     Obese     Other and unspecified hyperlipidemia     Other premature beats     Palpitations     Phlebitis and thrombophlebitis of other deep vessels of lower extremities 2000    has had 2-3 episodes    Stented coronary artery      4 stents    Syncope     Syncope     Thrombosis of leg     Type II or unspecified type diabetes mellitus without mention of complication, not stated as uncontrolled     Unspecified essential hypertension        PAST SURGICAL HISTORY:  Past Surgical History:   Procedure Laterality Date    ABDOMEN SURGERY      Hernia naval approx 25 years ago    BIOPSY  8/2016    CARDIAC SURGERY      COLONOSCOPY      CORONARY ANGIOGRAPHY ADULT ORDER   2000    75% distal LAD stenosis, 80% third diagonal stenosis, 75-80% mid ramus stenosis, ostial 60% stenosis in circumflex w/90% stenosis in distal circumflex    CORONARY ANGIOGRAPHY ADULT ORDER      4 stents placed    CV CORONARY ANGIOGRAM N/A 2023    Procedure: Coronary Angiogram;  Surgeon: Rafita Vela MD;  Location:  HEART CARDIAC CATH LAB    CV LEFT HEART CATH N/A 2023    Procedure: Left Heart Catheterization;  Surgeon: Rafita Vela MD;  Location:  HEART CARDIAC CATH LAB    CV PCI N/A 2023    Procedure: Percutaneous Coronary Intervention;  Surgeon: Rafita Vela MD;  Location:  HEART CARDIAC CATH LAB    EP ABLATION / EP STUDIES  3/25/2014    HEART CATH, ANGIOPLASTY      HYDROCELECTOMY SCROTAL Right 10/6/2016    Procedure: HYDROCELECTOMY SCROTAL;  Surgeon: Jordan Chandra MD;  Location: [facility]    ORTHOPEDIC SURGERY      Meniscus Orthoscopic surgery left knee.    TESTICLE SURGERY      ZZC NONSPECIFIC PROCEDURE      colonoscopy approx  done elsewhere       FAMILY HISTORY:  Family History   Problem Relation Age of Onset    Musculoskeletal Disorder Mother         spinal stenosis    Cancer Mother         cancer kidney age 85    Hypertension Mother         Dead    Diabetes Father         Dead    Diabetes Brother         Type 1    Heart Disease Brother        SOCIAL HISTORY:  Social History     Socioeconomic History    Marital status:      Spouse name: Bethany    Number of children: 3    Years of education: None    Highest education level: None   Occupational History    Occupation: Computers/     Employer: [employer]      Comment: [employer]   Tobacco Use    Smoking status: Former     Packs/day: 3.00     Years: 6.00     Additional pack years: 0.00     Total pack years: 18.00     Types: Cigarettes, Cigars, Pipe     Start date: 1961     Quit date: 1967     Years since quittin.2    Smokeless tobacco: Never    Tobacco  "comments:     quit 1960s   Vaping Use    Vaping Use: Never used   Substance and Sexual Activity    Alcohol use: Yes     Comment: 1 beer a week    Drug use: No    Sexual activity: Not Currently   Other Topics Concern    Parent/sibling w/ CABG, MI or angioplasty before 65F 55M? No       Review of Systems:  Skin:  Negative       Eyes:  Positive for glasses;contacts    ENT:  not assessed   itching ears  Respiratory:  Positive for dyspnea on exertion sob with stairs   Cardiovascular:  Negative Positive for;chest pain;edema chest tightness and sob with walking, but symptoms have improved; leg edema has improved.  Gastroenterology: not assessed   Hiatal Hernia  Genitourinary:  Positive for urinary frequency From Lasix  Musculoskeletal:  Negative back pain Occasional muscle cramps - recently in the hands but has dealt with this his entire life. feels some sharp pains in his feet - does not linger (within the last months)  Neurologic:  Negative      Psychiatric:  not assessed      Heme/Lymph/Imm:  Positive for easy bruising    Endocrine:  Positive for diabetes      Physical Exam:  Vitals: BP (!) 155/96   Pulse 59   Ht 1.778 m (5' 10\")   Wt 90.7 kg (199 lb 14.4 oz)   SpO2 100%   BMI 28.68 kg/m      Constitutional:  cooperative, alert and oriented, well developed, well nourished, in no acute distress overweight      Skin:  warm and dry to the touch, no apparent skin lesions or masses noted          Head:  normocephalic, no masses or lesions        Eyes:  pupils equal and round, conjunctivae and lids unremarkable, sclera white, no xanthalasma, EOMS intact, no nystagmus        Lymph:      ENT:  no pallor or cyanosis, dentition good        Neck:  no carotid bruit        Respiratory:  normal breath sounds, clear to auscultation, normal A-P diameter, normal symmetry, normal respiratory excursion, no use of accessory muscles         Cardiac: regular rhythm;no murmurs, gallops or rubs detected                pulses full and " equal                                        GI:           Extremities and Muscular Skeletal:  no spinal abnormalities noted;normal muscle strength and tone   bilateral LE edema;L greater than R;1+;trace     Wears compression stockings    Neurological:  no gross motor deficits        Psych:  affect appropriate, oriented to time, person and place        CC  Jordan Dixon MD  [address]  [place] [address]      Thank you for allowing me to participate in the care of your patient.      Sincerely,     Jordan Dixon MD     [facility]

## 2024-03-15 ENCOUNTER — LAB (OUTPATIENT)
Dept: LAB | Facility: CLINIC | Age: 79
End: 2024-03-15
Payer: MEDICARE

## 2024-03-15 ENCOUNTER — TELEPHONE (OUTPATIENT)
Dept: CARDIOLOGY | Facility: CLINIC | Age: 79
End: 2024-03-15

## 2024-03-15 DIAGNOSIS — I10 ESSENTIAL HYPERTENSION: ICD-10-CM

## 2024-03-15 DIAGNOSIS — N18.32 STAGE 3B CHRONIC KIDNEY DISEASE (H): ICD-10-CM

## 2024-03-15 LAB
ANION GAP SERPL CALCULATED.3IONS-SCNC: 10 MMOL/L (ref 7–15)
BUN SERPL-MCNC: 23.2 MG/DL (ref 8–23)
CALCIUM SERPL-MCNC: 9.2 MG/DL (ref 8.8–10.2)
CHLORIDE SERPL-SCNC: 100 MMOL/L (ref 98–107)
CREAT SERPL-MCNC: 2.28 MG/DL (ref 0.67–1.17)
DEPRECATED HCO3 PLAS-SCNC: 26 MMOL/L (ref 22–29)
EGFRCR SERPLBLD CKD-EPI 2021: 28 ML/MIN/1.73M2
GLUCOSE SERPL-MCNC: 164 MG/DL (ref 70–99)
POTASSIUM SERPL-SCNC: 3.8 MMOL/L (ref 3.4–5.3)
SODIUM SERPL-SCNC: 136 MMOL/L (ref 135–145)

## 2024-03-15 PROCEDURE — 80048 BASIC METABOLIC PNL TOTAL CA: CPT | Performed by: INTERNAL MEDICINE

## 2024-03-15 PROCEDURE — 36415 COLL VENOUS BLD VENIPUNCTURE: CPT | Performed by: INTERNAL MEDICINE

## 2024-03-15 NOTE — TELEPHONE ENCOUNTER
BMP routed to Dr Dixon, drawn for recheck of kidney function after decreasing hydrochlorothiazide to 25mg daily on 3/8. JUANA follow up and labs are scheduled for 5/1.       Component      Latest Ref Rng 3/7/2024  3:12 PM 3/15/2024  3:08 PM   Sodium      135 - 145 mmol/L 138  136    Potassium      3.4 - 5.3 mmol/L 3.8  3.8    Chloride      98 - 107 mmol/L 101  100    Carbon Dioxide (CO2)      22 - 29 mmol/L 28  26    Anion Gap      7 - 15 mmol/L 9  10    Urea Nitrogen      8.0 - 23.0 mg/dL 27.6 (H)  23.2 (H)    Creatinine      0.67 - 1.17 mg/dL 2.54 (H)  2.28 (H)    GFR Estimate      >60 mL/min/1.73m2 25 (L)  28 (L)    Calcium      8.8 - 10.2 mg/dL 9.5  9.2    Glucose      70 - 99 mg/dL 144 (H)  164 (H)

## 2024-03-16 ENCOUNTER — HEALTH MAINTENANCE LETTER (OUTPATIENT)
Age: 79
End: 2024-03-16

## 2024-03-18 NOTE — TELEPHONE ENCOUNTER
Jordan Dixon MD  You3 days ago     Creatinine is better       Spoke to patient and reviewed improved labs. Plan to follow up 5/1 as scheduled. Pt verbalized understanding, no questions at this time.

## 2024-04-29 NOTE — PROGRESS NOTES
HISTORY OF PRESENT ILLNESS:    This is a 78 year old male who follows with Dr Dixon at [facility]  His past medical history includes:  Coronary artery disease, bradycardia, hypertension, hyperlipidemia, stage III CKD, type II diabetes, and peripheral edema     Mr Ricks underwent ramus intermedius branch stenting (2003) and then staged stenting x2 to his LAD.  At that time, he was noted to have an occluded distal CFX.     He has seen our EP physicians some over the years for SVT and nocturnal bradycardia which did not require any treatment for.       A stress ECHO (4/2023) showed an apical distal LAD wall motion abnormality at rest which worsened with exercise.  EKG showed underlying rBBB and left anterior fascicular block.Coronary angiography showed an occluded ramus branch, 70% ostial CFX disease, 85% disease in distal CFX, 100% occlusion of his mid-LAD (at previous stented site) with 60% ostial-prox LAD in-stent stenosis.  He was noted to have poor targets and medical therapy was recommended and he was started on Imdur  His anginal symptoms have been minimal since then.    He noted some low heart rates on his iWatch during sleep  There was some concern for chronotropic incompetence and his Metoprolol dose was lowered last year   This was eventually stopped a few months ago and has continued to have some heart rates in the 40s nocturnally    Ziopatch (3/2024) showed average HR 63 bpm, rangng from  bpm  2 runst of SVT     He has developed worsening renal function and was taken off Lasix by  his nephrologist  He was then placed on hydrochlorothiazide by Dr Dixon up to 50 mg/day  however this was reduced to 25 mg/day due to creatinine up to 2.5.    Due to ongoing hypertension, Coreg was recently added back cautiously  Our visit today is for further review and labs          Our visit today is for further review.    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   He denies any significant  "lightheadedness or dizziness  He checks his heart rate via iWatch and gets notifications when his heart rate goes below 40 bpm for 10 minutes  Since the addition of carvedilol  He has noted some of these alerts during the daytime He notes these happen when he has been sitting at the computer  His heart rates rise once he gets up and walks  He does not notice any low heart rates on days that he is active and not sedentary at work  He used to only get the low heart rates at night  He has not had any recent chest \"discomfort\" or significant shortness of breath  This past month he has started on a low salt diet with his wife  His leg swelling has improved  He wears support socks faithfully                VITAL SIGNS:  BP:  124/64  Pulse:  50  Weight: 201 lbs    Labs today:  Sodium: 137  Potassium: 4.1  BUN: 35  Creatinine: 2.36     IMPRESSION AND PLAN:     Coronary Artery Disease:   -s/p stenting x2 to Ramus and x2 to LAD (2003)  -cath (4/7/23) showed mostly diffuse distal and branch vessel disease with in-stent LAD stenosis not easily fixed by stenting.  Medical management advised  -on Imdur  Denies any significant angina  -chronic Plavix      Hypertension:  -on Amlodipine 5 mg, hydrochlorothiazide 25 mg BID,  Imdur 30 mg, Lisinopril 20 mg, Coreg 6.25 mg BID  -BP well controlled  -will reduce Coreg to 3.125 mg BID due to more asymptomatic bradycardia during the daytime  -will reduce hydrochlorothiazide to 25 mg to take only ONCE A DAY  -return in 1 month for BMP and BP check with RN     Asymptomatic Bradycardia:  -noted some low heart rates when sedentary during the daytime  -no dizziness  -reduce Coreg to 3.125 mg BID     Hyperlipidemia:  -on Simvastatin 40 mg  -DUE FOR LIPIDS AT PMD     Stage III-IV CKD:  -follows with Dr Alvarez  -creatinine 2.36 on hydrochlorothiazide 25 mg BID and Lisinopril 20 mg    -will reduce hydrochlorothiazide to 25 mg every am       Type II Diabetes:  -Hgb A1C  7.9  -on Farxiga     Untreated " Sleep Apnea  -did not like CPAP when he tried it 20 yrs ago    The total time for the visit today was 32 minutes which includes patient visit, reviewing of records, discussion, and placing of orders of the outpatient coordination of cardiovascular care as described.  The level of medical decision making during this visit was of moderate complexity.  Thank you for allowing me to participate in their care.    The longitudinal plan of care for hypertension as documented were addressed during this visit. Due to the added complexity in care, I will continue to support Jeff or Ferdinand in the subsequent management and with ongoing continuity of care.      Orders Placed This Encounter   Procedures    Basic metabolic panel    Follow-Up with Cardiology Nurse       Orders Placed This Encounter   Medications    carvedilol (COREG) 3.125 MG tablet     Sig: Take 1 tablet (3.125 mg) by mouth 2 times daily (with meals)     Dispense:  180 tablet     Refill:  0    hydroCHLOROthiazide (HYDRODIURIL) 50 MG tablet     Sig: Take 0.5 tablets (25 mg) by mouth daily     Dispense:  45 tablet     Refill:  1       Medications Discontinued During This Encounter   Medication Reason    carvedilol (COREG) 6.25 MG tablet     hydrochlorothiazide (HYDRODIURIL) 50 MG tablet     hydrochlorothiazide (HYDRODIURIL) 50 MG tablet          Encounter Diagnoses   Name Primary?    Essential hypertension     Atherosclerosis of native coronary artery of native heart with stable angina pectoris (H24)     Stage 3b chronic kidney disease (H)     Chronotropic incompetence     Hypercholesterolemia     GALINDO (dyspnea on exertion)        CURRENT MEDICATIONS:  Current Outpatient Medications   Medication Sig Dispense Refill    acetic acid-hydrocortisone (VOSOL-HC) 1-2 % otic solution INSTILL 1 DROP INTO BOTH   EARS TWO TIMES A DAY (Patient taking differently: prn) 10 mL 0    amLODIPine (NORVASC) 5 MG tablet Take 1 tablet (5 mg) by mouth daily 90 tablet 3    Ascorbic Acid  (VITAMIN C PO) Take 1,000 mg by mouth 2 times daily      blood glucose (NO BRAND SPECIFIED) lancing device Device to be used with lancets. 1 each 0    blood glucose (NO BRAND SPECIFIED) test strip Use to test blood sugar 1 times daily or as directed. 100 strip 1    calcitRIOL (ROCALTROL) 0.25 MCG capsule Take 0.25 mcg by mouth daily       carvedilol (COREG) 3.125 MG tablet Take 1 tablet (3.125 mg) by mouth 2 times daily (with meals) 180 tablet 0    chlorhexidine (PERIDEX) 0.12 % solution USE 1/2 OZ TO SWISH FOR 30 SECONDS ONCE D  3    clopidogrel (PLAVIX) 75 MG tablet Take 1 tablet (75 mg) by mouth daily 90 tablet 3    Continuous Blood Gluc Sensor (FREESTYLE SHARRI 14 DAY SENSOR) MISC 1 each every 14 days Use 1 Sensor every 14 days. Use to read blood sugars per 's instructions. 2 each 5    Continuous Blood Gluc Sensor (FREESTYLE SHARRI 2 SENSOR) MISC 1 each every 14 days Use 1 sensor every 14 days. Use to read blood sugars per 's instructions. 2 each 5    Cyanocobalamin (B-12) 1000 MCG CAPS Take 1 mg by mouth daily      dapagliflozin (FARXIGA) 5 MG TABS tablet Take 5 mg by mouth daily      famotidine (PEPCID) 40 MG tablet TAKE 1 TABLET DAILY 90 tablet 3    ferrous sulfate (FEROSUL) 325 (65 Fe) MG tablet Take 325 mg by mouth daily      glimepiride (AMARYL) 1 MG tablet TAKE 2 TABLETS EVERY       MORNING BEFORE BREAKFAST 180 tablet 1    hydroCHLOROthiazide (HYDRODIURIL) 50 MG tablet Take 0.5 tablets (25 mg) by mouth daily 45 tablet 1    hydrocortisone 2.5 % cream APPLY TOPICALLY TWO TIMES ADAY (Patient taking differently: 2 times daily as needed) 28.35 g 11    isosorbide mononitrate (IMDUR) 30 MG 24 hr tablet Take 1 tablet (30 mg) by mouth daily (Patient taking differently: Take 30 mg by mouth daily Patient is taking a full tablet (30mg). 3-12-24) 90 tablet 0    JANUVIA 50 MG tablet TAKE 1 TABLET DAILY 90 tablet 1    lisinopril (ZESTRIL) 20 MG tablet Take 20 mg by mouth daily      nitroGLYcerin  (NITROSTAT) 0.6 MG sublingual tablet DISSOLVE 1 TABLET UNDER THETONGUE AS NEEDED. 100 tablet 0    omega-3 acid ethyl esters (LOVAZA) 1 g capsule TAKE 1 CAPSULE TWICE DAILY 180 capsule 3    Pyridoxine HCl (B-6) 100 MG TABS Take 100 mg by mouth daily      VITAMIN E NATURAL PO Take 400 Units by mouth daily      ZOCOR 40 MG tablet PLEASE UPDATE FOR ANY MEDICATION CHANGE (Patient not taking: Reported on 5/1/2024) 90 tablet 3       ALLERGIES     Allergies   Allergen Reactions    No Known Drug Allergy        PAST MEDICAL HISTORY:  Past Medical History:   Diagnosis Date    Chronic kidney disease     Coronary atherosclerosis of unspecified type of vessel, native or graft 10/03    at [facility]:  had 4 stents done following an MI    DDD (degenerative disc disease), lumbosacral 5/21/2021    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    Heart attack (H)     Hernia, abdominal     Hyperlipidaemia     Mumps     Obese     Other and unspecified hyperlipidemia     Other premature beats     Palpitations     Phlebitis and thrombophlebitis of other deep vessels of lower extremities 2000    has had 2-3 episodes    Stented coronary artery      4 stents    Syncope     Syncope     Thrombosis of leg     Type II or unspecified type diabetes mellitus without mention of complication, not stated as uncontrolled     Unspecified essential hypertension        PAST SURGICAL HISTORY:  Past Surgical History:   Procedure Laterality Date    ABDOMEN SURGERY      Hernia naval approx 25 years ago    BIOPSY  8/2016    CARDIAC SURGERY      COLONOSCOPY      CORONARY ANGIOGRAPHY ADULT ORDER  8/28/2000    75% distal LAD stenosis, 80% third diagonal stenosis, 75-80% mid ramus stenosis, ostial 60% stenosis in circumflex w/90% stenosis in distal circumflex    CORONARY ANGIOGRAPHY ADULT ORDER  2003    4 stents placed    CV CORONARY ANGIOGRAM N/A 4/14/2023    Procedure: Coronary Angiogram;  Surgeon: Rafita Vela MD;  Location: [facility] CARDIAC CATH LAB     CV LEFT HEART CATH N/A 2023    Procedure: Left Heart Catheterization;  Surgeon: Rafita Vela MD;  Location:  HEART CARDIAC CATH LAB    CV PCI N/A 2023    Procedure: Percutaneous Coronary Intervention;  Surgeon: Rafita Vela MD;  Location:  HEART CARDIAC CATH LAB    EP ABLATION / EP STUDIES  3/25/2014    HEART CATH, ANGIOPLASTY      HYDROCELECTOMY SCROTAL Right 10/6/2016    Procedure: HYDROCELECTOMY SCROTAL;  Surgeon: Jordan Chandra MD;  Location:  [facility]    ORTHOPEDIC SURGERY      Meniscus Orthoscopic surgery left knee.    TESTICLE SURGERY      ZZC NONSPECIFIC PROCEDURE      colonoscopy approx  done elsewhere       FAMILY HISTORY:  Family History   Problem Relation Age of Onset    Musculoskeletal Disorder Mother         spinal stenosis    Cancer Mother         cancer kidney age 85    Hypertension Mother         Dead    Diabetes Father         Dead    Diabetes Brother         Type 1    Heart Disease Brother        SOCIAL HISTORY:  Social History     Socioeconomic History    Marital status:      Spouse name: Bethany    Number of children: 3    Years of education: None    Highest education level: None   Occupational History    Occupation: [employer]     Employer: [employer]      Comment: [employer]   Tobacco Use    Smoking status: Former     Current packs/day: 0.00     Average packs/day: 3.0 packs/day for 6.0 years (18.0 ttl pk-yrs)     Types: Cigarettes, Cigars, Pipe     Start date: 1961     Quit date: 1967     Years since quittin.3    Smokeless tobacco: Never    Tobacco comments:     quit    Vaping Use    Vaping status: Never Used   Substance and Sexual Activity    Alcohol use: Yes     Comment: 1 beer a week    Drug use: No    Sexual activity: Not Currently   Other Topics Concern    Parent/sibling w/ CABG, MI or angioplasty before 65F 55M? No       Review of Systems:  Skin:  not assessed       Eyes:  not assessed      ENT:  not assessed    "   Respiratory:  Positive for dyspnea on exertion;sleep apnea dyspnea when outside and it's windy   Cardiovascular:    Positive for;edema    Gastroenterology: not assessed      Genitourinary:  not assessed      Musculoskeletal:  not assessed      Neurologic:  not assessed      Psychiatric:  not assessed      Heme/Lymph/Imm:  not assessed      Endocrine:  not assessed        Physical Exam:  Vitals: /64   Pulse 50   Ht 1.778 m (5' 10\")   Wt 91.4 kg (201 lb 9.6 oz)   BMI 28.93 kg/m      Constitutional:  cooperative overweight      Skin:  warm and dry to the touch          Head:  normocephalic, no masses or lesions        Eyes:  pupils equal and round        Lymph:      ENT:  no pallor or cyanosis, dentition good        Neck:  no carotid bruit        Respiratory:  clear to auscultation         Cardiac: regular rhythm;no murmurs, gallops or rubs detected                pulses full and equal                                        GI:           Extremities and Muscular Skeletal:      bilateral LE edema;trace     Wears compression stockings    Neurological:  no gross motor deficits        Psych:  affect appropriate, oriented to time, person and place          CC  Jordan Dixon MD  [id number]  [place] [id number]                    "

## 2024-05-01 ENCOUNTER — LAB (OUTPATIENT)
Dept: LAB | Facility: CLINIC | Age: 79
End: 2024-05-01
Payer: MEDICARE

## 2024-05-01 ENCOUNTER — OFFICE VISIT (OUTPATIENT)
Dept: CARDIOLOGY | Facility: CLINIC | Age: 79
End: 2024-05-01
Attending: INTERNAL MEDICINE
Payer: MEDICARE

## 2024-05-01 VITALS
DIASTOLIC BLOOD PRESSURE: 64 MMHG | HEIGHT: 70 IN | SYSTOLIC BLOOD PRESSURE: 124 MMHG | HEART RATE: 50 BPM | BODY MASS INDEX: 28.86 KG/M2 | WEIGHT: 201.6 LBS

## 2024-05-01 DIAGNOSIS — R06.09 DOE (DYSPNEA ON EXERTION): ICD-10-CM

## 2024-05-01 DIAGNOSIS — I25.118 ATHEROSCLEROSIS OF NATIVE CORONARY ARTERY OF NATIVE HEART WITH STABLE ANGINA PECTORIS (H): ICD-10-CM

## 2024-05-01 DIAGNOSIS — E78.00 HYPERCHOLESTEROLEMIA: ICD-10-CM

## 2024-05-01 DIAGNOSIS — I45.89 CHRONOTROPIC INCOMPETENCE: ICD-10-CM

## 2024-05-01 DIAGNOSIS — I10 ESSENTIAL HYPERTENSION: ICD-10-CM

## 2024-05-01 DIAGNOSIS — N18.32 STAGE 3B CHRONIC KIDNEY DISEASE (H): ICD-10-CM

## 2024-05-01 LAB
ANION GAP SERPL CALCULATED.3IONS-SCNC: 9 MMOL/L (ref 7–15)
BUN SERPL-MCNC: 35.6 MG/DL (ref 8–23)
CALCIUM SERPL-MCNC: 9.4 MG/DL (ref 8.8–10.2)
CHLORIDE SERPL-SCNC: 102 MMOL/L (ref 98–107)
CREAT SERPL-MCNC: 2.36 MG/DL (ref 0.67–1.17)
DEPRECATED HCO3 PLAS-SCNC: 26 MMOL/L (ref 22–29)
EGFRCR SERPLBLD CKD-EPI 2021: 27 ML/MIN/1.73M2
GLUCOSE SERPL-MCNC: 103 MG/DL (ref 70–99)
POTASSIUM SERPL-SCNC: 4.1 MMOL/L (ref 3.4–5.3)
SODIUM SERPL-SCNC: 137 MMOL/L (ref 135–145)

## 2024-05-01 PROCEDURE — 80048 BASIC METABOLIC PNL TOTAL CA: CPT

## 2024-05-01 PROCEDURE — 36415 COLL VENOUS BLD VENIPUNCTURE: CPT

## 2024-05-01 PROCEDURE — 99214 OFFICE O/P EST MOD 30 MIN: CPT | Performed by: NURSE PRACTITIONER

## 2024-05-01 RX ORDER — HYDROCHLOROTHIAZIDE 50 MG/1
25 TABLET ORAL DAILY
Qty: 45 TABLET | Refills: 1 | Status: SHIPPED | OUTPATIENT
Start: 2024-05-01 | End: 2024-07-17

## 2024-05-01 RX ORDER — CARVEDILOL 3.12 MG/1
3.12 TABLET ORAL 2 TIMES DAILY WITH MEALS
Qty: 180 TABLET | Refills: 0 | Status: SHIPPED | OUTPATIENT
Start: 2024-05-01 | End: 2024-07-23

## 2024-05-01 NOTE — LETTER
5/1/2024    Guillermo Deleon MD  [address] [facility] [address]    RE: Jeff Ricks       Dear Colleague,     I had the pleasure of seeing Jeff Jacksond in the [facility].  HISTORY OF PRESENT ILLNESS:    This is a 78 year old male who follows with Dr Dixon at [facility]  His past medical history includes:  Coronary artery disease, bradycardia, hypertension, hyperlipidemia, stage III CKD, type II diabetes, and peripheral edema     Mr Ricks underwent ramus intermedius branch stenting (2003) and then staged stenting x2 to his LAD.  At that time, he was noted to have an occluded distal CFX.     He has seen our EP physicians some over the years for SVT and nocturnal bradycardia which did not require any treatment for.       A stress ECHO (4/2023) showed an apical distal LAD wall motion abnormality at rest which worsened with exercise.  EKG showed underlying rBBB and left anterior fascicular block.Coronary angiography showed an occluded ramus branch, 70% ostial CFX disease, 85% disease in distal CFX, 100% occlusion of his mid-LAD (at previous stented site) with 60% ostial-prox LAD in-stent stenosis.  He was noted to have poor targets and medical therapy was recommended and he was started on Imdur  His anginal symptoms have been minimal since then.    He noted some low heart rates on his iWatch during sleep  There was some concern for chronotropic incompetence and his Metoprolol dose was lowered last year   This was eventually stopped a few months ago and has continued to have some heart rates in the 40s nocturnally    Ziopatch (3/2024) showed average HR 63 bpm, rangng from  bpm  2 runst of SVT     He has developed worsening renal function and was taken off Lasix by  his nephrologist  He was then placed on hydrochlorothiazide by Dr Dixon up to 50 mg/day  however this was reduced to 25 mg/day due to creatinine up to 2.5.    Due to ongoing hypertension, Coreg was  "recently added back cautiously  Our visit today is for further review and labs          Our visit today is for further review.    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   He denies any significant lightheadedness or dizziness  He checks his heart rate via iWatch and gets notifications when his heart rate goes below 40 bpm for 10 minutes  Since the addition of carvedilol  He has noted some of these alerts during the daytime He notes these happen when he has been sitting at the computer  His heart rates rise once he gets up and walks  He does not notice any low heart rates on days that he is active and not sedentary at work  He used to only get the low heart rates at night  He has not had any recent chest \"discomfort\" or significant shortness of breath  This past month he has started on a low salt diet with his wife  His leg swelling has improved  He wears support socks faithfully                VITAL SIGNS:  BP:  124/64  Pulse:  50  Weight: 201 lbs    Labs today:  Sodium: 137  Potassium: 4.1  BUN: 35  Creatinine: 2.36     IMPRESSION AND PLAN:     Coronary Artery Disease:   -s/p stenting x2 to Ramus and x2 to LAD (2003)  -cath (4/7/23) showed mostly diffuse distal and branch vessel disease with in-stent LAD stenosis not easily fixed by stenting.  Medical management advised  -on Imdur  Denies any significant angina  -chronic Plavix      Hypertension:  -on Amlodipine 5 mg, hydrochlorothiazide 25 mg BID,  Imdur 30 mg, Lisinopril 20 mg, Coreg 6.25 mg BID  -BP well controlled  -will reduce Coreg to 3.125 mg BID due to more asymptomatic bradycardia during the daytime  -will reduce hydrochlorothiazide to 25 mg to take only ONCE A DAY  -return in 1 month for BMP and BP check with RN     Asymptomatic Bradycardia:  -noted some low heart rates when sedentary during the daytime  -no dizziness  -reduce Coreg to 3.125 mg BID     Hyperlipidemia:  -on Simvastatin 40 mg  -DUE FOR LIPIDS AT PMD     Stage III-IV " CKD:  -follows with Dr Alvarez  -creatinine 2.36 on hydrochlorothiazide 25 mg BID and Lisinopril 20 mg    -will reduce hydrochlorothiazide to 25 mg every am       Type II Diabetes:  -Hgb A1C  7.9  -on Farxiga     Untreated Sleep Apnea  -did not like CPAP when he tried it 20 yrs ago    The total time for the visit today was 32 minutes which includes patient visit, reviewing of records, discussion, and placing of orders of the outpatient coordination of cardiovascular care as described.  The level of medical decision making during this visit was of moderate complexity.  Thank you for allowing me to participate in their care.    The longitudinal plan of care for hypertension as documented were addressed during this visit. Due to the added complexity in care, I will continue to support Jeff or Ferdinand in the subsequent management and with ongoing continuity of care.      Orders Placed This Encounter   Procedures    Basic metabolic panel    Follow-Up with Cardiology Nurse       Orders Placed This Encounter   Medications    carvedilol (COREG) 3.125 MG tablet     Sig: Take 1 tablet (3.125 mg) by mouth 2 times daily (with meals)     Dispense:  180 tablet     Refill:  0    hydroCHLOROthiazide (HYDRODIURIL) 50 MG tablet     Sig: Take 0.5 tablets (25 mg) by mouth daily     Dispense:  45 tablet     Refill:  1       Medications Discontinued During This Encounter   Medication Reason    carvedilol (COREG) 6.25 MG tablet     hydrochlorothiazide (HYDRODIURIL) 50 MG tablet     hydrochlorothiazide (HYDRODIURIL) 50 MG tablet          Encounter Diagnoses   Name Primary?    Essential hypertension     Atherosclerosis of native coronary artery of native heart with stable angina pectoris (H24)     Stage 3b chronic kidney disease (H)     Chronotropic incompetence     Hypercholesterolemia     GALINDO (dyspnea on exertion)        CURRENT MEDICATIONS:  Current Outpatient Medications   Medication Sig Dispense Refill    acetic acid-hydrocortisone  (VOSOL-HC) 1-2 % otic solution INSTILL 1 DROP INTO BOTH   EARS TWO TIMES A DAY (Patient taking differently: prn) 10 mL 0    amLODIPine (NORVASC) 5 MG tablet Take 1 tablet (5 mg) by mouth daily 90 tablet 3    Ascorbic Acid (VITAMIN C PO) Take 1,000 mg by mouth 2 times daily      blood glucose (NO BRAND SPECIFIED) lancing device Device to be used with lancets. 1 each 0    blood glucose (NO BRAND SPECIFIED) test strip Use to test blood sugar 1 times daily or as directed. 100 strip 1    calcitRIOL (ROCALTROL) 0.25 MCG capsule Take 0.25 mcg by mouth daily       carvedilol (COREG) 3.125 MG tablet Take 1 tablet (3.125 mg) by mouth 2 times daily (with meals) 180 tablet 0    chlorhexidine (PERIDEX) 0.12 % solution USE 1/2 OZ TO SWISH FOR 30 SECONDS ONCE D  3    clopidogrel (PLAVIX) 75 MG tablet Take 1 tablet (75 mg) by mouth daily 90 tablet 3    Continuous Blood Gluc Sensor (FREESTYLE SHARRI 14 DAY SENSOR) [facility] 1 each every 14 days Use 1 Sensor every 14 days. Use to read blood sugars per 's instructions. 2 each 5    Continuous Blood Gluc Sensor (FREESTYLE SHARRI 2 SENSOR) [facility] 1 each every 14 days Use 1 sensor every 14 days. Use to read blood sugars per 's instructions. 2 each 5    Cyanocobalamin (B-12) 1000 MCG CAPS Take 1 mg by mouth daily      dapagliflozin (FARXIGA) 5 MG TABS tablet Take 5 mg by mouth daily      famotidine (PEPCID) 40 MG tablet TAKE 1 TABLET DAILY 90 tablet 3    ferrous sulfate (FEROSUL) 325 (65 Fe) MG tablet Take 325 mg by mouth daily      glimepiride (AMARYL) 1 MG tablet TAKE 2 TABLETS EVERY       MORNING BEFORE BREAKFAST 180 tablet 1    hydroCHLOROthiazide (HYDRODIURIL) 50 MG tablet Take 0.5 tablets (25 mg) by mouth daily 45 tablet 1    hydrocortisone 2.5 % cream APPLY TOPICALLY TWO TIMES ADAY (Patient taking differently: 2 times daily as needed) 28.35 g 11    isosorbide mononitrate (IMDUR) 30 MG 24 hr tablet Take 1 tablet (30 mg) by mouth daily (Patient taking differently: Take  30 mg by mouth daily Patient is taking a full tablet (30mg). 3-12-24) 90 tablet 0    JANUVIA 50 MG tablet TAKE 1 TABLET DAILY 90 tablet 1    lisinopril (ZESTRIL) 20 MG tablet Take 20 mg by mouth daily      nitroGLYcerin (NITROSTAT) 0.6 MG sublingual tablet DISSOLVE 1 TABLET UNDER THETONGUE AS NEEDED. 100 tablet 0    omega-3 acid ethyl esters (LOVAZA) 1 g capsule TAKE 1 CAPSULE TWICE DAILY 180 capsule 3    Pyridoxine HCl (B-6) 100 MG TABS Take 100 mg by mouth daily      VITAMIN E NATURAL PO Take 400 Units by mouth daily      ZOCOR 40 MG tablet PLEASE UPDATE FOR ANY MEDICATION CHANGE (Patient not taking: Reported on 5/1/2024) 90 tablet 3       ALLERGIES     Allergies   Allergen Reactions    No Known Drug Allergy        PAST MEDICAL HISTORY:  Past Medical History:   Diagnosis Date    Chronic kidney disease     Coronary atherosclerosis of unspecified type of vessel, native or graft 10/03    at [facility]:  had 4 stents done following an MI    DDD (degenerative disc disease), lumbosacral 5/21/2021    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    Heart attack (H)     Hernia, abdominal     Hyperlipidaemia     Mumps     Obese     Other and unspecified hyperlipidemia     Other premature beats     Palpitations     Phlebitis and thrombophlebitis of other deep vessels of lower extremities 2000    has had 2-3 episodes    Stented coronary artery      4 stents    Syncope     Syncope     Thrombosis of leg     Type II or unspecified type diabetes mellitus without mention of complication, not stated as uncontrolled     Unspecified essential hypertension        PAST SURGICAL HISTORY:  Past Surgical History:   Procedure Laterality Date    ABDOMEN SURGERY      Hernia naval approx 25 years ago    BIOPSY  8/2016    CARDIAC SURGERY      COLONOSCOPY      CORONARY ANGIOGRAPHY ADULT ORDER  8/28/2000    75% distal LAD stenosis, 80% third diagonal stenosis, 75-80% mid ramus stenosis, ostial 60% stenosis in circumflex w/90% stenosis in distal  circumflex    CORONARY ANGIOGRAPHY ADULT ORDER      4 stents placed    CV CORONARY ANGIOGRAM N/A 2023    Procedure: Coronary Angiogram;  Surgeon: Rafita Vela MD;  Location:  HEART CARDIAC CATH LAB    CV LEFT HEART CATH N/A 2023    Procedure: Left Heart Catheterization;  Surgeon: Rafita Vela MD;  Location:  HEART CARDI[facility] CATH LAB    CV PCI N/A 2023    Procedure: Percutaneous Coronary Intervention;  Surgeon: Rafita Vela MD;  Location:  HEART CARDIAC CATH LAB    EP ABLATION / EP STUDIES  3/25/2014    HEART CATH, ANGIOPLASTY      HYDROCELECTOMY SCROTAL Right 10/6/2016    Procedure: HYDROCELECTOMY SCROTAL;  Surgeon: Jordan Chandra MD;  Location: [facility]    ORTHOPEDIC SURGERY      Meniscus Orthoscopic surgery left knee.    TESTICLE SURGERY      ZZC NONSPECIFIC PROCEDURE      colonoscopy approx  done elsewhere       FAMILY HISTORY:  Family History   Problem Relation Age of Onset    Musculoskeletal Disorder Mother         spinal stenosis    Cancer Mother         cancer kidney age 85    Hypertension Mother         Dead    Diabetes Father         Dead    Diabetes Brother         Type 1    Heart Disease Brother        SOCIAL HISTORY:  Social History     Socioeconomic History    Marital status:      Spouse name: Bethany    Number of children: 3    Years of education: None    Highest education level: None   Occupational History    Occupation: Computers/     Employer: [employer]      Comment: [employer]   Tobacco Use    Smoking status: Former     Current packs/day: 0.00     Average packs/day: 3.0 packs/day for 6.0 years (18.0 ttl pk-yrs)     Types: Cigarettes, Cigars, Pipe     Start date: 1961     Quit date: 1967     Years since quittin.3    Smokeless tobacco: Never    Tobacco comments:     quit    Vaping Use    Vaping status: Never Used   Substance and Sexual Activity    Alcohol use: Yes     Comment: 1 beer a week    Drug use:  "No    Sexual activity: Not Currently   Other Topics Concern    Parent/sibling w/ CABG, MI or angioplasty before 65F 55M? No       Review of Systems:  Skin:  not assessed       Eyes:  not assessed      ENT:  not assessed      Respiratory:  Positive for dyspnea on exertion;sleep apnea dyspnea when outside and it's windy   Cardiovascular:    Positive for;edema    Gastroenterology: not assessed      Genitourinary:  not assessed      Musculoskeletal:  not assessed      Neurologic:  not assessed      Psychiatric:  not assessed      Heme/Lymph/Imm:  not assessed      Endocrine:  not assessed        Physical Exam:  Vitals: /64   Pulse 50   Ht 1.778 m (5' 10\")   Wt 91.4 kg (201 lb 9.6 oz)   BMI 28.93 kg/m      Constitutional:  cooperative overweight      Skin:  warm and dry to the touch          Head:  normocephalic, no masses or lesions        Eyes:  pupils equal and round        Lymph:      ENT:  no pallor or cyanosis, dentition good        Neck:  no carotid bruit        Respiratory:  clear to auscultation         Cardiac: regular rhythm;no murmurs, gallops or rubs detected                pulses full and equal                                        GI:           Extremities and Muscular Skeletal:      bilateral LE edema;trace     Wears compression stockings    Neurological:  no gross motor deficits        Psych:  affect appropriate, oriented to time, person and place          CC  Jordan Dixon MD  [address]  [place] [address]                      Thank you for allowing me to participate in the care of your patient.      Sincerely,     HÉCTOR Carrillo [facility]  "

## 2024-05-01 NOTE — PATIENT INSTRUCTIONS
Lower your carvedilol to 3.125 mg twice a day  (new pill sent to pharmacy)  Lower your hydrochlorothiazide to 25 mg every morning (1/2 tab, only once a day)    Call with more concerning low heart rates  Repeat labs and and BP check with RN in 1 month  Arrange fasting lipids at your primary clinic    It was a pleasure seeing you today     Please do not hesitate to call my nurse team with any questions or concerns:  [phone]    Scheduling number:  [phone]    HÉCTOR Nicholson, CNP

## 2024-06-03 ENCOUNTER — DOCUMENTATION ONLY (OUTPATIENT)
Dept: CARDIOLOGY | Facility: CLINIC | Age: 79
End: 2024-06-03

## 2024-06-03 ENCOUNTER — ALLIED HEALTH/NURSE VISIT (OUTPATIENT)
Dept: CARDIOLOGY | Facility: CLINIC | Age: 79
End: 2024-06-03
Payer: MEDICARE

## 2024-06-03 ENCOUNTER — LAB (OUTPATIENT)
Dept: LAB | Facility: CLINIC | Age: 79
End: 2024-06-03
Payer: MEDICARE

## 2024-06-03 VITALS — SYSTOLIC BLOOD PRESSURE: 138 MMHG | DIASTOLIC BLOOD PRESSURE: 60 MMHG | HEART RATE: 46 BPM

## 2024-06-03 DIAGNOSIS — I10 ESSENTIAL HYPERTENSION: ICD-10-CM

## 2024-06-03 LAB
ANION GAP SERPL CALCULATED.3IONS-SCNC: 14 MMOL/L (ref 7–15)
BUN SERPL-MCNC: 39.8 MG/DL (ref 8–23)
CALCIUM SERPL-MCNC: 9.3 MG/DL (ref 8.8–10.2)
CHLORIDE SERPL-SCNC: 104 MMOL/L (ref 98–107)
CREAT SERPL-MCNC: 2.22 MG/DL (ref 0.67–1.17)
DEPRECATED HCO3 PLAS-SCNC: 20 MMOL/L (ref 22–29)
EGFRCR SERPLBLD CKD-EPI 2021: 29 ML/MIN/1.73M2
GLUCOSE SERPL-MCNC: 102 MG/DL (ref 70–99)
POTASSIUM SERPL-SCNC: 3.9 MMOL/L (ref 3.4–5.3)
SODIUM SERPL-SCNC: 138 MMOL/L (ref 135–145)

## 2024-06-03 PROCEDURE — 36415 COLL VENOUS BLD VENIPUNCTURE: CPT | Performed by: NURSE PRACTITIONER

## 2024-06-03 PROCEDURE — 99207 PR NO CHARGE LOS: CPT

## 2024-06-03 PROCEDURE — 80048 BASIC METABOLIC PNL TOTAL CA: CPT | Performed by: NURSE PRACTITIONER

## 2024-06-03 NOTE — PROGRESS NOTES
Improved renal insufficiency on lower dose of hydrochlorothiazide (creatinine still 2.2, down from 2.5)  He follows with Nephrology  BP controlled  HRs improved to 50-60 bpm, as opposed to HRs in the 40s  Continue current medical regimen  Follow up as planned (7/2024)

## 2024-06-03 NOTE — RESULT ENCOUNTER NOTE
"Per June, \"Improved renal insufficiency on lower dose of hydrochlorothiazide  Follows with Nephrology\"    Updated patient with results and comments via [employer]. "

## 2024-06-03 NOTE — PROGRESS NOTES
[facility] BLOOD PRESSURE CHECK     Last office visit: 5/1/24    Previous blood pressure: 124/64 mm Hg  Previous heart rate: 50 bpm      Time of visit: 2:15 pm    Morning medications were taken at: 7:45 am     Today's blood pressure: 138/60 mm Hg  Today's heart rate: 46 bpm       Additional Comments: Per pt, heart rates have been in the 50s and 60s more so than in the past. Not as many in the 40s and below. Has noted some in the 70s as well.       Results routed to:  Nurses      Ordering Provider: Tete Mcguire  In clinic Provider: Dr. Traore

## 2024-06-12 ENCOUNTER — TELEPHONE (OUTPATIENT)
Dept: CARDIOLOGY | Facility: CLINIC | Age: 79
End: 2024-06-12
Payer: MEDICARE

## 2024-06-12 NOTE — TELEPHONE ENCOUNTER
Fax received from patient's insurance to report possible medication non-adherence for imdur, last filled for a 90 day supply on 1/10/24.     Epic reviewed, last Rx was sent 8/2023 for 90 days with no refills. As of OV 5/1/24, patient was to continue imdur.     Spoke to patient, says he is taking imdur as prescribed and has a few bottles still at home, does not need a new refill at this time.

## 2024-06-28 DIAGNOSIS — E11.21 TYPE 2 DIABETES MELLITUS WITH DIABETIC NEPHROPATHY, WITHOUT LONG-TERM CURRENT USE OF INSULIN (H): ICD-10-CM

## 2024-06-28 RX ORDER — SITAGLIPTIN 50 MG/1
TABLET, FILM COATED ORAL
Qty: 90 TABLET | Refills: 1 | Status: SHIPPED | OUTPATIENT
Start: 2024-06-28

## 2024-07-08 ENCOUNTER — MYC REFILL (OUTPATIENT)
Dept: INTERNAL MEDICINE | Facility: CLINIC | Age: 79
End: 2024-07-08
Payer: MEDICARE

## 2024-07-08 DIAGNOSIS — E11.21 TYPE 2 DIABETES MELLITUS WITH DIABETIC NEPHROPATHY, WITHOUT LONG-TERM CURRENT USE OF INSULIN (H): ICD-10-CM

## 2024-07-09 RX ORDER — GLIMEPIRIDE 1 MG/1
1 TABLET ORAL
Qty: 180 TABLET | Refills: 1 | Status: SHIPPED | OUTPATIENT
Start: 2024-07-09 | End: 2024-09-04

## 2024-07-17 ENCOUNTER — OFFICE VISIT (OUTPATIENT)
Dept: CARDIOLOGY | Facility: CLINIC | Age: 79
End: 2024-07-17
Attending: INTERNAL MEDICINE
Payer: MEDICARE

## 2024-07-17 ENCOUNTER — LAB (OUTPATIENT)
Dept: LAB | Facility: CLINIC | Age: 79
End: 2024-07-17
Payer: MEDICARE

## 2024-07-17 VITALS
SYSTOLIC BLOOD PRESSURE: 128 MMHG | HEART RATE: 47 BPM | BODY MASS INDEX: 28.92 KG/M2 | OXYGEN SATURATION: 99 % | DIASTOLIC BLOOD PRESSURE: 52 MMHG | HEIGHT: 70 IN | WEIGHT: 202 LBS

## 2024-07-17 DIAGNOSIS — N18.32 STAGE 3B CHRONIC KIDNEY DISEASE (H): ICD-10-CM

## 2024-07-17 DIAGNOSIS — I45.89 CHRONOTROPIC INCOMPETENCE: ICD-10-CM

## 2024-07-17 DIAGNOSIS — I10 ESSENTIAL HYPERTENSION: ICD-10-CM

## 2024-07-17 DIAGNOSIS — I25.118 ATHEROSCLEROSIS OF NATIVE CORONARY ARTERY OF NATIVE HEART WITH STABLE ANGINA PECTORIS (H): ICD-10-CM

## 2024-07-17 DIAGNOSIS — R06.09 DOE (DYSPNEA ON EXERTION): ICD-10-CM

## 2024-07-17 DIAGNOSIS — E78.00 HYPERCHOLESTEROLEMIA: ICD-10-CM

## 2024-07-17 LAB
ANION GAP SERPL CALCULATED.3IONS-SCNC: 12 MMOL/L (ref 7–15)
BUN SERPL-MCNC: 33.6 MG/DL (ref 8–23)
CALCIUM SERPL-MCNC: 9 MG/DL (ref 8.8–10.4)
CHLORIDE SERPL-SCNC: 102 MMOL/L (ref 98–107)
CREAT SERPL-MCNC: 2.4 MG/DL (ref 0.67–1.17)
EGFRCR SERPLBLD CKD-EPI 2021: 27 ML/MIN/1.73M2
GLUCOSE SERPL-MCNC: 139 MG/DL (ref 70–99)
HCO3 SERPL-SCNC: 24 MMOL/L (ref 22–29)
POTASSIUM SERPL-SCNC: 4.2 MMOL/L (ref 3.4–5.3)
SODIUM SERPL-SCNC: 138 MMOL/L (ref 135–145)

## 2024-07-17 PROCEDURE — 99214 OFFICE O/P EST MOD 30 MIN: CPT | Performed by: INTERNAL MEDICINE

## 2024-07-17 PROCEDURE — G2211 COMPLEX E/M VISIT ADD ON: HCPCS | Performed by: INTERNAL MEDICINE

## 2024-07-17 PROCEDURE — 80061 LIPID PANEL: CPT | Performed by: INTERNAL MEDICINE

## 2024-07-17 PROCEDURE — 36415 COLL VENOUS BLD VENIPUNCTURE: CPT | Performed by: INTERNAL MEDICINE

## 2024-07-17 PROCEDURE — 80048 BASIC METABOLIC PNL TOTAL CA: CPT | Performed by: INTERNAL MEDICINE

## 2024-07-17 RX ORDER — HYDROCHLOROTHIAZIDE 25 MG/1
25 TABLET ORAL DAILY
Status: SHIPPED
Start: 2024-07-17

## 2024-07-17 NOTE — PROGRESS NOTES
It was a pleasure seeing you today and thank you for allowing me to be a part of your health care team.  Should   you have any questions regarding your visit or future needs please feel free to reach out to my care team for assistance.      Thank you, Dr. Jordan Dixon        **Nursing: [phone]       **Scheduling: [phone]

## 2024-07-17 NOTE — PROGRESS NOTES
HPI and Plan:   Mr. Ricks is a very nice 78-year-old gentleman with past medical history significant for hypertension, bradycardia, chronotropic incompetence, diabetes mellitus, coronary artery disease, peripheral edema, stage IIIb renal failure and diabetes mellitus.     Coronary interventions dates back to 2003 when at [facility] he had 2 stents placed in his ramus intermedius branch and in a staged fashion came back for 2 stents in his left anterior descending artery.  Distal circumflex was described as subtotally occluded and his right coronary artery was described as diffusely diseased with a distal occlusion.     Over the years he was seen by Dr. Rivas and Dr. Borrego.  Once for SVT for which he had a benign EP evaluation and secondly for nocturnal bradycardia which required no treatment.     Recurrent exertional angina and abnormal stress test resulted in repeat coronary angiography in April 2023 demonstrating a distal occlusion of his ramus intermedius, 70% ostial circumflex stenosis 85% stenosis in his distal circumflex 100% occlusion of his mid LAD and a 60% stenosis in his proximal LAD.  He had poor distal targets and recommendation was to treat him medically.   Imdur was initiated.      He has asymptomatic bradycardia for which we have titrated his beta-blockers now down to 3.125 of carvedilol twice a day.  He still works on the computer half-time.  And states his watch will tell him at times that his heart rate is down in the 40s.  He is asymptomatic.  He states it always speeds up when he gets up and performs any activities.  He denies having any lightheadedness, dizziness, syncope or near syncope.  He notes no dyspnea on exertion or exercise intolerance.     He states he thinks he has been doing fairly well.    He states he exercises 30 minutes every day sometimes will exercise more than once a day and normally this causes him no symptoms.  He has not had any more warnings from his watch that his  heart rates in the 40s.  Although this was usually a nocturnal phenomenon.     I did repeat his 7-day Zio patch which showed average heart rate of 63 ranging from .  He had 2 runs of SVT fastest 5 beats at 152 bpm.     Because of his rising creatinine I titrated back on his hydrochlorothiazide.    Jeff returns to clinic and state he thinks he is doing quite well.     Assessment and plan.  Angina has been very well-controlled.  Chronotropic incompetence is much improved.  I will continue with his carvedilol 3.125.  I sent in a prescription as he is currently splitting his 6.25's.  I also sent in a prescription for his hydrochlorothiazide as he is splitting of 50.  As I told him we are trying to get the [place] dose here such that were controlling his upper heart rate to avoid angina but avoiding chronotropic incompetence and exercise intolerance.     Creatinine is improved with the lower dose of hydrochlorothiazide.  Now 2.4     I congratulated him on his exercise regiment encouraged him continue to do so.    He is doing quite well.  He uses compression stockings, watches his salt and elevates as necessary.    He has run out of his simvastatin and notes it is not even on his refill list.  I will check a lipid profile on the blood we sandi earlier today and will call in a prescription for a statin when I see his numbers.  Obviously with his severe native vessel coronary disease we want LDL below 55.    I will have him follow-up with my JUANA in 3 month.  I will see him in 6 months.  Sooner if necessary. Thank you for allow me to participate in this patient's care.  Sincerely,                                Jordan Dixon MD [facility]    The longitudinal plan of care for the diagnosis(es)/condition(s) as documented were addressed during this visit. Due to the added complexity in care, I will continue to support Jeff or Ferdinand in the subsequent management and with ongoing continuity of care.         Today's clinic  visit entailed:  Review of the result(s) of each unique test - lab work  Ordering of each unique test  Prescription drug management  35 minutes spent by me on the date of the encounter doing chart review, history and exam, documentation and further activities per the note  Provider  Link to [facility] Help Grid     The level of medical decision making during this visit was of moderate complexity.      Orders Placed This Encounter   Procedures    Basic metabolic panel    Basic metabolic panel    Follow-Up with Cardiology    Follow-Up with Cardiology JUANA       Orders Placed This Encounter   Medications    hydrochlorothiazide (HYDRODIURIL) 25 MG tablet     Sig: Take 1 tablet (25 mg) by mouth daily       Medications Discontinued During This Encounter   Medication Reason    hydroCHLOROthiazide (HYDRODIURIL) 50 MG tablet          Encounter Diagnoses   Name Primary?    Essential hypertension     Atherosclerosis of native coronary artery of native heart with stable angina pectoris (H24)     Stage 3b chronic kidney disease (H)     Chronotropic incompetence     Hypercholesterolemia     GALINDO (dyspnea on exertion)        CURRENT MEDICATIONS:  Current Outpatient Medications   Medication Sig Dispense Refill    acetic acid-hydrocortisone (VOSOL-HC) 1-2 % otic solution INSTILL 1 DROP INTO BOTH   EARS TWO TIMES A DAY (Patient taking differently: as needed prn) 10 mL 0    amLODIPine (NORVASC) 5 MG tablet Take 1 tablet (5 mg) by mouth daily 90 tablet 3    Ascorbic Acid (VITAMIN C PO) Take 1,000 mg by mouth 2 times daily      calcitRIOL (ROCALTROL) 0.25 MCG capsule Take 0.25 mcg by mouth daily       carvedilol (COREG) 3.125 MG tablet Take 1 tablet (3.125 mg) by mouth 2 times daily (with meals) 180 tablet 0    chlorhexidine (PERIDEX) 0.12 % solution USE 1/2 OZ TO SWISH FOR 30 SECONDS ONCE D  3    clopidogrel (PLAVIX) 75 MG tablet Take 1 tablet (75 mg) by mouth daily 90 tablet 3    Cyanocobalamin (B-12) 1000 MCG CAPS Take 1 mg by mouth daily       dapagliflozin (FARXIGA) 5 MG TABS tablet Take 5 mg by mouth daily      famotidine (PEPCID) 40 MG tablet TAKE 1 TABLET DAILY 90 tablet 3    ferrous sulfate (FEROSUL) 325 (65 Fe) MG tablet Take 325 mg by mouth daily      glimepiride (AMARYL) 1 MG tablet Take 1 tablet (1 mg) by mouth every morning (before breakfast) (Patient taking differently: Take 1 mg by mouth every morning (before breakfast) Pt takes 2 tablets once a day) 180 tablet 1    hydrochlorothiazide (HYDRODIURIL) 25 MG tablet Take 1 tablet (25 mg) by mouth daily      hydrocortisone 2.5 % cream APPLY TOPICALLY TWO TIMES ADAY (Patient taking differently: 2 times daily as needed) 28.35 g 11    isosorbide mononitrate (IMDUR) 30 MG 24 hr tablet Take 1 tablet (30 mg) by mouth daily (Patient taking differently: Take 15 mg by mouth daily Patient is taking a 1/2 tablet (15mg). 7/17/24) 90 tablet 0    JANUVIA 50 MG tablet TAKE 1 TABLET DAILY 90 tablet 1    lisinopril (ZESTRIL) 20 MG tablet Take 20 mg by mouth daily      nitroGLYcerin (NITROSTAT) 0.6 MG sublingual tablet DISSOLVE 1 TABLET UNDER THETONGUE AS NEEDED. 100 tablet 0    omega-3 acid ethyl esters (LOVAZA) 1 g capsule TAKE 1 CAPSULE TWICE DAILY 180 capsule 3    Pyridoxine HCl (B-6) 100 MG TABS Take 100 mg by mouth daily      VITAMIN E NATURAL PO Take 400 Units by mouth daily      blood glucose (NO BRAND SPECIFIED) lancing device Device to be used with lancets. (Patient not taking: Reported on 7/17/2024) 1 each 0    blood glucose (NO BRAND SPECIFIED) test strip Use to test blood sugar 1 times daily or as directed. (Patient not taking: Reported on 7/17/2024) 100 strip 1    Continuous Blood Gluc Sensor (FREESTYLE SHARRI 14 DAY SENSOR) MISC 1 each every 14 days Use 1 Sensor every 14 days. Use to read blood sugars per 's instructions. (Patient not taking: Reported on 7/17/2024) 2 each 5    Continuous Blood Gluc Sensor (FREESTYLE SHARRI 2 SENSOR) MISC 1 each every 14 days Use 1 sensor every 14 days.  Use to read blood sugars per 's instructions. 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    Heart attack (H)     Hernia, abdominal     Hyperlipidaemia     Mumps     Obese     Other and unspecified hyperlipidemia     Other premature beats     Palpitations     Phlebitis and thrombophlebitis of other deep vessels of lower extremities 2000    has had 2-3 episodes    Stented coronary artery      4 stents    Syncope     Syncope     Thrombosis of leg     Type II or unspecified type diabetes mellitus without mention of complication, not stated as uncontrolled     Unspecified essential hypertension        PAST SURGICAL HISTORY:  Past Surgical History:   Procedure Laterality Date    ABDOMEN SURGERY      Hernia naval approx 25 years ago    BIOPSY  8/2016    CARDIAC SURGERY      COLONOSCOPY      CORONARY ANGIOGRAPHY ADULT ORDER  8/28/2000    75% distal LAD stenosis, 80% third diagonal stenosis, 75-80% mid ramus stenosis, ostial 60% stenosis in circumflex w/90% stenosis in distal circumflex    CORONARY ANGIOGRAPHY ADULT ORDER  2003    4 stents placed    CV CORONARY ANGIOGRAM N/A 4/14/2023    Procedure: Coronary Angiogram;  Surgeon: Rafita Vela MD;  Location: [facility] CARDIAC CATH LAB    CV LEFT HEART CATH N/A 4/14/2023    Procedure: Left Heart Catheterization;  Surgeon: Rafita Vela MD;  Location: [facility] CARDIAC CATH LAB    CV PCI N/A 4/14/2023    Procedure:  Percutaneous Coronary Intervention;  Surgeon: Rafita Vela MD;  Location:  HEART CARDIAC CATH LAB    EP ABLATION / EP STUDIES  3/25/2014    HEART CATH, ANGIOPLASTY      HYDROCELECTOMY SCROTAL Right 10/6/2016    Procedure: HYDROCELECTOMY SCROTAL;  Surgeon: Jordan Chandra MD;  Location: [facility]    ORTHOPEDIC SURGERY      Meniscus Orthoscopic surgery left knee.    TESTICLE SURGERY      ZZC NONSPECIFIC PROCEDURE      colonoscopy approx  done elsewhere       FAMILY HISTORY:  Family History   Problem Relation Age of Onset    Musculoskeletal Disorder Mother         spinal stenosis    Cancer Mother         cancer kidney age 85    Hypertension Mother         Dead    Diabetes Father         Dead    Diabetes Brother         Type 1    Heart Disease Brother        SOCIAL HISTORY:  Social History     Socioeconomic History    Marital status:      Spouse name: Bethany    Number of children: 3    Years of education: None    Highest education level: None   Occupational History    Occupation: "[employer]/     Employer: [employer]      Comment: [employer]   Tobacco Use    Smoking status: Former     Current packs/day: 0.00     Average packs/day: 3.0 packs/day for 6.0 years (18.0 ttl pk-yrs)     Types: Cigarettes, Cigars, Pipe     Start date: 1961     Quit date: 1967     Years since quittin.5    Smokeless tobacco: Never    Tobacco comments:     quit    Vaping Use    Vaping status: Never Used   Substance and Sexual Activity    Alcohol use: Yes     Comment: 1 beer a week    Drug use: No    Sexual activity: Not Currently   Other Topics Concern    Parent/sibling w/ CABG, MI or angioplasty before 65F 55M? No       Review of Systems:  Skin:  not assessed       Eyes:  not assessed      ENT:  not assessed      Respiratory:  Negative       Cardiovascular:    Positive for;chest pain;edema Slight chest pain,  Gastroenterology: not assessed      Genitourinary:  not assessed     "  Musculoskeletal:  not assessed      Neurologic:  not assessed      Psychiatric:  not assessed      Heme/Lymph/Imm:  not assessed      Endocrine:  not assessed        Physical Exam:  Vitals: /52 (BP Location: Right arm, Patient Position: Sitting, Cuff Size: Adult Large)   Pulse (!) 47   Ht 1.778 m (5' 10\")   Wt 91.6 kg (202 lb)   SpO2 99%   BMI 28.98 kg/m      Constitutional:  cooperative, alert and oriented, well developed, well nourished, in no acute distress overweight      Skin:  warm and dry to the touch, no apparent skin lesions or masses noted          Head:  normocephalic, no masses or lesions        Eyes:  pupils equal and round, conjunctivae and lids unremarkable, sclera white, no xanthalasma, EOMS intact, no nystagmus        Lymph:      ENT:  no pallor or cyanosis, dentition good        Neck:  no carotid bruit        Respiratory:  normal breath sounds, clear to auscultation, normal A-P diameter, normal symmetry, normal respiratory excursion, no use of accessory muscles         Cardiac: regular rhythm;no murmurs, gallops or rubs detected                pulses full and equal                                        GI:           Extremities and Muscular Skeletal:  no spinal abnormalities noted;normal muscle strength and tone   bilateral LE edema;L greater than R;1+;trace     Wears compression stockings    Neurological:  no gross motor deficits        Psych:  affect appropriate, oriented to time, person and place        CC  Jordan Dixon MD  [id number]  [place] [id number]                "

## 2024-07-17 NOTE — LETTER
7/17/2024    Guillermo Deleon MD  [address] [facility] [address]    RE: Jeff Ricks       Dear Colleague,     I had the pleasure of seeing Jeff Ricks in the [facility].  It was a pleasure seeing you today and thank you for allowing me to be a part of your health care team.  Should   you have any questions regarding your visit or future needs please feel free to reach out to my care team for assistance.      Thank you, Dr. Jordan Dixon        **Nursing: [phone]       **Scheduling: [phone]      HPI and Plan:   Mr. Ricks is a very nice 78-year-old gentleman with past medical history significant for hypertension, bradycardia, chronotropic incompetence, diabetes mellitus, coronary artery disease, peripheral edema, stage IIIb renal failure and diabetes mellitus.     Coronary interventions dates back to 2003 when at [facility] he had 2 stents placed in his ramus intermedius branch and in a staged fashion came back for 2 stents in his left anterior descending artery.  Distal circumflex was described as subtotally occluded and his right coronary artery was described as diffusely diseased with a distal occlusion.     Over the years he was seen by Dr. Rivas and Dr. Borrego.  Once for SVT for which he had a benign EP evaluation and secondly for nocturnal bradycardia which required no treatment.     Recurrent exertional angina and abnormal stress test resulted in repeat coronary angiography in April 2023 demonstrating a distal occlusion of his ramus intermedius, 70% ostial circumflex stenosis 85% stenosis in his distal circumflex 100% occlusion of his mid LAD and a 60% stenosis in his proximal LAD.  He had poor distal targets and recommendation was to treat him medically.   Imdur was initiated.      He has asymptomatic bradycardia for which we have titrated his beta-blockers now down to 3.125 of carvedilol twice a day.  He still works on the computer half-time.  And states his  watch will tell him at times that his heart rate is down in the 40s.  He is asymptomatic.  He states it always speeds up when he gets up and performs any activities.  He denies having any lightheadedness, dizziness, syncope or near syncope.  He notes no dyspnea on exertion or exercise intolerance.     He states he thinks he has been doing fairly well.    He states he exercises 30 minutes every day sometimes will exercise more than once a day and normally this causes him no symptoms.  He has not had any more warnings from his watch that his heart rates in the 40s.  Although this was usually a nocturnal phenomenon.     I did repeat his 7-day Zio patch which showed average heart rate of 63 ranging from .  He had 2 runs of SVT fastest 5 beats at 152 bpm.     Because of his rising creatinine I titrated back on his hydrochlorothiazide.    Jeff returns to clinic and state he thinks he is doing quite well.     Assessment and plan.  Angina has been very well-controlled.  Chronotropic incompetence is much improved.  I will continue with his carvedilol 3.125.  I sent in a prescription as he is currently splitting his 6.25's.  I also sent in a prescription for his hydrochlorothiazide as he is splitting of 50.  As I told him we are trying to get the [place] dose here such that were controlling his upper heart rate to avoid angina but avoiding chronotropic incompetence and exercise intolerance.     Creatinine is improved with the lower dose of hydrochlorothiazide.  Now 2.4     I congratulated him on his exercise regiment encouraged him continue to do so.    He is doing quite well.  He uses compression stockings, watches his salt and elevates as necessary.    He has run out of his simvastatin and notes it is not even on his refill list.  I will check a lipid profile on the blood we sandi earlier today and will call in a prescription for a statin when I see his numbers.  Obviously with his severe native vessel coronary  disease we want LDL below 55.    I will have him follow-up with my JUANA in 3 month.  I will see him in 6 months.  Sooner if necessary. Thank you for allow me to participate in this patient's care.  Sincerely,                                Jordan Dixon MD [facility]    The longitudinal plan of care for the diagnosis(es)/condition(s) as documented were addressed during this visit. Due to the added complexity in care, I will continue to support Jeff or Ferdinand in the subsequent management and with ongoing continuity of care.         Today's clinic visit entailed:  Review of the result(s) of each unique test - lab work  Ordering of each unique test  Prescription drug management  35 minutes spent by me on the date of the encounter doing chart review, history and exam, documentation and further activities per the note  Provider  Link to [facility] Help Grid     The level of medical decision making during this visit was of moderate complexity.      Orders Placed This Encounter   Procedures    Basic metabolic panel    Basic metabolic panel    Follow-Up with Cardiology    Follow-Up with Cardiology JUANA       Orders Placed This Encounter   Medications    hydrochlorothiazide (HYDRODIURIL) 25 MG tablet     Sig: Take 1 tablet (25 mg) by mouth daily       Medications Discontinued During This Encounter   Medication Reason    hydroCHLOROthiazide (HYDRODIURIL) 50 MG tablet          Encounter Diagnoses   Name Primary?    Essential hypertension     Atherosclerosis of native coronary artery of native heart with stable angina pectoris (H24)     Stage 3b chronic kidney disease (H)     Chronotropic incompetence     Hypercholesterolemia     GALINDO (dyspnea on exertion)        CURRENT MEDICATIONS:  Current Outpatient Medications   Medication Sig Dispense Refill    acetic acid-hydrocortisone (VOSOL-HC) 1-2 % otic solution INSTILL 1 DROP INTO BOTH   EARS TWO TIMES A DAY (Patient taking differently: as needed prn) 10 mL 0    amLODIPine (NORVASC) 5 MG  tablet Take 1 tablet (5 mg) by mouth daily 90 tablet 3    Ascorbic Acid (VITAMIN C PO) Take 1,000 mg by mouth 2 times daily      calcitRIOL (ROCALTROL) 0.25 MCG capsule Take 0.25 mcg by mouth daily       carvedilol (COREG) 3.125 MG tablet Take 1 tablet (3.125 mg) by mouth 2 times daily (with meals) 180 tablet 0    chlorhexidine (PERIDEX) 0.12 % solution USE 1/2 OZ TO SWISH FOR 30 SECONDS ONCE D  3    clopidogrel (PLAVIX) 75 MG tablet Take 1 tablet (75 mg) by mouth daily 90 tablet 3    Cyanocobalamin (B-12) 1000 MCG CAPS Take 1 mg by mouth daily      dapagliflozin (FARXIGA) 5 MG TABS tablet Take 5 mg by mouth daily      famotidine (PEPCID) 40 MG tablet TAKE 1 TABLET DAILY 90 tablet 3    ferrous sulfate (FEROSUL) 325 (65 Fe) MG tablet Take 325 mg by mouth daily      glimepiride (AMARYL) 1 MG tablet Take 1 tablet (1 mg) by mouth every morning (before breakfast) (Patient taking differently: Take 1 mg by mouth every morning (before breakfast) Pt takes 2 tablets once a day) 180 tablet 1    hydrochlorothiazide (HYDRODIURIL) 25 MG tablet Take 1 tablet (25 mg) by mouth daily      hydrocortisone 2.5 % cream APPLY TOPICALLY TWO TIMES ADAY (Patient taking differently: 2 times daily as needed) 28.35 g 11    isosorbide mononitrate (IMDUR) 30 MG 24 hr tablet Take 1 tablet (30 mg) by mouth daily (Patient taking differently: Take 15 mg by mouth daily Patient is taking a 1/2 tablet (15mg). 7/17/24) 90 tablet 0    JANUVIA 50 MG tablet TAKE 1 TABLET DAILY 90 tablet 1    lisinopril (ZESTRIL) 20 MG tablet Take 20 mg by mouth daily      nitroGLYcerin (NITROSTAT) 0.6 MG sublingual tablet DISSOLVE 1 TABLET UNDER THETONGUE AS NEEDED. 100 tablet 0    omega-3 acid ethyl esters (LOVAZA) 1 g capsule TAKE 1 CAPSULE TWICE DAILY 180 capsule 3    Pyridoxine HCl (B-6) 100 MG TABS Take 100 mg by mouth daily      VITAMIN E NATURAL PO Take 400 Units by mouth daily      blood glucose (NO BRAND SPECIFIED) lancing device Device to be used with lancets.  (Patient not taking: Reported on 7/17/2024) 1 each 0    blood glucose (NO BRAND SPECIFIED) test strip Use to test blood sugar 1 times daily or as directed. (Patient not taking: Reported on 7/17/2024) 100 strip 1    Continuous Blood Gluc Sensor (FREESTYLE SHARRI 14 DAY SENSOR) MISC 1 each every 14 days Use 1 Sensor every 14 days. Use to read blood sugars per 's instructions. (Patient not taking: Reported on 7/17/2024) 2 each 5    Continuous Blood Gluc Sensor (FREESTYLE SHARRI 2 SENSOR) MISC 1 each every 14 days Use 1 sensor every 14 days. Use to read blood sugars per 's instructions. 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    Heart attack (H)     Hernia, abdominal     Hyperlipidaemia     Mumps     Obese     Other and unspecified hyperlipidemia     Other premature beats     Palpitations     Phlebitis and thrombophlebitis of other deep vessels of lower extremities 2000    has had 2-3 episodes    Stented coronary artery      4 stents    Syncope     Syncope     Thrombosis of leg     Type II or unspecified type diabetes mellitus without mention of complication, not stated as uncontrolled     Unspecified essential hypertension        PAST SURGICAL HISTORY:  Past Surgical History:   Procedure Laterality Date    ABDOMEN SURGERY      Hernia naval approx 25 years ago    BIOPSY  8/2016    CARDIAC SURGERY      COLONOSCOPY      CORONARY ANGIOGRAPHY ADULT ORDER  8/28/2000    75%  distal LAD stenosis, 80% third diagonal stenosis, 75-80% mid ramus stenosis, ostial 60% stenosis in circumflex w/90% stenosis in distal circumflex    CORONARY ANGIOGRAPHY ADULT ORDER      4 stents placed    CV CORONARY ANGIOGRAM N/A 2023    Procedure: Coronary Angiogram;  Surgeon: Rafita Vela MD;  Location:  HEART CARDIAC CATH LAB    CV LEFT HEART CATH N/A 2023    Procedure: Left Heart Catheterization;  Surgeon: Rafita Vela MD;  Location:  HEART CARDIAC CATH LAB    CV PCI N/A 2023    Procedure: Percutaneous Coronary Intervention;  Surgeon: Rafita Vela MD;  Location:  HEART CARDIAC CATH LAB    EP ABLATION / EP STUDIES  3/25/2014    HEART CATH, ANGIOPLASTY      HYDROCELECTOMY SCROTAL Right 10/6/2016    Procedure: HYDROCELECTOMY SCROTAL;  Surgeon: Jordan Chandra MD;  Location: [facility]    ORTHOPEDIC SURGERY      Meniscus Orthoscopic surgery left knee.    TESTICLE SURGERY      ZZC NONSPECIFIC PROCEDURE      colonoscopy approx  done elsewhere       FAMILY HISTORY:  Family History   Problem Relation Age of Onset    Musculoskeletal Disorder Mother         spinal stenosis    Cancer Mother         cancer kidney age 85    Hypertension Mother         Dead    Diabetes Father         Dead    Diabetes Brother         Type 1    Heart Disease Brother        SOCIAL HISTORY:  Social History     Socioeconomic History    Marital status:      Spouse name: Bethany    Number of children: 3    Years of education: None    Highest education level: None   Occupational History    Occupation: Computers/     Employer: [employer]      Comment: [employer]   Tobacco Use    Smoking status: Former     Current packs/day: 0.00     Average packs/day: 3.0 packs/day for 6.0 years (18.0 ttl pk-yrs)     Types: Cigarettes, Cigars, Pipe     Start date: 1961     Quit date: 1967     Years since quittin.5    Smokeless tobacco: Never    Tobacco comments:     quit  "1960s   Vaping Use    Vaping status: Never Used   Substance and Sexual Activity    Alcohol use: Yes     Comment: 1 beer a week    Drug use: No    Sexual activity: Not Currently   Other Topics Concern    Parent/sibling w/ CABG, MI or angioplasty before 65F 55M? No       Review of Systems:  Skin:  not assessed       Eyes:  not assessed      ENT:  not assessed      Respiratory:  Negative       Cardiovascular:    Positive for;chest pain;edema Slight chest pain,  Gastroenterology: not assessed      Genitourinary:  not assessed      Musculoskeletal:  not assessed      Neurologic:  not assessed      Psychiatric:  not assessed      Heme/Lymph/Imm:  not assessed      Endocrine:  not assessed        Physical Exam:  Vitals: /52 (BP Location: Right arm, Patient Position: Sitting, Cuff Size: Adult Large)   Pulse (!) 47   Ht 1.778 m (5' 10\")   Wt 91.6 kg (202 lb)   SpO2 99%   BMI 28.98 kg/m      Constitutional:  cooperative, alert and oriented, well developed, well nourished, in no acute distress overweight      Skin:  warm and dry to the touch, no apparent skin lesions or masses noted          Head:  normocephalic, no masses or lesions        Eyes:  pupils equal and round, conjunctivae and lids unremarkable, sclera white, no xanthalasma, EOMS intact, no nystagmus        Lymph:      ENT:  no pallor or cyanosis, dentition good        Neck:  no carotid bruit        Respiratory:  normal breath sounds, clear to auscultation, normal A-P diameter, normal symmetry, normal respiratory excursion, no use of accessory muscles         Cardiac: regular rhythm;no murmurs, gallops or rubs detected                pulses full and equal                                        GI:           Extremities and Muscular Skeletal:  no spinal abnormalities noted;normal muscle strength and tone   bilateral LE edema;L greater than R;1+;trace     Wears compression stockings    Neurological:  no gross motor deficits        Psych:  affect " appropriate, oriented to time, person and place        CC  Jordan Dixon MD  [address]  [place] [address]    Thank you for allowing me to participate in the care of your patient.      Sincerely,     Jordan Dixon MD     [facility]

## 2024-07-18 ENCOUNTER — TELEPHONE (OUTPATIENT)
Dept: CARDIOLOGY | Facility: CLINIC | Age: 79
End: 2024-07-18
Payer: COMMERCIAL

## 2024-07-18 DIAGNOSIS — I25.118 ATHEROSCLEROSIS OF NATIVE CORONARY ARTERY OF NATIVE HEART WITH STABLE ANGINA PECTORIS (H): Primary | ICD-10-CM

## 2024-07-18 DIAGNOSIS — E78.5 HYPERLIPIDEMIA LDL GOAL <100: ICD-10-CM

## 2024-07-18 LAB
CHOLEST SERPL-MCNC: 146 MG/DL
HDLC SERPL-MCNC: 44 MG/DL
LDLC SERPL CALC-MCNC: 88 MG/DL
NONHDLC SERPL-MCNC: 102 MG/DL
TRIGL SERPL-MCNC: 71 MG/DL

## 2024-07-18 NOTE — TELEPHONE ENCOUNTER
Please see BMP and lipid profile. Patient seen yesterday, 7/17/24 by Dr. Dixon. Per clinic not - He has run out of his simvastatin and notes it is not even on his refill list. I will check a lipid profile on the blood we sandi earlier today and will call in a prescription for a statin when I see his numbers. Obviously with his severe native vessel coronary disease we want LDL below 55. Appears BMP reviewed at clinic apt.    Component      Latest Ref Rng 6/3/2024  2:26 PM 7/17/2024  2:37 PM   Sodium      135 - 145 mmol/L 138  138    Potassium      3.4 - 5.3 mmol/L 3.9  4.2    Chloride      98 - 107 mmol/L 104  102    Carbon Dioxide (CO2)      22 - 29 mmol/L 20 (L)  24    Anion Gap      7 - 15 mmol/L 14  12    Urea Nitrogen      8.0 - 23.0 mg/dL 39.8 (H)  33.6 (H)    Creatinine      0.67 - 1.17 mg/dL 2.22 (H)  2.40 (H)    GFR Estimate      >60 mL/min/1.73m2 29 (L)  27 (L)    Calcium      8.8 - 10.4 mg/dL 9.3  9.0    Glucose      70 - 99 mg/dL 102 (H)  139 (H)    Cholesterol      <200 mg/dL  146    Triglycerides      <150 mg/dL  71    HDL Cholesterol      >=40 mg/dL  44    LDL Cholesterol Calculated      <=100 mg/dL  88    Non HDL Cholesterol      <130 mg/dL  102

## 2024-07-19 RX ORDER — ROSUVASTATIN CALCIUM 5 MG/1
5 TABLET, COATED ORAL DAILY
Qty: 90 TABLET | Refills: 3 | Status: SHIPPED | OUTPATIENT
Start: 2024-07-19 | End: 2024-08-22

## 2024-07-19 NOTE — TELEPHONE ENCOUNTER
Jordan Dixon MD  You17 hours ago (3:29 PM)     Lets start rosuvastatin 5 mg daily recheck a fasting lipid profile and ALT in 1 month.   --------------------------------    Patient called to inform that Dr. iDxon has reviewed lipid profile and of above recommendations. Patient agreeable to start rosuvastatin 5 mg daily. Patient educated on indication and use of medication. Rx sent to patient's preferred pharmacy. Patient instructed to [facility] 1 month after starting medication to monitor effectiveness. Patient verbalizes understanding.

## 2024-07-23 DIAGNOSIS — I10 ESSENTIAL HYPERTENSION: ICD-10-CM

## 2024-07-23 RX ORDER — CARVEDILOL 3.12 MG/1
3.12 TABLET ORAL 2 TIMES DAILY WITH MEALS
Qty: 180 TABLET | Refills: 3 | Status: SHIPPED | OUTPATIENT
Start: 2024-07-23

## 2024-07-31 ENCOUNTER — TELEPHONE (OUTPATIENT)
Dept: PHARMACY | Facility: CLINIC | Age: 79
End: 2024-07-31
Payer: MEDICARE

## 2024-07-31 NOTE — TELEPHONE ENCOUNTER
We have attempted to contact this patient three times to set up a MTM follow up appointment and were unsuccessful. Contact attempts were made via mychart and letters. We will no longer continue to contact this patient to schedule a visit at this time. Please refer back to MTM if you believe this patient would continue to benefit from our services.     Thank you!    Padmini Linda, PharmD  Medication Therapy Management Pharmacist  Voicemail: [phone]

## 2024-08-03 ENCOUNTER — HEALTH MAINTENANCE LETTER (OUTPATIENT)
Age: 79
End: 2024-08-03

## 2024-08-19 ENCOUNTER — MYC MEDICAL ADVICE (OUTPATIENT)
Dept: INTERNAL MEDICINE | Facility: CLINIC | Age: 79
End: 2024-08-19
Payer: MEDICARE

## 2024-08-19 ENCOUNTER — TELEPHONE (OUTPATIENT)
Dept: CARDIOLOGY | Facility: CLINIC | Age: 79
End: 2024-08-19
Payer: MEDICARE

## 2024-08-19 NOTE — TELEPHONE ENCOUNTER
[facility] Call Center    Phone Message    May a detailed message be left on voicemail: yes     Reason for Call: Other: Jeff called requesting to speak with his care team about the lab orders that Dr. Dixon placed for him on 7/17 to be drawn around 8/19. Offered to schedule labs for Jeff but he has some questions about the lab orders. Please reach out to Jeff to discuss. Thank you!     Action Taken: Other: Cardiology    Travel Screening: Not Applicable    Thank you!  [facility]       Date of Service:

## 2024-08-19 NOTE — TELEPHONE ENCOUNTER
Rosuvastatin 5mg daily was started 7/19/24, plan to repeat lipids/ALT in 1mo.     Spoke to patient, reviewed that labs are to recheck cholesterol levels since start rosuvastatin. He expressed understanding. He will call the [facility] back to schedule. He is aware to fast.

## 2024-08-22 ENCOUNTER — TELEPHONE (OUTPATIENT)
Dept: CARDIOLOGY | Facility: CLINIC | Age: 79
End: 2024-08-22

## 2024-08-22 ENCOUNTER — LAB (OUTPATIENT)
Dept: LAB | Facility: CLINIC | Age: 79
End: 2024-08-22
Payer: MEDICARE

## 2024-08-22 DIAGNOSIS — I25.118 ATHEROSCLEROSIS OF NATIVE CORONARY ARTERY OF NATIVE HEART WITH STABLE ANGINA PECTORIS (H): ICD-10-CM

## 2024-08-22 DIAGNOSIS — E11.21 TYPE 2 DIABETES MELLITUS WITH DIABETIC NEPHROPATHY, WITHOUT LONG-TERM CURRENT USE OF INSULIN (H): ICD-10-CM

## 2024-08-22 DIAGNOSIS — E78.5 HYPERLIPIDEMIA LDL GOAL <100: ICD-10-CM

## 2024-08-22 LAB
ALT SERPL W P-5'-P-CCNC: 16 U/L (ref 0–70)
CHOLEST SERPL-MCNC: 80 MG/DL
FASTING STATUS PATIENT QL REPORTED: YES
HDLC SERPL-MCNC: 42 MG/DL
LDLC SERPL CALC-MCNC: 33 MG/DL
NONHDLC SERPL-MCNC: 38 MG/DL
TRIGL SERPL-MCNC: 23 MG/DL

## 2024-08-22 PROCEDURE — 80061 LIPID PANEL: CPT

## 2024-08-22 PROCEDURE — 36415 COLL VENOUS BLD VENIPUNCTURE: CPT

## 2024-08-22 PROCEDURE — 83036 HEMOGLOBIN GLYCOSYLATED A1C: CPT

## 2024-08-22 PROCEDURE — 84460 ALANINE AMINO (ALT) (SGPT): CPT

## 2024-08-22 RX ORDER — ROSUVASTATIN CALCIUM 5 MG/1
TABLET, COATED ORAL
COMMUNITY
Start: 2024-08-22

## 2024-08-22 NOTE — TELEPHONE ENCOUNTER
FLP/ALT completed as below, done for 1mo reassessment after starting crestor 5mg daily. Hx CAD/stents.       Component      Latest Ref Rng 7/17/2024  2:37 PM 8/22/2024  7:40 AM   Cholesterol      <200 mg/dL 146  80    Triglycerides      <150 mg/dL 71  23    HDL Cholesterol      >=40 mg/dL 44  42    LDL Cholesterol Calculated      <=100 mg/dL 88  33    Non HDL Cholesterol      <130 mg/dL 102  38    Patient Fasting?  Yes    ALT      0 - 70 U/L  16

## 2024-08-22 NOTE — TELEPHONE ENCOUNTER
Reply from Dr Dixon:  He can decrease his rosuvastatin to 5 mg on Monday Wednesday and Friday and recheck a fasting lipid profile and ALT in 1 month     1610 spoke with patient to discuss lipid results and Dr. Dixon's recommendation to decrease crestor to 5mg on MWF. Non-fasting Lipids and alt to be rechecked with visit in October.

## 2024-08-23 LAB — HBA1C MFR BLD: 6.8 % (ref 0–5.6)

## 2024-09-04 DIAGNOSIS — E11.21 TYPE 2 DIABETES MELLITUS WITH DIABETIC NEPHROPATHY, WITHOUT LONG-TERM CURRENT USE OF INSULIN (H): ICD-10-CM

## 2024-09-04 RX ORDER — GLIMEPIRIDE 1 MG/1
2 TABLET ORAL
Qty: 180 TABLET | Refills: 1 | Status: SHIPPED | OUTPATIENT
Start: 2024-09-04 | End: 2024-09-18

## 2024-09-04 NOTE — TELEPHONE ENCOUNTER
Medication Question or Refill        What medication are you calling about (include dose and sig)?: Gliperide 1mg tab     Preferred Pharmacy:     [id number] - [place], [place] - [address] [address] [facility] [address] [address] [address] [facility] [address]  Phone: [phone] Fax: [phone]      Controlled Substance Agreement on file:   CSA -- Patient Level:    CSA: None found at the patient level.       Who prescribed the medication?: Dr Guillermo Deleon    Do you need a refill? Yes    When did you use the medication last? Was given a rx on 07/09, but patient is out in a day or so, patient reported taking 2 a day , not 1 a day     Patient offered an appointment? No    Do you have any questions or concerns?  Yes: patient requesting a short refill to cover till the medication gets renewed again  on the 15th, as he is taking it differently and doesn't have any left       Could we send this information to you in [facility] or would you prefer to receive a phone call?:   Patient would prefer a phone call   Okay to leave a detailed message?: Yes at Cell number on file:    Telephone Information:   Mobile [phone]     Padmini Martínez/

## 2024-09-12 DIAGNOSIS — N18.32 CHRONIC KIDNEY DISEASE (CKD) STAGE G3B/A1, MODERATELY DECREASED GLOMERULAR FILTRATION RATE (GFR) BETWEEN 30-44 ML/MIN/1.73 SQUARE METER AND ALBUMINURIA CREATININE RATIO LESS THAN 30 MG/G (H): Primary | ICD-10-CM

## 2024-09-17 ENCOUNTER — OFFICE VISIT (OUTPATIENT)
Dept: INTERNAL MEDICINE | Facility: CLINIC | Age: 79
End: 2024-09-17
Payer: MEDICARE

## 2024-09-17 VITALS
OXYGEN SATURATION: 100 % | SYSTOLIC BLOOD PRESSURE: 129 MMHG | HEART RATE: 41 BPM | TEMPERATURE: 96.8 F | DIASTOLIC BLOOD PRESSURE: 70 MMHG | RESPIRATION RATE: 20 BRPM | HEIGHT: 70 IN | BODY MASS INDEX: 28.5 KG/M2 | WEIGHT: 199.1 LBS

## 2024-09-17 DIAGNOSIS — Z29.11 NEED FOR VACCINATION AGAINST RESPIRATORY SYNCYTIAL VIRUS: ICD-10-CM

## 2024-09-17 DIAGNOSIS — I25.118 ATHEROSCLEROSIS OF NATIVE CORONARY ARTERY OF NATIVE HEART WITH STABLE ANGINA PECTORIS (H): ICD-10-CM

## 2024-09-17 DIAGNOSIS — Z00.00 ENCOUNTER FOR PREVENTATIVE ADULT HEALTH CARE EXAMINATION: Primary | ICD-10-CM

## 2024-09-17 DIAGNOSIS — N18.32 STAGE 3B CHRONIC KIDNEY DISEASE (H): ICD-10-CM

## 2024-09-17 DIAGNOSIS — N18.32 CHRONIC KIDNEY DISEASE (CKD) STAGE G3B/A1, MODERATELY DECREASED GLOMERULAR FILTRATION RATE (GFR) BETWEEN 30-44 ML/MIN/1.73 SQUARE METER AND ALBUMINURIA CREATININE RATIO LESS THAN 30 MG/G (H): ICD-10-CM

## 2024-09-17 DIAGNOSIS — N63.24 MASS OF LOWER INNER QUADRANT OF LEFT BREAST: ICD-10-CM

## 2024-09-17 DIAGNOSIS — E11.21 TYPE 2 DIABETES MELLITUS WITH DIABETIC NEPHROPATHY, WITHOUT LONG-TERM CURRENT USE OF INSULIN (H): ICD-10-CM

## 2024-09-17 DIAGNOSIS — B37.2 YEAST DERMATITIS: ICD-10-CM

## 2024-09-17 DIAGNOSIS — Z12.5 SCREENING FOR PROSTATE CANCER: ICD-10-CM

## 2024-09-17 DIAGNOSIS — I10 ESSENTIAL HYPERTENSION: ICD-10-CM

## 2024-09-17 LAB
ANION GAP SERPL CALCULATED.3IONS-SCNC: 9 MMOL/L (ref 7–15)
BUN SERPL-MCNC: 25.8 MG/DL (ref 8–23)
CALCIUM SERPL-MCNC: 9.4 MG/DL (ref 8.8–10.4)
CHLORIDE SERPL-SCNC: 102 MMOL/L (ref 98–107)
CREAT SERPL-MCNC: 2.3 MG/DL (ref 0.67–1.17)
CREAT UR-MCNC: 137 MG/DL
EGFRCR SERPLBLD CKD-EPI 2021: 28 ML/MIN/1.73M2
ERYTHROCYTE [DISTWIDTH] IN BLOOD BY AUTOMATED COUNT: 13 % (ref 10–15)
GLUCOSE SERPL-MCNC: 181 MG/DL (ref 70–99)
HCO3 SERPL-SCNC: 27 MMOL/L (ref 22–29)
HCT VFR BLD AUTO: 39.7 % (ref 40–53)
HGB BLD-MCNC: 13.6 G/DL (ref 13.3–17.7)
MCH RBC QN AUTO: 31.5 PG (ref 26.5–33)
MCHC RBC AUTO-ENTMCNC: 34.3 G/DL (ref 31.5–36.5)
MCV RBC AUTO: 92 FL (ref 78–100)
MICROALBUMIN UR-MCNC: 54.4 MG/L
MICROALBUMIN/CREAT UR: 39.71 MG/G CR (ref 0–17)
PLATELET # BLD AUTO: 170 10E3/UL (ref 150–450)
POTASSIUM SERPL-SCNC: 4.7 MMOL/L (ref 3.4–5.3)
PSA SERPL DL<=0.01 NG/ML-MCNC: 5.2 NG/ML
RBC # BLD AUTO: 4.32 10E6/UL (ref 4.4–5.9)
SODIUM SERPL-SCNC: 138 MMOL/L (ref 135–145)
WBC # BLD AUTO: 5.4 10E3/UL (ref 4–11)

## 2024-09-17 PROCEDURE — 85027 COMPLETE CBC AUTOMATED: CPT | Performed by: INTERNAL MEDICINE

## 2024-09-17 PROCEDURE — G0439 PPPS, SUBSEQ VISIT: HCPCS | Performed by: INTERNAL MEDICINE

## 2024-09-17 PROCEDURE — 99213 OFFICE O/P EST LOW 20 MIN: CPT | Mod: 25 | Performed by: INTERNAL MEDICINE

## 2024-09-17 PROCEDURE — G0103 PSA SCREENING: HCPCS | Performed by: INTERNAL MEDICINE

## 2024-09-17 PROCEDURE — 80048 BASIC METABOLIC PNL TOTAL CA: CPT | Performed by: INTERNAL MEDICINE

## 2024-09-17 PROCEDURE — 82043 UR ALBUMIN QUANTITATIVE: CPT | Performed by: INTERNAL MEDICINE

## 2024-09-17 PROCEDURE — G0008 ADMIN INFLUENZA VIRUS VAC: HCPCS | Performed by: INTERNAL MEDICINE

## 2024-09-17 PROCEDURE — 82570 ASSAY OF URINE CREATININE: CPT | Performed by: INTERNAL MEDICINE

## 2024-09-17 PROCEDURE — 90662 IIV NO PRSV INCREASED AG IM: CPT | Performed by: INTERNAL MEDICINE

## 2024-09-17 PROCEDURE — 36415 COLL VENOUS BLD VENIPUNCTURE: CPT | Performed by: INTERNAL MEDICINE

## 2024-09-17 RX ORDER — NYSTATIN 100000 [USP'U]/G
POWDER TOPICAL 2 TIMES DAILY PRN
Qty: 60 G | Refills: 2 | Status: SHIPPED | OUTPATIENT
Start: 2024-09-17

## 2024-09-17 ASSESSMENT — PAIN SCALES - GENERAL: PAINLEVEL: NO PAIN (0)

## 2024-09-17 NOTE — PROGRESS NOTES
"Preventive Care Visit  [facility]  Guillermo Deleon MD, Internal Medicine  Sep 17, 2024      Assessment & Plan     Stage 3b chronic kidney disease (H)  Follow up with nephrology, stable   - Albumin Random Urine Quantitative with Creat Ratio  - CBC with platelets    Type 2 diabetes mellitus with diabetic nephropathy, without long-term current use of insulin (H)  Controlled , continue treatment, monitor lab   - OFFICE/OUTPT VISIT,JOCY GHOTRA III    Screening for prostate cancer    - PSA, screen    Encounter for preventative adult health care examination  advised regular aerobic activity, low cholesterol, low salt diet, wearing seat belt,  self examinations, sunscreen protection.Obtain screening cholesterol, immunizations reviewed.    - PSA, screen  - CBC with platelets    Yeast dermatitis    - nystatin (MYCOSTATIN) 261982 UNIT/GM external powder; Apply topically 2 times daily as needed (skin rash).  - OFFICE/OUTPT VISIT,JOCY GHOTRA III    Chronic kidney disease (CKD) stage G3b/A1, moderately decreased glomerular filtration rate (GFR) between 30-44 mL/min/1.73 square meter and albuminuria creatinine ratio less than 30 mg/g (H)    - Basic metabolic panel  - OFFICE/OUTPT VISIT,EST,LEVL III    Atherosclerosis of native coronary artery of native heart with stable angina pectoris (H24)  No anginal symptoms. On medical management   - OFFICE/OUTPT VISIT,JOCY GHOTRA III    Essential hypertension  Controlled on treatment   - OFFICE/OUTPT VISIT,JOCY GHOTRA III    Patient has been advised of split billing requirements and indicates understanding: Yes        BMI  Estimated body mass index is 28.57 kg/m  as calculated from the following:    Height as of this encounter: 1.778 m (5' 10\").    Weight as of this encounter: 90.3 kg (199 lb 1.6 oz).       Counseling  Appropriate preventive services were addressed with this patient via screening, questionnaire, or discussion as appropriate for fall prevention, nutrition, " physical activity, Tobacco-use cessation, social engagement, weight loss and cognition.  Checklist reviewing preventive services available has been given to the patient.  Reviewed patient's diet, addressing concerns and/or questions.       See Patient Instructions    Jared Streeter is a 78 year old, presenting for the following:  Wellness Visit (fasting)        9/17/2024     8:13 AM   Additional Questions   Roomed by Miri PELAYO   Accompanied by n/a         Via the Health Maintenance questionnaire, the patient has reported the following services have been completed -Eye Exam: [facility] 2023-09-01, this information has been sent to the abstraction team.  Health Care Directive  Patient does not have a Health Care Directive or Living Will: Patient states has Advance Directive and will bring in a copy to clinic.    HPI    Has h/o HTN. on medical treatment. BP has been controlled. No side effects from medications. No CP, HA, dizziness. good compliance with medications and low salt diet.  Has H/O DM. On diet , exercise and oral treatment. Blood sugars are controlled. No parestesias. No hypoglycemias.  Has h/o ischemic heart disease, asymptomatic regarding chest pains, SOB,palpitations. Has good compliance with treatment, diet and exercise.  Has h/o CRF. Monitoring BP, BG, medications, avoiding OTC NSAIDs. Needs periodic recheck of kidney function.  Concern for scrotal and groin redness, itching.   Has a  tender, itchy area in the left breast.   Has pain in the left ear with chewing. Has had locked jaw. Now better.             9/17/2024   General Health   How would you rate your overall physical health? Good            9/17/2024   Nutrition   Diet: Regular (no restrictions)            9/17/2024   Exercise   Days per week of moderate/strenous exercise 5 days   Average minutes spent exercising at this level 60 min            9/17/2024   Social Factors   Frequency of gathering with friends or relatives Patient  declined   Worry food won't last until get money to buy more No   Food not last or not have enough money for food? No   Do you have housing? (Housing is defined as stable permanent housing and does not include staying ouside in a car, in a tent, in an abandoned building, in an overnight shelter, or couch-surfing.) Yes   Are you worried about losing your housing? No   Lack of transportation? No   Unable to get utilities (heat,electricity)? No            9/17/2024   Fall Risk   Fallen 2 or more times in the past year? No    No   Trouble with walking or balance? No    No       Multiple values from one day are sorted in reverse-chronological order          9/17/2024   Activities of Daily Living- Home Safety   Needs help with the following daily activites None of the above   Safety concerns in the home None of the above            9/17/2024   Dental   Dentist two times every year? Yes            9/17/2024   Hearing Screening   Hearing concerns? None of the above            9/17/2024   Driving Risk Screening   Patient/family members have concerns about driving No            9/17/2024   General Alertness/Fatigue Screening   Have you been more tired than usual lately? No            9/17/2024   Urinary Incontinence Screening   Bothered by leaking urine in past 6 months No            9/17/2024   TB Screening   Were you born outside of the [place]? No            Today's PHQ-2 Score:       9/17/2024     7:18 AM   PHQ-2 ( 1999 [employer])   Q1: Little interest or pleasure in doing things 0   Q2: Feeling down, depressed or hopeless 0   PHQ-2 Score 0   Q1: Little interest or pleasure in doing things Not at all   Q2: Feeling down, depressed or hopeless Not at all   PHQ-2 Score 0           9/17/2024   Substance Use   Alcohol more than 3/day or more than 7/wk No   Do you have a current opioid prescription? No   How severe/bad is pain from 1 to 10? 2/10   Do you use any other substances recreationally? No        Social History     Tobacco Use     Smoking status: Former     Current packs/day: 0.00     Average packs/day: 3.0 packs/day for 6.0 years (18.0 ttl pk-yrs)     Types: Cigarettes, Cigars, Pipe     Start date: 1961     Quit date: 1967     Years since quittin.7    Smokeless tobacco: Never    Tobacco comments:     quit 1960s   Vaping Use    Vaping status: Never Used   Substance Use Topics    Alcohol use: Yes     Comment: 1 beer a week    Drug use: No                 Reviewed and updated as needed this visit by Provider                    Lab work is in process  Labs reviewed in EPIC  Current providers sharing in care for this patient include:  Patient Care Team:  Guillermo Deleon MD as PCP - General (Internal Medicine)  Guillermo Deleon MD as Assigned PCP  Jordan Dixon MD as Assigned Heart and Vascular Provider  Padmini Linda RPH as Assigned MTM Pharmacist    The following health maintenance items are reviewed in Epic and correct as of today:  Health Maintenance   Topic Date Due    RSV VACCINE (1 - 1-dose 75+ series) Never done    DIABETIC FOOT EXAM  2022    MICROALBUMIN  2023    MEDICARE ANNUAL WELLNESS VISIT  2023    ANNUAL REVIEW OF HM ORDERS  2023    EYE EXAM  2024    INFLUENZA VACCINE (1) 2024    COVID-19 Vaccine ( season) 2024    HEMOGLOBIN  2024    A1C  2024    BMP  2025    LIPID  2025    COLORECTAL CANCER SCREENING  2025    FALL RISK ASSESSMENT  2025    ADVANCE CARE PLANNING  2027    DTAP/TDAP/TD IMMUNIZATION (2 - Td or Tdap) 2029    PARATHYROID  Completed    PHOSPHORUS  Completed    PHQ-2 (once per calendar year)  Completed    Pneumococcal Vaccine: 65+ Years  Completed    URINALYSIS  Completed    ALK PHOS  Completed    ZOSTER IMMUNIZATION  Completed    HPV IMMUNIZATION  Aged Out    MENINGITIS IMMUNIZATION  Aged Out    RSV MONOCLONAL ANTIBODY  Aged Out         Review of Systems  Constitutional, HEENT,  "cardiovascular, pulmonary, GI, , musculoskeletal, neuro, skin, endocrine and psych systems are negative, except as otherwise noted.     Objective    Exam  /70 (BP Location: Right arm, Cuff Size: Adult Regular)   Pulse (!) 41   Temp 96.8  F (36  C) (Tympanic)   Resp 20   Ht 1.778 m (5' 10\")   Wt 90.3 kg (199 lb 1.6 oz)   SpO2 100%   BMI 28.57 kg/m     Estimated body mass index is 28.57 kg/m  as calculated from the following:    Height as of this encounter: 1.778 m (5' 10\").    Weight as of this encounter: 90.3 kg (199 lb 1.6 oz).    Physical Exam  GENERAL: alert and no distress  EYES: Eyes grossly normal to inspection, PERRL and conjunctivae and sclerae normal  HENT: ear canals and TM's normal, nose and mouth without ulcers or lesions  NECK: no adenopathy, no asymmetry, masses, or scars  RESP: lungs clear to auscultation - no rales, rhonchi or wheezes  CV: bradycardia, regular rate and rhythm, normal S1 S2, no S3 or S4, no murmur, click or rub, no peripheral edema  ABDOMEN: soft, nontender, no hepatosplenomegaly, no masses and bowel sounds normal  MS: no gross musculoskeletal defects noted, no edema  Left breast nodular density under the nipple   SKIN: no suspicious lesions or rashes  NEURO: Normal strength and tone, mentation intact and speech normal  PSYCH: mentation appears normal, affect normal/bright         9/17/2024   Mini Cog   Clock Draw Score 2 Normal   3 Item Recall 2 objects recalled   Mini Cog Total Score 4                 Signed Electronically by: Guillermo Deleon MD    "

## 2024-09-17 NOTE — LETTER
September 18, 2024      Jeff Ricks  [address] [facility]   [place] [address]        Dear ,    We are writing to inform you of your test results.    Your results are within normal limits.    Resulted Orders   Albumin Random Urine Quantitative with Creat Ratio   Result Value Ref Range    Creatinine Urine mg/dL 137.0 mg/dL      Comment:      The reference ranges have not been established in urine creatinine. The results should be integrated into the clinical context for interpretation.    Albumin Urine mg/L 54.4 mg/L      Comment:      The reference ranges have not been established in urine albumin. The results should be integrated into the clinical context for interpretation.    Albumin Urine mg/g Cr 39.71 (H) 0.00 - 17.00 mg/g Cr      Comment:      Microalbuminuria is defined as an albumin:creatinine ratio of 17 to 299 for males and 25 to 299 for females. A ratio of albumin:creatinine of 300 or higher is indicative of overt proteinuria.  Due to biologic variability, positive results should be confirmed by a second, first-morning random or 24-hour timed urine specimen. If there is discrepancy, a third specimen is recommended. When 2 out of 3 results are in the microalbuminuria range, this is evidence for incipient nephropathy and warrants increased efforts at glucose control, blood pressure control, and institution of therapy with an angiotensin-converting-enzyme (ACE) inhibitor (if the patient can tolerate it).     PSA, screen   Result Value Ref Range    Prostate Specific Antigen Screen 5.20 ng/mL      Comment:      No reference ranges have been established for patients over 80 years.    Narrative    This result is obtained using the Roche Elecsys total PSA method on the elicia e801 immunoassay analyzer. Results obtained with different assay methods or kits cannot be used interchangeably.   CBC with platelets   Result Value Ref Range    WBC Count 5.4 4.0 - 11.0 10e3/uL    RBC Count 4.32 (L)  4.40 - 5.90 10e6/uL    Hemoglobin 13.6 13.3 - 17.7 g/dL    Hematocrit 39.7 (L) 40.0 - 53.0 %    MCV 92 78 - 100 fL    MCH 31.5 26.5 - 33.0 pg    MCHC 34.3 31.5 - 36.5 g/dL    RDW 13.0 10.0 - 15.0 %    Platelet Count 170 150 - 450 10e3/uL    Narrative    Tech Comments  Name of Roni TAY  Laboratory Phone [phone]  What is Abnormal Plt Clumps  Provider Follow Up Needed na  If Yes, Provider Contact Name na  If Yes, Provider Phone/Pager na             If you have any questions or concerns, please call the clinic at the number listed above.       Sincerely,      Guillermo Deleon MD

## 2024-09-18 ENCOUNTER — MYC REFILL (OUTPATIENT)
Dept: INTERNAL MEDICINE | Facility: CLINIC | Age: 79
End: 2024-09-18
Payer: MEDICARE

## 2024-09-18 DIAGNOSIS — E11.21 TYPE 2 DIABETES MELLITUS WITH DIABETIC NEPHROPATHY, WITHOUT LONG-TERM CURRENT USE OF INSULIN (H): ICD-10-CM

## 2024-09-19 RX ORDER — GLIMEPIRIDE 1 MG/1
2 TABLET ORAL
Qty: 180 TABLET | Refills: 1 | Status: SHIPPED | OUTPATIENT
Start: 2024-09-19

## 2024-09-20 ENCOUNTER — HOSPITAL ENCOUNTER (OUTPATIENT)
Dept: MAMMOGRAPHY | Facility: CLINIC | Age: 79
Discharge: HOME | End: 2024-09-20
Attending: INTERNAL MEDICINE
Payer: MEDICARE

## 2024-09-20 ENCOUNTER — HOSPITAL ENCOUNTER (OUTPATIENT)
Dept: ULTRASOUND IMAGING | Facility: CLINIC | Age: 79
Discharge: HOME | End: 2024-09-20
Attending: INTERNAL MEDICINE
Payer: MEDICARE

## 2024-09-20 DIAGNOSIS — N63.24 MASS OF LOWER INNER QUADRANT OF LEFT BREAST: ICD-10-CM

## 2024-09-20 PROCEDURE — 77066 DX MAMMO INCL CAD BI: CPT

## 2024-09-20 PROCEDURE — 76642 ULTRASOUND BREAST LIMITED: CPT | Mod: LT

## 2024-09-20 NOTE — LETTER
Jeff Ricks  [address] [facility] [address]  [place] [address]            September 20, 2024    Date of Exam:     Dear Jeff:    Thank you for your recent visit.     Breast Imaging Result: We are pleased to inform you that the results of your recent breast imaging show no evidence of malignancy (cancer). Please check with your health care team for instructions on follow-up based on your medical history and physical exam findings.    Your breast tissue is not dense:  Breast tissue can be either dense or not dense. Dense tissue makes it harder to find breast cancer on a mammogram and also raises the risk of developing breast cancer.  Your breast tissue is not dense. Talk to your healthcare provider about breast density, risks for breast cancer, and your individual situation.    Remember that negative breast imaging does not exclude the possibility of breast disease.  You should never ignore a breast lump or any other change in your breasts, even if the breast imaging is normal.  If you are experiencing any breast problems such as a lump or localized pain we request that you discuss this with your health care team if you haven t already done so, as additional testing may be necessary.    A report of your breast imaging results was sent to: Guillermo Deleon    Your breast imaging will become part of your medical file here at [facility] for at least 10 years. You are responsible for informing any new health care team or breast imaging facility of the date and location of this examination.     We appreciate the opportunity to participate in your health care.    Sincerely,  Alysa Toribio MD  [facility]

## 2024-09-20 NOTE — LETTER
September 23, 2024      Jeff Ricks  [address] [facility]   [place] [address]        Dear ,    We are writing to inform you of your test results.    Your Ultrasound results are normal.     Resulted Orders   US Breast Left    Narrative    EXAM: [facility] DIAGNOSTIC DIGITAL BILATERAL, US BREAST LEFT LIMITED 1-3  QUADRANTS, 9/20/2024 2:36 PM    HISTORY: Palpable lump in the left lower inner quadrant    COMPARISON: None    BREAST DENSITY: The breasts are almost entirely fatty.    FINDINGS: Diagnostic tomosynthesis with CAD shows nothing for  malignancy in either breast.     Targeted ultrasound shows normal breast tissue in the lower inner left  breast in the vicinity of the palpable lump. Any further management  based on clinical grounds.      Impression    IMPRESSION: BI-RADS CATEGORY: 1 -  Negative.    RECOMMENDED FOLLOW-UP: Routine yearly mammography beginning at age 40  or as discussed with your provider.       FABY ACHARYA MD         SYSTEM ID:  [id number]       If you have any questions or concerns, please call the clinic at the number listed above.       Sincerely,      Guillermo Deleon MD

## 2024-09-24 ENCOUNTER — MYC MEDICAL ADVICE (OUTPATIENT)
Dept: CARDIOLOGY | Facility: CLINIC | Age: 79
End: 2024-09-24
Payer: MEDICARE

## 2024-09-25 NOTE — TELEPHONE ENCOUNTER
Pt sent a mychart refill request as well as a [facility] message requesting a refill of hydrochlorothiazide 25mg BID. Per chart review, nephrology decreased dosing to once daily on 9/6/24. Directed patient to please request the refill from nephrology.

## 2024-10-10 ENCOUNTER — MYC REFILL (OUTPATIENT)
Dept: INTERNAL MEDICINE | Facility: CLINIC | Age: 79
End: 2024-10-10
Payer: MEDICARE

## 2024-10-10 DIAGNOSIS — I25.10 ASHD (ARTERIOSCLEROTIC HEART DISEASE): ICD-10-CM

## 2024-10-10 RX ORDER — CLOPIDOGREL BISULFATE 75 MG/1
75 TABLET ORAL DAILY
Qty: 90 TABLET | Refills: 3 | Status: SHIPPED | OUTPATIENT
Start: 2024-10-10

## 2024-10-10 NOTE — TELEPHONE ENCOUNTER
Pending Prescriptions:                       Disp   Refills    clopidogrel (PLAVIX) 75 MG tablet         90 tab*3            Sig: Take 1 tablet (75 mg) by mouth daily.    Please see patient's mychart message below.  He isn't sure if he should continue taking medication.    Please advise, thanks.

## 2024-10-17 ENCOUNTER — MYC REFILL (OUTPATIENT)
Dept: INTERNAL MEDICINE | Facility: CLINIC | Age: 79
End: 2024-10-17

## 2024-10-17 ENCOUNTER — LAB (OUTPATIENT)
Dept: LAB | Facility: CLINIC | Age: 79
End: 2024-10-17
Payer: MEDICARE

## 2024-10-17 ENCOUNTER — TELEPHONE (OUTPATIENT)
Dept: CARDIOLOGY | Facility: CLINIC | Age: 79
End: 2024-10-17

## 2024-10-17 DIAGNOSIS — I10 ESSENTIAL HYPERTENSION: ICD-10-CM

## 2024-10-17 DIAGNOSIS — I25.10 ASHD (ARTERIOSCLEROTIC HEART DISEASE): ICD-10-CM

## 2024-10-17 DIAGNOSIS — E78.5 HYPERLIPIDEMIA LDL GOAL <100: ICD-10-CM

## 2024-10-17 DIAGNOSIS — I45.89 CHRONOTROPIC INCOMPETENCE: ICD-10-CM

## 2024-10-17 DIAGNOSIS — N18.32 STAGE 3B CHRONIC KIDNEY DISEASE (H): Primary | ICD-10-CM

## 2024-10-17 DIAGNOSIS — L29.9 EAR ITCHING: ICD-10-CM

## 2024-10-17 DIAGNOSIS — I25.118 ATHEROSCLEROSIS OF NATIVE CORONARY ARTERY OF NATIVE HEART WITH STABLE ANGINA PECTORIS (H): ICD-10-CM

## 2024-10-17 LAB
ALT SERPL W P-5'-P-CCNC: 21 U/L (ref 0–70)
ANION GAP SERPL CALCULATED.3IONS-SCNC: 11 MMOL/L (ref 7–15)
BUN SERPL-MCNC: 32.6 MG/DL (ref 8–23)
CALCIUM SERPL-MCNC: 9.3 MG/DL (ref 8.8–10.4)
CHLORIDE SERPL-SCNC: 98 MMOL/L (ref 98–107)
CHOLEST SERPL-MCNC: 102 MG/DL
CREAT SERPL-MCNC: 2.68 MG/DL (ref 0.67–1.17)
EGFRCR SERPLBLD CKD-EPI 2021: 23 ML/MIN/1.73M2
FASTING STATUS PATIENT QL REPORTED: YES
GLUCOSE SERPL-MCNC: 112 MG/DL (ref 70–99)
HCO3 SERPL-SCNC: 25 MMOL/L (ref 22–29)
HDLC SERPL-MCNC: 50 MG/DL
LDLC SERPL CALC-MCNC: 43 MG/DL
NONHDLC SERPL-MCNC: 52 MG/DL
POTASSIUM SERPL-SCNC: 3.7 MMOL/L (ref 3.4–5.3)
SODIUM SERPL-SCNC: 134 MMOL/L (ref 135–145)
TRIGL SERPL-MCNC: 45 MG/DL

## 2024-10-17 PROCEDURE — 84460 ALANINE AMINO (ALT) (SGPT): CPT | Performed by: INTERNAL MEDICINE

## 2024-10-17 PROCEDURE — 80048 BASIC METABOLIC PNL TOTAL CA: CPT | Performed by: INTERNAL MEDICINE

## 2024-10-17 PROCEDURE — 80061 LIPID PANEL: CPT | Performed by: INTERNAL MEDICINE

## 2024-10-17 PROCEDURE — 36415 COLL VENOUS BLD VENIPUNCTURE: CPT | Performed by: INTERNAL MEDICINE

## 2024-10-17 RX ORDER — HYDROCORTISONE AND ACETIC ACID 20.75; 10.375 MG/ML; MG/ML
1 SOLUTION AURICULAR (OTIC) 2 TIMES DAILY
Qty: 10 ML | Refills: 0 | Status: SHIPPED | OUTPATIENT
Start: 2024-10-17

## 2024-10-17 RX ORDER — NITROGLYCERIN 0.6 MG/1
0.6 TABLET SUBLINGUAL EVERY 5 MIN PRN
Qty: 100 TABLET | Refills: 0 | Status: SHIPPED | OUTPATIENT
Start: 2024-10-17

## 2024-10-17 RX ORDER — HYDROCHLOROTHIAZIDE 25 MG/1
12.5 TABLET ORAL DAILY
COMMUNITY
Start: 2024-10-17 | End: 2024-10-23

## 2024-10-17 NOTE — TELEPHONE ENCOUNTER
Labs routed to Dr Dixon for preliminary review, JUANA follow up is 10/23/24. No recent med changes by cardiology. Takes hydrochlorothiazide 25mg daily and lisinopril 20mg daily. Pt also follows with nephrology.       Component      Latest Ref Rng 9/17/2024  9:01 AM 10/17/2024  8:12 AM   Sodium      135 - 145 mmol/L 138  134 (L)    Potassium      3.4 - 5.3 mmol/L 4.7  3.7    Chloride      98 - 107 mmol/L 102  98    Carbon Dioxide (CO2)      22 - 29 mmol/L 27  25    Anion Gap      7 - 15 mmol/L 9  11    Urea Nitrogen      8.0 - 23.0 mg/dL 25.8 (H)  32.6 (H)    Creatinine      0.67 - 1.17 mg/dL 2.30 (H)  2.68 (H)    GFR Estimate      >60 mL/min/1.73m2 28 (L)  23 (L)    Calcium      8.8 - 10.4 mg/dL 9.4  9.3    Glucose      70 - 99 mg/dL 181 (H)  112 (H)    Cholesterol      <200 mg/dL  102    Triglycerides      <150 mg/dL  45    HDL Cholesterol      >=40 mg/dL  50    LDL Cholesterol Calculated      <100 mg/dL  43    Non HDL Cholesterol      <130 mg/dL  52    Patient Fasting?  Yes    ALT      0 - 70 U/L  21

## 2024-10-17 NOTE — TELEPHONE ENCOUNTER
Jordan Dixon MD  You23 minutes ago (3:06 PM)     Drink more fluids.  Decrease hydrochlorothiazide to 12.5 mg daily.  Recheck BMP on arrival       Spoke to patient, reviewed labs and recommendations as above. He expressed understanding. Med list updated and order placed for lab. Scheduling messaged to arrange.

## 2024-10-22 ENCOUNTER — TRANSFERRED RECORDS (OUTPATIENT)
Dept: MULTI SPECIALTY CLINIC | Facility: CLINIC | Age: 79
End: 2024-10-22
Payer: MEDICARE

## 2024-10-22 LAB — RETINOPATHY: NORMAL

## 2024-10-22 NOTE — PROGRESS NOTES
HPI and Plan:   This is a 78 year old male who follows with Dr Dixon at [facility]  His past medical history includes:  Coronary artery disease, bradycardia, hypertension, hyperlipidemia, stage III CKD, type II diabetes, and peripheral edema     Mr Ricks underwent ramus intermedius branch stenting (2003) and then staged stenting x2 to his LAD.  At that time, he was noted to have an occluded distal CFX.     He has seen our EP physicians some over the years for SVT and nocturnal bradycardia which has been medically managed.      A stress ECHO (4/2023) showed an apical distal LAD wall motion abnormality at rest which worsened with exercise.  EKG showed underlying rBBB and left anterior fascicular block.Coronary angiography showed an occluded ramus branch, 70% ostial CFX disease, 85% disease in distal CFX, 100% occlusion of his mid-LAD (at previous stented site) with 60% ostial-prox LAD in-stent stenosis.  He was noted to have poor targets and medical therapy was recommended and he was started on Imdur  His anginal symptoms have been minimal since then.    He beta-blocker was stopped last year for nocturnal bradycardia and concern for chronotropic incompetence  Ziopatch (3/2024) showed average HR 63 bpm, rangng from  bpm  2 runst of SVT      He has developed worsening renal function and was taken off Lasix by  his nephrologist and was later given hydrochlorothiazide by Dr Dixon to help control his blood pressure  The dose has been limited due to his underlying chronic renal failure      Due to ongoing hypertension, Coreg was recently added back cautiously  Recent labs showed worsening renal insufficiency and he was instructed to reduce the hydrochlorothiazide to 12.5 mg/day and drink more fluid     Our visit today is for further review and repeat labs    Mr Ricks is frustrated with his labs, weight, blood pressure, and blood sugars  He has not been taking the hydrochlorothiazide for the past week  as he says there has been too many changes in the dose lately by too many providers  He is trying to drink 40-48 oz of water a day His blood pressure at home many times is < 140/90 mmHg   He watches his pulse via iWatch  HRs at home can intermittently drop to 48 bpm but he does not noticed any associated lightheadedness  He states that his heart rate appropriately increases when he does activity   He denies any chest pain, shortness of breath, palpitations, or orthopnea  He wears support socks which have kept his leg swelling minimal now for several years         VITAL SIGNS:  BP:  136/60  Pulse:  56  Weight: 202 lbs  (BMI: 29)    Labs (10/17/24)  Total cholesterol: 102  Triglycerides: 45  HDL: 50  LDL: 43  ALT: 21    Labs today: Sodium: 138  Potassium: 4.0  BUN: 36  Creatinine: 2.54 (down from 2.68 last week)     IMPRESSION AND PLAN:     Coronary Artery Disease:   -s/p stenting x2 to Ramus and x2 to LAD (2003)  -cath (4/7/23) showed mostly diffuse distal and branch vessel disease with in-stent LAD stenosis not easily fixed by stenting.  Medical management advised  -on Imdur  Denies any significant angina  -chronic Plavix      Hypertension:  -on Amlodipine 5 mg, Imdur 15 mg, Lisinopril 20 mg, Coreg 3.125 mg BID  -BP fairly well controlled  -asked him to stay off hydrochlorothiazide and monitor his BP at home  He is to call with persistent BP > 140/90 mmHg     Asymptomatic Bradycardia:  -mainly nocturnal bradycardia in setting of untreated sleep apnea       Hyperlipidemia:  -Crestor 5 mg three times a week  -LDL  43     Stage III-IV CKD:  -follows with Dr Alvarez  -creatinine > 2.0  for past year  -encouraged continued hydration        Type II Diabetes:  -Hgb A1C  7.9  -on Farxiga     Untreated Sleep Apnea  -did not like CPAP when he tried it 20 yrs ago    The total time for the visit today was 33 minutes which includes patient visit, reviewing of records, discussion, and placing of orders of the outpatient coordination  of cardiovascular care as described.  The level of medical decision making during this visit was of moderate complexity.  Thank you for allowing me to participate in their care.    The longitudinal plan of care for the diagnosis(es)/condition(s) as documented were addressed during this visit. Due to the added complexity in care, I will continue to support Jeff or Ferdinand in the subsequent management and with ongoing continuity of care.      No orders of the defined types were placed in this encounter.      Orders Placed This Encounter   Medications    carvedilol (COREG) 3.125 MG tablet     Sig: Take 1 tablet (3.125 mg) by mouth 2 times daily (with meals).     Dispense:  180 tablet     Refill:  3       Medications Discontinued During This Encounter   Medication Reason    hydrochlorothiazide (HYDRODIURIL) 25 MG tablet     carvedilol (COREG) 3.125 MG tablet Reorder (No AVS)    hydrocortisone 2.5 % cream          Encounter Diagnoses   Name Primary?    Essential hypertension     Atherosclerosis of native coronary artery of native heart with stable angina pectoris (H)     Stage 3b chronic kidney disease (H)     Chronotropic incompetence     Hypercholesterolemia     GALINDO (dyspnea on exertion)        CURRENT MEDICATIONS:  Current Outpatient Medications   Medication Sig Dispense Refill    acetic acid-hydrocortisone (VOSOL-HC) 1-2 % otic solution Place 1 drop into both ears 2 times daily. INSTILL 1 DROP INTO BOTH   EARS TWO TIMES A DAY Strength: 1-2 % 10 mL 0    amLODIPine (NORVASC) 5 MG tablet Take 1 tablet (5 mg) by mouth daily 90 tablet 3    Ascorbic Acid (VITAMIN C PO) Take 1,000 mg by mouth 2 times daily      blood glucose (NO BRAND SPECIFIED) lancing device Device to be used with lancets. 1 each 0    blood glucose (NO BRAND SPECIFIED) test strip Use to test blood sugar 1 times daily or as directed. 100 strip 1    calcitRIOL (ROCALTROL) 0.25 MCG capsule Take 0.25 mcg by mouth. Taking one tablet Monday-Friday      carvedilol  (COREG) 3.125 MG tablet Take 1 tablet (3.125 mg) by mouth 2 times daily (with meals). 180 tablet 3    chlorhexidine (PERIDEX) 0.12 % solution USE 1/2 OZ TO SWISH FOR 30 SECONDS ONCE D  3    clopidogrel (PLAVIX) 75 MG tablet Take 1 tablet (75 mg) by mouth daily. 90 tablet 3    Continuous Blood Gluc Sensor (FREESTYLE SHARRI 14 DAY SENSOR) MISC 1 each every 14 days Use 1 Sensor every 14 days. Use to read blood sugars per 's instructions. 2 each 5    Continuous Blood Gluc Sensor (FREESTYLE SHARRI 2 SENSOR) MISC 1 each every 14 days Use 1 sensor every 14 days. Use to read blood sugars per 's instructions. 2 each 5    Cyanocobalamin (B-12) 1000 MCG CAPS Take 1 mg by mouth daily      dapagliflozin (FARXIGA) 5 MG TABS tablet Take 5 mg by mouth daily      famotidine (PEPCID) 40 MG tablet TAKE 1 TABLET DAILY 90 tablet 3    ferrous sulfate (FEROSUL) 325 (65 Fe) MG tablet Take 325 mg by mouth daily      glimepiride (AMARYL) 1 MG tablet Take 2 tablets (2 mg) by mouth every morning (before breakfast). Pt takes 2 tablets once a day 180 tablet 1    isosorbide mononitrate (IMDUR) 30 MG 24 hr tablet Take 1 tablet (30 mg) by mouth daily (Patient taking differently: Take 15 mg by mouth daily. Patient is taking a 1/2 tablet (15mg). 7/17/24) 90 tablet 0    JANUVIA 50 MG tablet TAKE 1 TABLET DAILY 90 tablet 1    lisinopril (ZESTRIL) 20 MG tablet Take 20 mg by mouth daily      nitroGLYcerin (NITROSTAT) 0.6 MG sublingual tablet Place 1 tablet (0.6 mg) under the tongue every 5 minutes as needed for chest pain. For chest pain place 1 tablet under the tongue every 5 minutes for 3 doses. If symptoms persist 5 minutes after 1st dose call 911. 100 tablet 0    nystatin (MYCOSTATIN) 497113 UNIT/GM external powder Apply topically 2 times daily as needed (skin rash). 60 g 2    omega-3 acid ethyl esters (LOVAZA) 1 g capsule TAKE 1 CAPSULE TWICE DAILY 180 capsule 3    Pyridoxine HCl (B-6) 100 MG TABS Take 100 mg by mouth daily       rosuvastatin (CRESTOR) 5 MG tablet Take rosuvastatin 5mg on Monday, Wednesday, Friday      VITAMIN E NATURAL PO Take 400 Units by mouth daily         ALLERGIES     Allergies   Allergen Reactions    No Known Drug Allergy        PAST MEDICAL HISTORY:  Past Medical History:   Diagnosis Date    Chronic kidney disease     Coronary atherosclerosis of unspecified type of vessel, native or graft 10/03    at [facility]:  had 4 stents done following an MI    DDD (degenerative disc disease), lumbosacral 5/21/2021    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    Heart attack (H)     Hernia, abdominal     Hyperlipidaemia     Mumps     Obese     Other and unspecified hyperlipidemia     Other premature beats     Palpitations     Phlebitis and thrombophlebitis of other deep vessels of lower extremities 2000    has had 2-3 episodes    Stented coronary artery      4 stents    Syncope     Syncope     Thrombosis of leg     Type II or unspecified type diabetes mellitus without mention of complication, not stated as uncontrolled     Unspecified essential hypertension        PAST SURGICAL HISTORY:  Past Surgical History:   Procedure Laterality Date    ABDOMEN SURGERY      Hernia naval approx 25 years ago    BIOPSY  08/2016    CARDIAC SURGERY      COLONOSCOPY      CORONARY ANGIOGRAPHY ADULT ORDER  08/28/2000    75% distal LAD stenosis, 80% third diagonal stenosis, 75-80% mid ramus stenosis, ostial 60% stenosis in circumflex w/90% stenosis in distal circumflex    CORONARY ANGIOGRAPHY ADULT ORDER  2003    4 stents placed    CV CORONARY ANGIOGRAM N/A 04/14/2023    Procedure: Coronary Angiogram;  Surgeon: Rafita Vela MD;  Location: [facility] CARDIAC CATH LAB    CV LEFT HEART CATH N/A 04/14/2023    Procedure: Left Heart Catheterization;  Surgeon: Rafita Vela MD;  Location: [facility] CARDIAC CATH LAB    CV PCI N/A 04/14/2023    Procedure: Percutaneous Coronary Intervention;  Surgeon: Rafita Vela MD;  Location:   HEART CARDIAC CATH LAB    EP ABLATION / EP STUDIES  2014    HEART CATH, ANGIOPLASTY      HERNIA REPAIR      Don't remember the date    HYDROCELECTOMY SCROTAL Right 10/06/2016    Procedure: HYDROCELECTOMY SCROTAL;  Surgeon: Jordan Chandra MD;  Location: [facility]    ORTHOPEDIC SURGERY      Meniscus Orthoscopic surgery left knee.    TESTICLE SURGERY      ZZC NONSPECIFIC PROCEDURE      colonoscopy approx  done elsewhere       FAMILY HISTORY:  Family History   Problem Relation Age of Onset    Musculoskeletal Disorder Mother         spinal stenosis    Cancer Mother         cancer kidney age 85    Hypertension Mother         Dead    Diabetes Father         Dead    Diabetes Brother         Type 1    Heart Disease Brother     Cerebrovascular Disease Maternal Grandmother        SOCIAL HISTORY:  Social History     Socioeconomic History    Marital status:      Spouse name: Bethany    Number of children: 3    Years of education: None    Highest education level: None   Occupational History    Occupation: Computers/     Employer: [employer]      Comment: [employer]   Tobacco Use    Smoking status: Former     Current packs/day: 0.00     Average packs/day: 3.0 packs/day for 6.0 years (18.0 ttl pk-yrs)     Types: Cigarettes, Cigars, Pipe     Start date: 1961     Quit date: 1967     Years since quittin.8    Smokeless tobacco: Never    Tobacco comments:     quit    Vaping Use    Vaping status: Never Used   Substance and Sexual Activity    Alcohol use: Yes     Comment: 1 beer a week    Drug use: No    Sexual activity: Not Currently     Partners: Female     Birth control/protection: Female Surgical   Other Topics Concern    Parent/sibling w/ CABG, MI or angioplasty before 65F 55M? No     Social Drivers of Health     Financial Resource Strain: Low Risk  (2024)    Financial Resource Strain     Within the past 12 months, have you or your family members you live with been unable to  get utilities (heat, electricity) when it was really needed?: No   Food Insecurity: Low Risk  (9/17/2024)    Food Insecurity     Within the past 12 months, did you worry that your food would run out before you got money to buy more?: No     Within the past 12 months, did the food you bought just not last and you didn t have money to get more?: No   Transportation Needs: Low Risk  (9/17/2024)    Transportation Needs     Within the past 12 months, has lack of transportation kept you from medical appointments, getting your medicines, non-medical meetings or appointments, work, or from getting things that you need?: No   Physical Activity: Sufficiently Active (9/17/2024)    Exercise Vital Sign     Days of Exercise per Week: 5 days     Minutes of Exercise per Session: 60 min   Social Connections: Unknown (9/17/2024)    Social Connection and Isolation Panel [NHANES]     Frequency of Social Gatherings with Friends and Family: Patient declined   Interpersonal Safety: Low Risk  (9/17/2024)    Interpersonal Safety     Do you feel physically and emotionally safe where you currently live?: Yes     Within the past 12 months, have you been hit, slapped, kicked or otherwise physically hurt by someone?: No     Within the past 12 months, have you been humiliated or emotionally abused in other ways by your partner or ex-partner?: No   Housing Stability: Low Risk  (9/17/2024)    Housing Stability     Do you have housing? : Yes     Are you worried about losing your housing?: No       Review of Systems:  Skin:  not assessed       Eyes:  not assessed      ENT:  not assessed      Respiratory:  Negative       Cardiovascular:    Positive for;lightheadedness;edema;fatigue slight tightness in his chest; positional lightheadedness; wears compression socks  Gastroenterology: not assessed      Genitourinary:  not assessed      Musculoskeletal:  not assessed      Neurologic:  not assessed      Psychiatric:  not assessed      Heme/Lymph/Imm:  not  "assessed      Endocrine:  not assessed        Physical Exam:  Vitals: /60   Pulse 56   Ht 1.778 m (5' 10\")   Wt 92 kg (202 lb 14.4 oz)   BMI 29.11 kg/m      Constitutional:  cooperative overweight      Skin:  warm and dry to the touch          Head:  normocephalic, no masses or lesions        Eyes:  pupils equal and round        Lymph:      ENT:  no pallor or cyanosis, dentition good        Neck:  no carotid bruit        Respiratory:  clear to auscultation         Cardiac: regular rhythm;no murmurs, gallops or rubs detected                pulses full and equal                                        GI:           Extremities and Muscular Skeletal:      bilateral LE edema;trace     Wears compression stockings    Neurological:  no gross motor deficits        Psych:  affect appropriate, oriented to time, person and place          CC  Jordan Dixon MD  [address]  [place] [id number]                    "

## 2024-10-23 ENCOUNTER — LAB (OUTPATIENT)
Dept: LAB | Facility: CLINIC | Age: 79
End: 2024-10-23
Payer: MEDICARE

## 2024-10-23 ENCOUNTER — OFFICE VISIT (OUTPATIENT)
Dept: CARDIOLOGY | Facility: CLINIC | Age: 79
End: 2024-10-23
Payer: MEDICARE

## 2024-10-23 ENCOUNTER — MYC MEDICAL ADVICE (OUTPATIENT)
Dept: INTERNAL MEDICINE | Facility: CLINIC | Age: 79
End: 2024-10-23

## 2024-10-23 VITALS
DIASTOLIC BLOOD PRESSURE: 60 MMHG | BODY MASS INDEX: 29.05 KG/M2 | WEIGHT: 202.9 LBS | HEART RATE: 56 BPM | HEIGHT: 70 IN | SYSTOLIC BLOOD PRESSURE: 136 MMHG

## 2024-10-23 DIAGNOSIS — I10 ESSENTIAL HYPERTENSION: ICD-10-CM

## 2024-10-23 DIAGNOSIS — E78.00 HYPERCHOLESTEROLEMIA: ICD-10-CM

## 2024-10-23 DIAGNOSIS — N18.32 STAGE 3B CHRONIC KIDNEY DISEASE (H): ICD-10-CM

## 2024-10-23 DIAGNOSIS — R06.09 DOE (DYSPNEA ON EXERTION): ICD-10-CM

## 2024-10-23 DIAGNOSIS — I45.89 CHRONOTROPIC INCOMPETENCE: ICD-10-CM

## 2024-10-23 DIAGNOSIS — I25.118 ATHEROSCLEROSIS OF NATIVE CORONARY ARTERY OF NATIVE HEART WITH STABLE ANGINA PECTORIS (H): ICD-10-CM

## 2024-10-23 LAB
ANION GAP SERPL CALCULATED.3IONS-SCNC: 11 MMOL/L (ref 7–15)
BUN SERPL-MCNC: 36.6 MG/DL (ref 8–23)
CALCIUM SERPL-MCNC: 9.1 MG/DL (ref 8.8–10.4)
CHLORIDE SERPL-SCNC: 103 MMOL/L (ref 98–107)
CREAT SERPL-MCNC: 2.54 MG/DL (ref 0.67–1.17)
EGFRCR SERPLBLD CKD-EPI 2021: 25 ML/MIN/1.73M2
GLUCOSE SERPL-MCNC: 142 MG/DL (ref 70–99)
HCO3 SERPL-SCNC: 24 MMOL/L (ref 22–29)
POTASSIUM SERPL-SCNC: 4 MMOL/L (ref 3.4–5.3)
SODIUM SERPL-SCNC: 138 MMOL/L (ref 135–145)

## 2024-10-23 PROCEDURE — 99214 OFFICE O/P EST MOD 30 MIN: CPT | Performed by: NURSE PRACTITIONER

## 2024-10-23 PROCEDURE — 80048 BASIC METABOLIC PNL TOTAL CA: CPT | Performed by: INTERNAL MEDICINE

## 2024-10-23 PROCEDURE — 36415 COLL VENOUS BLD VENIPUNCTURE: CPT | Performed by: INTERNAL MEDICINE

## 2024-10-23 RX ORDER — CARVEDILOL 3.12 MG/1
3.12 TABLET ORAL 2 TIMES DAILY WITH MEALS
Qty: 180 TABLET | Refills: 3 | Status: SHIPPED | OUTPATIENT
Start: 2024-10-23

## 2024-10-23 NOTE — PATIENT INSTRUCTIONS
Stay off hydrochlorothiazide  Return in 3 months  Follow up with Dr Alvarez  Call with persistent BP > 140/90 mmHg  Drink at least 40-48 oz/day    It was a pleasure seeing you today     Please do not hesitate to call my nurse team with any questions or concerns:  [phone]    Scheduling number:  [phone]    HÉCTOR Nicholson, CNP

## 2024-10-23 NOTE — LETTER
10/23/2024    Guillermo Deleon MD  [address] [facility] [address]    RE: Jeff HERNANDEZ Rambo       Dear Colleague,     I had the pleasure of seeing Jeff Ricks in the [facility].  HPI and Plan:   This is a 78 year old male who follows with Dr Dixon at [facility]  His past medical history includes:  Coronary artery disease, bradycardia, hypertension, hyperlipidemia, stage III CKD, type II diabetes, and peripheral edema     Mr Ricks underwent ramus intermedius branch stenting (2003) and then staged stenting x2 to his LAD.  At that time, he was noted to have an occluded distal CFX.     He has seen our EP physicians some over the years for SVT and nocturnal bradycardia which has been medically managed.      A stress ECHO (4/2023) showed an apical distal LAD wall motion abnormality at rest which worsened with exercise.  EKG showed underlying rBBB and left anterior fascicular block.Coronary angiography showed an occluded ramus branch, 70% ostial CFX disease, 85% disease in distal CFX, 100% occlusion of his mid-LAD (at previous stented site) with 60% ostial-prox LAD in-stent stenosis.  He was noted to have poor targets and medical therapy was recommended and he was started on Imdur  His anginal symptoms have been minimal since then.    He beta-blocker was stopped last year for nocturnal bradycardia and concern for chronotropic incompetence  Ziopatch (3/2024) showed average HR 63 bpm, rangng from  bpm  2 runst of SVT      He has developed worsening renal function and was taken off Lasix by  his nephrologist and was later given hydrochlorothiazide by Dr Dixon to help control his blood pressure  The dose has been limited due to his underlying chronic renal failure      Due to ongoing hypertension, Coreg was recently added back cautiously  Recent labs showed worsening renal insufficiency and he was instructed to reduce the hydrochlorothiazide to 12.5 mg/day and drink more  fluid     Our visit today is for further review and repeat labs    Mr Ricks is frustrated with his labs, weight, blood pressure, and blood sugars  He has not been taking the hydrochlorothiazide for the past week as he says there has been too many changes in the dose lately by too many providers  He is trying to drink 40-48 oz of water a day His blood pressure at home many times is < 140/90 mmHg   He watches his pulse via iWatch  HRs at home can intermittently drop to 48 bpm but he does not noticed any associated lightheadedness  He states that his heart rate appropriately increases when he does activity   He denies any chest pain, shortness of breath, palpitations, or orthopnea  He wears support socks which have kept his leg swelling minimal now for several years         VITAL SIGNS:  BP:  136/60  Pulse:  56  Weight: 202 lbs  (BMI: 29)    Labs (10/17/24)  Total cholesterol: 102  Triglycerides: 45  HDL: 50  LDL: 43  ALT: 21    Labs today: Sodium: 138  Potassium: 4.0  BUN: 36  Creatinine: 2.54 (down from 2.68 last week)     IMPRESSION AND PLAN:     Coronary Artery Disease:   -s/p stenting x2 to Ramus and x2 to LAD (2003)  -cath (4/7/23) showed mostly diffuse distal and branch vessel disease with in-stent LAD stenosis not easily fixed by stenting.  Medical management advised  -on Imdur  Denies any significant angina  -chronic Plavix      Hypertension:  -on Amlodipine 5 mg, Imdur 15 mg, Lisinopril 20 mg, Coreg 3.125 mg BID  -BP fairly well controlled  -asked him to stay off hydrochlorothiazide and monitor his BP at home  He is to call with persistent BP > 140/90 mmHg     Asymptomatic Bradycardia:  -mainly nocturnal bradycardia in setting of untreated sleep apnea       Hyperlipidemia:  -Crestor 5 mg three times a week  -LDL  43     Stage III-IV CKD:  -follows with Dr Alvarez  -creatinine > 2.0  for past year  -encouraged continued hydration        Type II Diabetes:  -Hgb A1C  7.9  -on Farxiga     Untreated Sleep Apnea  -did  not like CPAP when he tried it 20 yrs ago    The total time for the visit today was 33 minutes which includes patient visit, reviewing of records, discussion, and placing of orders of the outpatient coordination of cardiovascular care as described.  The level of medical decision making during this visit was of moderate complexity.  Thank you for allowing me to participate in their care.    The longitudinal plan of care for the diagnosis(es)/condition(s) as documented were addressed during this visit. Due to the added complexity in care, I will continue to support Jeff or Ferdinand in the subsequent management and with ongoing continuity of care.      No orders of the defined types were placed in this encounter.      Orders Placed This Encounter   Medications     carvedilol (COREG) 3.125 MG tablet     Sig: Take 1 tablet (3.125 mg) by mouth 2 times daily (with meals).     Dispense:  180 tablet     Refill:  3       Medications Discontinued During This Encounter   Medication Reason     hydrochlorothiazide (HYDRODIURIL) 25 MG tablet      carvedilol (COREG) 3.125 MG tablet Reorder (No AVS)     hydrocortisone 2.5 % cream          Encounter Diagnoses   Name Primary?     Essential hypertension      Atherosclerosis of native coronary artery of native heart with stable angina pectoris (H)      Stage 3b chronic kidney disease (H)      Chronotropic incompetence      Hypercholesterolemia      GALINDO (dyspnea on exertion)        CURRENT MEDICATIONS:  Current Outpatient Medications   Medication Sig Dispense Refill     acetic acid-hydrocortisone (VOSOL-HC) 1-2 % otic solution Place 1 drop into both ears 2 times daily. INSTILL 1 DROP INTO BOTH   EARS TWO TIMES A DAY Strength: 1-2 % 10 mL 0     amLODIPine (NORVASC) 5 MG tablet Take 1 tablet (5 mg) by mouth daily 90 tablet 3     Ascorbic Acid (VITAMIN C PO) Take 1,000 mg by mouth 2 times daily       blood glucose (NO BRAND SPECIFIED) lancing device Device to be used with lancets. 1 each 0      blood glucose (NO BRAND SPECIFIED) test strip Use to test blood sugar 1 times daily or as directed. 100 strip 1     calcitRIOL (ROCALTROL) 0.25 MCG capsule Take 0.25 mcg by mouth. Taking one tablet Monday-Friday       carvedilol (COREG) 3.125 MG tablet Take 1 tablet (3.125 mg) by mouth 2 times daily (with meals). 180 tablet 3     chlorhexidine (PERIDEX) 0.12 % solution USE 1/2 OZ TO SWISH FOR 30 SECONDS ONCE D  3     clopidogrel (PLAVIX) 75 MG tablet Take 1 tablet (75 mg) by mouth daily. 90 tablet 3     Continuous Blood Gluc Sensor (FREESTYLE SHARRI 14 DAY SENSOR) MISC 1 each every 14 days Use 1 Sensor every 14 days. Use to read blood sugars per 's instructions. 2 each 5     Continuous Blood Gluc Sensor (FREESTYLE SHARRI 2 SENSOR) MISC 1 each every 14 days Use 1 sensor every 14 days. Use to read blood sugars per 's instructions. 2 each 5     Cyanocobalamin (B-12) 1000 MCG CAPS Take 1 mg by mouth daily       dapagliflozin (FARXIGA) 5 MG TABS tablet Take 5 mg by mouth daily       famotidine (PEPCID) 40 MG tablet TAKE 1 TABLET DAILY 90 tablet 3     ferrous sulfate (FEROSUL) 325 (65 Fe) MG tablet Take 325 mg by mouth daily       glimepiride (AMARYL) 1 MG tablet Take 2 tablets (2 mg) by mouth every morning (before breakfast). Pt takes 2 tablets once a day 180 tablet 1     isosorbide mononitrate (IMDUR) 30 MG 24 hr tablet Take 1 tablet (30 mg) by mouth daily (Patient taking differently: Take 15 mg by mouth daily. Patient is taking a 1/2 tablet (15mg). 7/17/24) 90 tablet 0     JANUVIA 50 MG tablet TAKE 1 TABLET DAILY 90 tablet 1     lisinopril (ZESTRIL) 20 MG tablet Take 20 mg by mouth daily       nitroGLYcerin (NITROSTAT) 0.6 MG sublingual tablet Place 1 tablet (0.6 mg) under the tongue every 5 minutes as needed for chest pain. For chest pain place 1 tablet under the tongue every 5 minutes for 3 doses. If symptoms persist 5 minutes after 1st dose call 911. 100 tablet 0     nystatin (MYCOSTATIN)  925049 UNIT/GM external powder Apply topically 2 times daily as needed (skin rash). 60 g 2     omega-3 acid ethyl esters (LOVAZA) 1 g capsule TAKE 1 CAPSULE TWICE DAILY 180 capsule 3     Pyridoxine HCl (B-6) 100 MG TABS Take 100 mg by mouth daily       rosuvastatin (CRESTOR) 5 MG tablet Take rosuvastatin 5mg on Monday, Wednesday, Friday       VITAMIN E NATURAL PO Take 400 Units by mouth daily         ALLERGIES     Allergies   Allergen Reactions     No Known Drug Allergy        PAST MEDICAL HISTORY:  Past Medical History:   Diagnosis Date     Chronic kidney disease      Coronary atherosclerosis of unspecified type of vessel, native or graft 10/03    at 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     Heart attack (H)      Hernia, abdominal      Hyperlipidaemia      Mumps      Obese      Other and unspecified hyperlipidemia      Other premature beats      Palpitations      Phlebitis and thrombophlebitis of other deep vessels of lower extremities 2000    has had 2-3 episodes     Stented coronary artery      4 stents     Syncope      Syncope      Thrombosis of leg      Type II or unspecified type diabetes mellitus without mention of complication, not stated as uncontrolled      Unspecified essential hypertension        PAST SURGICAL HISTORY:  Past Surgical History:   Procedure Laterality Date     ABDOMEN SURGERY      Hernia naval approx 25 years ago     BIOPSY  08/2016     CARDIAC SURGERY       COLONOSCOPY       CORONARY ANGIOGRAPHY ADULT ORDER  08/28/2000    75% distal LAD stenosis, 80% third diagonal stenosis, 75-80% mid ramus stenosis, ostial 60% stenosis in circumflex w/90% stenosis in distal circumflex     CORONARY ANGIOGRAPHY ADULT ORDER  2003    4 stents placed     CV CORONARY ANGIOGRAM N/A 04/14/2023    Procedure: Coronary Angiogram;  Surgeon: Rafita Vela MD;  Location: [facility] CARDIAC CATH  LAB     CV LEFT HEART CATH N/A 2023    Procedure: Left Heart Catheterization;  Surgeon: Rafita Vela MD;  Location:  HEART CARDIAC CATH LAB     CV PCI N/A 2023    Procedure: Percutaneous Coronary Intervention;  Surgeon: Rafita Vela MD;  Location:  HEART CARDIAC CATH LAB     EP ABLATION / EP STUDIES  2014     HEART CATH, ANGIOPLASTY       HERNIA REPAIR      Don't remember the date     HYDROCELECTOMY SCROTAL Right 10/06/2016    Procedure: HYDROCELECTOMY SCROTAL;  Surgeon: Jordan Chandra MD;  Location:  [facility]     ORTHOPEDIC SURGERY      Meniscus Orthoscopic surgery left knee.     TESTICLE SURGERY       ZZC NONSPECIFIC PROCEDURE      colonoscopy approx  done elsewhere       FAMILY HISTORY:  Family History   Problem Relation Age of Onset     Musculoskeletal Disorder Mother         spinal stenosis     Cancer Mother         cancer kidney age 85     Hypertension Mother         Dead     Diabetes Father         Dead     Diabetes Brother         Type 1     Heart Disease Brother      Cerebrovascular Disease Maternal Grandmother        SOCIAL HISTORY:  Social History     Socioeconomic History     Marital status:      Spouse name: Bethany     Number of children: 3     Years of education: None     Highest education level: None   Occupational History     Occupation: [employer]/     Employer: [employer]      Comment: [employer]   Tobacco Use     Smoking status: Former     Current packs/day: 0.00     Average packs/day: 3.0 packs/day for 6.0 years (18.0 ttl pk-yrs)     Types: Cigarettes, Cigars, Pipe     Start date: 1961     Quit date: 1967     Years since quittin.8     Smokeless tobacco: Never     Tobacco comments:     quit    Vaping Use     Vaping status: Never Used   Substance and Sexual Activity     Alcohol use: Yes     Comment: 1 beer a week     Drug use: No     Sexual activity: Not Currently     Partners: Female     Birth control/protection:  Female Surgical   Other Topics Concern     Parent/sibling w/ CABG, MI or angioplasty before 65F 55M? No     Social Drivers of Health     Financial Resource Strain: Low Risk  (9/17/2024)    Financial Resource Strain      Within the past 12 months, have you or your family members you live with been unable to get utilities (heat, electricity) when it was really needed?: No   Food Insecurity: Low Risk  (9/17/2024)    Food Insecurity      Within the past 12 months, did you worry that your food would run out before you got money to buy more?: No      Within the past 12 months, did the food you bought just not last and you didn t have money to get more?: No   Transportation Needs: Low Risk  (9/17/2024)    Transportation Needs      Within the past 12 months, has lack of transportation kept you from medical appointments, getting your medicines, non-medical meetings or appointments, work, or from getting things that you need?: No   Physical Activity: Sufficiently Active (9/17/2024)    Exercise Vital Sign      Days of Exercise per Week: 5 days      Minutes of Exercise per Session: 60 min   Social Connections: Unknown (9/17/2024)    Social Connection and Isolation Panel [NHANES]      Frequency of Social Gatherings with Friends and Family: Patient declined   Interpersonal Safety: Low Risk  (9/17/2024)    Interpersonal Safety      Do you feel physically and emotionally safe where you currently live?: Yes      Within the past 12 months, have you been hit, slapped, kicked or otherwise physically hurt by someone?: No      Within the past 12 months, have you been humiliated or emotionally abused in other ways by your partner or ex-partner?: No   Housing Stability: Low Risk  (9/17/2024)    Housing Stability      Do you have housing? : Yes      Are you worried about losing your housing?: No       Review of Systems:  Skin:  not assessed       Eyes:  not assessed      ENT:  not assessed      Respiratory:  Negative       Cardiovascular:    " Positive for;lightheadedness;edema;fatigue slight tightness in his chest; positional lightheadedness; wears compression socks  Gastroenterology: not assessed      Genitourinary:  not assessed      Musculoskeletal:  not assessed      Neurologic:  not assessed      Psychiatric:  not assessed      Heme/Lymph/Imm:  not assessed      Endocrine:  not assessed        Physical Exam:  Vitals: /60   Pulse 56   Ht 1.778 m (5' 10\")   Wt 92 kg (202 lb 14.4 oz)   BMI 29.11 kg/m      Constitutional:  cooperative overweight      Skin:  warm and dry to the touch          Head:  normocephalic, no masses or lesions        Eyes:  pupils equal and round        Lymph:      ENT:  no pallor or cyanosis, dentition good        Neck:  no carotid bruit        Respiratory:  clear to auscultation         Cardiac: regular rhythm;no murmurs, gallops or rubs detected                pulses full and equal                                        GI:           Extremities and Muscular Skeletal:      bilateral LE edema;trace     Wears compression stockings    Neurological:  no gross motor deficits        Psych:  affect appropriate, oriented to time, person and place          CC  Jordan Dixon MD  [address]  [place] [id number]                      Thank you for allowing me to participate in the care of your patient.      Sincerely,     HÉCTOR Carrillo [facility]  cc:   Jordan Dixon MD  [address]  [place] [id number]      "

## 2024-10-24 NOTE — TELEPHONE ENCOUNTER
I don't recommend Ozempic for him, because of decreased kidney function. Also his diabetes is controlled on the current treatment.

## 2024-10-24 NOTE — TELEPHONE ENCOUNTER
Please see message below and advise.    Last office visit 9/17/24. Would provider like patient to schedule an appointment to discuss?

## 2024-11-02 ENCOUNTER — HOSPITAL ENCOUNTER (EMERGENCY)
Facility: CLINIC | Age: 79
Discharge: HOME | End: 2024-11-02
Attending: EMERGENCY MEDICINE | Admitting: EMERGENCY MEDICINE
Payer: MEDICARE

## 2024-11-02 ENCOUNTER — APPOINTMENT (OUTPATIENT)
Dept: GENERAL RADIOLOGY | Facility: CLINIC | Age: 79
End: 2024-11-02
Payer: MEDICARE

## 2024-11-02 VITALS
DIASTOLIC BLOOD PRESSURE: 83 MMHG | SYSTOLIC BLOOD PRESSURE: 126 MMHG | HEART RATE: 69 BPM | WEIGHT: 203.48 LBS | OXYGEN SATURATION: 97 % | BODY MASS INDEX: 29.13 KG/M2 | TEMPERATURE: 100.4 F | HEIGHT: 70 IN | RESPIRATION RATE: 20 BRPM

## 2024-11-02 DIAGNOSIS — N30.00 ACUTE CYSTITIS WITHOUT HEMATURIA: ICD-10-CM

## 2024-11-02 LAB
ALBUMIN SERPL BCG-MCNC: 4.1 G/DL (ref 3.5–5.2)
ALBUMIN UR-MCNC: 20 MG/DL
ALP SERPL-CCNC: 79 U/L (ref 40–150)
ALT SERPL W P-5'-P-CCNC: 20 U/L (ref 0–70)
ANION GAP SERPL CALCULATED.3IONS-SCNC: 12 MMOL/L (ref 7–15)
APPEARANCE UR: CLEAR
AST SERPL W P-5'-P-CCNC: 25 U/L (ref 0–45)
BACTERIA #/AREA URNS HPF: ABNORMAL /HPF
BASOPHILS # BLD AUTO: 0 10E3/UL (ref 0–0.2)
BASOPHILS NFR BLD AUTO: 0 %
BILIRUB SERPL-MCNC: 0.6 MG/DL
BILIRUB UR QL STRIP: NEGATIVE
BUN SERPL-MCNC: 18.3 MG/DL (ref 8–23)
CALCIUM SERPL-MCNC: 9.2 MG/DL (ref 8.8–10.4)
CHLORIDE SERPL-SCNC: 107 MMOL/L (ref 98–107)
COLOR UR AUTO: ABNORMAL
CREAT SERPL-MCNC: 2.09 MG/DL (ref 0.67–1.17)
EGFRCR SERPLBLD CKD-EPI 2021: 31 ML/MIN/1.73M2
EOSINOPHIL # BLD AUTO: 0.1 10E3/UL (ref 0–0.7)
EOSINOPHIL NFR BLD AUTO: 1 %
ERYTHROCYTE [DISTWIDTH] IN BLOOD BY AUTOMATED COUNT: 13.3 % (ref 10–15)
FLUAV RNA SPEC QL NAA+PROBE: NEGATIVE
FLUBV RNA RESP QL NAA+PROBE: NEGATIVE
GLUCOSE SERPL-MCNC: 142 MG/DL (ref 70–99)
GLUCOSE UR STRIP-MCNC: >=1000 MG/DL
GROUP A STREP BY PCR: NOT DETECTED
HCO3 SERPL-SCNC: 22 MMOL/L (ref 22–29)
HCT VFR BLD AUTO: 38.5 % (ref 40–53)
HGB BLD-MCNC: 13 G/DL (ref 13.3–17.7)
HGB UR QL STRIP: NEGATIVE
IMM GRANULOCYTES # BLD: 0.1 10E3/UL
IMM GRANULOCYTES NFR BLD: 1 %
KETONES UR STRIP-MCNC: NEGATIVE MG/DL
LACTATE SERPL-SCNC: 0.9 MMOL/L (ref 0.7–2)
LEUKOCYTE ESTERASE UR QL STRIP: NEGATIVE
LYMPHOCYTES # BLD AUTO: 0.9 10E3/UL (ref 0.8–5.3)
LYMPHOCYTES NFR BLD AUTO: 8 %
MCH RBC QN AUTO: 31 PG (ref 26.5–33)
MCHC RBC AUTO-ENTMCNC: 33.8 G/DL (ref 31.5–36.5)
MCV RBC AUTO: 92 FL (ref 78–100)
MONOCYTES # BLD AUTO: 1 10E3/UL (ref 0–1.3)
MONOCYTES NFR BLD AUTO: 9 %
MUCOUS THREADS #/AREA URNS LPF: PRESENT /LPF
NEUTROPHILS # BLD AUTO: 8.7 10E3/UL (ref 1.6–8.3)
NEUTROPHILS NFR BLD AUTO: 81 %
NITRATE UR QL: POSITIVE
NRBC # BLD AUTO: 0 10E3/UL
NRBC BLD AUTO-RTO: 0 /100
PH UR STRIP: 5 [PH] (ref 5–7)
PLATELET # BLD AUTO: 205 10E3/UL (ref 150–450)
POTASSIUM SERPL-SCNC: 4.1 MMOL/L (ref 3.4–5.3)
PROT SERPL-MCNC: 6.5 G/DL (ref 6.4–8.3)
RBC # BLD AUTO: 4.19 10E6/UL (ref 4.4–5.9)
RBC URINE: 2 /HPF
RSV RNA SPEC NAA+PROBE: NEGATIVE
SARS-COV-2 RNA RESP QL NAA+PROBE: NEGATIVE
SODIUM SERPL-SCNC: 141 MMOL/L (ref 135–145)
SP GR UR STRIP: 1.02 (ref 1–1.03)
SQUAMOUS EPITHELIAL: <1 /HPF
UROBILINOGEN UR STRIP-MCNC: NORMAL MG/DL
WBC # BLD AUTO: 10.7 10E3/UL (ref 4–11)
WBC URINE: 10 /HPF

## 2024-11-02 PROCEDURE — 87651 STREP A DNA AMP PROBE: CPT

## 2024-11-02 PROCEDURE — 81003 URINALYSIS AUTO W/O SCOPE: CPT

## 2024-11-02 PROCEDURE — 36415 COLL VENOUS BLD VENIPUNCTURE: CPT

## 2024-11-02 PROCEDURE — 85004 AUTOMATED DIFF WBC COUNT: CPT | Performed by: EMERGENCY MEDICINE

## 2024-11-02 PROCEDURE — 93005 ELECTROCARDIOGRAM TRACING: CPT

## 2024-11-02 PROCEDURE — 87637 SARSCOV2&INF A&B&RSV AMP PRB: CPT

## 2024-11-02 PROCEDURE — 82947 ASSAY GLUCOSE BLOOD QUANT: CPT | Performed by: EMERGENCY MEDICINE

## 2024-11-02 PROCEDURE — 84155 ASSAY OF PROTEIN SERUM: CPT | Performed by: EMERGENCY MEDICINE

## 2024-11-02 PROCEDURE — 83605 ASSAY OF LACTIC ACID: CPT

## 2024-11-02 PROCEDURE — 87040 BLOOD CULTURE FOR BACTERIA: CPT

## 2024-11-02 PROCEDURE — 71046 X-RAY EXAM CHEST 2 VIEWS: CPT

## 2024-11-02 PROCEDURE — 87186 SC STD MICRODIL/AGAR DIL: CPT

## 2024-11-02 PROCEDURE — 99285 EMERGENCY DEPT VISIT HI MDM: CPT | Mod: 25

## 2024-11-02 RX ORDER — ACETAMINOPHEN 325 MG/1
975 TABLET ORAL ONCE
Status: DISCONTINUED | OUTPATIENT
Start: 2024-11-02 | End: 2024-11-02 | Stop reason: HOSPADM

## 2024-11-02 RX ORDER — CEPHALEXIN 500 MG/1
500 CAPSULE ORAL 4 TIMES DAILY
Qty: 28 CAPSULE | Refills: 0 | Status: SHIPPED | OUTPATIENT
Start: 2024-11-02 | End: 2024-11-09

## 2024-11-02 ASSESSMENT — ACTIVITIES OF DAILY LIVING (ADL)
ADLS_ACUITY_SCORE: 0

## 2024-11-02 ASSESSMENT — COLUMBIA-SUICIDE SEVERITY RATING SCALE - C-SSRS
1. IN THE PAST MONTH, HAVE YOU WISHED YOU WERE DEAD OR WISHED YOU COULD GO TO SLEEP AND NOT WAKE UP?: NO
2. HAVE YOU ACTUALLY HAD ANY THOUGHTS OF KILLING YOURSELF IN THE PAST MONTH?: NO
6. HAVE YOU EVER DONE ANYTHING, STARTED TO DO ANYTHING, OR PREPARED TO DO ANYTHING TO END YOUR LIFE?: NO

## 2024-11-02 NOTE — ED TRIAGE NOTES
Pt got up to the bathroom, pt reports difficulty with urinating and having a BM. Pt did have a BM this AM, but reports he is only dribbling. Pt reports his HR is usually 40-50's and noted this AM his HR was 114. Pt denies cough/cold symptoms

## 2024-11-02 NOTE — ED PROVIDER NOTES
Emergency Department Note      History of Present Illness     Chief Complaint   Tachycardia      HPI   Jeff Ricks is a 79 year old male with history of hypertension, CAD, DM2, CKD, and BPH who presents with complaint of feeling as though his heart rate was elevated overnight.  Patient states his heart rate is normally in the 40s-50s.  However last night when he woke up at 1 AM to urinate, his heart rate was 114.  States his heart rate remained elevated through most of the nights.  He has also been having difficulty urinating for the last 1 to 2 days, urinary hesitation, denies pain with urination.  He has also had a dry cough and dyspnea on exertion for the last 2 to 3 days.  Also endorses sore throat.  He has not checked his temperature at home, has not had chills, has felt warm for the last 2 to 3 days.  He had abdominal discomfort yesterday and through most of the night until he had a bowel movement approximately 2 hours ago, abdominal discomfort completely resolved.  No bowel movement for 2 days prior to this morning.  He also notes mildly increased swelling in bilateral lower extremities without erythema or pain.  He denies headache and dizziness, chest pain, nausea and vomiting.      Independent Historian   None    Review of External Notes   10/23/2024 reviewed cardiology office visit note.    Past Medical History     Medical History and Problem List   Past Medical History:   Diagnosis Date    Chronic kidney disease     Coronary atherosclerosis of unspecified type of vessel, native or graft 10/03    DDD (degenerative disc disease), lumbosacral 5/21/2021    Edema     Heart attack (H)     Hernia, abdominal     Hyperlipidaemia     Mumps     Obese     Other and unspecified hyperlipidemia     Other premature beats     Palpitations     Phlebitis and thrombophlebitis of other deep vessels of lower extremities 2000    Stented coronary artery     Syncope     Syncope     Thrombosis of leg     Type II or unspecified  type diabetes mellitus without mention of complication, not stated as uncontrolled     Unspecified essential hypertension        Medications   cephALEXin (KEFLEX) 500 MG capsule  acetic acid-hydrocortisone (VOSOL-HC) 1-2 % otic solution  amLODIPine (NORVASC) 5 MG tablet  Ascorbic Acid (VITAMIN C PO)  blood glucose (NO BRAND SPECIFIED) lancing device  blood glucose (NO BRAND SPECIFIED) test strip  calcitRIOL (ROCALTROL) 0.25 MCG capsule  carvedilol (COREG) 3.125 MG tablet  chlorhexidine (PERIDEX) 0.12 % solution  clopidogrel (PLAVIX) 75 MG tablet  Continuous Blood Gluc Sensor (FREESTYLE SHARRI 14 DAY SENSOR) MISC  Continuous Blood Gluc Sensor (FREESTYLE SHARRI 2 SENSOR) MISC  Cyanocobalamin (B-12) 1000 MCG CAPS  dapagliflozin (FARXIGA) 5 MG TABS tablet  famotidine (PEPCID) 40 MG tablet  ferrous sulfate (FEROSUL) 325 (65 Fe) MG tablet  glimepiride (AMARYL) 1 MG tablet  isosorbide mononitrate (IMDUR) 30 MG 24 hr tablet  JANUVIA 50 MG tablet  lisinopril (ZESTRIL) 20 MG tablet  nitroGLYcerin (NITROSTAT) 0.6 MG sublingual tablet  nystatin (MYCOSTATIN) 224913 UNIT/GM external powder  omega-3 acid ethyl esters (LOVAZA) 1 g capsule  Pyridoxine HCl (B-6) 100 MG TABS  rosuvastatin (CRESTOR) 5 MG tablet  VITAMIN E NATURAL PO        Surgical History   Past Surgical History:   Procedure Laterality Date    ABDOMEN SURGERY      Hernia naval approx 25 years ago    BIOPSY  08/2016    CARDIAC SURGERY      COLONOSCOPY      CORONARY ANGIOGRAPHY ADULT ORDER  08/28/2000    75% distal LAD stenosis, 80% third diagonal stenosis, 75-80% mid ramus stenosis, ostial 60% stenosis in circumflex w/90% stenosis in distal circumflex    CORONARY ANGIOGRAPHY ADULT ORDER  2003    4 stents placed    CV CORONARY ANGIOGRAM N/A 04/14/2023    Procedure: Coronary Angiogram;  Surgeon: Rafita Vela MD;  Location:  HEART CARDIAC CATH LAB    CV LEFT HEART CATH N/A 04/14/2023    Procedure: Left Heart Catheterization;  Surgeon: Rafita Vela MD;   "Location:  HE[facility] CARDI[facility] CATH LAB    CV PCI N/A 04/14/2023    Procedure: Percutaneous Coronary Intervention;  Surgeon: Rafita Vela MD;  Location:  HEART CARDIAC CATH LAB    EP ABLATION / EP STUDIES  03/25/2014    HEART CATH, ANGIOPLASTY      HERNIA REPAIR      Don't remember the date    HYDROCELECTOMY SCROTAL Right 10/06/2016    Procedure: HYDROCELECTOMY SCROTAL;  Surgeon: Jordan Chandra MD;  [facility]    ORTHOPEDIC SURGERY  1990    Meniscus Orthoscopic surgery left knee.    TESTICLE SURGERY      ZZC NONSPECIFIC PROCEDURE      colonoscopy approx 1999 done elsewhere       Physical Exam     Patient Vitals for the past 24 hrs:   BP Temp Temp src Pulse Resp SpO2 Height Weight   11/02/24 0930 126/83 -- -- 69 20 -- -- --   11/02/24 0900 134/62 -- -- 62 19 97 % -- --   11/02/24 0830 133/78 -- -- 67 21 98 % -- --   11/02/24 0700 (!) 143/69 -- -- 75 18 97 % -- --   11/02/24 0616 (!) 167/107 100.4  F (38  C) Temporal 83 18 98 % 1.778 m (5' 10\") 92.3 kg (203 lb 7.8 oz)     Physical Exam  Physical Exam:  GENERAL: Warm, dry, alert, no increased work of breathing, not toxic appearing, laying back in gurney with no acute distress, speaking full clear sentences.  HEENT: PERRL, no scleral icterus, clear conjunctiva, posterior oropharynx is mildly erythematous, tonsils 1+ and equal, no tonsillar exudates, uvula midline.  NECK: No JVD, supple without lymphadenopathy.  No stiffness or restricted range of motion  HEART: Regular rate and rhythm, no murmur or rubs  LUNGS: CTAB, moving air well.  No crackles or wheezes are heard.  ABD: Soft, nontender, nondistended, no guarding, with good bowel sounds heard.  BACK: No CVAT, no obvious deformities  EXTREMITIES: Moves all extremities without difficulty, no calf tenderness, Nonpitting equal bilateral lower ext edema.  SKIN: Warm and dry without rash or lesions.  NEUROLOGICAL: No focal deficits.  PSYCH: Appropriate mood and affect.     Diagnostics     Lab Results "   Labs Ordered and Resulted from Time of ED Arrival to Time of ED Departure   ROUTINE UA WITH MICROSCOPIC REFLEX TO CULTURE - Abnormal       Result Value    Color Urine Light Yellow      Appearance Urine Clear      Glucose Urine >=1000 (*)     Bilirubin Urine Negative      Ketones Urine Negative      Specific Gravity Urine 1.024      Blood Urine Negative      pH Urine 5.0      Protein Albumin Urine 20 (*)     Urobilinogen Urine Normal      Nitrite Urine Positive (*)     Leukocyte Esterase Urine Negative      Bacteria Urine Few (*)     Mucus Urine Present (*)     RBC Urine 2      WBC Urine 10 (*)     Squamous Epithelials Urine <1     COMPREHENSIVE METABOLIC PANEL - Abnormal    Sodium 141      Potassium 4.1      Carbon Dioxide (CO2) 22      Anion Gap 12      Urea Nitrogen 18.3      Creatinine 2.09 (*)     GFR Estimate 31 (*)     Calcium 9.2      Chloride 107      Glucose 142 (*)     Alkaline Phosphatase 79      AST 25      ALT 20      Protein Total 6.5      Albumin 4.1      Bilirubin Total 0.6     CBC WITH PLATELETS AND DIFFERENTIAL - Abnormal    WBC Count 10.7      RBC Count 4.19 (*)     Hemoglobin 13.0 (*)     Hematocrit 38.5 (*)     MCV 92      MCH 31.0      MCHC 33.8      RDW 13.3      Platelet Count 205      % Neutrophils 81      % Lymphocytes 8      % Monocytes 9      % Eosinophils 1      % Basophils 0      % Immature Granulocytes 1      NRBCs per 100 WBC 0      Absolute Neutrophils 8.7 (*)     Absolute Lymphocytes 0.9      Absolute Monocytes 1.0      Absolute Eosinophils 0.1      Absolute Basophils 0.0      Absolute Immature Granulocytes 0.1      Absolute NRBCs 0.0     INFLUENZA A/B, RSV, & SARS-COV2 PCR - Normal    Influenza A PCR Negative      Influenza B PCR Negative      RSV PCR Negative      SARS CoV2 PCR Negative     LACTIC ACID WHOLE BLOOD WITH 1X REPEAT IN 2 HR WHEN >2 - Normal    Lactic Acid, Initial 0.9     GROUP A STREPTOCOCCUS PCR THROAT SWAB - Normal    Group A strep by PCR Not Detected     BLOOD  CULTURE   URINE CULTURE       Imaging   XR Chest 2 Views   Final Result   IMPRESSION: Mild elevation of the right hemidiaphragm. Minimal right peribronchial cuffing could reflect reactive airways or less likely sequelae of edema. No focal consolidation, pleural effusion or pneumothorax. Heart size is normal.          EKG   ECG taken at 0711, ECG read at 0720  Normal sinus rhythm  Right bundle branch block  Left anterior fascicular block  **Bifascicular block**   Vent. rate has increased by  25 bpm, T wave inversion less evident in Inferior leads, Nonspecific T wave abnormality has replaced inverted T waves in Lateral leads as compared to prior, dated 4/14/23.  Rate 69 bpm. [place] interval 182 ms. QRS duration 136 ms. QT/QTc 388/415 ms. P-R-T axes 32 -70 4.     Independent Interpretation   CXR: No pneumothorax, infiltrate, or pleural effusion.    ED Course      Medications Administered   Medications - No data to display      Procedures   Procedures     Discussion of Management   Clinical PharmacistApril for antibiotic choice and renal dosing    ED Course   ED Course as of 11/02/24 1328   Sat Nov 02, 2024   0630 I evaluated examined the patient   0930 I reevaluated the patient, discussed results.       Additional Documentation  None    Medical Decision Making / Diagnosis     CMS Diagnoses: None    MIPS       None    [facility]   Jeff Ricks is a 79 year old male with history of hypertension, CAD, DM2, CKD, and BPH who presents with complaint of feeling as though his heart rate was elevated overnight, cough, and urinary hesitation.  Differential includes but is not limited to paroxysmal arrhythmia, pneumonia, viral URI, acute cystitis, and sepsis among many others.  At presentation vital signs significant for fever with temperature 100.4 F otherwise he was not tachycardic and not hypoxic.  Patient's blood pressure was elevated, 167/107 however improved to 126/83 at discharge.  Physical exam was also unremarkable in this  nontoxic-appearing 79-year-old male.  On laboratory workup, there was a very mild anemia however he is at baseline, no leukocytosis, group A strep throat swab was negative, viral swab was negative for influenza, COVID-19, and RSV.  His lactic acid was 0.9, I doubt sepsis today.  GFR was 31, slightly improved from baseline, and creatinine was 2.09, also slightly improved from baseline.  A chest x-ray was obtained which was also negative for infiltrates, effusions, or other acute pathologies.  Patient's UA was significant for pyuria and nitrites, consistent with acute cystitis.  UTI certainly explains patient's fever which explains his elevated heart rate last night.  Patient has had urinary hesitation for the past several days also most likely from the UTI.  I spoke with patient regarding findings and recommended antibiotics.  Given reassuring workup with with normal lactate, patient safe for outpatient trial with oral antibiotics and strong return precautions.  Patient agrees to this plan, states he would like to go home with antibiotics.  Return precautions discussed.  I spoke with the clinical pharmacist regarding appropriate antibiotic choice with renal dosing given chronic kidney disease.  Patient was subsequently discharged and prescription sent to his pharmacy of choice.    Disposition   The patient was discharged.     Diagnosis     ICD-10-CM    1. Acute cystitis without hematuria  N30.00            Discharge Medications   Discharge Medication List as of 11/2/2024 10:06 AM        START taking these medications    Details   cephALEXin (KEFLEX) 500 MG capsule Take 1 capsule (500 mg) by mouth 4 times daily for 7 days., Disp-28 capsule, R-0, E-Prescribe               YAHAIRA Byers John, PA-C  11/02/24 7324

## 2024-11-02 NOTE — ED PROVIDER NOTES
ED APC SUPERVISION NOTE:   I evaluated this patient in conjunction with Elias Mercedes PA-C  I have participated in the care of the patient and personally performed key elements of the history, exam, and medical decision making.      HPI:   Jeff Ricks is a 79 year old male who presents to the ED as he awoke overnight with the need to move his bowels.  He felt that his heart rate was elevated on his Apple Watch up to 118 which is not usual for him.  He is on a beta-blocker so this concerned him.  He did have a fever on arrival but was unaware of this.  He says he has had slight puffiness in his ankles which potentially is worse than usual.  He has known chronic renal insufficiency.  He denies cough.  He has a sore throat which she feels is more related to allergies.  No known ill contacts or exposures.  He did have urgency of urination overnight which is not typical for him.  He had some abdominal discomfort but this is now resolved.    Independent Historian:   The patient's wife and daughter are present and provide additional history in regards to the progression of his current symptoms as well as his baseline health status.    Review of External Notes:   Cardiology notes reviewed from October 23, 2024.  The patient has known coronary artery disease with prior stenting of the LAD.  Most recent echocardiogram in April 2023 showed a normal ejection fraction.     EXAM:   General: No distress  HEENT: Oral membranes are moist, no erythema or exudate of the posterior oropharynx  Lungs: Clear to oscillation bilaterally, breathing comfortably  Cardiac: Regular rate and rhythm, no murmur, trace edema around the ankles  Abdomen: Soft, nontender, nondistended     Independent Interpretation (X-rays, CTs, rhythm strip):  ECG  ECG taken at 0711, ECG read at 0720  Normal sinus rhythm  Right bundle branch block  Left anterior fascicular block  **Bifascicular block**   Vent. rate has increased by  25 bpm, T wave inversion less  evident in Inferior leads, Nonspecific T wave abnormality has replaced inverted T waves in Lateral leads as compared to prior, dated 4/14/23.  Rate 69 bpm. AL interval 182 ms. QRS duration 136 ms. QT/QTc 388/415 ms. P-R-T axes 32 -70 4.      MEDICAL DECISION MAKING/ASSESSMENT AND PLAN:   This patient is a very pleasant 79-year-old who presents with urinary urgency.  He has a fever on arrival though he was not aware of this at home.  White count is normal.  Lactate is normal.  Renal function actually has slightly improved from his most recent baseline.  No evidence of a URI or pneumonia.  Urine appears to demonstrate a urinary tract infection and he will be treated with Keflex.  He feels comfortable with home management and agrees to return if worse.     DIAGNOSIS:     ICD-10-CM    1. Acute cystitis without hematuria  N30.00             Scribe Disclosure:  Polly RIDDLE, am serving as a scribe at 6:26 AM on 11/2/2024 to document services personally performed by Dereje Collazo MD based on my observations and the provider's statements to me.     11/2/2024  [facility] EMERGENCY DEPT       Dereje Collazo MD  11/02/24 1012

## 2024-11-03 LAB — BACTERIA UR CULT: ABNORMAL

## 2024-11-04 ENCOUNTER — TELEPHONE (OUTPATIENT)
Dept: NURSING | Facility: CLINIC | Age: 79
End: 2024-11-04
Payer: MEDICARE

## 2024-11-04 LAB
ATRIAL RATE - MUSE: 69 BPM
DIASTOLIC BLOOD PRESSURE - MUSE: NORMAL MMHG
INTERPRETATION ECG - MUSE: NORMAL
P AXIS - MUSE: 32 DEGREES
PR INTERVAL - MUSE: 182 MS
QRS DURATION - MUSE: 136 MS
QT - MUSE: 388 MS
QTC - MUSE: 415 MS
R AXIS - MUSE: -70 DEGREES
SYSTOLIC BLOOD PRESSURE - MUSE: NORMAL MMHG
T AXIS - MUSE: 4 DEGREES
VENTRICULAR RATE- MUSE: 69 BPM

## 2024-11-04 NOTE — TELEPHONE ENCOUNTER
[facility]    Reason for call: Lab Result Notification     Lab Result (including Rx patient on, if applicable).  If culture, copy of lab report at bottom.  Lab Result: Urine Culture - See Below    ED Rx: cephALEXin (KEFLEX) 500 MG capsule - Take 1 capsule (500 mg) by mouth 4 times daily for 7 days (SUSCEPTIBLE)    Creatinine Level (mg/dl)   Creatinine   Date Value Ref Range Status   11/02/2024 2.09 (H) 0.67 - 1.17 mg/dL Final   06/10/2021 1.70 (H) 0.66 - 1.25 mg/dL Final    Creatinine clearance (ml/min), if applicable    Serum creatinine: 2.09 mg/dL (H) 11/02/24 0822  Estimated creatinine clearance: 31.6 mL/min (A)     ED Symptoms: Presented to the ED with an elevated heart rate, difficulty urinating, cough, sore throat, and shortness of breath.     Current Symptoms: Had a rough night last night. Having some constipation.  Denies any new or worsening symptoms.     RN Recommendations/Instructions per [facility] ED lab result protocol:   [facility] ED lab result protocol utilized: Urine Culture    Patient/care giver notified to contact your PCP clinic or return to the Emergency department if your:  Symptoms do not improve after 3 days on antibiotic.  Symptoms do not resolve after completing antibiotic.  Symptoms worsen or other concerning symptoms.       JUAN FRANCISCO HELMS RN

## 2024-11-07 LAB — BACTERIA BLD CULT: NO GROWTH

## 2024-11-15 ENCOUNTER — OFFICE VISIT (OUTPATIENT)
Dept: INTERNAL MEDICINE | Facility: CLINIC | Age: 79
End: 2024-11-15
Payer: COMMERCIAL

## 2024-11-15 VITALS
SYSTOLIC BLOOD PRESSURE: 146 MMHG | OXYGEN SATURATION: 100 % | HEART RATE: 45 BPM | RESPIRATION RATE: 20 BRPM | WEIGHT: 202.9 LBS | HEIGHT: 70 IN | DIASTOLIC BLOOD PRESSURE: 62 MMHG | TEMPERATURE: 97.6 F | BODY MASS INDEX: 29.05 KG/M2

## 2024-11-15 DIAGNOSIS — R21 RASH: ICD-10-CM

## 2024-11-15 DIAGNOSIS — N39.0 URINARY TRACT INFECTION WITHOUT HEMATURIA, SITE UNSPECIFIED: Primary | ICD-10-CM

## 2024-11-15 DIAGNOSIS — Z12.5 SCREENING FOR PROSTATE CANCER: ICD-10-CM

## 2024-11-15 LAB
ALBUMIN UR-MCNC: 100 MG/DL
APPEARANCE UR: ABNORMAL
BACTERIA #/AREA URNS HPF: ABNORMAL /HPF
BASOPHILS # BLD AUTO: 0 10E3/UL (ref 0–0.2)
BASOPHILS NFR BLD AUTO: 0 %
BILIRUB UR QL STRIP: NEGATIVE
COLOR UR AUTO: YELLOW
EOSINOPHIL # BLD AUTO: 0.1 10E3/UL (ref 0–0.7)
EOSINOPHIL NFR BLD AUTO: 2 %
ERYTHROCYTE [DISTWIDTH] IN BLOOD BY AUTOMATED COUNT: 13.3 % (ref 10–15)
GLUCOSE UR STRIP-MCNC: 500 MG/DL
HCT VFR BLD AUTO: 37.2 % (ref 40–53)
HGB BLD-MCNC: 12.8 G/DL (ref 13.3–17.7)
HGB UR QL STRIP: ABNORMAL
IMM GRANULOCYTES # BLD: 0 10E3/UL
IMM GRANULOCYTES NFR BLD: 1 %
KETONES UR STRIP-MCNC: NEGATIVE MG/DL
LEUKOCYTE ESTERASE UR QL STRIP: ABNORMAL
LYMPHOCYTES # BLD AUTO: 1.6 10E3/UL (ref 0.8–5.3)
LYMPHOCYTES NFR BLD AUTO: 22 %
MCH RBC QN AUTO: 31.4 PG (ref 26.5–33)
MCHC RBC AUTO-ENTMCNC: 34.4 G/DL (ref 31.5–36.5)
MCV RBC AUTO: 91 FL (ref 78–100)
MONOCYTES # BLD AUTO: 0.7 10E3/UL (ref 0–1.3)
MONOCYTES NFR BLD AUTO: 10 %
MUCOUS THREADS #/AREA URNS LPF: PRESENT /LPF
NEUTROPHILS # BLD AUTO: 5 10E3/UL (ref 1.6–8.3)
NEUTROPHILS NFR BLD AUTO: 67 %
NITRATE UR QL: POSITIVE
PH UR STRIP: 5.5 [PH] (ref 5–7)
PLATELET # BLD AUTO: 223 10E3/UL (ref 150–450)
RBC # BLD AUTO: 4.08 10E6/UL (ref 4.4–5.9)
RBC #/AREA URNS AUTO: ABNORMAL /HPF
SP GR UR STRIP: 1.01 (ref 1–1.03)
SQUAMOUS #/AREA URNS AUTO: ABNORMAL /LPF
UROBILINOGEN UR STRIP-ACNC: 0.2 E.U./DL
WBC # BLD AUTO: 7.5 10E3/UL (ref 4–11)
WBC #/AREA URNS AUTO: ABNORMAL /HPF
WBC CLUMPS #/AREA URNS HPF: PRESENT /HPF

## 2024-11-15 PROCEDURE — 85025 COMPLETE CBC W/AUTO DIFF WBC: CPT

## 2024-11-15 PROCEDURE — 81001 URINALYSIS AUTO W/SCOPE: CPT

## 2024-11-15 PROCEDURE — 36415 COLL VENOUS BLD VENIPUNCTURE: CPT

## 2024-11-15 RX ORDER — HYDROCORTISONE 25 MG/G
CREAM TOPICAL ONCE
OUTPATIENT
Start: 2024-11-15

## 2024-11-15 ASSESSMENT — PAIN SCALES - GENERAL: PAINLEVEL_OUTOF10: NO PAIN (0)

## 2024-11-15 NOTE — PROGRESS NOTES
Assessment & Plan   Problem List Items Addressed This Visit    None  Visit Diagnoses       Urinary tract infection without hematuria, site unspecified    -  Primary    Relevant Orders    UA with Microscopic reflex to Culture - lab collect    CBC with platelets and differential    Basic metabolic panel  (Ca, Cl, CO2, Creat, Gluc, K, Na, BUN)    Adult Urology  Referral    Urine Microscopic Exam    Screening for prostate cancer        Relevant Orders    PSA, screen    Rash        Relevant Medications    hydrocortisone 2.5 % cream                MED REC REQUIRED  Post Medication Reconciliation Status:     MEDICATIONS:   Orders Placed This Encounter   Medications    hydrocortisone 2.5 % cream     There are no discontinued medications.       - Continue other medications without change      Jared Aguilar is a 79 year old, presenting for the following health issues: This is a follow up for an ED visit. Pt has a PMH of  of hypertension, CAD, DM2, CKD, and BPH who presents with complaint of feeling as though his heart rate was elevated overnight. Patient states his heart rate is normally in the 40s-50s.  Patient up at 1 in the morning to urinate and his heart rate was 114.  Patient also reports difficulty urinating for the last 1-2 days, urinary hesitation denies dysuria.  Patient also experiencing dry persistent cough and dyspnea on exertion for the last 2 to 3 days and sore throat.  Patient presented to the ED with a temp of 100.4.  Chest x-ray was negative for infiltrates, effusions or other acute pathologies.  Patient was positive for UTI which answers for fever and tachycardia.  Patient was treated with cephalexin.    Pt presents with reports of improvement. No episodes of tachycardia. His smart watch has not indicated any episodes of tachycardia. Pt reports that he does not have any dysuria. Pt reports a tingling sensation at the tip of his penis upon urination when he urinates and then returns because  "he feels like he hasn't completely emptied his bladder. The second episode of trying to empty the bladder may cause a tingling sensation that radiates down his arm. No paresthesia to right leg or face. Pt denies straining to empty his bladder which would potentially cause tingling if he is applying the Valsalva maneuver.  Pt denies any testicular pain. Denies hematuria.  Patient reports  irregular bowel movements.  ER F/U        11/15/2024     1:41 PM   Additional Questions   Roomed by Tatyana   Accompanied by self     Via the Health Maintenance questionnaire, the patient has reported the following services have been completed -Eye Exam: [facility] 2024-10-22, this information has been sent to the abstraction team.  [facility]       ED/ Followup:    Facility:  ED [facility]  Date of visit: 11/2/24  Reason for visit: Tachycardia  Current Status: feel ok had not had the episode          Review of Systems  Constitutional, HEENT, cardiovascular, pulmonary, gi and gu systems are negative, except as otherwise noted.      Objective    BP (!) 162/70 (BP Location: Right arm, Patient Position: Sitting, Cuff Size: Adult Large)   Pulse (!) 45   Temp 97.6  F (36.4  C) (Oral)   Resp 20   Ht 1.765 m (5' 9.5\")   Wt 92 kg (202 lb 14.4 oz)   SpO2 100%   BMI 29.53 kg/m    Body mass index is 29.53 kg/m .  Physical Exam   GENERAL: alert and no distress  EYES: Eyes grossly normal to inspection and conjunctiva/corneas- arcus senilus OD  HENT: normal cephalic/atraumatic, right ear: normal: no effusions, no erythema, normal landmarks and occluded with wax, left ear: normal: no effusions, no erythema, normal landmarks, nose and mouth without ulcers or lesions, oropharynx clear, and oral mucous membranes moist  NECK: no adenopathy, no asymmetry, masses, or scars  RESP: lungs clear to auscultation - no rales, rhonchi or wheezes  CV: regular rate and rhythm, normal S1 S2, no S3 or S4, no murmur, click or rub, no peripheral " edema  ABDOMEN: soft, nontender, no hepatosplenomegaly, no masses and bowel sounds normal  MS: no gross musculoskeletal defects noted, no edema  SKIN: no suspicious lesions or rashes  NEURO: Normal strength and tone, mentation intact and speech normal  PSYCH: mentation appears normal, affect normal/bright            Signed Electronically by: HÉCTOR Contreras CNP

## 2024-11-16 LAB
ANION GAP SERPL CALCULATED.3IONS-SCNC: 7 MMOL/L (ref 7–15)
BACTERIA UR CULT: ABNORMAL
BUN SERPL-MCNC: 18.9 MG/DL (ref 8–23)
CALCIUM SERPL-MCNC: 8.9 MG/DL (ref 8.8–10.4)
CHLORIDE SERPL-SCNC: 105 MMOL/L (ref 98–107)
CREAT SERPL-MCNC: 1.82 MG/DL (ref 0.67–1.17)
EGFRCR SERPLBLD CKD-EPI 2021: 37 ML/MIN/1.73M2
GLUCOSE SERPL-MCNC: 80 MG/DL (ref 70–99)
HCO3 SERPL-SCNC: 26 MMOL/L (ref 22–29)
POTASSIUM SERPL-SCNC: 4.1 MMOL/L (ref 3.4–5.3)
PSA SERPL DL<=0.01 NG/ML-MCNC: 7.94 NG/ML
SODIUM SERPL-SCNC: 138 MMOL/L (ref 135–145)

## 2024-11-19 ENCOUNTER — MYC MEDICAL ADVICE (OUTPATIENT)
Dept: INTERNAL MEDICINE | Facility: CLINIC | Age: 79
End: 2024-11-19
Payer: MEDICARE

## 2024-11-19 DIAGNOSIS — N39.0 URINARY TRACT INFECTION WITHOUT HEMATURIA, SITE UNSPECIFIED: ICD-10-CM

## 2024-11-20 RX ORDER — CEPHALEXIN 500 MG/1
500 CAPSULE ORAL 2 TIMES DAILY
Qty: 20 CAPSULE | Refills: 0 | Status: SHIPPED | OUTPATIENT
Start: 2024-11-20

## 2024-11-20 NOTE — TELEPHONE ENCOUNTER
Re-sent prescription to local pharmacy.     Summer RN 7:42 AM November 20, 2024   [facility]

## 2024-11-25 DIAGNOSIS — R13.12 OROPHARYNGEAL DYSPHAGIA: ICD-10-CM

## 2024-11-25 DIAGNOSIS — E11.21 TYPE 2 DIABETES MELLITUS WITH DIABETIC NEPHROPATHY, WITHOUT LONG-TERM CURRENT USE OF INSULIN (H): ICD-10-CM

## 2024-11-26 RX ORDER — FAMOTIDINE 40 MG/1
TABLET, FILM COATED ORAL
Qty: 90 TABLET | Refills: 3 | Status: SHIPPED | OUTPATIENT
Start: 2024-11-26

## 2024-11-26 RX ORDER — SITAGLIPTIN 50 MG/1
TABLET, FILM COATED ORAL
Qty: 90 TABLET | Refills: 1 | Status: SHIPPED | OUTPATIENT
Start: 2024-11-26

## 2024-12-09 DIAGNOSIS — E78.5 HYPERLIPIDEMIA LDL GOAL <100: ICD-10-CM

## 2024-12-10 RX ORDER — OMEGA-3-ACID ETHYL ESTERS 1 G/1
1 CAPSULE, LIQUID FILLED ORAL 2 TIMES DAILY
Qty: 180 CAPSULE | Refills: 2 | Status: SHIPPED | OUTPATIENT
Start: 2024-12-10

## 2024-12-19 DIAGNOSIS — I10 ESSENTIAL HYPERTENSION, MALIGNANT: Primary | ICD-10-CM

## 2024-12-19 DIAGNOSIS — N18.32 CHRONIC KIDNEY DISEASE (CKD) STAGE G3B/A1, MODERATELY DECREASED GLOMERULAR FILTRATION RATE (GFR) BETWEEN 30-44 ML/MIN/1.73 SQUARE METER AND ALBUMINURIA CREATININE RATIO LESS THAN 30 MG/G (H): ICD-10-CM

## 2024-12-19 DIAGNOSIS — E11.9 DIABETES MELLITUS (H): ICD-10-CM

## 2025-01-05 ENCOUNTER — MYC REFILL (OUTPATIENT)
Dept: INTERNAL MEDICINE | Facility: CLINIC | Age: 80
End: 2025-01-05
Payer: MEDICARE

## 2025-01-05 DIAGNOSIS — B37.2 YEAST DERMATITIS: ICD-10-CM

## 2025-01-06 RX ORDER — NYSTATIN 100000 [USP'U]/G
POWDER TOPICAL 2 TIMES DAILY PRN
Qty: 60 G | Refills: 0 | Status: SHIPPED | OUTPATIENT
Start: 2025-01-06

## 2025-01-09 ENCOUNTER — HOSPITAL ENCOUNTER (OUTPATIENT)
Dept: ULTRASOUND IMAGING | Facility: CLINIC | Age: 80
Discharge: HOME | End: 2025-01-09
Attending: INTERNAL MEDICINE
Payer: MEDICARE

## 2025-01-09 DIAGNOSIS — N18.9 CHRONIC KIDNEY DISEASE: ICD-10-CM

## 2025-01-09 PROCEDURE — 76770 US EXAM ABDO BACK WALL COMP: CPT

## 2025-01-25 ENCOUNTER — HEALTH MAINTENANCE LETTER (OUTPATIENT)
Age: 80
End: 2025-01-25

## 2025-01-30 ENCOUNTER — OFFICE VISIT (OUTPATIENT)
Dept: UROLOGY | Facility: CLINIC | Age: 80
End: 2025-01-30
Payer: COMMERCIAL

## 2025-01-30 VITALS — WEIGHT: 207 LBS | BODY MASS INDEX: 30.13 KG/M2 | SYSTOLIC BLOOD PRESSURE: 138 MMHG | DIASTOLIC BLOOD PRESSURE: 68 MMHG

## 2025-01-30 DIAGNOSIS — Z79.2 PROPHYLACTIC ANTIBIOTIC: ICD-10-CM

## 2025-01-30 DIAGNOSIS — N39.0 URINARY TRACT INFECTION WITHOUT HEMATURIA, SITE UNSPECIFIED: ICD-10-CM

## 2025-01-30 DIAGNOSIS — N40.2 PROSTATE NODULE: Primary | ICD-10-CM

## 2025-01-30 LAB
ALBUMIN UR-MCNC: 100 MG/DL
APPEARANCE UR: CLEAR
BILIRUB UR QL STRIP: NEGATIVE
COLOR UR AUTO: YELLOW
GLUCOSE UR STRIP-MCNC: >=1000 MG/DL
HGB UR QL STRIP: NEGATIVE
KETONES UR STRIP-MCNC: NEGATIVE MG/DL
LEUKOCYTE ESTERASE UR QL STRIP: NEGATIVE
NITRATE UR QL: NEGATIVE
PH UR STRIP: 6.5 [PH] (ref 5–7)
RESIDUAL VOLUME (RV) (EXTERNAL): NORMAL
SP GR UR STRIP: 1.02 (ref 1–1.03)
UROBILINOGEN UR STRIP-ACNC: 0.2 E.U./DL

## 2025-01-30 RX ORDER — SULFAMETHOXAZOLE AND TRIMETHOPRIM 800; 160 MG/1; MG/1
1 TABLET ORAL 2 TIMES DAILY
Qty: 6 TABLET | Refills: 0 | Status: SHIPPED | OUTPATIENT
Start: 2025-01-30 | End: 2025-02-02

## 2025-01-30 ASSESSMENT — PAIN SCALES - GENERAL: PAINLEVEL_OUTOF10: NO PAIN (0)

## 2025-01-30 NOTE — NURSING NOTE
Chief Complaint   Patient presents with    UTI     Pt has UTI in November 2024.  Has not had a previous UTI.    No current symptoms.  Denies gross hematuria.    PVR: 69 mL by bladder scan      Beckie Timmons, Clinic Assistant

## 2025-01-30 NOTE — PROGRESS NOTES
Chief Complaint:    Elevated PSA (Prostate Specific Antigen)  History of urinary tract infection         Consult or Referral:     Mr. Jeff Ricks is a 79 year old male seen at the request of Dr. Thoams.         History of Present Illness:     Jeff Ricks is a 79 year old male being seen for elevated PSA and history of recent urinary tract infection.  Duration of problem: 2 months ago  Previous treatments: Had UTI treatment for at least 2 weeks      Reviewed previous notes from Dr. Thomas    Jeff has been referred to me for his history of elevated PSA and a urinary tract infection  He denies any significant urinary symptoms that bother him  His PSA was drawn at the time of his last UTI and therefore difficult to interpret  He does not have any significant family history of prostate cancer and his PSA prior to was slightly elevated at 5  He has had a history of cardiac stent placement and hernia surgery as well  He takes clopidogrel  He tells me that he does not like closed MRIs and would rather not be sedated over be under anesthesia for an MRI               Past Medical History:     Past Medical History:   Diagnosis Date    Chronic kidney disease     Coronary atherosclerosis of unspecified type of vessel, native or graft 10/03    at [facility]:  had 4 stents done following an MI    DDD (degenerative disc disease), lumbosacral 5/21/2021    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    Heart attack (H)     Hernia, abdominal     Hyperlipidaemia     Mumps     Obese     Other and unspecified hyperlipidemia     Other premature beats     Palpitations     Phlebitis and thrombophlebitis of other deep vessels of lower extremities 2000    has had 2-3 episodes    Stented coronary artery      4 stents    Syncope     Syncope     Thrombosis of leg     Type II or unspecified type diabetes mellitus without mention of complication, not stated as uncontrolled     Unspecified essential hypertension             Past Surgical  History:     Past Surgical History:   Procedure Laterality Date    ABDOMEN SURGERY      Hernia naval approx 25 years ago    BIOPSY  08/2016    CARDIAC SURGERY      COLONOSCOPY      CORONARY ANGIOGRAPHY ADULT ORDER  08/28/2000    75% distal LAD stenosis, 80% third diagonal stenosis, 75-80% mid ramus stenosis, ostial 60% stenosis in circumflex w/90% stenosis in distal circumflex    CORONARY ANGIOGRAPHY ADULT ORDER  2003    4 stents placed    CV CORONARY ANGIOGRAM N/A 04/14/2023    Procedure: Coronary Angiogram;  Surgeon: Rafita Vela MD;  Location:  HEART CARDIAC CATH LAB    CV LEFT HEART CATH N/A 04/14/2023    Procedure: Left Heart Catheterization;  Surgeon: Rafita Vela MD;  Location:  [facility]    CV PCI N/A 04/14/2023    Procedure: Percutaneous Coronary Intervention;  Surgeon: Rafita Vela MD;  Location:  HE[facility]ARD[facility] CATH LAB    EP ABLATION / EP STUDIES  03/25/2014    HEART CATH, ANGIOPLASTY      HERNIA REPAIR      Don't remember the date    HYDROCELECTOMY SCROTAL Right 10/06/2016    Procedure: HYDROCELECTOMY SCROTAL;  Surgeon: Jordan Chandra MD;  Location: [facility]    ORTHOPEDIC SURGERY  1990    Meniscus Orthoscopic surgery left knee.    TESTICLE SURGERY      ZZC NONSPECIFIC PROCEDURE      colonoscopy approx 1999 done elsewhere            Medications     Current Outpatient Medications   Medication Sig Dispense Refill    acetic acid-hydrocortisone (VOSOL-HC) 1-2 % otic solution Place 1 drop into both ears 2 times daily. INSTILL 1 DROP INTO BOTH   EARS TWO TIMES A DAY Strength: 1-2 % 10 mL 0    amLODIPine (NORVASC) 5 MG tablet Take 1 tablet (5 mg) by mouth daily. 90 tablet 3    Ascorbic Acid (VITAMIN C PO) Take 1,000 mg by mouth 2 times daily      blood glucose (NO BRAND SPECIFIED) lancing device Device to be used with lancets. 1 each 0    blood glucose (NO BRAND SPECIFIED) test strip Use to test blood sugar 1 times daily or as directed. 100 strip 1     calcitRIOL (ROCALTROL) 0.25 MCG capsule Take 0.25 mcg by mouth. Taking one tablet Monday-Friday      carvedilol (COREG) 3.125 MG tablet Take 1 tablet (3.125 mg) by mouth 2 times daily (with meals). 180 tablet 3    chlorhexidine (PERIDEX) 0.12 % solution USE 1/2 OZ TO SWISH FOR 30 SECONDS ONCE D  3    clopidogrel (PLAVIX) 75 MG tablet Take 1 tablet (75 mg) by mouth daily. 90 tablet 3    Continuous Blood Gluc Sensor (FREESTYLE SHARRI 14 DAY SENSOR) MISC 1 each every 14 days Use 1 Sensor every 14 days. Use to read blood sugars per 's instructions. 2 each 5    Continuous Blood Gluc Sensor (FREESTYLE SHARRI 2 SENSOR) MISC 1 each every 14 days Use 1 sensor every 14 days. Use to read blood sugars per 's instructions. 2 each 5    Cyanocobalamin (B-12) 1000 MCG CAPS Take 1 mg by mouth daily      dapagliflozin (FARXIGA) 5 MG TABS tablet Take 5 mg by mouth daily      famotidine (PEPCID) 40 MG tablet TAKE 1 TABLET DAILY 90 tablet 3    ferrous sulfate (FEROSUL) 325 (65 Fe) MG tablet Take 325 mg by mouth daily      glimepiride (AMARYL) 1 MG tablet Take 2 tablets (2 mg) by mouth every morning (before breakfast). Pt takes 2 tablets once a day 180 tablet 1    isosorbide mononitrate (IMDUR) 30 MG 24 hr tablet Take 1 tablet (30 mg) by mouth daily 90 tablet 0    JANUVIA 50 MG tablet TAKE 1 TABLET DAILY 90 tablet 1    lisinopril (ZESTRIL) 20 MG tablet Take 20 mg by mouth daily      nitroGLYcerin (NITROSTAT) 0.6 MG sublingual tablet Place 1 tablet (0.6 mg) under the tongue every 5 minutes as needed for chest pain. For chest pain place 1 tablet under the tongue every 5 minutes for 3 doses. If symptoms persist 5 minutes after 1st dose call 911. 100 tablet 0    nystatin (MYCOSTATIN) 795913 UNIT/GM external powder Apply topically 2 times daily as needed (skin rash). 60 g 0    omega-3 acid ethyl esters (LOVAZA) 1 g capsule TAKE 1 CAPSULE TWICE DAILY 180 capsule 2    Pyridoxine HCl (B-6) 100 MG TABS Take 100 mg by mouth  daily      rosuvastatin (CRESTOR) 5 MG tablet Take rosuvastatin 5mg on Monday, Wednesday, Friday 12 tablet 3    sulfamethoxazole-trimethoprim (BACTRIM DS) 800-160 MG tablet Take 1 tablet by mouth 2 times daily for 3 days. 6 tablet 0    VITAMIN E NATURAL PO Take 400 Units by mouth daily      cephALEXin (KEFLEX) 500 MG capsule Take 1 capsule (500 mg) by mouth 2 times daily. (Patient not taking: Reported on 2025) 20 capsule 0     Current Facility-Administered Medications   Medication Dose Route Frequency Provider Last Rate Last Admin    hydrocortisone 2.5 % cream   Topical Once          Facility-Administered Medications Ordered in Other Visits   Medication Dose Route Frequency Provider Last Rate Last Admin    gadobutrol (GADAVIST) injection 10 mL  10 mL Intravenous Once Jordan Chandra MD                Family History:     Family History   Problem Relation Age of Onset    Musculoskeletal Disorder Mother         spinal stenosis    Cancer Mother         cancer kidney age 85    Hypertension Mother         Dead    Diabetes Father         Dead    Diabetes Brother         Type 1    Heart Disease Brother     Cerebrovascular Disease Maternal Grandmother             Social History:     Social History     Socioeconomic History    Marital status:      Spouse name: Bethany    Number of children: 3    Years of education: Not on file    Highest education level: Not on file   Occupational History    Occupation: [employer]     Employer: [employer]      Comment: [employer]   Tobacco Use    Smoking status: Former     Current packs/day: 0.00     Average packs/day: 3.0 packs/day for 6.0 years (18.0 ttl pk-yrs)     Types: Cigarettes, Cigars, Pipe     Start date: 1961     Quit date: 1967     Years since quittin.1    Smokeless tobacco: Never    Tobacco comments:     quit    Vaping Use    Vaping status: Never Used   Substance and Sexual Activity    Alcohol use: Yes     Comment: 1 beer a week    Drug  use: No    Sexual activity: Not Currently     Partners: Female     Birth control/protection: Female Surgical   Other Topics Concern    Parent/sibling w/ CABG, MI or angioplasty before 65F 55M? No   Social History Narrative    Not on file     Social Drivers of Health     Financial Resource Strain: Low Risk  (9/17/2024)    Financial Resource Strain     Within the past 12 months, have you or your family members you live with been unable to get utilities (heat, electricity) when it was really needed?: No   Food Insecurity: Low Risk  (9/17/2024)    Food Insecurity     Within the past 12 months, did you worry that your food would run out before you got money to buy more?: No     Within the past 12 months, did the food you bought just not last and you didn t have money to get more?: No   Transportation Needs: Low Risk  (9/17/2024)    Transportation Needs     Within the past 12 months, has lack of transportation kept you from medical appointments, getting your medicines, non-medical meetings or appointments, work, or from getting things that you need?: No   Physical Activity: Sufficiently Active (9/17/2024)    Exercise Vital Sign     Days of Exercise per Week: 5 days     Minutes of Exercise per Session: 60 min   Stress: Not on file   Social Connections: Unknown (9/17/2024)    Social Connection and Isolation Panel [NHANES]     Frequency of Communication with Friends and Family: Not on file     Frequency of Social Gatherings with Friends and Family: Patient declined     Attends Zoroastrian Services: Not on file     Active Member of Clubs or Organizations: Not on file     Attends Club or Organization Meetings: Not on file     Marital Status: Not on file   Interpersonal Safety: Low Risk  (9/17/2024)    Interpersonal Safety     Do you feel physically and emotionally safe where you currently live?: Yes     Within the past 12 months, have you been hit, slapped, kicked or otherwise physically hurt by someone?: No     Within the past  12 months, have you been humiliated or emotionally abused in other ways by your partner or ex-partner?: No   Housing Stability: Low Risk  (9/17/2024)    Housing Stability     Do you have housing? : Yes     Are you worried about losing your housing?: No            Allergies:   No known drug allergy         Review of Systems:  From intake questionnaire     Skin: negative  Eyes: negative  Ears/Nose/Throat: negative  Respiratory: No shortness of breath, dyspnea on exertion, cough, or hemoptysis  Cardiovascular: No chest pain or palpitations  Gastrointestinal: negative; no nausea/vomiting, constipation or diarrhea  Genitourinary: as per HPI  Musculoskeletal: negative  Neurologic: negative  Psychiatric: negative  Hematologic/Lymphatic/Immunologic: negative  Endocrine: negative         Physical Exam:     Patient is a 79 year old  male   Vitals: Blood pressure 138/68, weight 93.9 kg (207 lb).  Constitutional: Body mass index is 30.13 kg/m .  Alert, no acute distress, oriented, conversant  Eyes: no scleral icterus; extraocular muscles intact, moist conjunctivae  Neck: trachea midline, no thyromegaly  Ears/nose/mouth: throat/mouth:normal, good dentition  Respiratory: no respiratory distress, or pursed lip breathing  Cardiovascular: pulses strong and intact; no obvious jugular venous distension present  Gastrointestinal: soft, nontender, no organomegaly or masses,   Lymphatics: No inguinal adenopathy  Musculoskeletal: extremities normal, no peripheral edema  Skin: no suspicious lesions or rashes  Neuro: Alert, oriented, speech and mentation normal  Psych: affect and mood normal, alert and oriented to person, place and time  Gait: Normal  : Multiple nodules felt on prostate exam with enlarged prostate      Labs and Pathology:    The following labs were reviewed by me and discussed with the patient:  UA:   Component      Latest Ref Rng 1/30/2025  1:54 PM   Color Urine      Colorless, Straw, Light Yellow, Yellow  Yellow     Appearance Urine      Clear  Clear    Glucose Urine      Negative mg/dL >=1000 !    Bilirubin Urine      Negative  Negative    Ketones Urine      Negative mg/dL Negative    Specific Gravity Urine      1.003 - 1.035  1.020    Blood Urine      Negative  Negative    pH Urine      5.0 - 7.0  6.5    Protein Albumin Urine      Negative mg/dL 100 !    Urobilinogen Urine      0.2, 1.0 E.U./dL 0.2    Nitrite Urine      Negative  Negative    Leukocyte Esterase Urine      Negative  Negative       Legend:  ! Abnormal  Significant for   Lab Results   Component Value Date    CR 1.87 01/17/2025    CR 1.82 11/15/2024    CR 2.09 11/02/2024    CR 2.54 10/23/2024    CR 2.68 10/17/2024    CR 2.30 09/17/2024    CR 2.40 07/17/2024    CR 2.22 06/03/2024    CR 2.36 05/01/2024    CR 2.28 03/15/2024    CR 1.70 06/10/2021    CR 1.61 01/20/2021    CR 1.88 11/06/2020    CR 1.59 09/11/2020    CR 1.65 07/01/2020    CR 1.66 12/23/2019    CR 1.43 08/23/2019    CR 1.70 10/31/2018    CR 1.59 08/24/2018    CR 1.62 06/22/2018     PSA   Date Value Ref Range Status   09/11/2020 3.08 0 - 4 ug/L Final     Comment:     Assay Method:  Chemiluminescence using Siemens Vista analyzer   08/23/2019 2.10 0 - 4 ug/L Final     Comment:     Assay Method:  Chemiluminescence using Siemens Vista analyzer   09/08/2017 4.02 (H) 0 - 4 ug/L Final     Comment:     Assay Method:  Chemiluminescence using Siemens Vista analyzer   05/16/2016 4.07 (H) 0 - 4 ug/L Final   04/30/2014 3.25 0 - 4 ug/L Final   10/18/2012 3.09 0 - 4 ug/L Final   02/24/2010 1.95 0 - 4 ug/L Final   01/13/2009 2.03 0 - 4 ug/L Final   12/27/2007 1.71 0 - 4 ug/L Final   09/20/2006 1.60 0 - 4 ug/L Final     Prostate Specific Antigen Screen   Date Value Ref Range Status   11/15/2024 7.94 ng/mL Final     Comment:     No reference ranges have been established for patients over 80 years.   09/17/2024 5.20 ng/mL Final     Comment:     No reference ranges have been established for patients over 80 years.              Imaging:    The following imaging exams were independently viewed and interpreted by me and discussed with patient:  Renal/Bladder Ultrasound: EXAM: US RENAL COMPLETE NON-VASCULAR  LOCATION: [facility]  DATE: 1/9/2025     INDICATION:  Chronic kidney disease.  COMPARISON: None.  TECHNIQUE: Routine Bilateral Renal and Bladder Ultrasound.     FINDINGS:     RIGHT KIDNEY: 8.8 x 4.1 x 3.8 cm, cortex thickness of 1.1 cm. Normal without hydronephrosis or masses.      LEFT KIDNEY: 9.2 x 4.2 x 4.9 cm, cortex thickness of 1.4 cm. Normal without hydronephrosis or masses.      BLADDER: Normal.                                                                      IMPRESSION:  1.  No acute abnormality. No hydronephrosis. Renal measurements as above.       Standardized Questionnaire:      AUASS: 2, 3, 4, 3, 1, 0, 2 = 13/35 QOL: 3/6           Assessment and Plan:     Urinary tract infection without hematuria, site unspecified  No current concerns for UTI  Postvoid residual urine is insignificant at 69 cc  I do not think he needs a treatment for his enlarged prostate at this time    - Adult Urology  Referral  - UA without Microscopic [[id number]]; Future  - MEASURE POST-VOID RESIDUAL URINE/BLADDER CAPACITY, US NON-IMAGING  - UA without Microscopic [[id number]]    Prostate nodule  Multiple nodules in the prostate more prominently on the right side  I offered MRI of the prostate but Jeff does not like closed MRIs and is not open to the idea of anesthesia or sedation for MRI  I therefore recommended that we should go ahead with biopsy if he cannot get an open MRI for his prostate  We discussed about the prostate biopsy process being done in the clinic with the local anesthetic and the fact that he will need to stop his blood thinners prior to the biopsy  He expressed understanding was agreeable  - MR Prostate wo & w Contrast; Future    - sulfamethoxazole-trimethoprim (BACTRIM DS) 800-160 MG tablet;  Take 1 tablet by mouth 2 times daily for 3 days.      Plan:  Schedule for prostate biopsy if ineligible for MRI on an open machine for prostate    Orders  Orders Placed This Encounter   Procedures    MEASURE POST-VOID RESIDUAL URINE/BLADDER CAPACITY, US NON-IMAGING    MR Prostate wo & w Contrast    UA without Microscopic [[id number]]       Jack Lowe MD  [facility] [place]      ==========================    Additional Billing and Coding Information:  Review of external notes as documented above   Review of the result(s) of each unique test - PSA, UA, creatinine, ultrasound kidneys                25 minutes spent by me on the date of the encounter doing chart review, review of test results, interpretation of tests, patient visit, and documentation     ==========================

## 2025-01-30 NOTE — LETTER
1/30/2025       RE: Jeff Ricks  [address]  [facility] [address]     Dear Colleague,    Thank you for referring your patient, Jeff Ricks, to the [facility] [place] at [facility]. Please see a copy of my visit note below.          Chief Complaint:    Elevated PSA (Prostate Specific Antigen)  History of urinary tract infection         Consult or Referral:     Mr. Jeff Ricks is a 79 year old male seen at the request of Dr. Thomas.         History of Present Illness:     Jeff Ricks is a 79 year old male being seen for elevated PSA and history of recent urinary tract infection.  Duration of problem: 2 months ago  Previous treatments: Had UTI treatment for at least 2 weeks      Reviewed previous notes from Dr. Thomas    Jeff has been referred to me for his history of elevated PSA and a urinary tract infection  He denies any significant urinary symptoms that bother him  His PSA was drawn at the time of his last UTI and therefore difficult to interpret  He does not have any significant family history of prostate cancer and his PSA prior to was slightly elevated at 5  He has had a history of cardiac stent placement and hernia surgery as well  He takes clopidogrel  He tells me that he does not like closed MRIs and would rather not be sedated over be under anesthesia for an MRI               Past Medical History:     Past Medical History:   Diagnosis Date     Chronic kidney disease      Coronary atherosclerosis of unspecified type of vessel, native or graft 10/03    at [facility]:  had 4 stents done following an MI     DDD (degenerative disc disease), lumbosacral 5/21/2021     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     Heart attack (H)      Hernia, abdominal      Hyperlipidaemia      Mumps      Obese      Other and unspecified hyperlipidemia      Other premature beats      Palpitations      Phlebitis and thrombophlebitis of other  deep vessels of lower extremities 2000    has had 2-3 episodes     Stented coronary artery      4 stents     Syncope      Syncope      Thrombosis of leg      Type II or unspecified type diabetes mellitus without mention of complication, not stated as uncontrolled      Unspecified essential hypertension             Past Surgical History:     Past Surgical History:   Procedure Laterality Date     ABDOMEN SURGERY      Hernia naval approx 25 years ago     BIOPSY  08/2016     CARDIAC SURGERY       COLONOSCOPY       CORONARY ANGIOGRAPHY ADULT ORDER  08/28/2000    75% distal LAD stenosis, 80% third diagonal stenosis, 75-80% mid ramus stenosis, ostial 60% stenosis in circumflex w/90% stenosis in distal circumflex     CORONARY ANGIOGRAPHY ADULT ORDER  2003    4 stents placed     CV CORONARY ANGIOGRAM N/A 04/14/2023    Procedure: Coronary Angiogram;  Surgeon: Rafita Vela MD;  Location:  HEART CARDIAC CATH LAB     CV LEFT HEART CATH N/A 04/14/2023    Procedure: Left Heart Catheterization;  Surgeon: Rafita Vela MD;  Location: [facility] CARDIAC CATH LAB     CV PCI N/A 04/14/2023    Procedure: Percutaneous Coronary Intervention;  Surgeon: Rafita Vela MD;  Location:  HEART CARDIAC CATH LAB     EP ABLATION / EP STUDIES  03/25/2014     HEART CATH, ANGIOPLASTY       HERNIA REPAIR      Don't remember the date     HYDROCELECTOMY SCROTAL Right 10/06/2016    Procedure: HYDROCELECTOMY SCROTAL;  Surgeon: Jordan Chandra MD;  Location: [facility]     ORTHOPEDIC SURGERY  1990    Meniscus Orthoscopic surgery left knee.     TESTICLE SURGERY       ZZC NONSPECIFIC PROCEDURE      colonoscopy approx 1999 done elsewhere            Medications     Current Outpatient Medications   Medication Sig Dispense Refill     acetic acid-hydrocortisone (VOSOL-HC) 1-2 % otic solution Place 1 drop into both ears 2 times daily. INSTILL 1 DROP INTO BOTH   EARS TWO TIMES A DAY Strength: 1-2 % 10 mL 0     amLODIPine (NORVASC) 5  MG tablet Take 1 tablet (5 mg) by mouth daily. 90 tablet 3     Ascorbic Acid (VITAMIN C PO) Take 1,000 mg by mouth 2 times daily       blood glucose (NO BRAND SPECIFIED) lancing device Device to be used with lancets. 1 each 0     blood glucose (NO BRAND SPECIFIED) test strip Use to test blood sugar 1 times daily or as directed. 100 strip 1     calcitRIOL (ROCALTROL) 0.25 MCG capsule Take 0.25 mcg by mouth. Taking one tablet Monday-Friday       carvedilol (COREG) 3.125 MG tablet Take 1 tablet (3.125 mg) by mouth 2 times daily (with meals). 180 tablet 3     chlorhexidine (PERIDEX) 0.12 % solution USE 1/2 OZ TO SWISH FOR 30 SECONDS ONCE D  3     clopidogrel (PLAVIX) 75 MG tablet Take 1 tablet (75 mg) by mouth daily. 90 tablet 3     Continuous Blood Gluc Sensor (FREESTYLE SHARRI 14 DAY SENSOR) MISC 1 each every 14 days Use 1 Sensor every 14 days. Use to read blood sugars per 's instructions. 2 each 5     Continuous Blood Gluc Sensor (FREESTYLE SHARRI 2 SENSOR) MISC 1 each every 14 days Use 1 sensor every 14 days. Use to read blood sugars per 's instructions. 2 each 5     Cyanocobalamin (B-12) 1000 MCG CAPS Take 1 mg by mouth daily       dapagliflozin (FARXIGA) 5 MG TABS tablet Take 5 mg by mouth daily       famotidine (PEPCID) 40 MG tablet TAKE 1 TABLET DAILY 90 tablet 3     ferrous sulfate (FEROSUL) 325 (65 Fe) MG tablet Take 325 mg by mouth daily       glimepiride (AMARYL) 1 MG tablet Take 2 tablets (2 mg) by mouth every morning (before breakfast). Pt takes 2 tablets once a day 180 tablet 1     isosorbide mononitrate (IMDUR) 30 MG 24 hr tablet Take 1 tablet (30 mg) by mouth daily 90 tablet 0     JANUVIA 50 MG tablet TAKE 1 TABLET DAILY 90 tablet 1     lisinopril (ZESTRIL) 20 MG tablet Take 20 mg by mouth daily       nitroGLYcerin (NITROSTAT) 0.6 MG sublingual tablet Place 1 tablet (0.6 mg) under the tongue every 5 minutes as needed for chest pain. For chest pain place 1 tablet under the tongue  every 5 minutes for 3 doses. If symptoms persist 5 minutes after 1st dose call 911. 100 tablet 0     nystatin (MYCOSTATIN) 250423 UNIT/GM external powder Apply topically 2 times daily as needed (skin rash). 60 g 0     omega-3 acid ethyl esters (LOVAZA) 1 g capsule TAKE 1 CAPSULE TWICE DAILY 180 capsule 2     Pyridoxine HCl (B-6) 100 MG TABS Take 100 mg by mouth daily       rosuvastatin (CRESTOR) 5 MG tablet Take rosuvastatin 5mg on Monday, Wednesday, Friday 12 tablet 3     sulfamethoxazole-trimethoprim (BACTRIM DS) 800-160 MG tablet Take 1 tablet by mouth 2 times daily for 3 days. 6 tablet 0     VITAMIN E NATURAL PO Take 400 Units by mouth daily       cephALEXin (KEFLEX) 500 MG capsule Take 1 capsule (500 mg) by mouth 2 times daily. (Patient not taking: Reported on 1/30/2025) 20 capsule 0     Current Facility-Administered Medications   Medication Dose Route Frequency Provider Last Rate Last Admin     hydrocortisone 2.5 % cream   Topical Once          Facility-Administered Medications Ordered in Other Visits   Medication Dose Route Frequency Provider Last Rate Last Admin     gadobutrol (GADAVIST) injection 10 mL  10 mL Intravenous Once Jordan Chandra MD                Family History:     Family History   Problem Relation Age of Onset     Musculoskeletal Disorder Mother         spinal stenosis     Cancer Mother         cancer kidney age 85     Hypertension Mother         Dead     Diabetes Father         Dead     Diabetes Brother         Type 1     Heart Disease Brother      Cerebrovascular Disease Maternal Grandmother             Social History:     Social History     Socioeconomic History     Marital status:      Spouse name: Bethany     Number of children: 3     Years of education: Not on file     Highest education level: Not on file   Occupational History     Occupation: Computers/     Employer: [employer]      Comment: [employer]   Tobacco Use     Smoking status: Former     Current packs/day:  0.00     Average packs/day: 3.0 packs/day for 6.0 years (18.0 ttl pk-yrs)     Types: Cigarettes, Cigars, Pipe     Start date: 1961     Quit date: 1967     Years since quittin.1     Smokeless tobacco: Never     Tobacco comments:     quit 1960s   Vaping Use     Vaping status: Never Used   Substance and Sexual Activity     Alcohol use: Yes     Comment: 1 beer a week     Drug use: No     Sexual activity: Not Currently     Partners: Female     Birth control/protection: Female Surgical   Other Topics Concern     Parent/sibling w/ CABG, MI or angioplasty before 65F 55M? No   Social History Narrative     Not on file     Social Drivers of Health     Financial Resource Strain: Low Risk  (2024)    Financial Resource Strain      Within the past 12 months, have you or your family members you live with been unable to get utilities (heat, electricity) when it was really needed?: No   Food Insecurity: Low Risk  (2024)    Food Insecurity      Within the past 12 months, did you worry that your food would run out before you got money to buy more?: No      Within the past 12 months, did the food you bought just not last and you didn t have money to get more?: No   Transportation Needs: Low Risk  (2024)    Transportation Needs      Within the past 12 months, has lack of transportation kept you from medical appointments, getting your medicines, non-medical meetings or appointments, work, or from getting things that you need?: No   Physical Activity: Sufficiently Active (2024)    Exercise Vital Sign      Days of Exercise per Week: 5 days      Minutes of Exercise per Session: 60 min   Stress: Not on file   Social Connections: Unknown (2024)    Social Connection and Isolation Panel [NHANES]      Frequency of Communication with Friends and Family: Not on file      Frequency of Social Gatherings with Friends and Family: Patient declined      Attends Orthodoxy Services: Not on file      Active Member of  Clubs or Organizations: Not on file      Attends Club or Organization Meetings: Not on file      Marital Status: Not on file   Interpersonal Safety: Low Risk  (9/17/2024)    Interpersonal Safety      Do you feel physically and emotionally safe where you currently live?: Yes      Within the past 12 months, have you been hit, slapped, kicked or otherwise physically hurt by someone?: No      Within the past 12 months, have you been humiliated or emotionally abused in other ways by your partner or ex-partner?: No   Housing Stability: Low Risk  (9/17/2024)    Housing Stability      Do you have housing? : Yes      Are you worried about losing your housing?: No            Allergies:   No known drug allergy         Review of Systems:  From intake questionnaire     Skin: negative  Eyes: negative  Ears/Nose/Throat: negative  Respiratory: No shortness of breath, dyspnea on exertion, cough, or hemoptysis  Cardiovascular: No chest pain or palpitations  Gastrointestinal: negative; no nausea/vomiting, constipation or diarrhea  Genitourinary: as per HPI  Musculoskeletal: negative  Neurologic: negative  Psychiatric: negative  Hematologic/Lymphatic/Immunologic: negative  Endocrine: negative         Physical Exam:     Patient is a 79 year old  male   Vitals: Blood pressure 138/68, weight 93.9 kg (207 lb).  Constitutional: Body mass index is 30.13 kg/m .  Alert, no acute distress, oriented, conversant  Eyes: no scleral icterus; extraocular muscles intact, moist conjunctivae  Neck: trachea midline, no thyromegaly  Ears/nose/mouth: throat/mouth:normal, good dentition  Respiratory: no respiratory distress, or pursed lip breathing  Cardiovascular: pulses strong and intact; no obvious jugular venous distension present  Gastrointestinal: soft, nontender, no organomegaly or masses,   Lymphatics: No inguinal adenopathy  Musculoskeletal: extremities normal, no peripheral edema  Skin: no suspicious lesions or rashes  Neuro: Alert, oriented,  speech and mentation normal  Psych: affect and mood normal, alert and oriented to person, place and time  Gait: Normal  : Multiple nodules felt on prostate exam with enlarged prostate      Labs and Pathology:    The following labs were reviewed by me and discussed with the patient:  UA:   Component      Latest Ref Rng 1/30/2025  1:54 PM   Color Urine      Colorless, Straw, Light Yellow, Yellow  Yellow    Appearance Urine      Clear  Clear    Glucose Urine      Negative mg/dL >=1000 !    Bilirubin Urine      Negative  Negative    Ketones Urine      Negative mg/dL Negative    Specific Gravity Urine      1.003 - 1.035  1.020    Blood Urine      Negative  Negative    pH Urine      5.0 - 7.0  6.5    Protein Albumin Urine      Negative mg/dL 100 !    Urobilinogen Urine      0.2, 1.0 E.U./dL 0.2    Nitrite Urine      Negative  Negative    Leukocyte Esterase Urine      Negative  Negative       Legend:  ! Abnormal  Significant for   Lab Results   Component Value Date    CR 1.87 01/17/2025    CR 1.82 11/15/2024    CR 2.09 11/02/2024    CR 2.54 10/23/2024    CR 2.68 10/17/2024    CR 2.30 09/17/2024    CR 2.40 07/17/2024    CR 2.22 06/03/2024    CR 2.36 05/01/2024    CR 2.28 03/15/2024    CR 1.70 06/10/2021    CR 1.61 01/20/2021    CR 1.88 11/06/2020    CR 1.59 09/11/2020    CR 1.65 07/01/2020    CR 1.66 12/23/2019    CR 1.43 08/23/2019    CR 1.70 10/31/2018    CR 1.59 08/24/2018    CR 1.62 06/22/2018     PSA   Date Value Ref Range Status   09/11/2020 3.08 0 - 4 ug/L Final     Comment:     Assay Method:  Chemiluminescence using Siemens Vista analyzer   08/23/2019 2.10 0 - 4 ug/L Final     Comment:     Assay Method:  Chemiluminescence using Siemens Vista analyzer   09/08/2017 4.02 (H) 0 - 4 ug/L Final     Comment:     Assay Method:  Chemiluminescence using Siemens Vista analyzer   05/16/2016 4.07 (H) 0 - 4 ug/L Final   04/30/2014 3.25 0 - 4 ug/L Final   10/18/2012 3.09 0 - 4 ug/L Final   02/24/2010 1.95 0 - 4 ug/L Final    01/13/2009 2.03 0 - 4 ug/L Final   12/27/2007 1.71 0 - 4 ug/L Final   09/20/2006 1.60 0 - 4 ug/L Final     Prostate Specific Antigen Screen   Date Value Ref Range Status   11/15/2024 7.94 ng/mL Final     Comment:     No reference ranges have been established for patients over 80 years.   09/17/2024 5.20 ng/mL Final     Comment:     No reference ranges have been established for patients over 80 years.             Imaging:    The following imaging exams were independently viewed and interpreted by me and discussed with patient:  Renal/Bladder Ultrasound: EXAM: US RENAL COMPLETE NON-VASCULAR  LOCATION: [facility]  DATE: 1/9/2025     INDICATION:  Chronic kidney disease.  COMPARISON: None.  TECHNIQUE: Routine Bilateral Renal and Bladder Ultrasound.     FINDINGS:     RIGHT KIDNEY: 8.8 x 4.1 x 3.8 cm, cortex thickness of 1.1 cm. Normal without hydronephrosis or masses.      LEFT KIDNEY: 9.2 x 4.2 x 4.9 cm, cortex thickness of 1.4 cm. Normal without hydronephrosis or masses.      BLADDER: Normal.                                                                      IMPRESSION:  1.  No acute abnormality. No hydronephrosis. Renal measurements as above.       Standardized Questionnaire:      AUASS: 2, 3, 4, 3, 1, 0, 2 = 13/35 QOL: 3/6           Assessment and Plan:     Urinary tract infection without hematuria, site unspecified  No current concerns for UTI  Postvoid residual urine is insignificant at 69 cc  I do not think he needs a treatment for his enlarged prostate at this time    - Adult Urology  Referral  - UA without Microscopic [[id number]]; Future  - MEASURE POST-VOID RESIDUAL URINE/BLADDER CAPACITY, US NON-IMAGING  - UA without Microscopic [[id number]]    Prostate nodule  Multiple nodules in the prostate more prominently on the right side  I offered MRI of the prostate but Jeff does not like closed MRIs and is not open to the idea of anesthesia or sedation for MRI  I therefore recommended  that we should go ahead with biopsy if he cannot get an open MRI for his prostate  We discussed about the prostate biopsy process being done in the clinic with the local anesthetic and the fact that he will need to stop his blood thinners prior to the biopsy  He expressed understanding was agreeable  - MR Prostate wo & w Contrast; Future    - sulfamethoxazole-trimethoprim (BACTRIM DS) 800-160 MG tablet; Take 1 tablet by mouth 2 times daily for 3 days.      Plan:  Schedule for prostate biopsy if ineligible for MRI on an open machine for prostate    Orders  Orders Placed This Encounter   Procedures     MEASURE POST-VOID RESIDUAL URINE/BLADDER CAPACITY, US NON-IMAGING     MR Prostate wo & w Contrast     UA without Microscopic [[id number]]       Jack Lowe MD  [facility] [place]      ==========================    Additional Billing and Coding Information:  Review of external notes as documented above   Review of the result(s) of each unique test - PSA, UA, creatinine, ultrasound kidneys                25 minutes spent by me on the date of the encounter doing chart review, review of test results, interpretation of tests, patient visit, and documentation     ==========================      Again, thank you for allowing me to participate in the care of your patient.      Sincerely,    Jack Lowe MD

## 2025-02-27 ENCOUNTER — MYC REFILL (OUTPATIENT)
Dept: INTERNAL MEDICINE | Facility: CLINIC | Age: 80
End: 2025-02-27
Payer: MEDICARE

## 2025-02-27 DIAGNOSIS — B37.2 YEAST DERMATITIS: ICD-10-CM

## 2025-02-27 RX ORDER — NYSTATIN 100000 [USP'U]/G
POWDER TOPICAL 2 TIMES DAILY PRN
Qty: 60 G | Refills: 0 | Status: SHIPPED | OUTPATIENT
Start: 2025-02-27

## 2025-03-18 ENCOUNTER — VIRTUAL VISIT (OUTPATIENT)
Dept: SURGERY | Facility: HOSPITAL | Age: 80
End: 2025-03-18
Attending: STUDENT IN AN ORGANIZED HEALTH CARE EDUCATION/TRAINING PROGRAM
Payer: MEDICARE

## 2025-03-18 DIAGNOSIS — R97.20 ELEVATED PROSTATE SPECIFIC ANTIGEN (PSA): Primary | ICD-10-CM

## 2025-03-18 NOTE — LETTER
3/18/2025      Jeff Ricks  [address] [facility] [address]  [place] [address]      Dear Colleague,    Thank you for referring your patient, Jeff Ricks, to the [facility]. Please see a copy of my visit note below.    Virtual Visit Details    Type of service:  Video Visit   Video Start Time: 3:39 PM  Video End Time:3:47 PM    Originating Location (pt. Location): Home    Distant Location (provider location):  On-site  Platform [facility] Video Visit[facility]  Jeff Ricks is a 79 year old male being seen for discussion of prostate biopsy results.  Duration of problem: 2 weeks  Previous treatments: None yet, treated for UTIs      Reviewed previous notes  No significant issues after prostate biopsy doing well         Review of Systems:  From intake questionnaire     Skin: negative  Eyes: negative  Ears/Nose/Throat: negative  Respiratory: No shortness of breath, dyspnea on exertion, cough, or hemoptysis  Cardiovascular: No chest pain or palpitations  Gastrointestinal: negative; no nausea/vomiting, constipation or diarrhea  Genitourinary: as per HPI  Musculoskeletal: negative  Neurologic: negative  Psychiatric: negative  Hematologic/Lymphatic/Immunologic: negative  Endocrine: negative         Physical Exam:   This is a virtual visit    Alert, no acute distress, oriented, conversant    Ears/nose/mouth: mouth:normal, good dentition  Respiratory: no respiratory distress, or pursed lip breathing  Cardiovascular:no obvious jugular venous distension present  Skin: no suspicious lesions or rashes on Visible body parts on the Screen  Neuro: Alert, oriented, speech and mentation normal  Psych: affect and mood normal, alert and oriented to person, place and time  Review of Imaging:  The following imaging exams were independently viewed and interpreted by me and discussed with patient:      Review of Labs:  The following labs were reviewed by me and discussed with the patient:  Component      Latest  Ref Rng 9/17/2024  9:01 AM 11/15/2024  3:03 PM   Prostate Specific Antigen Screen      ng/mL 5.20  7.94        Final Diagnosis   A.  Prostate, left base, biopsy-  Benign prostate tissue with atrophic change     B.  Prostate, left mid, biopsy-  Benign prostate tissue     C.  Prostate, left apex, biopsy-  Benign prostate tissue with atrophic change     D.  Prostate, right base, biopsy-  Benign prostate tissue     E.  Prostate, right mid, biopsy-  Benign prostate tissue     F.  Prostate, right apex, biopsy-  Benign prostate tissue with chronic inflammation and atrophic change             Assessment & Plan    Elevated prostate specific antigen (PSA)  No evidence of cancer on the biopsy  Evidence of inflammation which may be a reflection of his recent urinary tract infection  Recommend follow-up as needed as he is not symptomatic with lower urinary tract symptoms  Recommend against PSA testing as he is 79 years old    Jack Lowe MD  [facility]      ==========================    Additional Billing and Coding Information:  Review of external notes as documented above   Review of the result(s) of each unique test - surgical pathology, PSA              10 minutes spent by me on the date of the encounter doing chart review, review of test results, interpretation of tests, patient visit, and documentation         Again, thank you for allowing me to participate in the care of your patient.        Sincerely,        Jack Lowe MD    Electronically signed

## 2025-03-18 NOTE — PROGRESS NOTES
Virtual Visit Details    Type of service:  Video Visit   Video Start Time: 3:39 PM  Video End Time:3:47 PM    Originating Location (pt. Location): Home    Distant Location (provider location):  On-site  Platform used for Video Visit: William:  Jeff Ricks is a 79 year old male being seen for discussion of prostate biopsy results.  Duration of problem: 2 weeks  Previous treatments: None yet, treated for UTIs      Reviewed previous notes  No significant issues after prostate biopsy doing well         Review of Systems:  From intake questionnaire     Skin: negative  Eyes: negative  Ears/Nose/Throat: negative  Respiratory: No shortness of breath, dyspnea on exertion, cough, or hemoptysis  Cardiovascular: No chest pain or palpitations  Gastrointestinal: negative; no nausea/vomiting, constipation or diarrhea  Genitourinary: as per HPI  Musculoskeletal: negative  Neurologic: negative  Psychiatric: negative  Hematologic/Lymphatic/Immunologic: negative  Endocrine: negative         Physical Exam:   This is a virtual visit    Alert, no acute distress, oriented, conversant    Ears/nose/mouth: mouth:normal, good dentition  Respiratory: no respiratory distress, or pursed lip breathing  Cardiovascular:no obvious jugular venous distension present  Skin: no suspicious lesions or rashes on Visible body parts on the Screen  Neuro: Alert, oriented, speech and mentation normal  Psych: affect and mood normal, alert and oriented to person, place and time  Review of Imaging:  The following imaging exams were independently viewed and interpreted by me and discussed with patient:      Review of Labs:  The following labs were reviewed by me and discussed with the patient:  Component      Latest Ref Rng 9/17/2024  9:01 AM 11/15/2024  3:03 PM   Prostate Specific Antigen Screen      ng/mL 5.20  7.94        Final Diagnosis   A.  Prostate, left base, biopsy-  Benign prostate tissue with atrophic change     B.  Prostate, left mid,  biopsy-  Benign prostate tissue     C.  Prostate, left apex, biopsy-  Benign prostate tissue with atrophic change     D.  Prostate, right base, biopsy-  Benign prostate tissue     E.  Prostate, right mid, biopsy-  Benign prostate tissue     F.  Prostate, right apex, biopsy-  Benign prostate tissue with chronic inflammation and atrophic change             Assessment & Plan     Elevated prostate specific antigen (PSA)  No evidence of cancer on the biopsy  Evidence of inflammation which may be a reflection of his recent urinary tract infection  Recommend follow-up as needed as he is not symptomatic with lower urinary tract symptoms  Recommend against PSA testing as he is 79 years old    Jack Lowe MD  [facility]      ==========================    Additional Billing and Coding Information:  Review of external notes as documented above   Review of the result(s) of each unique test - surgical pathology, PSA              10 minutes spent by me on the date of the encounter doing chart review, review of test results, interpretation of tests, patient visit, and documentation

## 2025-03-18 NOTE — NURSING NOTE
Current patient location: [address] [place] [address]   [facility] [id number]    Is the patient currently in the state of [place]? YES    Visit mode: VIDEO    If the visit is dropped, the patient can be reconnected by:VIDEO VISIT: Send to e-mail at: [email or web address]    Will anyone else be joining the visit? NO  (If patient encounters technical issues they should call [phone]    Are changes needed to the allergy or medication list? Pt stated no changes to allergies and Pt stated no med changes    Are refills needed on medications prescribed by this physician? NO    Rooming Documentation:  Questionnaire(s) completed    Reason for visit: RECHECK    Tino OLIVER

## 2025-03-25 ENCOUNTER — OFFICE VISIT (OUTPATIENT)
Dept: INTERNAL MEDICINE | Facility: CLINIC | Age: 80
End: 2025-03-25
Payer: COMMERCIAL

## 2025-03-25 VITALS
DIASTOLIC BLOOD PRESSURE: 60 MMHG | WEIGHT: 202.5 LBS | OXYGEN SATURATION: 100 % | HEART RATE: 42 BPM | HEIGHT: 70 IN | TEMPERATURE: 96 F | BODY MASS INDEX: 28.99 KG/M2 | RESPIRATION RATE: 16 BRPM | SYSTOLIC BLOOD PRESSURE: 138 MMHG

## 2025-03-25 DIAGNOSIS — E11.9 DIABETES MELLITUS (H): ICD-10-CM

## 2025-03-25 DIAGNOSIS — N18.32 STAGE 3B CHRONIC KIDNEY DISEASE (H): ICD-10-CM

## 2025-03-25 DIAGNOSIS — I10 ESSENTIAL HYPERTENSION: ICD-10-CM

## 2025-03-25 DIAGNOSIS — I10 ESSENTIAL HYPERTENSION, MALIGNANT: ICD-10-CM

## 2025-03-25 DIAGNOSIS — I25.118 ATHEROSCLEROSIS OF NATIVE CORONARY ARTERY OF NATIVE HEART WITH STABLE ANGINA PECTORIS: ICD-10-CM

## 2025-03-25 DIAGNOSIS — H61.22 IMPACTED CERUMEN OF LEFT EAR: ICD-10-CM

## 2025-03-25 DIAGNOSIS — E11.21 TYPE 2 DIABETES MELLITUS WITH DIABETIC NEPHROPATHY, WITHOUT LONG-TERM CURRENT USE OF INSULIN (H): Primary | ICD-10-CM

## 2025-03-25 DIAGNOSIS — N18.32 CHRONIC KIDNEY DISEASE (CKD) STAGE G3B/A1, MODERATELY DECREASED GLOMERULAR FILTRATION RATE (GFR) BETWEEN 30-44 ML/MIN/1.73 SQUARE METER AND ALBUMINURIA CREATININE RATIO LESS THAN 30 MG/G (H): ICD-10-CM

## 2025-03-25 LAB
ALBUMIN SERPL BCG-MCNC: 4.4 G/DL (ref 3.5–5.2)
ALP SERPL-CCNC: 85 U/L (ref 40–150)
ALT SERPL W P-5'-P-CCNC: 21 U/L (ref 0–70)
ANION GAP SERPL CALCULATED.3IONS-SCNC: 10 MMOL/L (ref 7–15)
AST SERPL W P-5'-P-CCNC: 31 U/L (ref 0–45)
BILIRUB SERPL-MCNC: 0.7 MG/DL
BUN SERPL-MCNC: 19.2 MG/DL (ref 8–23)
CALCIUM SERPL-MCNC: 9.4 MG/DL (ref 8.8–10.4)
CHLORIDE SERPL-SCNC: 105 MMOL/L (ref 98–107)
CREAT SERPL-MCNC: 1.82 MG/DL (ref 0.67–1.17)
CREAT UR-MCNC: 29.2 MG/DL
EGFRCR SERPLBLD CKD-EPI 2021: 37 ML/MIN/1.73M2
ERYTHROCYTE [DISTWIDTH] IN BLOOD BY AUTOMATED COUNT: 13.3 % (ref 10–15)
EST. AVERAGE GLUCOSE BLD GHB EST-MCNC: 128 MG/DL
GLUCOSE SERPL-MCNC: 84 MG/DL (ref 70–99)
HBA1C MFR BLD: 6.1 % (ref 0–5.6)
HCO3 SERPL-SCNC: 26 MMOL/L (ref 22–29)
HCT VFR BLD AUTO: 42 % (ref 40–53)
HGB BLD-MCNC: 14.5 G/DL (ref 13.3–17.7)
MCH RBC QN AUTO: 31.1 PG (ref 26.5–33)
MCHC RBC AUTO-ENTMCNC: 34.5 G/DL (ref 31.5–36.5)
MCV RBC AUTO: 90 FL (ref 78–100)
MICROALBUMIN UR-MCNC: 81.5 MG/L
MICROALBUMIN/CREAT UR: 279.11 MG/G CR (ref 0–17)
PHOSPHATE SERPL-MCNC: 2.9 MG/DL (ref 2.5–4.5)
PLATELET # BLD AUTO: 172 10E3/UL (ref 150–450)
POTASSIUM SERPL-SCNC: 4 MMOL/L (ref 3.4–5.3)
PROT SERPL-MCNC: 7 G/DL (ref 6.4–8.3)
RBC # BLD AUTO: 4.66 10E6/UL (ref 4.4–5.9)
SODIUM SERPL-SCNC: 141 MMOL/L (ref 135–145)
WBC # BLD AUTO: 5.7 10E3/UL (ref 4–11)

## 2025-03-25 PROCEDURE — 84100 ASSAY OF PHOSPHORUS: CPT | Performed by: INTERNAL MEDICINE

## 2025-03-25 ASSESSMENT — PAIN SCALES - GENERAL: PAINLEVEL_OUTOF10: NO PAIN (0)

## 2025-03-25 NOTE — LETTER
March 26, 2025      Jeff Ricks  [address] [facility] [address]  [facility] [address]        Dear ,    We are writing to inform you of your test results.    Decreased kidney function, stable.   Controlled diabetes, improved.   Follow up in 6 months.     Resulted Orders   HEMOGLOBIN A1C   Result Value Ref Range    Estimated Average Glucose 128 (H) <117 mg/dL    Hemoglobin A1C 6.1 (H) 0.0 - 5.6 %      Comment:      Normal <5.7%   Prediabetes 5.7-6.4%    Diabetes 6.5% or higher     Note: Adopted from ADA consensus guidelines.   Albumin Random Urine Quantitative with Creat Ratio   Result Value Ref Range    Creatinine Urine mg/dL 29.2 mg/dL      Comment:      The reference ranges have not been established in urine creatinine. The results should be integrated into the clinical context for interpretation.    Albumin Urine mg/L 81.5 mg/L      Comment:      The reference ranges have not been established in urine albumin. The results should be integrated into the clinical context for interpretation.    Albumin Urine mg/g Cr 279.11 (H) 0.00 - 17.00 mg/g Cr      Comment:      Microalbuminuria is defined as an albumin:creatinine ratio of 17 to 299 for males and 25 to 299 for females. A ratio of albumin:creatinine of 300 or higher is indicative of overt proteinuria.  Due to biologic variability, positive results should be confirmed by a second, first-morning random or 24-hour timed urine specimen. If there is discrepancy, a third specimen is recommended. When 2 out of 3 results are in the microalbuminuria range, this is evidence for incipient nephropathy and warrants increased efforts at glucose control, blood pressure control, and institution of therapy with an angiotensin-converting-enzyme (ACE) inhibitor (if the patient can tolerate it).     CBC with platelets   Result Value Ref Range    WBC Count 5.7 4.0 - 11.0 10e3/uL    RBC Count 4.66 4.40 - 5.90 10e6/uL    Hemoglobin 14.5 13.3 - 17.7 g/dL    Hematocrit 42.0 40.0 - 53.0  %    MCV 90 78 - 100 fL    MCH 31.1 26.5 - 33.0 pg    MCHC 34.5 31.5 - 36.5 g/dL    RDW 13.3 10.0 - 15.0 %    Platelet Count 172 150 - 450 10e3/uL   Comprehensive metabolic panel (BMP + Alb, Alk Phos, ALT, AST, Total. Bili, TP)   Result Value Ref Range    Sodium 141 135 - 145 mmol/L    Potassium 4.0 3.4 - 5.3 mmol/L    Carbon Dioxide (CO2) 26 22 - 29 mmol/L    Anion Gap 10 7 - 15 mmol/L    Urea Nitrogen 19.2 8.0 - 23.0 mg/dL    Creatinine 1.82 (H) 0.67 - 1.17 mg/dL    GFR Estimate 37 (L) >60 mL/min/1.73m2      Comment:      eGFR calculated using 2021 CKD-EPI equation.    Calcium 9.4 8.8 - 10.4 mg/dL    Chloride 105 98 - 107 mmol/L    Glucose 84 70 - 99 mg/dL    Alkaline Phosphatase 85 40 - 150 U/L    AST 31 0 - 45 U/L    ALT 21 0 - 70 U/L    Protein Total 7.0 6.4 - 8.3 g/dL    Albumin 4.4 3.5 - 5.2 g/dL    Bilirubin Total 0.7 <=1.2 mg/dL       If you have any questions or concerns, please call the clinic at the number listed above.       Sincerely,      Guillermo Deleon MD    Electronically signed

## 2025-03-25 NOTE — PROGRESS NOTES
"  Assessment & Plan     Type 2 diabetes mellitus with diabetic nephropathy, without long-term current use of insulin (H)  Assess diabetic control  Continue treatment   - HEMOGLOBIN A1C    Stage 3b chronic kidney disease (H)  Monitor renal function     - Albumin Random Urine Quantitative with Creat Ratio  - CBC with platelets  - Comprehensive metabolic panel (BMP + Alb, Alk Phos, ALT, AST, Total. Bili, TP)    Atherosclerosis of native coronary artery of native heart with stable angina pectoris  Stable angina, continue treatment  Follow up with cardiology     Essential hypertension  Fluctuating BP, continue treatment, monitor     Impacted cerumen of left ear    Left ear canal cerumen impaction removed with curette, residual cerumen flushed with water irrigation.     - REMOVE IMPACTED CERUMEN          BMI  Estimated body mass index is 29.06 kg/m  as calculated from the following:    Height as of this encounter: 1.778 m (5' 10\").    Weight as of this encounter: 91.9 kg (202 lb 8 oz).         See Patient Instructions    Jared Aguilar is a 79 year old, presenting for the following health issues:  RECHECK        3/25/2025     2:43 PM   Additional Questions   Roomed by Miri PELAYO   Accompanied by n/a     History of Present Illness       Reason for visit:  Regular visit   He is taking medications regularly.        Patient is seen for a follow up visit.  Has had prostate biopsy for history of elevated PSA. Negative for malignancy.   Feels well after the procedure.   Has h/o HTN. on medical treatment. BP has been fluctuating. No side effects from medications. No CP, HA, dizziness. good compliance with medications and low salt diet.  Has  history of  DM. On diet , exercise and oral treatment. Blood sugars are controlled. No paresthesias. No hypoglycemias.  Has h/o CRF. Monitoring BP, BG, medications, avoiding OTC NSAIDs. Needs periodic recheck of kidney function.  Has h/o ischemic heart disease, asymptomatic regarding chest " "pains, SOB,palpitations. Has good compliance with treatment, diet and exercise.    Concerns for ears ringing sensation. Slightly decreased hearing with crowded places.             Review of Systems  Constitutional, HEENT, cardiovascular, pulmonary, gi and gu systems are negative, except as otherwise noted.      Objective    BP (!) 178/65 (BP Location: Left arm, Cuff Size: Adult Large)   Pulse (!) 42   Temp (!) 96  F (35.6  C) (Tympanic)   Resp 16   Ht 1.778 m (5' 10\")   Wt 91.9 kg (202 lb 8 oz)   SpO2 100%   BMI 29.06 kg/m    Body mass index is 29.06 kg/m .  Physical Exam   GENERAL: alert and no distress  EYES: Eyes grossly normal to inspection, PERRL and conjunctivae and sclerae normal  HENT: ear canals- left is obsctructed with brown - yellow soft cerumen,  and TM's normal, nose and mouth without ulcers or lesions  NECK: no adenopathy, no asymmetry, masses, or scars  RESP: lungs clear to auscultation - no rales, rhonchi or wheezes  CV: bradycardia, regular rate and rhythm, normal S1 S2, no S3 or S4, no murmur, click or rub  ABDOMEN: soft, nontender, no hepatosplenomegaly, no masses and bowel sounds normal  MS: no gross musculoskeletal defects noted, 1+ ankles edema  SKIN: no suspicious lesions or rashes    Office Visit on 03/07/2025   Component Date Value Ref Range Status    Case Report 03/07/2025    Final                    Value:Surgical Pathology Report                         Case: [id number]                                  Authorizing Provider:  Jack Lowe MD    Collected:           03/07/2025 11:43 AM          Ordering Location:     [facility]  Received:            03/07/2025 11:43 AM                                 Clinic [place]                                                                 Pathologist:           Roger Camejo MD                                                               " "            Specimens:   A) - Prostate, LEFT BASE X2                                                                         B) - Prostate, LEFT MID X2                                                                          C) - Prostate, LEFT APEX X2                                                                         D) - Prostate, RIGHT BASE X2                                                                                                  E) - Prostate, RIGHT MID X2                                                                         F) - Prostate, RIGHT APEX X2                                                               Final Diagnosis 03/07/2025    Final                    Value:A.  Prostate, left base, biopsy-  Benign prostate tissue with atrophic change    B.  Prostate, left mid, biopsy-  Benign prostate tissue    C.  Prostate, left apex, biopsy-  Benign prostate tissue with atrophic change    D.  Prostate, right base, biopsy-  Benign prostate tissue    E.  Prostate, right mid, biopsy-  Benign prostate tissue    F.  Prostate, right apex, biopsy-  Benign prostate tissue with chronic inflammation and atrophic change          Comment 03/07/2025    Final                    Value:Immunoperoxidase stains for p63/cytokeratin/P504S confirm the benign nature of the submitted tissue in part E.      Clinical Information 03/07/2025    Final                    Value:Prostate Nodule    Gross Description 03/07/2025    Final                    Value:A(A). Prostate, LEFT BASE X2:  The specimen is received in formalin, labeled with the patient's name, medical record number and other identifying information designated \"left base x 2\". It consists of 2 fragments of white-tan needle core biopsy tissue measuring 1.1-1.8 cm in greatest dimension.  The fragments are submitted entirely in formalin wrapped in lens paper in 1 cassette.    B(B). Prostate, LEFT MID X2:  The specimen is received in formalin, labeled with the " "patient's name, medical record number and other identifying information designated \"left mid x 2\". It consists of 2 fragments of white-tan needle core biopsy tissue measuring 1.4-1.6 cm in greatest dimension.  The fragments are submitted entirely in formalin wrapped in lens paper in 1 cassette.  C(C). Prostate, LEFT APEX X2:  The specimen is received in formalin, labeled with the patient's name, medical record number and other identifying information designated \"left apex x 2\". It consists of 2 fragments of white-tan needle                           core biopsy tissue measuring 1.4-2.1 cm in greatest dimension.  The fragments are submitted entirely in formalin wrapped in lens paper in 1 cassette.  D(D). Prostate, RIGHT BASE X2:  The specimen is received in formalin, labeled with the patient's name, medical record number and other identifying information designated \"right base x 2\". It consists of 2 fragments of white-tan needle core biopsy tissue measuring 1.3-1.5 cm in greatest dimension.  The fragments are submitted entirely in formalin wrapped in lens paper in 1 cassette.  E(E). Prostate, RIGHT MID X2:  The specimen is received in formalin, labeled with the patient's name, medical record number and other identifying information designated \"right mid x 2\". It consists of 2 fragments of white-tan needle core biopsy tissue measuring 1.7-1.9 cm in greatest dimension.  The fragments are submitted entirely in formalin wrapped in lens paper in 1 cassette.  F(F). Prostate, RIGHT APEX X2:  The specimen is received in formalin, labeled                           with the patient's name, medical record number and other identifying information designated \"right apex x 2\". It consists of 2 fragments of white-tan needle core biopsy tissue measuring 1.9-2.2 cm in greatest dimension.  The fragments are submitted entirely in formalin wrapped in lens paper in 1 cassette.   NIRAV Villanueva(ASCP) 3/10/2025 8:15 AM       " Microscopic Description 03/07/2025    Final                    Value:A-F.  Microscopic performed      Performing Labs 03/07/2025    Final                    Value:The technical component of this testing was completed at [facility].    Stain controls for all stains resulted within this report have been reviewed and show appropriate reactivity.              Signed Electronically by: Guillermo Deleon MD

## 2025-04-07 DIAGNOSIS — E78.5 HYPERLIPIDEMIA LDL GOAL <100: ICD-10-CM

## 2025-04-07 DIAGNOSIS — I25.118 ATHEROSCLEROSIS OF NATIVE CORONARY ARTERY OF NATIVE HEART WITH STABLE ANGINA PECTORIS: ICD-10-CM

## 2025-04-07 RX ORDER — ROSUVASTATIN CALCIUM 5 MG/1
TABLET, COATED ORAL
Qty: 36 TABLET | Refills: 4 | Status: SHIPPED | OUTPATIENT
Start: 2025-04-07

## 2025-04-15 DIAGNOSIS — E11.21 TYPE 2 DIABETES MELLITUS WITH DIABETIC NEPHROPATHY, WITHOUT LONG-TERM CURRENT USE OF INSULIN (H): ICD-10-CM

## 2025-04-16 RX ORDER — SITAGLIPTIN 50 MG/1
TABLET, FILM COATED ORAL
Qty: 90 TABLET | Refills: 1 | Status: SHIPPED | OUTPATIENT
Start: 2025-04-16

## 2025-05-03 DIAGNOSIS — B37.2 YEAST DERMATITIS: ICD-10-CM

## 2025-05-03 DIAGNOSIS — I25.10 ASHD (ARTERIOSCLEROTIC HEART DISEASE): ICD-10-CM

## 2025-05-05 RX ORDER — NYSTATIN 100000 [USP'U]/G
POWDER TOPICAL 2 TIMES DAILY PRN
Qty: 60 G | Refills: 2 | Status: SHIPPED | OUTPATIENT
Start: 2025-05-05

## 2025-05-05 RX ORDER — NITROGLYCERIN 0.6 MG/1
TABLET SUBLINGUAL
Qty: 100 TABLET | Refills: 0 | Status: SHIPPED | OUTPATIENT
Start: 2025-05-05

## 2025-05-15 NOTE — Clinical Note
Hemodynamic equipment used: 5 lead ECG, Machine BP Cuff and pulse oximeter probe. Admission Reconciliation is Not Complete  Discharge Reconciliation is Not Complete Admission Reconciliation is Not Complete  Discharge Reconciliation is Completed

## 2025-07-11 ENCOUNTER — MYC MEDICAL ADVICE (OUTPATIENT)
Dept: INTERNAL MEDICINE | Facility: CLINIC | Age: 80
End: 2025-07-11
Payer: MEDICARE

## 2025-07-11 DIAGNOSIS — E11.21 TYPE 2 DIABETES MELLITUS WITH DIABETIC NEPHROPATHY, WITHOUT LONG-TERM CURRENT USE OF INSULIN (H): Primary | ICD-10-CM

## 2025-07-11 NOTE — TELEPHONE ENCOUNTER
Pended DME for diabetic insoles.    Thank you,  Anurag, Triage RN [place]    8:51 AM 7/11/2025

## 2025-07-21 ENCOUNTER — PATIENT OUTREACH (OUTPATIENT)
Dept: CARE COORDINATION | Facility: CLINIC | Age: 80
End: 2025-07-21
Payer: MEDICARE

## 2025-07-22 DIAGNOSIS — E78.5 HYPERLIPIDEMIA LDL GOAL <100: ICD-10-CM

## 2025-07-23 ENCOUNTER — PATIENT OUTREACH (OUTPATIENT)
Dept: CARE COORDINATION | Facility: CLINIC | Age: 80
End: 2025-07-23
Payer: MEDICARE

## 2025-07-23 RX ORDER — OMEGA-3-ACID ETHYL ESTERS 1 G/1
1 CAPSULE, LIQUID FILLED ORAL 2 TIMES DAILY
Qty: 180 CAPSULE | Refills: 0 | Status: SHIPPED | OUTPATIENT
Start: 2025-07-23

## 2025-07-26 ENCOUNTER — HEALTH MAINTENANCE LETTER (OUTPATIENT)
Age: 80
End: 2025-07-26

## 2025-08-04 DIAGNOSIS — I25.10 ASHD (ARTERIOSCLEROTIC HEART DISEASE): ICD-10-CM

## 2025-08-05 RX ORDER — CLOPIDOGREL BISULFATE 75 MG/1
75 TABLET ORAL DAILY
Qty: 90 TABLET | Refills: 3 | Status: SHIPPED | OUTPATIENT
Start: 2025-08-05

## 2025-08-11 ENCOUNTER — OFFICE VISIT (OUTPATIENT)
Dept: PODIATRY | Facility: CLINIC | Age: 80
End: 2025-08-11
Payer: COMMERCIAL

## 2025-08-11 DIAGNOSIS — M20.41 HAMMER TOES, BILATERAL: Primary | ICD-10-CM

## 2025-08-11 DIAGNOSIS — E11.21 TYPE 2 DIABETES MELLITUS WITH DIABETIC NEPHROPATHY, WITHOUT LONG-TERM CURRENT USE OF INSULIN (H): ICD-10-CM

## 2025-08-11 DIAGNOSIS — M20.42 HAMMER TOES, BILATERAL: Primary | ICD-10-CM

## 2025-08-11 DIAGNOSIS — L84 CALLUS OF FOOT: ICD-10-CM

## 2025-08-11 PROCEDURE — 99203 OFFICE O/P NEW LOW 30 MIN: CPT | Performed by: PODIATRIST

## 2025-08-22 ENCOUNTER — DOCUMENTATION ONLY (OUTPATIENT)
Dept: INTERNAL MEDICINE | Facility: CLINIC | Age: 80
End: 2025-08-22
Payer: MEDICARE

## 2025-08-25 ENCOUNTER — TELEPHONE (OUTPATIENT)
Dept: INTERNAL MEDICINE | Facility: CLINIC | Age: 80
End: 2025-08-25
Payer: MEDICARE

## (undated) DEVICE — CATH ANGIO INFINITI 3DRC 4FRX100CM 538476

## (undated) DEVICE — TOTE ANGIO CORP PC15AT SAN32CC83O

## (undated) DEVICE — KIT HAND CONTROL ANGIOTOUCH ACIST 65CM AT-P65

## (undated) DEVICE — MANIFOLD KIT ANGIO AUTOMATED 014613

## (undated) DEVICE — INTRO SHEATH 4FRX10CM PINNACLE RSS402

## (undated) DEVICE — CATH DIAG 4FR JL 4.5 538417

## (undated) DEVICE — DEFIB PRO-PADZ LVP LQD GEL ADULT 8900-2105-01

## (undated) DEVICE — NDL PERC ENTRY THINWALL 18GA 7.0" G00166

## (undated) RX ORDER — FENTANYL CITRATE 50 UG/ML
INJECTION, SOLUTION INTRAMUSCULAR; INTRAVENOUS
Status: DISPENSED
Start: 2023-04-14

## (undated) RX ORDER — HEPARIN SODIUM 200 [USP'U]/100ML
INJECTION, SOLUTION INTRAVENOUS
Status: DISPENSED
Start: 2023-04-14

## (undated) RX ORDER — LIDOCAINE HYDROCHLORIDE 10 MG/ML
INJECTION, SOLUTION EPIDURAL; INFILTRATION; INTRACAUDAL; PERINEURAL
Status: DISPENSED
Start: 2023-04-14

## (undated) RX ORDER — HEPARIN SODIUM 1000 [USP'U]/ML
INJECTION, SOLUTION INTRAVENOUS; SUBCUTANEOUS
Status: DISPENSED
Start: 2023-04-14